# Patient Record
Sex: MALE | Race: WHITE | NOT HISPANIC OR LATINO | Employment: OTHER | URBAN - METROPOLITAN AREA
[De-identification: names, ages, dates, MRNs, and addresses within clinical notes are randomized per-mention and may not be internally consistent; named-entity substitution may affect disease eponyms.]

---

## 2018-03-22 ENCOUNTER — APPOINTMENT (OUTPATIENT)
Dept: PHYSICAL THERAPY | Facility: REHABILITATION | Age: 73
End: 2018-03-22
Payer: MEDICARE

## 2018-10-20 NOTE — H&P
DATE OF SURGERY:  10/23/2018    PREOPERATIVE DIAGNOSIS:  Benign prostatic hyperplasia with urinary retention.    PLANNED PROCEDURE:  Transurethral resection of the prostate.    HISTORY OF PRESENT ILLNESS:  Patient is a 73-year-old gentleman.  He presented   some time ago with urinary retention.  He was placed on medical therapy with   Flomax and finasteride.  He has continued to have some difficulties with   voiding and prefers surgical management.  He did have a cystoscopy showing   trilobar occlusion.  He also was found to have a small bladder stone.  His   urologic history is, otherwise, unremarkable.    PAST MEDICAL HISTORY:  Hypertension, adult onset diabetes mellitus, and   coronary artery disease.    PAST SURGICAL HISTORY:  Multiple spine surgeries, left lower leg amputation.    MEDICATIONS:  Hydrocodone, Flexeril, tamsulosin, and finasteride.    PHYSICAL EXAMINATION:  GENERAL:  He appears his age.  LUNGS:  Clear.  CARDIAC:  Regular rate and rhythm.  ABDOMEN:  Soft, nontender, no masses.  EXTREMITIES:  Right leg is normal.  GENITOURINARY:  Prostate exam, palpably benign 50 gram prostate.    ASSESSMENT:  Chronic urinary retention.    PLAN:  Plan will be for transurethral resection of prostate, anticipated   benefit is improved voiding.  Potential risks include, but limited to   continued difficulty voiding, bleeding requiring reoperation or transfusion,   prolonged catheterization, urgency, frequency, incontinence, recurrence of   obstruction and ongoing need for medical management.  With these issues in   mind, he does wish to proceed as planned.       ____________________________________     MD MANNY URIAS / LISA    DD:  10/19/2018 15:33:01  DT:  10/19/2018 16:48:28    D#:  [id number]  Job#:  [id number]    cc: Angela Paulino NP

## 2018-10-22 RX ORDER — LOVASTATIN 20 MG/1
20 TABLET ORAL NIGHTLY
Status: ON HOLD | COMMUNITY
End: 2018-10-23

## 2018-10-22 RX ORDER — VENLAFAXINE 100 MG/1
100 TABLET ORAL DAILY
Status: ON HOLD | COMMUNITY
End: 2018-10-23

## 2018-10-22 RX ORDER — FINASTERIDE 5 MG/1
5 TABLET, FILM COATED ORAL DAILY
Status: ON HOLD | COMMUNITY
End: 2020-07-02

## 2018-10-22 RX ORDER — TRAMADOL HYDROCHLORIDE 50 MG/1
50-100 TABLET ORAL 3 TIMES DAILY PRN
Status: ON HOLD | COMMUNITY
End: 2020-07-02

## 2018-10-22 RX ORDER — ARIPIPRAZOLE 5 MG/1
5 TABLET ORAL DAILY
Status: ON HOLD | COMMUNITY
End: 2018-10-23

## 2018-10-22 NOTE — OR NURSING
Spoke to Bebe/Dr Sahu' office regarding pt's report he did not have ordered labs drawn for scheduled procedure 10.23.18; per Bebe Sahu is aware.

## 2018-10-23 ENCOUNTER — HOSPITAL ENCOUNTER (OUTPATIENT)
Facility: MEDICAL CENTER | Age: 73
End: 2018-10-24
Attending: UROLOGY | Admitting: UROLOGY
Payer: MEDICARE

## 2018-10-23 DIAGNOSIS — N40.1 BENIGN PROSTATIC HYPERPLASIA WITH URINARY FREQUENCY: ICD-10-CM

## 2018-10-23 DIAGNOSIS — R35.0 BENIGN PROSTATIC HYPERPLASIA WITH URINARY FREQUENCY: ICD-10-CM

## 2018-10-23 LAB
ANION GAP SERPL CALC-SCNC: 5 MMOL/L (ref 0–11.9)
BASOPHILS # BLD AUTO: 0.7 % (ref 0–1.8)
BASOPHILS # BLD: 0.05 K/UL (ref 0–0.12)
BUN SERPL-MCNC: 18 MG/DL (ref 8–22)
CALCIUM SERPL-MCNC: 9.9 MG/DL (ref 8.5–10.5)
CHLORIDE SERPL-SCNC: 108 MMOL/L (ref 96–112)
CO2 SERPL-SCNC: 31 MMOL/L (ref 20–33)
CREAT SERPL-MCNC: 0.72 MG/DL (ref 0.5–1.4)
EKG IMPRESSION: NORMAL
EOSINOPHIL # BLD AUTO: 0.1 K/UL (ref 0–0.51)
EOSINOPHIL NFR BLD: 1.4 % (ref 0–6.9)
ERYTHROCYTE [DISTWIDTH] IN BLOOD BY AUTOMATED COUNT: 46.8 FL (ref 35.9–50)
GLUCOSE SERPL-MCNC: 79 MG/DL (ref 65–99)
HCT VFR BLD AUTO: 39.5 % (ref 42–52)
HGB BLD-MCNC: 13 G/DL (ref 14–18)
IMM GRANULOCYTES # BLD AUTO: 0.01 K/UL (ref 0–0.11)
IMM GRANULOCYTES NFR BLD AUTO: 0.1 % (ref 0–0.9)
INR PPP: 0.98 (ref 0.87–1.13)
LYMPHOCYTES # BLD AUTO: 2.04 K/UL (ref 1–4.8)
LYMPHOCYTES NFR BLD: 28.4 % (ref 22–41)
MCH RBC QN AUTO: 30.5 PG (ref 27–33)
MCHC RBC AUTO-ENTMCNC: 32.9 G/DL (ref 33.7–35.3)
MCV RBC AUTO: 92.7 FL (ref 81.4–97.8)
MONOCYTES # BLD AUTO: 0.53 K/UL (ref 0–0.85)
MONOCYTES NFR BLD AUTO: 7.4 % (ref 0–13.4)
NEUTROPHILS # BLD AUTO: 4.46 K/UL (ref 1.82–7.42)
NEUTROPHILS NFR BLD: 62 % (ref 44–72)
NRBC # BLD AUTO: 0 K/UL
NRBC BLD-RTO: 0 /100 WBC
PLATELET # BLD AUTO: 237 K/UL (ref 164–446)
PMV BLD AUTO: 9.5 FL (ref 9–12.9)
POTASSIUM SERPL-SCNC: 4.1 MMOL/L (ref 3.6–5.5)
PROTHROMBIN TIME: 13.1 SEC (ref 12–14.6)
RBC # BLD AUTO: 4.26 M/UL (ref 4.7–6.1)
SODIUM SERPL-SCNC: 144 MMOL/L (ref 135–145)
WBC # BLD AUTO: 7.2 K/UL (ref 4.8–10.8)

## 2018-10-23 PROCEDURE — A4357 BEDSIDE DRAINAGE BAG: HCPCS | Performed by: UROLOGY

## 2018-10-23 PROCEDURE — G0378 HOSPITAL OBSERVATION PER HR: HCPCS

## 2018-10-23 PROCEDURE — 700102 HCHG RX REV CODE 250 W/ 637 OVERRIDE(OP): Performed by: UROLOGY

## 2018-10-23 PROCEDURE — 88305 TISSUE EXAM BY PATHOLOGIST: CPT

## 2018-10-23 PROCEDURE — 96365 THER/PROPH/DIAG IV INF INIT: CPT

## 2018-10-23 PROCEDURE — 85025 COMPLETE CBC W/AUTO DIFF WBC: CPT

## 2018-10-23 PROCEDURE — 500879 HCHG PACK, CYSTO: Performed by: UROLOGY

## 2018-10-23 PROCEDURE — 160009 HCHG ANES TIME/MIN: Performed by: UROLOGY

## 2018-10-23 PROCEDURE — 160039 HCHG SURGERY MINUTES - EA ADDL 1 MIN LEVEL 3: Performed by: UROLOGY

## 2018-10-23 PROCEDURE — 501329 HCHG SET, CYSTO IRRIG Y TUR: Performed by: UROLOGY

## 2018-10-23 PROCEDURE — 160028 HCHG SURGERY MINUTES - 1ST 30 MINS LEVEL 3: Performed by: UROLOGY

## 2018-10-23 PROCEDURE — 160036 HCHG PACU - EA ADDL 30 MINS PHASE I: Performed by: UROLOGY

## 2018-10-23 PROCEDURE — 36415 COLL VENOUS BLD VENIPUNCTURE: CPT

## 2018-10-23 PROCEDURE — 160035 HCHG PACU - 1ST 60 MINS PHASE I: Performed by: UROLOGY

## 2018-10-23 PROCEDURE — 700111 HCHG RX REV CODE 636 W/ 250 OVERRIDE (IP)

## 2018-10-23 PROCEDURE — 700101 HCHG RX REV CODE 250

## 2018-10-23 PROCEDURE — 700105 HCHG RX REV CODE 258: Performed by: UROLOGY

## 2018-10-23 PROCEDURE — 93010 ELECTROCARDIOGRAM REPORT: CPT | Performed by: INTERNAL MEDICINE

## 2018-10-23 PROCEDURE — 85610 PROTHROMBIN TIME: CPT

## 2018-10-23 PROCEDURE — A9270 NON-COVERED ITEM OR SERVICE: HCPCS | Performed by: UROLOGY

## 2018-10-23 PROCEDURE — 80048 BASIC METABOLIC PNL TOTAL CA: CPT

## 2018-10-23 PROCEDURE — A4346 CATH INDW FOLEY 3 WAY: HCPCS | Performed by: UROLOGY

## 2018-10-23 PROCEDURE — 93005 ELECTROCARDIOGRAM TRACING: CPT | Performed by: UROLOGY

## 2018-10-23 PROCEDURE — 700111 HCHG RX REV CODE 636 W/ 250 OVERRIDE (IP): Performed by: UROLOGY

## 2018-10-23 PROCEDURE — 160048 HCHG OR STATISTICAL LEVEL 1-5: Performed by: UROLOGY

## 2018-10-23 PROCEDURE — 160002 HCHG RECOVERY MINUTES (STAT): Performed by: UROLOGY

## 2018-10-23 RX ORDER — HYDROMORPHONE HYDROCHLORIDE 2 MG/ML
0.5 INJECTION, SOLUTION INTRAMUSCULAR; INTRAVENOUS; SUBCUTANEOUS
Status: DISCONTINUED | OUTPATIENT
Start: 2018-10-23 | End: 2018-10-23 | Stop reason: HOSPADM

## 2018-10-23 RX ORDER — SODIUM CHLORIDE, SODIUM LACTATE, POTASSIUM CHLORIDE, CALCIUM CHLORIDE 600; 310; 30; 20 MG/100ML; MG/100ML; MG/100ML; MG/100ML
INJECTION, SOLUTION INTRAVENOUS CONTINUOUS
Status: DISCONTINUED | OUTPATIENT
Start: 2018-10-23 | End: 2018-10-23 | Stop reason: HOSPADM

## 2018-10-23 RX ORDER — HALOPERIDOL 5 MG/ML
1 INJECTION INTRAMUSCULAR
Status: DISCONTINUED | OUTPATIENT
Start: 2018-10-23 | End: 2018-10-23 | Stop reason: HOSPADM

## 2018-10-23 RX ORDER — MEPERIDINE HYDROCHLORIDE 25 MG/ML
25 INJECTION INTRAMUSCULAR; INTRAVENOUS; SUBCUTANEOUS
Status: DISCONTINUED | OUTPATIENT
Start: 2018-10-23 | End: 2018-10-23 | Stop reason: HOSPADM

## 2018-10-23 RX ORDER — DIPHENHYDRAMINE HYDROCHLORIDE 50 MG/ML
25 INJECTION INTRAMUSCULAR; INTRAVENOUS EVERY 6 HOURS PRN
Status: DISCONTINUED | OUTPATIENT
Start: 2018-10-23 | End: 2018-10-24 | Stop reason: HOSPADM

## 2018-10-23 RX ORDER — OXYCODONE HYDROCHLORIDE 10 MG/1
10 TABLET ORAL
Status: DISCONTINUED | OUTPATIENT
Start: 2018-10-23 | End: 2018-10-24 | Stop reason: HOSPADM

## 2018-10-23 RX ORDER — HYDROMORPHONE HYDROCHLORIDE 2 MG/ML
0.5 INJECTION, SOLUTION INTRAMUSCULAR; INTRAVENOUS; SUBCUTANEOUS
Status: DISCONTINUED | OUTPATIENT
Start: 2018-10-23 | End: 2018-10-24 | Stop reason: HOSPADM

## 2018-10-23 RX ORDER — FINASTERIDE 5 MG/1
5 TABLET, FILM COATED ORAL DAILY
Status: DISCONTINUED | OUTPATIENT
Start: 2018-10-23 | End: 2018-10-24 | Stop reason: HOSPADM

## 2018-10-23 RX ORDER — TAMSULOSIN HYDROCHLORIDE 0.4 MG/1
0.4 CAPSULE ORAL DAILY
Status: DISCONTINUED | OUTPATIENT
Start: 2018-10-24 | End: 2018-10-24 | Stop reason: HOSPADM

## 2018-10-23 RX ORDER — SCOLOPAMINE TRANSDERMAL SYSTEM 1 MG/1
1 PATCH, EXTENDED RELEASE TRANSDERMAL
Status: DISCONTINUED | OUTPATIENT
Start: 2018-10-23 | End: 2018-10-24 | Stop reason: HOSPADM

## 2018-10-23 RX ORDER — SCOLOPAMINE TRANSDERMAL SYSTEM 1 MG/1
1 PATCH, EXTENDED RELEASE TRANSDERMAL
Status: DISCONTINUED | OUTPATIENT
Start: 2018-10-23 | End: 2018-10-23

## 2018-10-23 RX ORDER — HALOPERIDOL 5 MG/ML
1 INJECTION INTRAMUSCULAR EVERY 6 HOURS PRN
Status: DISCONTINUED | OUTPATIENT
Start: 2018-10-23 | End: 2018-10-23

## 2018-10-23 RX ORDER — POLYETHYLENE GLYCOL 3350 17 G/17G
1 POWDER, FOR SOLUTION ORAL DAILY
Status: DISCONTINUED | OUTPATIENT
Start: 2018-10-24 | End: 2018-10-24 | Stop reason: HOSPADM

## 2018-10-23 RX ORDER — HYDRALAZINE HYDROCHLORIDE 20 MG/ML
10 INJECTION INTRAMUSCULAR; INTRAVENOUS
Status: DISCONTINUED | OUTPATIENT
Start: 2018-10-23 | End: 2018-10-23 | Stop reason: HOSPADM

## 2018-10-23 RX ORDER — LIDOCAINE HYDROCHLORIDE 10 MG/ML
INJECTION, SOLUTION INFILTRATION; PERINEURAL
Status: COMPLETED
Start: 2018-10-23 | End: 2018-10-23

## 2018-10-23 RX ORDER — SODIUM CHLORIDE, SODIUM LACTATE, POTASSIUM CHLORIDE, CALCIUM CHLORIDE 600; 310; 30; 20 MG/100ML; MG/100ML; MG/100ML; MG/100ML
INJECTION, SOLUTION INTRAVENOUS EVERY 6 HOURS
Status: DISCONTINUED | OUTPATIENT
Start: 2018-10-23 | End: 2018-10-23

## 2018-10-23 RX ORDER — LOVASTATIN 10 MG/1
10 TABLET ORAL NIGHTLY
Status: ON HOLD | COMMUNITY
End: 2020-07-02 | Stop reason: SDUPTHER

## 2018-10-23 RX ORDER — OXYCODONE HCL 5 MG/5 ML
5 SOLUTION, ORAL ORAL
Status: DISCONTINUED | OUTPATIENT
Start: 2018-10-23 | End: 2018-10-23 | Stop reason: HOSPADM

## 2018-10-23 RX ORDER — DIPHENHYDRAMINE HYDROCHLORIDE 50 MG/ML
12.5 INJECTION INTRAMUSCULAR; INTRAVENOUS
Status: DISCONTINUED | OUTPATIENT
Start: 2018-10-23 | End: 2018-10-23 | Stop reason: HOSPADM

## 2018-10-23 RX ORDER — HALOPERIDOL 5 MG/ML
1 INJECTION INTRAMUSCULAR EVERY 6 HOURS PRN
Status: DISCONTINUED | OUTPATIENT
Start: 2018-10-23 | End: 2018-10-24 | Stop reason: HOSPADM

## 2018-10-23 RX ORDER — MIDAZOLAM HYDROCHLORIDE 1 MG/ML
INJECTION INTRAMUSCULAR; INTRAVENOUS
Status: DISPENSED
Start: 2018-10-23 | End: 2018-10-24

## 2018-10-23 RX ORDER — ATROPA BELLADONNA AND OPIUM 16.2; 6 MG/1; MG/1
60 SUPPOSITORY RECTAL EVERY 12 HOURS PRN
Status: DISCONTINUED | OUTPATIENT
Start: 2018-10-23 | End: 2018-10-24 | Stop reason: HOSPADM

## 2018-10-23 RX ORDER — VENLAFAXINE HYDROCHLORIDE 75 MG/1
300 CAPSULE, EXTENDED RELEASE ORAL DAILY
Status: DISCONTINUED | OUTPATIENT
Start: 2018-10-24 | End: 2018-10-24 | Stop reason: HOSPADM

## 2018-10-23 RX ORDER — HYDROMORPHONE HYDROCHLORIDE 2 MG/ML
0.25 INJECTION, SOLUTION INTRAMUSCULAR; INTRAVENOUS; SUBCUTANEOUS
Status: DISCONTINUED | OUTPATIENT
Start: 2018-10-23 | End: 2018-10-23 | Stop reason: HOSPADM

## 2018-10-23 RX ORDER — HYDROMORPHONE HYDROCHLORIDE 2 MG/ML
0.5 INJECTION, SOLUTION INTRAMUSCULAR; INTRAVENOUS; SUBCUTANEOUS
Status: DISCONTINUED | OUTPATIENT
Start: 2018-10-23 | End: 2018-10-23

## 2018-10-23 RX ORDER — MELOXICAM 7.5 MG/1
7.5 TABLET ORAL DAILY
Status: ON HOLD | COMMUNITY
End: 2020-07-02

## 2018-10-23 RX ORDER — CIPROFLOXACIN 2 MG/ML
400 INJECTION, SOLUTION INTRAVENOUS EVERY 12 HOURS
Status: DISCONTINUED | OUTPATIENT
Start: 2018-10-23 | End: 2018-10-24 | Stop reason: HOSPADM

## 2018-10-23 RX ORDER — HYDROMORPHONE HYDROCHLORIDE 2 MG/ML
0.1 INJECTION, SOLUTION INTRAMUSCULAR; INTRAVENOUS; SUBCUTANEOUS
Status: DISCONTINUED | OUTPATIENT
Start: 2018-10-23 | End: 2018-10-23 | Stop reason: HOSPADM

## 2018-10-23 RX ORDER — ACETAMINOPHEN 500 MG
1000 TABLET ORAL EVERY 6 HOURS
Status: DISCONTINUED | OUTPATIENT
Start: 2018-10-23 | End: 2018-10-23

## 2018-10-23 RX ORDER — ONDANSETRON 2 MG/ML
4 INJECTION INTRAMUSCULAR; INTRAVENOUS EVERY 4 HOURS PRN
Status: DISCONTINUED | OUTPATIENT
Start: 2018-10-23 | End: 2018-10-24 | Stop reason: HOSPADM

## 2018-10-23 RX ORDER — SODIUM CHLORIDE, SODIUM LACTATE, POTASSIUM CHLORIDE, CALCIUM CHLORIDE 600; 310; 30; 20 MG/100ML; MG/100ML; MG/100ML; MG/100ML
INJECTION, SOLUTION INTRAVENOUS EVERY 6 HOURS
Status: COMPLETED | OUTPATIENT
Start: 2018-10-23 | End: 2018-10-24

## 2018-10-23 RX ORDER — CIPROFLOXACIN 2 MG/ML
400 INJECTION, SOLUTION INTRAVENOUS EVERY 12 HOURS
Status: DISCONTINUED | OUTPATIENT
Start: 2018-10-23 | End: 2018-10-23

## 2018-10-23 RX ORDER — POLYETHYLENE GLYCOL 3350 17 G/17G
1 POWDER, FOR SOLUTION ORAL DAILY
Status: DISCONTINUED | OUTPATIENT
Start: 2018-10-24 | End: 2018-10-23

## 2018-10-23 RX ORDER — ARIPIPRAZOLE 15 MG/1
15 TABLET ORAL DAILY
Status: ON HOLD | COMMUNITY
End: 2020-07-02

## 2018-10-23 RX ORDER — VENLAFAXINE HYDROCHLORIDE 150 MG/1
300 CAPSULE, EXTENDED RELEASE ORAL DAILY
Status: ON HOLD | COMMUNITY
End: 2020-07-02

## 2018-10-23 RX ORDER — LABETALOL HYDROCHLORIDE 5 MG/ML
5 INJECTION, SOLUTION INTRAVENOUS
Status: DISCONTINUED | OUTPATIENT
Start: 2018-10-23 | End: 2018-10-23 | Stop reason: HOSPADM

## 2018-10-23 RX ORDER — ONDANSETRON 2 MG/ML
4 INJECTION INTRAMUSCULAR; INTRAVENOUS EVERY 4 HOURS PRN
Status: DISCONTINUED | OUTPATIENT
Start: 2018-10-23 | End: 2018-10-23

## 2018-10-23 RX ORDER — OXYCODONE HYDROCHLORIDE 5 MG/1
5 TABLET ORAL
Status: DISCONTINUED | OUTPATIENT
Start: 2018-10-23 | End: 2018-10-23

## 2018-10-23 RX ORDER — DEXAMETHASONE SODIUM PHOSPHATE 4 MG/ML
4 INJECTION, SOLUTION INTRA-ARTICULAR; INTRALESIONAL; INTRAMUSCULAR; INTRAVENOUS; SOFT TISSUE
Status: DISCONTINUED | OUTPATIENT
Start: 2018-10-23 | End: 2018-10-23

## 2018-10-23 RX ORDER — ACETAMINOPHEN 500 MG
1000 TABLET ORAL EVERY 6 HOURS
Status: DISCONTINUED | OUTPATIENT
Start: 2018-10-23 | End: 2018-10-24 | Stop reason: HOSPADM

## 2018-10-23 RX ORDER — TRAMADOL HYDROCHLORIDE 50 MG/1
50-100 TABLET ORAL 3 TIMES DAILY PRN
Status: DISCONTINUED | OUTPATIENT
Start: 2018-10-23 | End: 2018-10-24 | Stop reason: HOSPADM

## 2018-10-23 RX ORDER — SODIUM CHLORIDE 9 MG/ML
INJECTION, SOLUTION INTRAVENOUS ONCE
Status: COMPLETED | OUTPATIENT
Start: 2018-10-23 | End: 2018-10-24

## 2018-10-23 RX ORDER — OXYCODONE HYDROCHLORIDE 10 MG/1
10 TABLET ORAL
Status: DISCONTINUED | OUTPATIENT
Start: 2018-10-23 | End: 2018-10-23 | Stop reason: HOSPADM

## 2018-10-23 RX ORDER — GABAPENTIN 400 MG/1
800 CAPSULE ORAL 2 TIMES DAILY
Status: ON HOLD | COMMUNITY
End: 2020-07-02

## 2018-10-23 RX ORDER — DEXAMETHASONE SODIUM PHOSPHATE 4 MG/ML
4 INJECTION, SOLUTION INTRA-ARTICULAR; INTRALESIONAL; INTRAMUSCULAR; INTRAVENOUS; SOFT TISSUE
Status: DISCONTINUED | OUTPATIENT
Start: 2018-10-23 | End: 2018-10-24 | Stop reason: HOSPADM

## 2018-10-23 RX ORDER — ONDANSETRON 2 MG/ML
4 INJECTION INTRAMUSCULAR; INTRAVENOUS
Status: DISCONTINUED | OUTPATIENT
Start: 2018-10-23 | End: 2018-10-23 | Stop reason: HOSPADM

## 2018-10-23 RX ORDER — AMLODIPINE BESYLATE 5 MG/1
5 TABLET ORAL DAILY
Status: DISCONTINUED | OUTPATIENT
Start: 2018-10-24 | End: 2018-10-24 | Stop reason: HOSPADM

## 2018-10-23 RX ORDER — MIRTAZAPINE 15 MG/1
15 TABLET, FILM COATED ORAL NIGHTLY
Status: ON HOLD | COMMUNITY
End: 2020-07-02

## 2018-10-23 RX ORDER — ARIPIPRAZOLE 15 MG/1
15 TABLET ORAL DAILY
Status: DISCONTINUED | OUTPATIENT
Start: 2018-10-24 | End: 2018-10-24 | Stop reason: HOSPADM

## 2018-10-23 RX ORDER — MIRTAZAPINE 15 MG/1
15 TABLET, FILM COATED ORAL NIGHTLY
Status: DISCONTINUED | OUTPATIENT
Start: 2018-10-23 | End: 2018-10-24 | Stop reason: HOSPADM

## 2018-10-23 RX ORDER — OXYCODONE HYDROCHLORIDE 10 MG/1
10 TABLET ORAL
Status: DISCONTINUED | OUTPATIENT
Start: 2018-10-23 | End: 2018-10-23

## 2018-10-23 RX ORDER — OXYCODONE HYDROCHLORIDE 5 MG/1
5 TABLET ORAL
Status: DISCONTINUED | OUTPATIENT
Start: 2018-10-23 | End: 2018-10-23 | Stop reason: HOSPADM

## 2018-10-23 RX ORDER — LISINOPRIL 20 MG/1
40 TABLET ORAL DAILY
Status: DISCONTINUED | OUTPATIENT
Start: 2018-10-24 | End: 2018-10-24 | Stop reason: HOSPADM

## 2018-10-23 RX ORDER — OXYCODONE HCL 5 MG/5 ML
10 SOLUTION, ORAL ORAL
Status: DISCONTINUED | OUTPATIENT
Start: 2018-10-23 | End: 2018-10-23 | Stop reason: HOSPADM

## 2018-10-23 RX ORDER — OXYCODONE HYDROCHLORIDE 5 MG/1
5 TABLET ORAL
Status: DISCONTINUED | OUTPATIENT
Start: 2018-10-23 | End: 2018-10-24 | Stop reason: HOSPADM

## 2018-10-23 RX ORDER — GABAPENTIN 400 MG/1
800 CAPSULE ORAL 2 TIMES DAILY
Status: DISCONTINUED | OUTPATIENT
Start: 2018-10-23 | End: 2018-10-24 | Stop reason: HOSPADM

## 2018-10-23 RX ADMIN — SODIUM CHLORIDE, POTASSIUM CHLORIDE, SODIUM LACTATE AND CALCIUM CHLORIDE: 600; 310; 30; 20 INJECTION, SOLUTION INTRAVENOUS at 21:32

## 2018-10-23 RX ADMIN — FINASTERIDE 5 MG: 5 TABLET, FILM COATED ORAL at 21:32

## 2018-10-23 RX ADMIN — CIPROFLOXACIN 400 MG: 2 INJECTION, SOLUTION INTRAVENOUS at 21:48

## 2018-10-23 RX ADMIN — LIDOCAINE HYDROCHLORIDE 0.5 ML: 10 INJECTION, SOLUTION INFILTRATION; PERINEURAL at 14:32

## 2018-10-23 RX ADMIN — METFORMIN HYDROCHLORIDE 500 MG: 500 TABLET ORAL at 21:33

## 2018-10-23 RX ADMIN — SODIUM CHLORIDE, SODIUM LACTATE, POTASSIUM CHLORIDE, CALCIUM CHLORIDE: 600; 310; 30; 20 INJECTION, SOLUTION INTRAVENOUS at 18:10

## 2018-10-23 RX ADMIN — Medication 0.5 ML: at 14:32

## 2018-10-23 RX ADMIN — ACETAMINOPHEN 1000 MG: 500 TABLET ORAL at 23:28

## 2018-10-23 RX ADMIN — SODIUM CHLORIDE: 9 INJECTION, SOLUTION INTRAVENOUS at 14:32

## 2018-10-23 RX ADMIN — GABAPENTIN 800 MG: 400 CAPSULE ORAL at 21:32

## 2018-10-23 ASSESSMENT — COGNITIVE AND FUNCTIONAL STATUS - GENERAL
WALKING IN HOSPITAL ROOM: A LITTLE
CLIMB 3 TO 5 STEPS WITH RAILING: A LITTLE
MOBILITY SCORE: 21
STANDING UP FROM CHAIR USING ARMS: A LITTLE
SUGGESTED CMS G CODE MODIFIER DAILY ACTIVITY: CI
DRESSING REGULAR LOWER BODY CLOTHING: A LITTLE
DAILY ACTIVITIY SCORE: 23
SUGGESTED CMS G CODE MODIFIER MOBILITY: CJ

## 2018-10-23 ASSESSMENT — LIFESTYLE VARIABLES: ALCOHOL_USE: NO

## 2018-10-23 ASSESSMENT — PAIN SCALES - GENERAL
PAINLEVEL_OUTOF10: 0
PAINLEVEL_OUTOF10: 3
PAINLEVEL_OUTOF10: 0

## 2018-10-23 ASSESSMENT — PATIENT HEALTH QUESTIONNAIRE - PHQ9
SUM OF ALL RESPONSES TO PHQ9 QUESTIONS 1 AND 2: 0
1. LITTLE INTEREST OR PLEASURE IN DOING THINGS: NOT AT ALL
2. FEELING DOWN, DEPRESSED, IRRITABLE, OR HOPELESS: NOT AT ALL

## 2018-10-23 NOTE — PROGRESS NOTES
Patient in pre-op, assessment completed, patient and family updated on plan of care, all questions answered, no further needs at this time, call light within reach.

## 2018-10-24 VITALS
DIASTOLIC BLOOD PRESSURE: 72 MMHG | BODY MASS INDEX: 26.63 KG/M2 | HEART RATE: 66 BPM | OXYGEN SATURATION: 93 % | TEMPERATURE: 97.5 F | SYSTOLIC BLOOD PRESSURE: 127 MMHG | RESPIRATION RATE: 18 BRPM | WEIGHT: 175.71 LBS | HEIGHT: 68 IN

## 2018-10-24 PROBLEM — N40.0 BPH (BENIGN PROSTATIC HYPERPLASIA): Status: ACTIVE | Noted: 2018-10-24

## 2018-10-24 LAB
ANION GAP SERPL CALC-SCNC: 7 MMOL/L (ref 0–11.9)
BUN SERPL-MCNC: 17 MG/DL (ref 8–22)
CALCIUM SERPL-MCNC: 9.2 MG/DL (ref 8.5–10.5)
CHLORIDE SERPL-SCNC: 106 MMOL/L (ref 96–112)
CO2 SERPL-SCNC: 27 MMOL/L (ref 20–33)
CREAT SERPL-MCNC: 0.75 MG/DL (ref 0.5–1.4)
ERYTHROCYTE [DISTWIDTH] IN BLOOD BY AUTOMATED COUNT: 46.6 FL (ref 35.9–50)
GLUCOSE SERPL-MCNC: 119 MG/DL (ref 65–99)
HCT VFR BLD AUTO: 37.7 % (ref 42–52)
HGB BLD-MCNC: 12.3 G/DL (ref 14–18)
MCH RBC QN AUTO: 30.4 PG (ref 27–33)
MCHC RBC AUTO-ENTMCNC: 32.6 G/DL (ref 33.7–35.3)
MCV RBC AUTO: 93.1 FL (ref 81.4–97.8)
PATHOLOGY CONSULT NOTE: NORMAL
PLATELET # BLD AUTO: 245 K/UL (ref 164–446)
PMV BLD AUTO: 9.6 FL (ref 9–12.9)
POTASSIUM SERPL-SCNC: 4.3 MMOL/L (ref 3.6–5.5)
RBC # BLD AUTO: 4.05 M/UL (ref 4.7–6.1)
SODIUM SERPL-SCNC: 140 MMOL/L (ref 135–145)
WBC # BLD AUTO: 9.5 K/UL (ref 4.8–10.8)

## 2018-10-24 PROCEDURE — 700102 HCHG RX REV CODE 250 W/ 637 OVERRIDE(OP): Performed by: UROLOGY

## 2018-10-24 PROCEDURE — G0378 HOSPITAL OBSERVATION PER HR: HCPCS

## 2018-10-24 PROCEDURE — 80048 BASIC METABOLIC PNL TOTAL CA: CPT

## 2018-10-24 PROCEDURE — 96366 THER/PROPH/DIAG IV INF ADDON: CPT

## 2018-10-24 PROCEDURE — 36415 COLL VENOUS BLD VENIPUNCTURE: CPT

## 2018-10-24 PROCEDURE — 700111 HCHG RX REV CODE 636 W/ 250 OVERRIDE (IP): Performed by: UROLOGY

## 2018-10-24 PROCEDURE — A9270 NON-COVERED ITEM OR SERVICE: HCPCS | Performed by: UROLOGY

## 2018-10-24 PROCEDURE — 85027 COMPLETE CBC AUTOMATED: CPT

## 2018-10-24 RX ADMIN — VENLAFAXINE HYDROCHLORIDE 300 MG: 75 CAPSULE, EXTENDED RELEASE ORAL at 06:13

## 2018-10-24 RX ADMIN — METFORMIN HYDROCHLORIDE 500 MG: 500 TABLET ORAL at 07:55

## 2018-10-24 RX ADMIN — ACETAMINOPHEN 1000 MG: 500 TABLET ORAL at 12:27

## 2018-10-24 RX ADMIN — ACETAMINOPHEN 1000 MG: 500 TABLET ORAL at 06:11

## 2018-10-24 RX ADMIN — GABAPENTIN 800 MG: 400 CAPSULE ORAL at 06:10

## 2018-10-24 RX ADMIN — ARIPIPRAZOLE 15 MG: 15 TABLET ORAL at 06:12

## 2018-10-24 RX ADMIN — CIPROFLOXACIN 400 MG: 2 INJECTION, SOLUTION INTRAVENOUS at 06:03

## 2018-10-24 RX ADMIN — AMLODIPINE BESYLATE 5 MG: 5 TABLET ORAL at 06:12

## 2018-10-24 RX ADMIN — TAMSULOSIN HYDROCHLORIDE 0.4 MG: 0.4 CAPSULE ORAL at 06:11

## 2018-10-24 RX ADMIN — TRAMADOL HYDROCHLORIDE 50 MG: 50 TABLET, FILM COATED ORAL at 01:25

## 2018-10-24 ASSESSMENT — PAIN SCALES - GENERAL
PAINLEVEL_OUTOF10: 0
PAINLEVEL_OUTOF10: 2

## 2018-10-24 NOTE — PROGRESS NOTES
Bladder trained patient with 300mL of irrigation solution and removed CBI. Patient was able to void 250mL of pink urine output immediately.

## 2018-10-24 NOTE — CARE PLAN
Problem: Urinary Elimination:  Goal: Ability to reestablish a normal urinary elimination pattern will improve  Outcome: PROGRESSING AS EXPECTED

## 2018-10-24 NOTE — PROGRESS NOTES
Report Received from PACU RN, assumed care @1950  A/O x 4  VSS  Pain- denies  O2- RA  IV-R FA, saline locked  Diet-Reg, denies nausea  Void- pt has CBI in place, pink tinged drainage 600 ml left in bag  BM- pta  Bed Locked in Lowest Position  Call light in reach    2 RN skin check  Pt has prosthetic Lower left leg, scattered abrasions on Lateral r leg, and bilat bruising on forearms.

## 2018-10-24 NOTE — PROGRESS NOTES
Discharge and follow up instructions discussed with patient. PIV and ID band removed. Wheelchair acquired for transport to vehicle.

## 2018-10-24 NOTE — DISCHARGE SUMMARY
Discharge Summary    CHIEF COMPLAINT ON ADMISSION  BPH with LUTS    Reason for Admission  BPH WITH LOWER URINARY TRACT SYMPT    Admission Date  10/23/2018    CODE STATUS  Full Code    HPI & HOSPITAL COURSE  This is a 73 y.o. male here with BPH with lower urinary tract symptoms, now S/P Trans urethral resection of the prostate 10/23/2018.  He has done well following the procedure.  He denies pain, has not required pain medication.  Porter in place and draining well.  CBI clamped, porter draining clear light pink, no clots.  Tolerates regular diet.  Denies N/V. Carlos fevers, chills.  Able to ambulate without difficulty.  At this point he is stable for discharge.  He will have voiding trial prior to discharge.  If successful he will discharge without the porter and plan for an outpatient follow up.     Therefore, he is discharged in good and stable condition to home with close outpatient follow-up.    The patient recovered much more quickly than anticipated on admission.    Discharge Date  10/24    FOLLOW UP ITEMS POST DISCHARGE  Post-op with Dr. Sahu    DISCHARGE DIAGNOSES  Active Problems:    BPH (benign prostatic hyperplasia) POA: Unknown  Resolved Problems:    * No resolved hospital problems. *      FOLLOW UP  Post- op with Dr. Sahu    MEDICATIONS ON DISCHARGE     Medication List      CONTINUE taking these medications      Instructions   amLODIPine 5 MG Tabs  Commonly known as:  NORVASC   Take 5 mg by mouth every day.  Dose:  5 mg     ARIPiprazole 15 MG Tabs  Commonly known as:  ABILIFY   Take 15 mg by mouth every day.  Dose:  15 mg     aspirin EC 81 MG Tbec  Commonly known as:  ECOTRIN   Take 81 mg by mouth every day.  Dose:  81 mg     b complex vitamins tablet   Take 1 Tab by mouth every day.  Dose:  1 Tab     baclofen 10 MG Tabs  Commonly known as:  LIORESAL   Take 10-20 mg by mouth 3 times a day as needed.  Dose:  10-20 mg     CENTRUM SILVER ADULT 50+ Tabs   Take 1 Tab by mouth every day.  Dose:  1 Tab      finasteride 5 MG Tabs  Commonly known as:  PROSCAR   Take 5 mg by mouth every day.  Dose:  5 mg     gabapentin 400 MG Caps  Commonly known as:  NEURONTIN   Take 800 mg by mouth 2 times a day.  Dose:  800 mg     lisinopril 40 MG tablet  Commonly known as:  PRINIVIL, ZESTRIL   Take 40 mg by mouth every day.  Dose:  40 mg     lovastatin 10 MG tablet  Commonly known as:  MEVACOR   Take 10 mg by mouth every evening.  Dose:  10 mg     meloxicam 7.5 MG Tabs  Commonly known as:  MOBIC   Take 7.5 mg by mouth every day.  Dose:  7.5 mg     metFORMIN 500 MG Tabs  Commonly known as:  GLUCOPHAGE   Take 500 mg by mouth 2 times a day. Prior to breakfast  Dose:  500 mg     mirtazapine 15 MG Tabs  Commonly known as:  REMERON   Take 15 mg by mouth every evening.  Dose:  15 mg     tamsulosin 0.4 MG capsule  Commonly known as:  FLOMAX   Take 0.4 mg by mouth every day.  Dose:  0.4 mg     tramadol 50 MG Tabs  Commonly known as:  ULTRAM   Take  mg by mouth 3 times a day as needed.  Dose:   mg     venlafaxine 150 MG extended-release capsule  Commonly known as:  EFFEXOR-XR   Take 300 mg by mouth every day.  Dose:  300 mg     Vitamin D-3 1000 units Caps   Take 1,000 Units by mouth every day.  Dose:  1000 Units            Allergies  Allergies   Allergen Reactions   • Wellbutrin [Bupropion] Nausea       DIET  Orders Placed This Encounter   Procedures   • Diet Order Clear Liquid     Standing Status:   Standing     Number of Occurrences:   1     Order Specific Question:   Diet:     Answer:   Clear Liquid [10]       ACTIVITY  Light duty.  Weight bearing as tolerated    CONSULTATIONS  None    PROCEDURES  Trans urethral resection of the prostate.    LABORATORY  Lab Results   Component Value Date    SODIUM 140 10/24/2018    POTASSIUM 4.3 10/24/2018    CHLORIDE 106 10/24/2018    CO2 27 10/24/2018    GLUCOSE 119 (H) 10/24/2018    BUN 17 10/24/2018    CREATININE 0.75 10/24/2018        Lab Results   Component Value Date    WBC 9.5  10/24/2018    HEMOGLOBIN 12.3 (L) 10/24/2018    HEMATOCRIT 37.7 (L) 10/24/2018    PLATELETCT 245 10/24/2018        Total time of the discharge process exceeds 30 minutes.

## 2018-10-24 NOTE — PROGRESS NOTES
Report received from NOc shift RN.   A/O X 4. Room air.   Patient has pacemaker/partial.   VSS. Labs reviewed.   BS normoactive X 4. Last BM was 10/23/2018 in am.   CBI in place with clear urine output with RBCs present. No clots observed. Drip rate slowed.   Patient denies pain.   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 Pt does not usually do that at home but states it has helped him get to the bathroom here.     Pt has prosthetics appt at 1300 today. Hoping to dc early to make appointment at [facility] on [facility].     Call light at bedside.

## 2018-10-24 NOTE — OR NURSING
Belongings taken from locker and placed on gurney with pt. 1-bag. FC with CBI draining clear.Attempt to locate wife in waiting area.

## 2018-10-24 NOTE — OR NURSING
FC has pink tinged drainage. The CBI rate was increased. No pain. Respirations regular and easy.Wife is not in waiting area. I have looked for her 3 times.

## 2018-10-24 NOTE — DISCHARGE INSTRUCTIONS
Discharge Instructions    Discharged to home by car with relative. Discharged via wheelchair, hospital escort: Yes.  Special equipment needed: Not Applicable    Be sure to schedule a follow-up appointment with your primary care doctor or any specialists as instructed.     Discharge Plan:     Discharge Instructions FOLLOWING    TRAnsurethral resection of prostate (TURP)       DIET:   You can resume your regular diet. We encourage you to eat well-balanced and nutritious meals.       ACTIVITY:   Please restrain from strenuous activity or heavy lifting (more than 10 pounds) for two weeks following your TURP procedure.  Please walk daily as much as tolerated, making exercise a part of your daily life. Do not drive while using narcotics for pain control if you are using them.  Please avoid excessive straining with defecation and use stool softeners as directed to prevent constipation!       WOUND CARE:   You have no dressing or wound to manage.     MEDICATIONS:   1. Please use plain tylenol for pain and stool softeners (Miralax and Colace) as directed.   2. Continue your home medications.   3. If you take aspirin,please do not take for 2 days following your surgery.     FOLLOW-UP:   We will call you to schedule your follow up appointment in 1-2 days.  If you have not heard from us in 1-2 business days, please call [phone] to schedule your follow-up appointment. You may also contact this number if you have questions or concerns.     WARNING SIGNS:   Fever greater than 101 degrees Fahrenheit, chills, nausea or vomiting, Large amount of clots in urine that make it difficult to urinate or for urine to drain from porter, increasing pain, or abdominal swelling. If you are experiencing these symptoms, call the Urology Clinic or go to your local PCP or emergency room.     It is normal to see blood in your urine for up to 2 weeks even from surgery. The urine may clear up entirely, and then turn bloody again a few days later  depending on your activity level; do not be alarmed. However, if you experience severe pain or tenderness, have a lot of increased bleeding, or find that you are unable to urinate because of large clots, please notify your doctor immediately       MEDICAL HELP DURING NORMAL BUSINESS HOURS   Between the hours of 7 AM and 7 PM, please call [phone] to speak with Dr. Lucio Roy’ staff.     MEDICAL HELP AFTER HOURS   If you have a serious emergency such as chest pain, shortness of breath, relentless pain you should call 911. For other urgent problems after hours you may contact the urology physician on call by phoning the [phone]. You may also visit the Emergency room at local hospital for help.   For non-emergent problems such as prescription refills or routine questions, please do your best to contact us during normal business hours. This after-hours number should be used for urgent or emergent questions only    Diet Plan: Discussed  Activity Level: Discussed  Confirmed Follow up Appointment: Patient to Call and Schedule Appointment  Confirmed Symptoms Management: Discussed  Medication Reconciliation Updated: Yes  Pneumococcal Vaccine Administered/Refused: Given (See MAR)  Influenza Vaccine Indication: Patient Refuses    I understand that a diet low in cholesterol, fat, and sodium is recommended for good health. Unless I have been given specific instructions below for another diet, I accept this instruction as my diet prescription.   Other diet: Regular    Special Instructions: None    · Is patient discharged on Warfarin / Coumadin?   No     Transurethral Resection of the Prostate, Care After  Refer to this sheet in the next few weeks. These instructions provide you with information on caring for yourself after your procedure. Your caregiver also may give you specific instructions. Your treatment has been planned according to current medical practices, but complications sometimes occur. Call your caregiver  if you have any problems or questions after your procedure.  HOME CARE INSTRUCTIONS   Recovery can take 4-6 weeks. Avoid alcohol, caffeinated drinks, and spicy foods for 2 weeks after your procedure. Drink enough fluids to keep your urine clear or pale yellow. Urinate as soon as you feel the urge to do so. Do not try to hold your urine for long periods of time.  During recovery you may experience pain caused by bladder spasms, which result in a very intense urge to urinate. Take all medicines as directed by your caregiver, including medicines for pain. Try to limit the amount of pain medicines you take because it can cause constipation. If you do become constipated, do not strain to move your bowels. Straining can increase bleeding. Constipation can be minimized by increasing the amount fluids and fiber in your diet. Your caregiver also may prescribe a stool softener.  Do not lift heavy objects (more than 5 lb [2.25 kg]) or perform exercises that cause you to strain for at least 1 month after your procedure. When sitting, you may want to sit in a soft chair or use a cushion. For the first 10 days after your procedure, avoid the following activities:  · Running.  · Strenuous work.  · Long walks.  · Riding in a car for extended periods.  · Sex.  SEEK MEDICAL CARE IF:  · You have difficulty urinating.  · You have blood in your urine that does not go away after you rest or increase your fluid intake.  · You have swelling in your penis or scrotum.  SEEK IMMEDIATE MEDICAL CARE IF:   · You are suddenly unable to urinate.  · You notice blood clots in your urine.  · You have chills.  · You have a fever.  · You have pain in your back or lower abdomen.  · You have pain or swelling in your legs.  MAKE SURE YOU:   · Understand these instructions.  · Will watch your condition.  · Will get help right away if you are not doing well or get worse.     This information is not intended to replace advice given to you by your health care  provider. Make sure you discuss any questions you have with your health care provider.     Document Released: 12/18/2006 Document Revised: 01/08/2016 Document Reviewed: 01/25/2013  [employer] Patient Education ©2016 [employer]      Depression / Suicide Risk    As you are discharged from this [facility] facility, it is important to learn how to keep safe from harming yourself.    Recognize the warning signs:  · Abrupt changes in personality, positive or negative- including increase in energy   · Giving away possessions  · Change in eating patterns- significant weight changes-  positive or negative  · Change in sleeping patterns- unable to sleep or sleeping all the time   · Unwillingness or inability to communicate  · Depression  · Unusual sadness, discouragement and loneliness  · Talk of wanting to die  · Neglect of personal appearance   · Rebelliousness- reckless behavior  · Withdrawal from people/activities they love  · Confusion- inability to concentrate     If you or a loved one observes any of these behaviors or has concerns about self-harm, here's what you can do:  · Talk about it- your feelings and reasons for harming yourself  · Remove any means that you might use to hurt yourself (examples: pills, rope, extension cords, firearm)  · Get professional help from the community (Mental Health, Substance Abuse, psychological counseling)  · Do not be alone:Call your Safe Contact- someone whom you trust who will be there for you.  · Call your local CRISIS HOTLINE 496-0858 or 475-263-6678  · Call your local Children's Mobile Crisis Response Team [place] (140) 639-4781 or www.[employer]  · Call the toll free National Suicide Prevention Hotlines   · National Suicide Prevention Lifeline 816-171-AJRE (4909)  · National Hope Line Network 800-SUICIDE (805-1196)

## 2018-10-26 NOTE — OP REPORT
DATE OF SERVICE:  10/23/2018    PREOPERATIVE DIAGNOSIS:  Benign prostatic hyperplasia.    POSTOPERATIVE DIAGNOSIS:  Benign prostatic hyperplasia.    PROCEDURE PERFORMED:  Transurethral resection of prostate.    OPERATIVE REPORT:  The patient was brought to the operating room, was given a   general anesthetic, placed in lithotomy position, prepped and draped in the   usual sterile fashion.  Resectoscope was placed.  Normal anterior and bulbar   urethra.  Prostatic urethra shows 2 cm of occlusion.  Bladder was found to be   normal.  I began the resection by first identifying the ureteral orifices, so   we did not resect into them, began by resecting the floor of the prostatic   urethra going from the bladder neck to the level of verumontanum staying   proximal to it.  With the floor well resected, we were able to improve our   continuous irrigation.  With good hemostasis, I began resecting the patient's   right lobe beginning at the groove at the 8 o'clock position where we could   identify the capsule, then worked anteriorly following the level of the   capsule until we got to the 11 o'clock.  With that resected and good   hemostasis, similar resection was done on the patient's left side.  There is   scant amount of tissue anteriorly that was resected at the conclusion.  With   good hemostasis and chips evacuated, a 22-Albanian 3-way catheter was placed to   continuous irrigation.  The patient was then sent to recovery in stable   condition.    FINAL DIAGNOSIS:  Status post transurethral resection of the prostate for   benign prostatic hyperplasia.       ____________________________________     MD MANNY URIAS / LISA    DD:  10/26/2018 07:43:40  DT:  10/26/2018 08:47:57    D#:  [id number]  Job#:  [id number]

## 2020-06-18 ENCOUNTER — APPOINTMENT (OUTPATIENT)
Dept: RADIOLOGY | Facility: REHABILITATION | Age: 75
DRG: 559 | End: 2020-06-18
Attending: PHYSICAL MEDICINE & REHABILITATION
Payer: MEDICARE

## 2020-06-18 ENCOUNTER — HOSPITAL ENCOUNTER (INPATIENT)
Facility: REHABILITATION | Age: 75
LOS: 20 days | DRG: 559 | End: 2020-07-08
Attending: PHYSICAL MEDICINE & REHABILITATION | Admitting: PHYSICAL MEDICINE & REHABILITATION
Payer: MEDICARE

## 2020-06-18 DIAGNOSIS — Z89.512 STATUS POST BELOW-KNEE AMPUTATION OF LEFT LOWER EXTREMITY (HCC): Chronic | ICD-10-CM

## 2020-06-18 DIAGNOSIS — M48.061 SPINAL STENOSIS OF LUMBAR REGION AT MULTIPLE LEVELS: ICD-10-CM

## 2020-06-18 DIAGNOSIS — G95.9 CERVICAL MYELOPATHY (HCC): ICD-10-CM

## 2020-06-18 DIAGNOSIS — G47.01 INSOMNIA DUE TO MEDICAL CONDITION: ICD-10-CM

## 2020-06-18 DIAGNOSIS — Z98.1 S/P LAMINECTOMY WITH SPINAL FUSION: ICD-10-CM

## 2020-06-18 PROBLEM — G89.18 POSTOPERATIVE PAIN: Status: ACTIVE | Noted: 2020-06-18

## 2020-06-18 LAB
GLUCOSE BLD-MCNC: 113 MG/DL (ref 65–99)
GLUCOSE BLD-MCNC: 134 MG/DL (ref 65–99)

## 2020-06-18 PROCEDURE — 700111 HCHG RX REV CODE 636 W/ 250 OVERRIDE (IP): Performed by: PHYSICAL MEDICINE & REHABILITATION

## 2020-06-18 PROCEDURE — 73502 X-RAY EXAM HIP UNI 2-3 VIEWS: CPT | Mod: RT

## 2020-06-18 PROCEDURE — A9270 NON-COVERED ITEM OR SERVICE: HCPCS | Performed by: PHYSICAL MEDICINE & REHABILITATION

## 2020-06-18 PROCEDURE — 700102 HCHG RX REV CODE 250 W/ 637 OVERRIDE(OP): Performed by: PHYSICAL MEDICINE & REHABILITATION

## 2020-06-18 PROCEDURE — 99223 1ST HOSP IP/OBS HIGH 75: CPT | Mod: AI | Performed by: PHYSICAL MEDICINE & REHABILITATION

## 2020-06-18 PROCEDURE — 770010 HCHG ROOM/CARE - REHAB SEMI PRIVAT*

## 2020-06-18 PROCEDURE — 700112 HCHG RX REV CODE 229: Performed by: PHYSICAL MEDICINE & REHABILITATION

## 2020-06-18 PROCEDURE — 74018 RADEX ABDOMEN 1 VIEW: CPT

## 2020-06-18 PROCEDURE — 94760 N-INVAS EAR/PLS OXIMETRY 1: CPT

## 2020-06-18 PROCEDURE — 82962 GLUCOSE BLOOD TEST: CPT

## 2020-06-18 RX ORDER — FINASTERIDE 5 MG/1
5 TABLET, FILM COATED ORAL DAILY
Status: DISCONTINUED | OUTPATIENT
Start: 2020-06-19 | End: 2020-07-08 | Stop reason: HOSPADM

## 2020-06-18 RX ORDER — LOVASTATIN 20 MG/1
10 TABLET ORAL NIGHTLY
Status: DISCONTINUED | OUTPATIENT
Start: 2020-06-18 | End: 2020-07-08 | Stop reason: HOSPADM

## 2020-06-18 RX ORDER — ONDANSETRON 2 MG/ML
4 INJECTION INTRAMUSCULAR; INTRAVENOUS 4 TIMES DAILY PRN
Status: DISCONTINUED | OUTPATIENT
Start: 2020-06-18 | End: 2020-07-08 | Stop reason: HOSPADM

## 2020-06-18 RX ORDER — VITAMIN C
1 TAB ORAL DAILY
Status: DISCONTINUED | OUTPATIENT
Start: 2020-06-19 | End: 2020-07-08 | Stop reason: HOSPADM

## 2020-06-18 RX ORDER — ACETAMINOPHEN 500 MG
1000 TABLET ORAL 3 TIMES DAILY
Status: DISPENSED | OUTPATIENT
Start: 2020-06-18 | End: 2020-06-23

## 2020-06-18 RX ORDER — VENLAFAXINE HYDROCHLORIDE 75 MG/1
300 CAPSULE, EXTENDED RELEASE ORAL DAILY
Status: DISCONTINUED | OUTPATIENT
Start: 2020-06-19 | End: 2020-06-18

## 2020-06-18 RX ORDER — ALUMINA, MAGNESIA, AND SIMETHICONE 2400; 2400; 240 MG/30ML; MG/30ML; MG/30ML
20 SUSPENSION ORAL
Status: DISCONTINUED | OUTPATIENT
Start: 2020-06-18 | End: 2020-07-08 | Stop reason: HOSPADM

## 2020-06-18 RX ORDER — ARIPIPRAZOLE 5 MG/1
10 TABLET ORAL DAILY
Status: DISCONTINUED | OUTPATIENT
Start: 2020-06-19 | End: 2020-07-07

## 2020-06-18 RX ORDER — DEXTROSE MONOHYDRATE 25 G/50ML
50 INJECTION, SOLUTION INTRAVENOUS
Status: DISCONTINUED | OUTPATIENT
Start: 2020-06-18 | End: 2020-06-22

## 2020-06-18 RX ORDER — LISINOPRIL 20 MG/1
40 TABLET ORAL DAILY
Status: DISCONTINUED | OUTPATIENT
Start: 2020-06-19 | End: 2020-06-19

## 2020-06-18 RX ORDER — VITAMIN B COMPLEX
2000 TABLET ORAL DAILY
Status: DISCONTINUED | OUTPATIENT
Start: 2020-06-19 | End: 2020-07-08 | Stop reason: HOSPADM

## 2020-06-18 RX ORDER — TAMSULOSIN HYDROCHLORIDE 0.4 MG/1
0.4 CAPSULE ORAL
Status: DISCONTINUED | OUTPATIENT
Start: 2020-06-18 | End: 2020-07-01

## 2020-06-18 RX ORDER — ARIPIPRAZOLE 5 MG/1
15 TABLET ORAL DAILY
Status: DISCONTINUED | OUTPATIENT
Start: 2020-06-19 | End: 2020-06-18

## 2020-06-18 RX ORDER — MIRTAZAPINE 15 MG/1
15 TABLET, ORALLY DISINTEGRATING ORAL
Status: DISCONTINUED | OUTPATIENT
Start: 2020-06-18 | End: 2020-06-24

## 2020-06-18 RX ORDER — AMLODIPINE BESYLATE 5 MG/1
5 TABLET ORAL DAILY
Status: DISCONTINUED | OUTPATIENT
Start: 2020-06-19 | End: 2020-06-19

## 2020-06-18 RX ORDER — BACLOFEN 10 MG/1
10 TABLET ORAL 3 TIMES DAILY
Status: DISCONTINUED | OUTPATIENT
Start: 2020-06-18 | End: 2020-06-23

## 2020-06-18 RX ORDER — POLYVINYL ALCOHOL 14 MG/ML
1 SOLUTION/ DROPS OPHTHALMIC PRN
Status: DISCONTINUED | OUTPATIENT
Start: 2020-06-18 | End: 2020-07-08 | Stop reason: HOSPADM

## 2020-06-18 RX ORDER — OXYCODONE HYDROCHLORIDE 5 MG/1
5 TABLET ORAL
Status: DISCONTINUED | OUTPATIENT
Start: 2020-06-18 | End: 2020-07-08 | Stop reason: HOSPADM

## 2020-06-18 RX ORDER — OXYCODONE HYDROCHLORIDE 10 MG/1
10 TABLET ORAL
Status: DISCONTINUED | OUTPATIENT
Start: 2020-06-18 | End: 2020-07-08 | Stop reason: HOSPADM

## 2020-06-18 RX ORDER — TRAZODONE HYDROCHLORIDE 50 MG/1
50 TABLET ORAL
Status: DISCONTINUED | OUTPATIENT
Start: 2020-06-18 | End: 2020-07-08 | Stop reason: HOSPADM

## 2020-06-18 RX ORDER — TRAMADOL HYDROCHLORIDE 50 MG/1
50 TABLET ORAL EVERY 4 HOURS PRN
Status: DISCONTINUED | OUTPATIENT
Start: 2020-06-18 | End: 2020-07-08 | Stop reason: HOSPADM

## 2020-06-18 RX ORDER — ECHINACEA PURPUREA EXTRACT 125 MG
2 TABLET ORAL PRN
Status: DISCONTINUED | OUTPATIENT
Start: 2020-06-18 | End: 2020-07-08 | Stop reason: HOSPADM

## 2020-06-18 RX ORDER — M-VIT,TX,IRON,MINS/CALC/FOLIC 27MG-0.4MG
1 TABLET ORAL DAILY
Status: DISCONTINUED | OUTPATIENT
Start: 2020-06-19 | End: 2020-07-08 | Stop reason: HOSPADM

## 2020-06-18 RX ORDER — ACETAMINOPHEN 325 MG/1
650 TABLET ORAL EVERY 4 HOURS PRN
Status: DISCONTINUED | OUTPATIENT
Start: 2020-06-18 | End: 2020-07-08 | Stop reason: HOSPADM

## 2020-06-18 RX ORDER — ONDANSETRON 4 MG/1
4 TABLET, ORALLY DISINTEGRATING ORAL 4 TIMES DAILY PRN
Status: DISCONTINUED | OUTPATIENT
Start: 2020-06-18 | End: 2020-07-08 | Stop reason: HOSPADM

## 2020-06-18 RX ORDER — DOCUSATE SODIUM 100 MG/1
200 CAPSULE, LIQUID FILLED ORAL 2 TIMES DAILY
Status: DISCONTINUED | OUTPATIENT
Start: 2020-06-18 | End: 2020-06-23

## 2020-06-18 RX ORDER — LIDOCAINE 50 MG/G
2 PATCH TOPICAL
Status: DISCONTINUED | OUTPATIENT
Start: 2020-06-19 | End: 2020-07-08 | Stop reason: HOSPADM

## 2020-06-18 RX ORDER — BISACODYL 10 MG/1
10 SUPPOSITORY RECTAL
Status: DISCONTINUED | OUTPATIENT
Start: 2020-06-18 | End: 2020-06-23

## 2020-06-18 RX ORDER — SENNOSIDES A AND B 8.6 MG/1
17.2 TABLET, FILM COATED ORAL
Status: DISCONTINUED | OUTPATIENT
Start: 2020-06-18 | End: 2020-06-23

## 2020-06-18 RX ORDER — GABAPENTIN 400 MG/1
800 CAPSULE ORAL 2 TIMES DAILY
Status: DISCONTINUED | OUTPATIENT
Start: 2020-06-18 | End: 2020-06-22

## 2020-06-18 RX ORDER — POLYETHYLENE GLYCOL 3350 17 G/17G
1 POWDER, FOR SOLUTION ORAL DAILY
Status: DISCONTINUED | OUTPATIENT
Start: 2020-06-18 | End: 2020-06-19

## 2020-06-18 RX ORDER — VENLAFAXINE HYDROCHLORIDE 75 MG/1
150 CAPSULE, EXTENDED RELEASE ORAL 2 TIMES DAILY
Status: DISCONTINUED | OUTPATIENT
Start: 2020-06-18 | End: 2020-07-08 | Stop reason: HOSPADM

## 2020-06-18 RX ORDER — MELOXICAM 7.5 MG/1
7.5 TABLET ORAL DAILY
Status: DISCONTINUED | OUTPATIENT
Start: 2020-06-18 | End: 2020-06-18

## 2020-06-18 RX ORDER — BACLOFEN 10 MG/1
10 TABLET ORAL 2 TIMES DAILY
Status: DISCONTINUED | OUTPATIENT
Start: 2020-06-18 | End: 2020-06-18

## 2020-06-18 RX ADMIN — STANDARDIZED SENNA CONCENTRATE 17.2 MG: 8.6 TABLET ORAL at 15:15

## 2020-06-18 RX ADMIN — POLYETHYLENE GLYCOL 3350 1 PACKET: 17 POWDER, FOR SOLUTION ORAL at 15:15

## 2020-06-18 RX ADMIN — TAMSULOSIN HYDROCHLORIDE 0.4 MG: 0.4 CAPSULE ORAL at 17:59

## 2020-06-18 RX ADMIN — VENLAFAXINE HYDROCHLORIDE 150 MG: 75 CAPSULE, EXTENDED RELEASE ORAL at 21:11

## 2020-06-18 RX ADMIN — ACETAMINOPHEN 1000 MG: 500 TABLET, FILM COATED ORAL at 21:10

## 2020-06-18 RX ADMIN — GABAPENTIN 800 MG: 400 CAPSULE ORAL at 21:10

## 2020-06-18 RX ADMIN — DOCUSATE SODIUM 200 MG: 100 CAPSULE, LIQUID FILLED ORAL at 21:11

## 2020-06-18 RX ADMIN — BISACODYL 10 MG: 10 SUPPOSITORY RECTAL at 21:00

## 2020-06-18 RX ADMIN — LOVASTATIN 10 MG: 20 TABLET ORAL at 21:11

## 2020-06-18 RX ADMIN — ACETAMINOPHEN 1000 MG: 500 TABLET, FILM COATED ORAL at 15:15

## 2020-06-18 RX ADMIN — OXYCODONE HYDROCHLORIDE 10 MG: 10 TABLET ORAL at 16:38

## 2020-06-18 RX ADMIN — BACLOFEN 10 MG: 10 TABLET ORAL at 21:11

## 2020-06-18 RX ADMIN — MIRTAZAPINE 15 MG: 15 TABLET, ORALLY DISINTEGRATING ORAL at 21:11

## 2020-06-18 RX ADMIN — ENOXAPARIN SODIUM 40 MG: 100 INJECTION SUBCUTANEOUS at 21:00

## 2020-06-18 RX ADMIN — METFORMIN HYDROCHLORIDE 500 MG: 500 TABLET ORAL at 17:59

## 2020-06-18 ASSESSMENT — LIFESTYLE VARIABLES
EVER_SMOKED: YES
EVER HAD A DRINK FIRST THING IN THE MORNING TO STEADY YOUR NERVES TO GET RID OF A HANGOVER: NO
TOTAL SCORE: 0
HOW MANY TIMES IN THE PAST YEAR HAVE YOU HAD 5 OR MORE DRINKS IN A DAY: 0
TOTAL SCORE: 0
HAVE YOU EVER FELT YOU SHOULD CUT DOWN ON YOUR DRINKING: NO
EVER FELT BAD OR GUILTY ABOUT YOUR DRINKING: NO
TOTAL SCORE: 0
HAVE PEOPLE ANNOYED YOU BY CRITICIZING YOUR DRINKING: NO
AVERAGE NUMBER OF DAYS PER WEEK YOU HAVE A DRINK CONTAINING ALCOHOL: 0
ALCOHOL_USE: NO
ON A TYPICAL DAY WHEN YOU DRINK ALCOHOL HOW MANY DRINKS DO YOU HAVE: 0
CONSUMPTION TOTAL: NEGATIVE

## 2020-06-18 ASSESSMENT — COPD QUESTIONNAIRES
COPD SCREENING SCORE: 4
DO YOU EVER COUGH UP ANY MUCUS OR PHLEGM?: NO/ONLY WITH OCCASIONAL COLDS OR INFECTIONS
HAVE YOU SMOKED AT LEAST 100 CIGARETTES IN YOUR ENTIRE LIFE: YES
DURING THE PAST 4 WEEKS HOW MUCH DID YOU FEEL SHORT OF BREATH: NONE/LITTLE OF THE TIME

## 2020-06-18 ASSESSMENT — PATIENT HEALTH QUESTIONNAIRE - PHQ9
SUM OF ALL RESPONSES TO PHQ9 QUESTIONS 1 AND 2: 0
1. LITTLE INTEREST OR PLEASURE IN DOING THINGS: NOT AT ALL
2. FEELING DOWN, DEPRESSED, IRRITABLE, OR HOPELESS: NOT AT ALL
2. FEELING DOWN, DEPRESSED, IRRITABLE, OR HOPELESS: NOT AT ALL
1. LITTLE INTEREST OR PLEASURE IN DOING THINGS: NOT AT ALL
SUM OF ALL RESPONSES TO PHQ9 QUESTIONS 1 AND 2: 0

## 2020-06-18 NOTE — PROGRESS NOTES
Patient admitted to facility at 12:45 via gurney; accompanied by [facility] staff. Patient assisted to room and positioned in bed for comfort and safety; call light within reach.  Patient assisted with stowing belongings and oriented to room and facility.  Admission assessment performed and documented in computer.  Admission paperwork completed; signed copies placed in chart.  2 RN skin check done with admitting RN and Nurse Tiffani. Face photo and skin photos documented in media. Appropriate LDAs opened.   Pt with Ovidio score of 18.

## 2020-06-18 NOTE — FLOWSHEET NOTE
06/18/20 1646   Incentive Spirometry Treatment   Incentive Spirometer Volume 3000 mL  (Good effort and technique)   Incentive Spirometer Next Change Date 07/16/20
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
## 2020-06-18 NOTE — FLOWSHEET NOTE
06/18/20 1626   Events/Summary/Plan   Events/Summary/Plan RT assessment   Vital Signs   Pulse 90   Respiration 16   Pulse Oximetry 95 %   $ Pulse Oximetry (Spot Check) Yes   Respiratory Assessment   Level of Consciousness Alert   Breath Sounds   RUL Breath Sounds Clear   RML Breath Sounds Clear   RLL Breath Sounds Clear   NISH Breath Sounds Clear   LLL Breath Sounds Clear   Secretions   Cough Strong;Dry;Non Productive   Oxygen   O2 Delivery Device None - Room Air   Smoking History   Have you ever smoked Yes   Have you smoked in the last 12 months No   Confirm Quit Date 06/18/92

## 2020-06-18 NOTE — CARE PLAN
Problem: Safety  Goal: Will remain free from injury  Outcome: PROGRESSING AS EXPECTED   Received shift report and assumed care of patient.  Patient awake, calm and stable, currently positioned in bed for comfort and safety; call light within reach.  Denies pain or discomfort at this time.  Will continue to monitor.   Problem: Infection  Goal: Will remain free from infection  Outcome: PROGRESSING AS EXPECTED   Patient remains free of infection as evidenced by normal vital signs and breath sounds. Will continue monitoring.   Problem: Venous Thromboembolism (VTW)/Deep Vein Thrombosis (DVT) Prevention:  Goal: Patient will participate in Venous Thrombosis (VTE)/Deep Vein Thrombosis (DVT)Prevention Measures  Outcome: PROGRESSING AS EXPECTED   Patient on Lovenox therapy for DVT prophylaxis.

## 2020-06-19 PROBLEM — E11.9 DIABETES (HCC): Status: ACTIVE | Noted: 2020-06-19

## 2020-06-19 PROBLEM — E55.9 VITAMIN D INSUFFICIENCY: Status: ACTIVE | Noted: 2020-06-19

## 2020-06-19 LAB
25(OH)D3 SERPL-MCNC: 28 NG/ML (ref 30–100)
ALBUMIN SERPL BCP-MCNC: 3.4 G/DL (ref 3.2–4.9)
ALBUMIN/GLOB SERPL: 1.3 G/DL
ALP SERPL-CCNC: 79 U/L (ref 30–99)
ALT SERPL-CCNC: 23 U/L (ref 2–50)
ANION GAP SERPL CALC-SCNC: 9 MMOL/L (ref 7–16)
AST SERPL-CCNC: 16 U/L (ref 12–45)
BASOPHILS # BLD AUTO: 0.7 % (ref 0–1.8)
BASOPHILS # BLD: 0.06 K/UL (ref 0–0.12)
BILIRUB SERPL-MCNC: 0.2 MG/DL (ref 0.1–1.5)
BUN SERPL-MCNC: 18 MG/DL (ref 8–22)
CALCIUM SERPL-MCNC: 8.8 MG/DL (ref 8.5–10.5)
CHLORIDE SERPL-SCNC: 105 MMOL/L (ref 96–112)
CHOLEST SERPL-MCNC: 182 MG/DL (ref 100–199)
CO2 SERPL-SCNC: 27 MMOL/L (ref 20–33)
CREAT SERPL-MCNC: 0.69 MG/DL (ref 0.5–1.4)
EOSINOPHIL # BLD AUTO: 0.16 K/UL (ref 0–0.51)
EOSINOPHIL NFR BLD: 1.9 % (ref 0–6.9)
ERYTHROCYTE [DISTWIDTH] IN BLOOD BY AUTOMATED COUNT: 48.4 FL (ref 35.9–50)
EST. AVERAGE GLUCOSE BLD GHB EST-MCNC: 114 MG/DL
GLOBULIN SER CALC-MCNC: 2.6 G/DL (ref 1.9–3.5)
GLUCOSE BLD-MCNC: 104 MG/DL (ref 65–99)
GLUCOSE BLD-MCNC: 111 MG/DL (ref 65–99)
GLUCOSE BLD-MCNC: 133 MG/DL (ref 65–99)
GLUCOSE BLD-MCNC: 94 MG/DL (ref 65–99)
GLUCOSE SERPL-MCNC: 129 MG/DL (ref 65–99)
HBA1C MFR BLD: 5.6 % (ref 0–5.6)
HCT VFR BLD AUTO: 28.3 % (ref 42–52)
HDLC SERPL-MCNC: 30 MG/DL
HGB BLD-MCNC: 8.7 G/DL (ref 14–18)
IMM GRANULOCYTES # BLD AUTO: 0.14 K/UL (ref 0–0.11)
IMM GRANULOCYTES NFR BLD AUTO: 1.7 % (ref 0–0.9)
INR PPP: 0.96 (ref 0.87–1.13)
LDLC SERPL CALC-MCNC: 93 MG/DL
LYMPHOCYTES # BLD AUTO: 1.64 K/UL (ref 1–4.8)
LYMPHOCYTES NFR BLD: 19.5 % (ref 22–41)
MAGNESIUM SERPL-MCNC: 2.1 MG/DL (ref 1.5–2.5)
MCH RBC QN AUTO: 29.6 PG (ref 27–33)
MCHC RBC AUTO-ENTMCNC: 30.7 G/DL (ref 33.7–35.3)
MCV RBC AUTO: 96.3 FL (ref 81.4–97.8)
MONOCYTES # BLD AUTO: 0.64 K/UL (ref 0–0.85)
MONOCYTES NFR BLD AUTO: 7.6 % (ref 0–13.4)
NEUTROPHILS # BLD AUTO: 5.77 K/UL (ref 1.82–7.42)
NEUTROPHILS NFR BLD: 68.6 % (ref 44–72)
NRBC # BLD AUTO: 0 K/UL
NRBC BLD-RTO: 0 /100 WBC
PHOSPHATE SERPL-MCNC: 3.1 MG/DL (ref 2.5–4.5)
PLATELET # BLD AUTO: 653 K/UL (ref 164–446)
PMV BLD AUTO: 8.9 FL (ref 9–12.9)
POTASSIUM SERPL-SCNC: 4 MMOL/L (ref 3.6–5.5)
PROT SERPL-MCNC: 6 G/DL (ref 6–8.2)
PROTHROMBIN TIME: 13 SEC (ref 12–14.6)
RBC # BLD AUTO: 2.94 M/UL (ref 4.7–6.1)
SODIUM SERPL-SCNC: 141 MMOL/L (ref 135–145)
TRIGL SERPL-MCNC: 296 MG/DL (ref 0–149)
TSH SERPL DL<=0.005 MIU/L-ACNC: 0.72 UIU/ML (ref 0.38–5.33)
WBC # BLD AUTO: 8.4 K/UL (ref 4.8–10.8)

## 2020-06-19 PROCEDURE — 99233 SBSQ HOSP IP/OBS HIGH 50: CPT | Performed by: PHYSICAL MEDICINE & REHABILITATION

## 2020-06-19 PROCEDURE — A9270 NON-COVERED ITEM OR SERVICE: HCPCS | Performed by: PHYSICAL MEDICINE & REHABILITATION

## 2020-06-19 PROCEDURE — 700111 HCHG RX REV CODE 636 W/ 250 OVERRIDE (IP): Performed by: PHYSICAL MEDICINE & REHABILITATION

## 2020-06-19 PROCEDURE — 94760 N-INVAS EAR/PLS OXIMETRY 1: CPT

## 2020-06-19 PROCEDURE — 97167 OT EVAL HIGH COMPLEX 60 MIN: CPT

## 2020-06-19 PROCEDURE — 85025 COMPLETE CBC W/AUTO DIFF WBC: CPT

## 2020-06-19 PROCEDURE — 82962 GLUCOSE BLOOD TEST: CPT | Mod: 91

## 2020-06-19 PROCEDURE — 80053 COMPREHEN METABOLIC PANEL: CPT

## 2020-06-19 PROCEDURE — 700102 HCHG RX REV CODE 250 W/ 637 OVERRIDE(OP): Performed by: PHYSICAL MEDICINE & REHABILITATION

## 2020-06-19 PROCEDURE — 97530 THERAPEUTIC ACTIVITIES: CPT

## 2020-06-19 PROCEDURE — 97535 SELF CARE MNGMENT TRAINING: CPT

## 2020-06-19 PROCEDURE — 770010 HCHG ROOM/CARE - REHAB SEMI PRIVAT*

## 2020-06-19 PROCEDURE — 99223 1ST HOSP IP/OBS HIGH 75: CPT | Performed by: HOSPITALIST

## 2020-06-19 PROCEDURE — 700101 HCHG RX REV CODE 250: Performed by: PHYSICAL MEDICINE & REHABILITATION

## 2020-06-19 PROCEDURE — 97162 PT EVAL MOD COMPLEX 30 MIN: CPT

## 2020-06-19 PROCEDURE — 83735 ASSAY OF MAGNESIUM: CPT

## 2020-06-19 PROCEDURE — 84100 ASSAY OF PHOSPHORUS: CPT

## 2020-06-19 PROCEDURE — 36415 COLL VENOUS BLD VENIPUNCTURE: CPT

## 2020-06-19 PROCEDURE — 700112 HCHG RX REV CODE 229: Performed by: PHYSICAL MEDICINE & REHABILITATION

## 2020-06-19 PROCEDURE — 84443 ASSAY THYROID STIM HORMONE: CPT

## 2020-06-19 PROCEDURE — 85610 PROTHROMBIN TIME: CPT

## 2020-06-19 PROCEDURE — 83036 HEMOGLOBIN GLYCOSYLATED A1C: CPT

## 2020-06-19 PROCEDURE — 82306 VITAMIN D 25 HYDROXY: CPT

## 2020-06-19 PROCEDURE — 80061 LIPID PANEL: CPT

## 2020-06-19 RX ORDER — MIDODRINE HYDROCHLORIDE 2.5 MG/1
5 TABLET ORAL EVERY 4 HOURS PRN
Status: DISCONTINUED | OUTPATIENT
Start: 2020-06-19 | End: 2020-07-08 | Stop reason: HOSPADM

## 2020-06-19 RX ORDER — LISINOPRIL 20 MG/1
20 TABLET ORAL DAILY
Status: DISCONTINUED | OUTPATIENT
Start: 2020-06-20 | End: 2020-06-19

## 2020-06-19 RX ADMIN — GABAPENTIN 800 MG: 400 CAPSULE ORAL at 08:38

## 2020-06-19 RX ADMIN — DOCUSATE SODIUM 200 MG: 100 CAPSULE, LIQUID FILLED ORAL at 20:13

## 2020-06-19 RX ADMIN — MELATONIN 2000 UNITS: at 08:34

## 2020-06-19 RX ADMIN — ASPIRIN 81 MG: 81 TABLET, COATED ORAL at 08:35

## 2020-06-19 RX ADMIN — FINASTERIDE 5 MG: 5 TABLET, FILM COATED ORAL at 08:34

## 2020-06-19 RX ADMIN — VENLAFAXINE HYDROCHLORIDE 150 MG: 75 CAPSULE, EXTENDED RELEASE ORAL at 20:12

## 2020-06-19 RX ADMIN — LISINOPRIL 40 MG: 20 TABLET ORAL at 08:34

## 2020-06-19 RX ADMIN — ENOXAPARIN SODIUM 40 MG: 100 INJECTION SUBCUTANEOUS at 20:14

## 2020-06-19 RX ADMIN — BACLOFEN 10 MG: 10 TABLET ORAL at 16:28

## 2020-06-19 RX ADMIN — ACETAMINOPHEN 1000 MG: 500 TABLET, FILM COATED ORAL at 20:12

## 2020-06-19 RX ADMIN — ARIPIPRAZOLE 10 MG: 5 TABLET ORAL at 08:35

## 2020-06-19 RX ADMIN — GABAPENTIN 800 MG: 400 CAPSULE ORAL at 20:12

## 2020-06-19 RX ADMIN — ACETAMINOPHEN 1000 MG: 500 TABLET, FILM COATED ORAL at 08:35

## 2020-06-19 RX ADMIN — DOCUSATE SODIUM 200 MG: 100 CAPSULE, LIQUID FILLED ORAL at 08:35

## 2020-06-19 RX ADMIN — LIDOCAINE 2 PATCH: 50 PATCH TOPICAL at 08:36

## 2020-06-19 RX ADMIN — METFORMIN HYDROCHLORIDE 500 MG: 500 TABLET ORAL at 08:35

## 2020-06-19 RX ADMIN — MULTIPLE VITAMINS W/ MINERALS TAB 1 TABLET: TAB at 08:36

## 2020-06-19 RX ADMIN — LOVASTATIN 10 MG: 20 TABLET ORAL at 20:13

## 2020-06-19 RX ADMIN — TAMSULOSIN HYDROCHLORIDE 0.4 MG: 0.4 CAPSULE ORAL at 17:47

## 2020-06-19 RX ADMIN — BISACODYL 10 MG: 10 SUPPOSITORY RECTAL at 20:12

## 2020-06-19 RX ADMIN — BACLOFEN 10 MG: 10 TABLET ORAL at 20:13

## 2020-06-19 RX ADMIN — VENLAFAXINE HYDROCHLORIDE 150 MG: 75 CAPSULE, EXTENDED RELEASE ORAL at 08:34

## 2020-06-19 RX ADMIN — VITAMIN C 1 TABLET: TAB at 08:34

## 2020-06-19 RX ADMIN — MIRTAZAPINE 15 MG: 15 TABLET, ORALLY DISINTEGRATING ORAL at 20:13

## 2020-06-19 RX ADMIN — ACETAMINOPHEN 650 MG: 325 TABLET, FILM COATED ORAL at 13:49

## 2020-06-19 RX ADMIN — METFORMIN HYDROCHLORIDE 500 MG: 500 TABLET ORAL at 17:47

## 2020-06-19 RX ADMIN — POLYETHYLENE GLYCOL 3350 1 PACKET: 17 POWDER, FOR SOLUTION ORAL at 08:36

## 2020-06-19 RX ADMIN — BACLOFEN 10 MG: 10 TABLET ORAL at 08:36

## 2020-06-19 ASSESSMENT — ACTIVITIES OF DAILY LIVING (ADL)
TOILETING_LEVEL_OF_ASSIST_DESCRIPTION: ASSIST FOR HYGIENE;ASSIST TO PULL PANTS UP;ASSIST TO PULL PANTS DOWN;INCREASED TIME;INITIAL PREPARATION FOR TASK;SET-UP OF EQUIPMENT;SUPERVISION FOR SAFETY;VERBAL CUEING
TOILETING: REQUIRES ASSIST

## 2020-06-19 ASSESSMENT — BRIEF INTERVIEW FOR MENTAL STATUS (BIMS)
ASKED TO RECALL SOCK: NO, COULD NOT RECALL
INITIAL REPETITION OF BED BLUE SOCK - FIRST ATTEMPT: 3
WHAT DAY OF THE WEEK IS IT: CORRECT
WHAT YEAR IS IT: CORRECT
WHAT MONTH IS IT: ACCURATE WITHIN 5 DAYS
BIMS SUMMARY SCORE: 13
ASKED TO RECALL BED: YES, NO CUE REQUIRED
ASKED TO RECALL BLUE: YES, NO CUE REQUIRED

## 2020-06-19 ASSESSMENT — PATIENT HEALTH QUESTIONNAIRE - PHQ9
2. FEELING DOWN, DEPRESSED, IRRITABLE, OR HOPELESS: NOT AT ALL
SUM OF ALL RESPONSES TO PHQ9 QUESTIONS 1 AND 2: 0
1. LITTLE INTEREST OR PLEASURE IN DOING THINGS: NOT AT ALL

## 2020-06-19 ASSESSMENT — GAIT ASSESSMENTS: GAIT LEVEL OF ASSIST: UNABLE TO PARTICIPATE

## 2020-06-19 NOTE — THERAPY
"Occupational Therapy  Daily Treatment     Patient Name: Gaurav Lopez  Age:  75 y.o., Sex:  male  Medical Record #: [id number]  Today's Date: 6/19/2020     Precautions  Precautions: Spinal / Back Precautions , Fall Risk, Other (See Comments)  Comments: LLE prosthesis, pacemaker          Subjective  \"I'm feeling better\" - therapist inquired of BP/dizziness - refer to BP above.     Objective       06/19/20 1231   Precautions   Precautions Spinal / Back Precautions ;Fall Risk;Other (See Comments)   Comments LLE prosthesis, pacemaker    Vitals   Pulse 78   Patient BP Position Supine   Blood Pressure  104/68   O2 Delivery Device None - Room Air   Sleep/Wake Cycle   Sleep & Rest Awake   Functional Level of Assist   Grooming Modified Independent;Seated   Grooming Description Seated in wheelchair at sink   Lower Body Dressing Maximal Assist   Lower Body Dressing Description Grab bar;Cues for spinal precautions;Increased time;Initial preparation for task;Set-up of equipment;Supervision for safety;Verbal cueing;Assist with threading into pant leg   Toileting Total Assist   Toileting Description Increased time;Grab bar;Assist to pull pants up;Assist to pull pants down;Assist for hygiene;Assist for standing balance;Initial preparation for task;Set-up of equipment;Supervision for safety;Verbal cueing  (See notes)   Toilet Transfers Moderate Assist   Toilet Transfer Description Grab bar;Set-up of equipment;Supervision for safety;Verbal cueing;Verbal cues for spinal precautions  (Wearing L prothesis)   Balance   Standing Balance (Static) Poor +   Standing Balance (Dynamic) Poor   Interdisciplinary Plan of Care Collaboration   IDT Collaboration with  Certified Nursing Assistant   Patient Position at End of Therapy Seated;Self Releasing Lap Belt Applied;Chair Alarm On;Call Light within Reach;Tray Table within Reach;Phone within Reach   OT Total Time Spent   OT Individual Total Time Spent (Mins) 30   OT Charge Group   OT Self Care / ADL " 2       Assessment    Pt was alert and cooperative w/ tx.  BP stabilized - refer to above.  Therapist applied FAMILIA hose to RLE and tube  stockingnette to R lower leg and thigh + L thigh.  Significant BM, Total assist for post BM toilet hygiene.  Total assist to thread feet through briefs/short,, Mod assist in stand via grab bar (wearing L prosthesis), Max assist to manage pants/briefs at waist.  Total assist to manage FAMILIA hose and  tube  stockingnette.  Mod I for groom/hygiene seated at sink side.  Strengths: Pleasant and cooperative, Willingly participates in therapeutic activities  Barriers: Decreased endurance, Generalized weakness, Orthostatic hypotension, Impaired activity tolerance, Impaired balance, Limited mobility    Plan    OT to address strength, endurance, balance + education for AE/DME to facilitate greater independence w/ ADL's/functional mobility/transfers.    Occupational Therapy Goals     Problem: Dressing     Dates: Start: 06/19/20       Goal: STG-Within one week, patient will dress LB     Dates: Start: 06/19/20       Description: 1) Individualized Goal:  Max assist for LB clothing management via AE/DME PRN  2) Interventions:  OT Self Care/ADL, OT Neuro Re-Ed/Balance, OT Therapeutic Activity, OT Evaluation, and OT Therapeutic Exercise                  Problem: Functional Transfers     Dates: Start: 06/19/20       Goal: STG-Within one week, patient will transfer to toilet     Dates: Start: 06/19/20       Description: 1) Individualized Goal:  Max assist for wc/commode transfer via DME PRN  2) Interventions:  OT Self Care/ADL, OT Neuro Re-Ed/Balance, OT Therapeutic Activity, OT Evaluation, and OT Therapeutic Exercise                  Problem: OT Long Term Goals     Dates: Start: 06/19/20       Goal: LTG-By discharge, patient will complete basic self care tasks     Dates: Start: 06/19/20       Description: 1) Individualized Goal:  Mod I for BADL's, Sup/SBA for shower via AE/DME PRN  2)  Interventions:  OT Self Care/ADL, OT Neuro Re-Ed/Balance, OT Therapeutic Activity, OT Evaluation, and OT Therapeutic Exercise            Goal: LTG-By discharge, patient will perform bathroom transfers     Dates: Start: 06/19/20                   Problem: Toileting     Dates: Start: 06/19/20       Goal: STG-Within one week, patient will complete toileting tasks     Dates: Start: 06/19/20       Description: 1) Individualized Goal:  Max assist for toileting task via AE/DME PRN  2) Interventions:  OT Self Care/ADL, OT Neuro Re-Ed/Balance, OT Therapeutic Activity, OT Evaluation, and OT Therapeutic Exercise
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
## 2020-06-19 NOTE — DISCHARGE PLANNING
CASE MANAGEMENT INITIAL ASSESSMENT    Admit Date:  6/18/2020     I met with patient to discuss role of case management / discharge planning / team conference. Patient is alert and able to provide information.   Patient is a  75 y.o. male with past medical history of BPH, multiple spinal surgeries, cervical myelopathy, pathic pain, left BKA, spasticity, hypertension, type 2 diabetes, dyslipidemia, who was admitted to [facility] on 6/18 after spinal surgeries  transferred from [facility] (6/5-6/10).    Primary Care Provider: Angela Paulino   Surgeon: Dr. Tam    Diagnosis: 04.130 Other Non-Traumatic Spinal Cord Dysfunction  Quadriplegia, C1-C4 incomplete (HCC)    Co-morbidities:   Patient Active Problem List    Diagnosis Date Noted   • Postoperative pain 06/18/2020   • BPH (benign prostatic hyperplasia) 10/24/2018   • Inability to walk 09/16/2015   • Sciatica of right side 09/16/2015   • Deep vein thrombosis (HCC) 12/19/2011   • Insomnia due to medical condition 12/15/2011   • Neurogenic bowel 12/15/2011   • Depression 11/30/2011   • Lower extremity weakness 11/30/2011   • Thoracic myelopathy 11/30/2011   • Neuropathic pain 10/14/2011   • Anemia 08/20/2011   • Spinal stenosis of lumbar region at multiple levels 08/19/2011   • S/P laminectomy with spinal fusion 08/19/2011   • Hyperlipidemia 08/02/2011   • Leukocytosis 08/02/2011   • S/P BKA (below knee amputation) ([facility]) 07/28/2011   • Cervical myelopathy ([facility]) 07/28/2011   • Lumbar myelopathy ([facility]) 07/28/2011   • Hypertension 07/28/2011   • DIABETES MELLITUS 07/28/2011     Prior Living Situation:  Housing / Facility: 1 Story House  Lives with - Patient's Self Care Capacity: Spouse    Prior Level of Function:  Medication Management: Requires Assist  Finances: Requires Assist  Home Management: Requires Assist  Shopping: Dependent  Prior Level Of Mobility: Independent With Device in Community, Independent With Device in  Home  Driving / Transportation: Relatives / Others Provide Transportation    Support Systems:  Primary : Tina Lopez (wife) [phone]    Previous Services Utilized:   Equipment Owned: Front-Wheel Walker, 4-Wheel Walker, Wheelchair(Has a light weight walker )  Prior Services: Home-Independent    Other Information:  Occupation (Pre-Hospital Vocational): Retired Due To Age  Primary Payor Source: Other (Comments)([employer] )  Secondary Payor Source: Other (Comments)([employer])  Primary Care Practitioner : Angela Paulino  Other MDs: Dr. Tam (Neurosurgeon) Shane    Patient / Family Goal:  Patient / Family Goal: patient lives in [facility] in a single level house with 3 mario. He lives with his spouse who is able to be caregiver. patient has a wheelchair and 2 walkers. Patient would like to be able to stand and walk out of the rehab facility.      Additional Case Management Questions:  Have you ever received case management services for yourself or a family member? Yes    Do you feel you have and an understanding of what services  provide? Yes    Do you have any additional questions regarding case management? No      CASE MANAGEMENT PLAN OF CARE   Individualized Goals:   1. To walk out of the rehabilitation center  2. To be able to stand  3. Set up PCP appointment    Barriers:   1. Functional deficit     Plan:  1. Continue to follow patient through hospitalization and provide discharge planning in collaboration with patient, family, physicians and ancillary services.     2. Utilize community resources to ensure a safe discharge.

## 2020-06-19 NOTE — ASSESSMENT & PLAN NOTE
BP ok and occ rises up  Off Norvasc and Lisinopril  On Florinef  On Midodrine  On Sudafed  Note: 2nd to on Baclofen and/or Proscar   Management per Physiatry

## 2020-06-19 NOTE — PROGRESS NOTES
"Rehab Progress Note     Encounter Date: 6/19/2020    CC: weakness    Interval Events (Subjective)  He reported episode of dizziness this morning, discussion with staff blood pressure dropped to 82/52 when standing.  Unable to perform shower this morning, blood pressure recovered when laying back in bed.    Asking about the large amounts of stool softeners that he is currently on.  He slept well last night  RT reporting patient did well with incentive spirometry, greater than 2 L    KUB done yesterday    Planes of generalized weakness, worse in his shoulders and hips    He reports that jumping of his legs has improved with the increased dose of baclofen    Bowel: Medium soft BM last night with BTP  Bladder: PVR/void-84/200(double voiding), 128/300      Intake/Output Summary (Last 24 hours) at 6/19/2020 1144  Last data filed at 6/19/2020 0458  Gross per 24 hour   Intake 540 ml   Output 500 ml   Net 40 ml       ROS:  Gen: No fatigue, confusion, significant weight loss  Eyes: no blurry vision, double vision or pain in eyes  ENT: no changes in hearing, runny nose, nose bleeds, sinus pain  CV: No CP, palpitations  Lungs: no shortness of breath, changes in secretions, changes in cough, pain with coughing  Abd: No abd pain, nausea, vomiting, pain with eating  : no blood in urine, pain during storage phase, bladder spasms, suprapubic pain  Ext: No swelling in legs, asymmetric atrophy  Neuro: no changes in strength or sensation  Skin: no new ulcers/skin breakdown appreciated by patient  Mood: No changes in mood today, increase in depression or anxiety  Heme: no bruising, or bleeding  Rest of 14 point review of systems is negative    Objective:  Vitals: /79   Pulse 90   Temp 36.6 °C (97.9 °F) (Temporal)   Resp 18   Ht 1.753 m (5' 9\")   Wt 76.5 kg (168 lb 10.4 oz)   SpO2 93%   Gen: NAD, Body mass index is 24.91 kg/m².  HEENT: NC/AT, PERRLA, moist mucous membranes, Aspen collar in place  Cardio: RRR, no " mumurs  Pulm: CTAB, with normal respiratory effort  Abd: Soft NTND, active bowel sounds  Ext: No peripheral edema. No calf tenderness. No clubbing/cyanosis. +dorsal pedalis pulses bilaterally.      Recent Results (from the past 72 hour(s))   ACCU-CHEK GLUCOSE    Collection Time: 06/18/20  5:26 PM   Result Value Ref Range    Glucose - Accu-Ck 134 (H) 65 - 99 mg/dL   ACCU-CHEK GLUCOSE    Collection Time: 06/18/20  8:59 PM   Result Value Ref Range    Glucose - Accu-Ck 113 (H) 65 - 99 mg/dL   CBC with Differential    Collection Time: 06/19/20  5:12 AM   Result Value Ref Range    WBC 8.4 4.8 - 10.8 K/uL    RBC 2.94 (L) 4.70 - 6.10 M/uL    Hemoglobin 8.7 (L) 14.0 - 18.0 g/dL    Hematocrit 28.3 (L) 42.0 - 52.0 %    MCV 96.3 81.4 - 97.8 fL    MCH 29.6 27.0 - 33.0 pg    MCHC 30.7 (L) 33.7 - 35.3 g/dL    RDW 48.4 35.9 - 50.0 fL    Platelet Count 653 (H) 164 - 446 K/uL    MPV 8.9 (L) 9.0 - 12.9 fL    Neutrophils-Polys 68.60 44.00 - 72.00 %    Lymphocytes 19.50 (L) 22.00 - 41.00 %    Monocytes 7.60 0.00 - 13.40 %    Eosinophils 1.90 0.00 - 6.90 %    Basophils 0.70 0.00 - 1.80 %    Immature Granulocytes 1.70 (H) 0.00 - 0.90 %    Nucleated RBC 0.00 /100 WBC    Neutrophils (Absolute) 5.77 1.82 - 7.42 K/uL    Lymphs (Absolute) 1.64 1.00 - 4.80 K/uL    Monos (Absolute) 0.64 0.00 - 0.85 K/uL    Eos (Absolute) 0.16 0.00 - 0.51 K/uL    Baso (Absolute) 0.06 0.00 - 0.12 K/uL    Immature Granulocytes (abs) 0.14 (H) 0.00 - 0.11 K/uL    NRBC (Absolute) 0.00 K/uL   Comp Metabolic Panel (CMP)    Collection Time: 06/19/20  5:12 AM   Result Value Ref Range    Sodium 141 135 - 145 mmol/L    Potassium 4.0 3.6 - 5.5 mmol/L    Chloride 105 96 - 112 mmol/L    Co2 27 20 - 33 mmol/L    Anion Gap 9.0 7.0 - 16.0    Glucose 129 (H) 65 - 99 mg/dL    Bun 18 8 - 22 mg/dL    Creatinine 0.69 0.50 - 1.40 mg/dL    Calcium 8.8 8.5 - 10.5 mg/dL    AST(SGOT) 16 12 - 45 U/L    ALT(SGPT) 23 2 - 50 U/L    Alkaline Phosphatase 79 30 - 99 U/L    Total Bilirubin 0.2 0.1  - 1.5 mg/dL    Albumin 3.4 3.2 - 4.9 g/dL    Total Protein 6.0 6.0 - 8.2 g/dL    Globulin 2.6 1.9 - 3.5 g/dL    A-G Ratio 1.3 g/dL   Lipid Profile (Lipid Panel)    Collection Time: 06/19/20  5:12 AM   Result Value Ref Range    Cholesterol,Tot 182 100 - 199 mg/dL    Triglycerides 296 (H) 0 - 149 mg/dL    HDL 30 (A) >=40 mg/dL    LDL 93 <100 mg/dL   Magnesium    Collection Time: 06/19/20  5:12 AM   Result Value Ref Range    Magnesium 2.1 1.5 - 2.5 mg/dL   Phosphorus    Collection Time: 06/19/20  5:12 AM   Result Value Ref Range    Phosphorus 3.1 2.5 - 4.5 mg/dL   Prothrombin time    Collection Time: 06/19/20  5:12 AM   Result Value Ref Range    PT 13.0 12.0 - 14.6 sec    INR 0.96 0.87 - 1.13   ESTIMATED GFR    Collection Time: 06/19/20  5:12 AM   Result Value Ref Range    GFR If African American >60 >60 mL/min/1.73 m 2    GFR If Non African American >60 >60 mL/min/1.73 m 2   ACCU-CHEK GLUCOSE    Collection Time: 06/19/20  7:38 AM   Result Value Ref Range    Glucose - Accu-Ck 111 (H) 65 - 99 mg/dL       Current Facility-Administered Medications   Medication Frequency   • amLODIPine (NORVASC) tablet 5 mg DAILY   • aspirin EC (ECOTRIN) tablet 81 mg DAILY   • vitamin D (cholecalciferol) tablet 2,000 Units DAILY   • finasteride (PROSCAR) tablet 5 mg DAILY   • gabapentin (NEURONTIN) capsule 800 mg BID   • lisinopril (PRINIVIL) tablet 40 mg DAILY   • lovastatin (MEVACOR) tablet 10 mg Nightly   • metFORMIN (GLUCOPHAGE) tablet 500 mg BID AC   • mirtazapine (REMERON) orally disintegrating tab 15 mg QHS   • tamsulosin (FLOMAX) capsule 0.4 mg AFTER DINNER   • vitamin B comp+C (ALLBEE WITH C) 1 Tab DAILY   • therapeutic multivitamin-minerals (THERAGRAN-M) tablet 1 Tab DAILY   • Pharmacy Consult Request ...Pain Management Review 1 Each PHARMACY TO DOSE   • Respiratory Therapy Consult Continuous RT   • tramadol (ULTRAM) 50 MG tablet 50 mg Q4HRS PRN   • acetaminophen (TYLENOL) tablet 1,000 mg TID   • oxyCODONE immediate-release  (ROXICODONE) tablet 5 mg Q3HRS PRN   • oxyCODONE immediate release (ROXICODONE) tablet 10 mg Q3HRS PRN   • acetaminophen (TYLENOL) tablet 650 mg Q4HRS PRN   • enoxaparin (LOVENOX) inj 40 mg QHS   • docusate sodium (COLACE) capsule 200 mg BID    And   • sennosides (SENOKOT) 8.6 MG tablet 17.2 mg DAILY AT NOON    And   • bisacodyl (THE MAGIC BULLET) suppository 10 mg QDAY    And   • magnesium hydroxide (MILK OF MAGNESIA) suspension 30 mL QDAY PRN   • artificial tears ophthalmic solution 1 Drop PRN   • benzocaine-menthol (CEPACOL) lozenge 1 Lozenge Q2HRS PRN   • mag hydrox-al hydrox-simeth (MAALOX PLUS ES or MYLANTA DS) suspension 20 mL Q2HRS PRN   • ondansetron (ZOFRAN ODT) dispertab 4 mg 4X/DAY PRN    Or   • ondansetron (ZOFRAN) syringe/vial injection 4 mg 4X/DAY PRN   • traZODone (DESYREL) tablet 50 mg QHS PRN   • sodium chloride (OCEAN) 0.65 % nasal spray 2 Spray PRN   • lidocaine (LIDODERM) 5 % 2 Patch Daily-0800   • aripiprazole (ABILIFY) tablet 10 mg DAILY   • baclofen (LIORESAL) tablet 10 mg TID   • polyethylene glycol/lytes (MIRALAX) PACKET 1 Packet DAILY   • venlafaxine XR (EFFEXOR XR) capsule 150 mg BID   • insulin regular (HUMULIN R) injection 1-6 Units 4X/DAY ACHS    And   • glucose 4 g chewable tablet 16 g Q15 MIN PRN    And   • dextrose 50% (D50W) injection 50 mL Q15 MIN PRN       Orders Placed This Encounter   Procedures   • Diet Order Diabetic     Standing Status:   Standing     Number of Occurrences:   1     Order Specific Question:   Diet:     Answer:   Diabetic [3]       Assessment:  Active Hospital Problems    Diagnosis   • *Cervical myelopathy (HCC)   • Postoperative pain   • Deep vein thrombosis (HCC)   • Insomnia due to medical condition   • Neurogenic bowel   • Depression   • Thoracic myelopathy   • Neuropathic pain   • Hyperlipidemia   • Hypertension   • S/P BKA (below knee amputation) (HCC)       Medical Decision Making and Plan:  C2 AIS D, 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  Status post multiple spine surgeries, C3-T7 posterior spinal fusion on 6/5, T12-pelvis posterior spinal fusion on 6/10 by Dr Marsh.   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 noon, Colace 200 mg twice daily, Dulcolax suppository with digital stimulation,   -DC MiraLAX 1 packet daily  -Discussed program with patient and importance of appropriate management of neurogenic bowel with repercussions of colonic dilation and possible rupture  -KUB showed no significant stool load in ascending or transverse colon      orthostatic hypotension: On 6/19- 82/52 with change in position  Because of significant autonomic dysfunction associated with spinal cord injury, the patient is at high risk for orthostatic hypotension.  - FAMILIA hose on prior to out of bed  - Midodrine 5 mg every 4 hours PRN systolic blood pressure less than 100  - P.o. fluid intake, goal 1.5-2 L/day  -Consult hospitalist, discussion with hospitalist about decreasing dose of lisinopril to 20 mg    Spasticity: MAS of 1-1+/4 jumping of bilateral lower extremities, greater in hip flexors  - Baclofen 10  mg 3 times daily     Decreased range of motion of right hip: In differential diagnosis includes heterotopic ossification of the right hip after multiple spinal surgeries  - x-ray right hip, showed no signs of heterotopic ossification  -CRP is less than 2 likely negative     Pain:  #Neuropathic pain: Neuropathy, complicated by diabetes and a cervical myelopathy  -increase Gabapentin 800 mg tid  -Tylenol 1000 mg 3 times daily     #Postoperative pain:  -Tramadol  mg every 4 hours as needed moderate to severe pain first-line  -Oxycodone 5-10 mg every 3 hours as needed moderate to severe pain, second line  -Tylenol 1000 mg 3 times daily, check CMP in a.m. to evaluate for LFTs given high-dose Tylenol  -Lidocaine patches to either side of incision on for 12 hours off for 12 hours     Hypertension:  -Lovastatin 10 mg nightly     Type 2 diabetes: With hyperglycemia  -Metformin 500 mg twice daily     Insomnia:  -Remeron 15 mg nightly  - Melatonin 3 mg nightly     Depression:  -Effexor  mg twice daily     Vitamin D deficiency  -Vitamin D pending     DVT prophylaxis: Patient has previous history of DVT in 2011 making him higher risk for clot formation  -Lovenox 40 mg daily    Total time:  36 minutes.  I spent greater than 50% of the time for patient care and coordination on this date, including unit/floor time, and face-to-face time with the patient as per assessment and plan above including orthostatic hypotension, called to the room, resolved quickly with change in position, decrease dose of lisinopril to 20 mg daily, Midodrine 5 mg every 4 hours PRN systolic blood pressure less than 100, fluid goal, consult hospitalist, neurogenic bowel, DC MiraLAX.    Rj Navarro D.O.

## 2020-06-19 NOTE — THERAPY
Physical Therapy   Initial Evaluation     Patient Name: Gaurav Lopez  Age:  75 y.o., Sex:  male  Medical Record #: [id number]  Today's Date: 6/19/2020     Subjective    Patient agreeable to PT.     Objective       06/19/20 1331   Prior Living Situation   Prior Services None   Housing / Facility 1 Story House   Steps Into Home 3   Steps In Home 0   Rail Right Rail (Steps into Home)   Elevator No   Equipment Owned Front-Wheel Walker  (Left BKA prosthesis)   Lives with - Patient's Self Care Capacity Spouse   Comments Patient's subjective portion of evaluation variable throughout session due to hypotension and orthostatic hypotension today; will continue to monitor.   Prior Level of Functional Mobility   Bed Mobility Independent   Transfer Status Independent   Ambulation Independent   Distance Ambulation (Feet)   (community distances)   Assistive Devices Used   (R BKA prosthesis)   Stairs Required Assist   Prior Functioning: Everyday Activities   Self Care Needed some help   Indoor Mobility (Ambulation) Independent   Stairs Needed some help   Functional Cognition Needed some help   Prior Device Use Orthotics/Prosthetics   Vitals   O2 (LPM) 0   O2 Delivery Device None - Room Air   Vitals Comments Patient had hypotension in sitting this session as per flowsheet.   Cognition    Level of Consciousness Alert  (but pt's confusion increases as BP decreases)   Ability To Follow Commands 1 Step   Safety Awareness Impaired   New Learning Impaired   Attention Impaired   Passive ROM Lower Body   Passive ROM Lower Body WDL   Comments prior Left BKA   Active ROM Lower Body    Active ROM Lower Body  WDL   Comments prior Left BKA   Strength Lower Body   Lower Body Strength  X   Gross Strength Generalized Weakness, Equal Bilaterally   Comments Grossly 4/5 BLE MMTs but decreases throughout session   Bed Mobility    Supine to Sit Total Assist X 2  (Yue initially but progressed to 2-person A @ end of session)   Sit to Supine Total Assist X  2  (Yue initially but progressed to 2-person A @ end of session)   Sit to Stand Total Assist X 2   Scooting Total Assist X 2   Rolling Total Assist X 2 to Lt.;Total Assist X 2 to Rt.   Roll Left and Right   Assistance Needed Physical assistance   Physical Assistance Level Total assistance   CARE Score 1   Roll Left and Right Discharge Goal   Discharge Goal Independent   Sit to Lying   Assistance Needed Physical assistance   Physical Assistance Level Total assistance   CARE Score 1   Sit to Lying Discharge Goal   Discharge Goal Independent   Lying to Sitting on Side of Bed   Assistance Needed Physical assistance   Physical Assistance Level Total assistance   CARE Score 1   Lying to Sitting on Side of Bed Discharge Goal   Discharge Goal Independent   Sit to Stand   Assistance Needed Physical assistance   Physical Assistance Level Total assistance   CARE Score 1   Sit to Stand Discharge Goal   Discharge Goal Independent   Chair/Bed-to-Chair Transfer   Assistance Needed Physical assistance   Physical Assistance Level Total assistance   CARE Score 1   Chair/Bed-to-Chair Transfer Discharge Goal   Discharge Goal Independent   Toilet Transfer   Assistance Needed Physical assistance   Physical Assistance Level Total assistance   CARE Score 1   Car Transfer   Reason if not Attempted Medical concerns   CARE Score 88   Car Transfer Discharge Goal   Discharge Goal Supervision or touching assistance   Walk 10 Feet   Reason if not Attempted Medical concerns   CARE Score 88   Walk 10 Feet Discharge Goal   Discharge Goal Supervision or touching assistance   Walk 50 Feet with Two Turns   Reason if not Attempted Medical concerns   CARE Score 88   Walk 50 Feet with Two Turns Discharge Goal   Discharge Goal Supervision or touching assistance   Walk 150 Feet   Reason if not Attempted Medical concerns   CARE Score 88   Walk 150 Feet Discharge Goal   Discharge Goal Supervision or touching assistance   Walking 10 Feet on Uneven Surfaces    Reason if not Attempted Medical concerns   CARE Score 88   Walking 10 Feet on Uneven Surfaces Discharge Goal   Discharge Goal Supervision or touching assistance   1 Step (Curb)   Reason if not Attempted Medical concerns   CARE Score 88   1 Step (Curb) Discharge Goal   Discharge Goal Partial/moderate assistance   4 Steps   Reason if not Attempted Medical concerns   CARE Score 88   4 Steps Discharge Goal   Discharge Goal Partial/moderate assistance   12 Steps   Reason if not Attempted Medical concerns   CARE Score 88   12 Steps Discharge Goal   Discharge Goal Partial/moderate assistance   Picking Up Object   Reason if not Attempted Medical concerns   CARE Score 88   Picking Up Object Discharge Goal   Discharge Goal Partial/moderate assistance   Wheel 50 Feet with Two Turns   Assistance Needed Supervision   Physical Assistance Level No physical assistance   CARE Score 4   Type of Wheelchair/Scooter Manual   Wheel 50 Feet with Two Turns Discharge Goal   Discharge Goal Independent   Wheel 150 Feet   Reason if not Attempted Safety concerns   CARE Score 88   Type of Wheelchair/Scooter Manual   Wheel 150 Feet Discharge Goal   Discharge Goal Independent   Gait Functional Level of Assist    Gait Level Of Assist Unable to Participate   Deviation   (Patient unable due to hypotension today)   Wheelchair Functional Level of Assist   Wheelchair Assist Stand by Assist   Distance Wheelchair (Feet or Distance) 146   Wheelchair Description Adaptive equipment;Extra time;Leg rest management;Limited by fatigue;Supervision for safety;Verbal cueing   Stairs Functional Level of Assist   Level of Assist with Stairs Unable to Participate   # of Stairs Climbed 0   Stairs Description Safety concerns   Transfer Functional Level of Assist   Bed, Chair, Wheelchair Transfer Total Assist X 2   Bed Chair Wheelchair Transfer Description Adaptive equipment;Assist with two limbs;Increased time;Set-up of equipment;Squat pivot transfer to  wheelchair;Supervision for safety;Verbal cueing   Toilet Transfers Total Assist X 2   Toilet Transfer Description Adaptive equipment;Grab bar;Increased time;Set-up of equipment;Supervision for safety;Verbal cueing;Verbal cues for spinal precautions   Problem List    Problems Impaired Bed Mobility;Impaired Ambulation;Impaired Transfers;Functional Strength Deficit;Impaired Balance;Decreased Activity Tolerance;Safety Awareness Deficits / Cognition;Limited Knowledge of Post-Op Precautions   Precautions   Precautions Cervical Collar  ;Spinal / Back Precautions ;Fall Risk   Comments LLE prosthesis, pacemaker, orthostatic hypotension   Current Discharge Plan   Current Discharge Plan Return to Prior Living Situation   Interdisciplinary Plan of Care Collaboration   IDT Collaboration with  Certified Nursing Assistant;Occupational Therapist;Nursing;Physician;Hospitalist RN   Patient Position at End of Therapy In Bed;Call Light within Reach;Tray Table within Reach;Phone within Reach;Other (Comments)  (elevated BLE; hospitalist present)   Collaboration Comments bowel incontinence epsiode during session monstly contained; hypotension; return to bed; medical hold; CLOF   Benefit   Therapy Benefit Patient Would Benefit from Inpatient Rehabilitation Physical Therapy to Maximize Functional [place] with ADLs, IADLs and Mobility.   Therapy Missed   Missed Therapy (Minutes) 30   Reason For Missed Therapy Medical - Patient has Bowel Issues;Medical - Patient on Hold from Therapy;Medical - Other (Please Comment)  (orthostatic hypotension requiring supine with elevated BLE)   PT Total Time Spent   PT Individual Total Time Spent (Mins) 60   PT Charge Group   PT Therapeutic Activities 1   PT Evaluation PT Evaluation Mod       Assessment  Patient is 75 y.o. male with a diagnosis of Spinal Cord Dysfunction, Non-Traumatic: Quadriplegia, Incomplete C1-4 due to the etiologic diagnosis of Cervical myelopathy.  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      Plan  Recommend Physical Therapy  minutes per day 5-7 days per week for 5 weeks for the following treatments:  PT Group Therapy, PT E Stim Attended, PT Prosthetic Training, PT Gait Training, PT Self Care/Home Eval, PT Therapeutic Exercises, PT TENS Application, PT Neuro Re-Ed/Balance, PT Aquatic Therapy, PT Therapeutic Activity and PT Manual Therapy.    Goals:  Long term and short term goals have been discussed with patient and they are in agreement.    Physical Therapy Problems     Problem: Balance     Dates: Start: 06/19/20       Goal: STG-Within one week, patient will maintain static standing     Dates: Start: 06/19/20       Description: 1) Individualized goal:  // bars, c/s collar, L BKA prosthesis, gait belt, at Min A level x3 minutes  2) Interventions:  PT Group Therapy, PT E Stim Attended, PT Prosthetic Training, PT Gait Training, PT Therapeutic Exercises, PT TENS Application, PT Neuro Re-Ed/Balance, PT Therapeutic Activity, and PT Manual Therapy          Goal: STG-Within one week, patient will maintain static sitting     Dates: Start: 06/19/20       Description: 1) Individualized goal:  EOM or unsupported, c/s collar, L BKA prosthesis, gait belt, at CGA level x3 minutes  2) Interventions:  PT Group Therapy, PT E Stim Attended, PT Prosthetic Training, PT Gait Training, PT Therapeutic Exercises, PT TENS Application, PT Neuro Re-Ed/Balance, PT Therapeutic Activity, and PT Manual Therapy                  Problem: Mobility     Dates: Start: 06/19/20       Goal: STG-Within one  week, patient will ambulate household distance     Dates: Start: 06/19/20       Description: 1) Individualized goal:  // bars x10 feet, c/s collar, L BKA prosthesis, gait belt, at Mod A level with w/c follow prn  2) Interventions:  PT Group Therapy, PT E Stim Attended, PT Prosthetic Training, PT Gait Training, PT Therapeutic Exercises, PT TENS Application, PT Neuro Re-Ed/Balance, PT Therapeutic Activity, and PT Manual Therapy            Goal: STG-Within one week, patient will propel wheelchair community     Dates: Start: 06/19/20       Description: 1) Individualized goal:  BLE propulsion x150 feet, c/s collar, L BKA prosthesis, gait belt, at SPV level  2) Interventions:  PT Group Therapy, PT E Stim Attended, PT Prosthetic Training, PT Gait Training, PT Therapeutic Exercises, PT TENS Application, PT Neuro Re-Ed/Balance, PT Therapeutic Activity, and PT Manual Therapy                  Problem: Mobility Transfers     Dates: Start: 06/19/20       Goal: STG-Within one week, patient will perform bed mobility     Dates: Start: 06/19/20       Description: 1) Individualized goal:  Mod A supine<>sit, c/s collar, L BKA prosthesis, bed rail  2) Interventions:  PT Group Therapy, PT E Stim Attended, PT Prosthetic Training, PT Gait Training, PT Therapeutic Exercises, PT TENS Application, PT Neuro Re-Ed/Balance, PT Therapeutic Activity, and PT Manual Therapy          Goal: STG-Within one week, patient will sit to stand     Dates: Start: 06/19/20       Description: 1) Individualized goal:  // bars, c/s collar, L BKA prosthesis, gait belt, at Min A level consistently  2) Interventions:  PT Group Therapy, PT E Stim Attended, PT Prosthetic Training, PT Gait Training, PT Therapeutic Exercises, PT TENS Application, PT Neuro Re-Ed/Balance, PT Therapeutic Activity, and PT Manual Therapy          Goal: STG-Within one week, patient will transfer bed to chair     Dates: Start: 06/19/20       Description: 1) Individualized goal:  Mod A  supine<>sit, c/s collar, L BKA prosthesis, bed rail, gait belt  2) Interventions:  PT Group Therapy, PT E Stim Attended, PT Prosthetic Training, PT Gait Training, PT Therapeutic Exercises, PT TENS Application, PT Neuro Re-Ed/Balance, PT Therapeutic Activity, and PT Manual Therapy                  Problem: PT-Long Term Goals     Dates: Start: 06/19/20       Goal: LTG-By discharge, patient will ambulate     Dates: Start: 06/19/20       Description: 1) Individualized goal:  150 feet with FWW, c/s collar, L BKA prosthesis, at SBA level  2) Interventions:  PT Group Therapy, PT E Stim Attended, PT Prosthetic Training, PT Gait Training, PT Therapeutic Exercises, PT TENS Application, PT Neuro Re-Ed/Balance, PT Therapeutic Activity, and PT Manual Therapy          Goal: LTG-By discharge, patient will transfer one surface to another     Dates: Start: 06/19/20       Description: 1) Individualized goal:  with FWW, c/s collar, L BKA prosthesis, at SBA level  2) Interventions:  PT Group Therapy, PT E Stim Attended, PT Prosthetic Training, PT Gait Training, PT Therapeutic Exercises, PT TENS Application, PT Neuro Re-Ed/Balance, PT Therapeutic Activity, and PT Manual Therapy            Goal: LTG-By discharge, patient will ambulate up/down 4-6 stairs     Dates: Start: 06/19/20       Description: 1) Individualized goal:  with 1 railing, c/s collar, L BKA prosthesis, gait belt, at Min A level  2) Interventions:  PT Group Therapy, PT E Stim Attended, PT Prosthetic Training, PT Gait Training, PT Therapeutic Exercises, PT TENS Application, PT Neuro Re-Ed/Balance, PT Therapeutic Activity, and PT Manual Therapy          Goal: LTG-By discharge, patient will transfer in/out of a car     Dates: Start: 06/19/20       Description: 1) Individualized goal:  with FWW, c/s collar, L BKA prosthesis, at SBA level  2) Interventions:  PT Group Therapy, PT E Stim Attended, PT Prosthetic Training, PT Gait Training, PT Therapeutic Exercises, PT TENS  Application, PT Neuro Re-Ed/Balance, PT Therapeutic Activity, and PT Manual Therapy

## 2020-06-19 NOTE — CARE PLAN
Problem: Safety  Goal: Will remain free from injury  Outcome: PROGRESSING AS EXPECTED  Note: Call light with in reach. Redirection to use call light for assistance. Non skid socks. Upper siderails up x2 while in bed. HS FS with in parameters. Aspen collar on.     Problem: Bowel/Gastric:  Goal: Normal bowel function is maintained or improved  Outcome: PROGRESSING AS EXPECTED  Note: HS bowel program with medium formed soft BM results.   Will continue to monitor.

## 2020-06-20 LAB
GLUCOSE BLD-MCNC: 112 MG/DL (ref 65–99)
GLUCOSE BLD-MCNC: 124 MG/DL (ref 65–99)
GLUCOSE BLD-MCNC: 91 MG/DL (ref 65–99)
GLUCOSE BLD-MCNC: 99 MG/DL (ref 65–99)

## 2020-06-20 PROCEDURE — 700101 HCHG RX REV CODE 250: Performed by: PHYSICAL MEDICINE & REHABILITATION

## 2020-06-20 PROCEDURE — 82962 GLUCOSE BLOOD TEST: CPT | Mod: 91

## 2020-06-20 PROCEDURE — 700102 HCHG RX REV CODE 250 W/ 637 OVERRIDE(OP): Performed by: HOSPITALIST

## 2020-06-20 PROCEDURE — A9270 NON-COVERED ITEM OR SERVICE: HCPCS | Performed by: PHYSICAL MEDICINE & REHABILITATION

## 2020-06-20 PROCEDURE — 700111 HCHG RX REV CODE 636 W/ 250 OVERRIDE (IP): Performed by: PHYSICAL MEDICINE & REHABILITATION

## 2020-06-20 PROCEDURE — 770010 HCHG ROOM/CARE - REHAB SEMI PRIVAT*

## 2020-06-20 PROCEDURE — 700112 HCHG RX REV CODE 229: Performed by: PHYSICAL MEDICINE & REHABILITATION

## 2020-06-20 PROCEDURE — 99232 SBSQ HOSP IP/OBS MODERATE 35: CPT | Performed by: HOSPITALIST

## 2020-06-20 PROCEDURE — A9270 NON-COVERED ITEM OR SERVICE: HCPCS | Performed by: HOSPITALIST

## 2020-06-20 PROCEDURE — 700102 HCHG RX REV CODE 250 W/ 637 OVERRIDE(OP): Performed by: PHYSICAL MEDICINE & REHABILITATION

## 2020-06-20 PROCEDURE — 99231 SBSQ HOSP IP/OBS SF/LOW 25: CPT | Performed by: PHYSICAL MEDICINE & REHABILITATION

## 2020-06-20 RX ADMIN — GABAPENTIN 800 MG: 400 CAPSULE ORAL at 08:45

## 2020-06-20 RX ADMIN — LIDOCAINE 2 PATCH: 50 PATCH TOPICAL at 08:53

## 2020-06-20 RX ADMIN — BISACODYL 10 MG: 10 SUPPOSITORY RECTAL at 20:06

## 2020-06-20 RX ADMIN — BACLOFEN 10 MG: 10 TABLET ORAL at 20:17

## 2020-06-20 RX ADMIN — ARIPIPRAZOLE 10 MG: 5 TABLET ORAL at 08:43

## 2020-06-20 RX ADMIN — TAMSULOSIN HYDROCHLORIDE 0.4 MG: 0.4 CAPSULE ORAL at 17:54

## 2020-06-20 RX ADMIN — MELATONIN 2000 UNITS: at 08:43

## 2020-06-20 RX ADMIN — ACETAMINOPHEN 1000 MG: 500 TABLET, FILM COATED ORAL at 20:16

## 2020-06-20 RX ADMIN — ACETAMINOPHEN 1000 MG: 500 TABLET, FILM COATED ORAL at 16:15

## 2020-06-20 RX ADMIN — GABAPENTIN 800 MG: 400 CAPSULE ORAL at 20:06

## 2020-06-20 RX ADMIN — TRAZODONE HYDROCHLORIDE 50 MG: 50 TABLET ORAL at 23:34

## 2020-06-20 RX ADMIN — MIRTAZAPINE 15 MG: 15 TABLET, ORALLY DISINTEGRATING ORAL at 20:17

## 2020-06-20 RX ADMIN — ACETAMINOPHEN 1000 MG: 500 TABLET, FILM COATED ORAL at 08:49

## 2020-06-20 RX ADMIN — LOVASTATIN 10 MG: 20 TABLET ORAL at 20:16

## 2020-06-20 RX ADMIN — ASPIRIN 81 MG: 81 TABLET, COATED ORAL at 08:45

## 2020-06-20 RX ADMIN — VENLAFAXINE HYDROCHLORIDE 150 MG: 75 CAPSULE, EXTENDED RELEASE ORAL at 08:43

## 2020-06-20 RX ADMIN — FINASTERIDE 5 MG: 5 TABLET, FILM COATED ORAL at 08:43

## 2020-06-20 RX ADMIN — METFORMIN HYDROCHLORIDE 250 MG: 500 TABLET ORAL at 08:44

## 2020-06-20 RX ADMIN — BACLOFEN 10 MG: 10 TABLET ORAL at 08:53

## 2020-06-20 RX ADMIN — DOCUSATE SODIUM 100 MG: 100 CAPSULE, LIQUID FILLED ORAL at 08:46

## 2020-06-20 RX ADMIN — BACLOFEN 10 MG: 10 TABLET ORAL at 16:15

## 2020-06-20 RX ADMIN — METFORMIN HYDROCHLORIDE 250 MG: 500 TABLET ORAL at 17:54

## 2020-06-20 RX ADMIN — DOCUSATE SODIUM 200 MG: 100 CAPSULE, LIQUID FILLED ORAL at 20:17

## 2020-06-20 RX ADMIN — MULTIPLE VITAMINS W/ MINERALS TAB 1 TABLET: TAB at 08:43

## 2020-06-20 RX ADMIN — ENOXAPARIN SODIUM 40 MG: 100 INJECTION SUBCUTANEOUS at 20:15

## 2020-06-20 RX ADMIN — VENLAFAXINE HYDROCHLORIDE 150 MG: 75 CAPSULE, EXTENDED RELEASE ORAL at 20:17

## 2020-06-20 RX ADMIN — VITAMIN C 1 TABLET: TAB at 08:43

## 2020-06-20 RX ADMIN — STANDARDIZED SENNA CONCENTRATE 17.2 MG: 8.6 TABLET ORAL at 13:34

## 2020-06-20 ASSESSMENT — ENCOUNTER SYMPTOMS
FEVER: 0
NAUSEA: 0
PALPITATIONS: 0
DIZZINESS: 0
VOMITING: 0
SHORTNESS OF BREATH: 0
BLURRED VISION: 0
HALLUCINATIONS: 0
HEADACHES: 0

## 2020-06-20 ASSESSMENT — PATIENT HEALTH QUESTIONNAIRE - PHQ9
1. LITTLE INTEREST OR PLEASURE IN DOING THINGS: NOT AT ALL
SUM OF ALL RESPONSES TO PHQ9 QUESTIONS 1 AND 2: 0
2. FEELING DOWN, DEPRESSED, IRRITABLE, OR HOPELESS: NOT AT ALL

## 2020-06-20 ASSESSMENT — FIBROSIS 4 INDEX: FIB4 SCORE: 0.37

## 2020-06-20 NOTE — PROGRESS NOTES
"Rehab Progress Note     CC: weakness    Interval Events (Subjective)  Patient is doing well. No complaints. Patient reports pain is controlled, sleeping well, and overall feeling ok. No new issues.     No new hypotension since Friday. Asymptomatic       Objective:  VITAL SIGNS: /76   Pulse 88   Temp 36.5 °C (97.7 °F) (Oral)   Resp 18   Ht 1.753 m (5' 9\")   Wt 76 kg (167 lb 8.8 oz)   SpO2 96%   BMI 24.74 kg/m²   Gen: NAD  Psych: Mood & Affect appropriate   CV: RRR, No cyanosis  Resp: CTAB  Abd: NTND  Skin: No visible rashes or lesions  Neuro: Answers questions appropriately, Following commands     Recent Results (from the past 72 hour(s))   ACCU-CHEK GLUCOSE    Collection Time: 06/18/20  5:26 PM   Result Value Ref Range    Glucose - Accu-Ck 134 (H) 65 - 99 mg/dL   ACCU-CHEK GLUCOSE    Collection Time: 06/18/20  8:59 PM   Result Value Ref Range    Glucose - Accu-Ck 113 (H) 65 - 99 mg/dL   CBC with Differential    Collection Time: 06/19/20  5:12 AM   Result Value Ref Range    WBC 8.4 4.8 - 10.8 K/uL    RBC 2.94 (L) 4.70 - 6.10 M/uL    Hemoglobin 8.7 (L) 14.0 - 18.0 g/dL    Hematocrit 28.3 (L) 42.0 - 52.0 %    MCV 96.3 81.4 - 97.8 fL    MCH 29.6 27.0 - 33.0 pg    MCHC 30.7 (L) 33.7 - 35.3 g/dL    RDW 48.4 35.9 - 50.0 fL    Platelet Count 653 (H) 164 - 446 K/uL    MPV 8.9 (L) 9.0 - 12.9 fL    Neutrophils-Polys 68.60 44.00 - 72.00 %    Lymphocytes 19.50 (L) 22.00 - 41.00 %    Monocytes 7.60 0.00 - 13.40 %    Eosinophils 1.90 0.00 - 6.90 %    Basophils 0.70 0.00 - 1.80 %    Immature Granulocytes 1.70 (H) 0.00 - 0.90 %    Nucleated RBC 0.00 /100 WBC    Neutrophils (Absolute) 5.77 1.82 - 7.42 K/uL    Lymphs (Absolute) 1.64 1.00 - 4.80 K/uL    Monos (Absolute) 0.64 0.00 - 0.85 K/uL    Eos (Absolute) 0.16 0.00 - 0.51 K/uL    Baso (Absolute) 0.06 0.00 - 0.12 K/uL    Immature Granulocytes (abs) 0.14 (H) 0.00 - 0.11 K/uL    NRBC (Absolute) 0.00 K/uL   Comp Metabolic Panel (CMP)    Collection Time: 06/19/20  5:12 AM "   Result Value Ref Range    Sodium 141 135 - 145 mmol/L    Potassium 4.0 3.6 - 5.5 mmol/L    Chloride 105 96 - 112 mmol/L    Co2 27 20 - 33 mmol/L    Anion Gap 9.0 7.0 - 16.0    Glucose 129 (H) 65 - 99 mg/dL    Bun 18 8 - 22 mg/dL    Creatinine 0.69 0.50 - 1.40 mg/dL    Calcium 8.8 8.5 - 10.5 mg/dL    AST(SGOT) 16 12 - 45 U/L    ALT(SGPT) 23 2 - 50 U/L    Alkaline Phosphatase 79 30 - 99 U/L    Total Bilirubin 0.2 0.1 - 1.5 mg/dL    Albumin 3.4 3.2 - 4.9 g/dL    Total Protein 6.0 6.0 - 8.2 g/dL    Globulin 2.6 1.9 - 3.5 g/dL    A-G Ratio 1.3 g/dL   HEMOGLOBIN A1C    Collection Time: 06/19/20  5:12 AM   Result Value Ref Range    Glycohemoglobin 5.6 0.0 - 5.6 %    Est Avg Glucose 114 mg/dL   Lipid Profile (Lipid Panel)    Collection Time: 06/19/20  5:12 AM   Result Value Ref Range    Cholesterol,Tot 182 100 - 199 mg/dL    Triglycerides 296 (H) 0 - 149 mg/dL    HDL 30 (A) >=40 mg/dL    LDL 93 <100 mg/dL   Magnesium    Collection Time: 06/19/20  5:12 AM   Result Value Ref Range    Magnesium 2.1 1.5 - 2.5 mg/dL   Phosphorus    Collection Time: 06/19/20  5:12 AM   Result Value Ref Range    Phosphorus 3.1 2.5 - 4.5 mg/dL   Prothrombin time    Collection Time: 06/19/20  5:12 AM   Result Value Ref Range    PT 13.0 12.0 - 14.6 sec    INR 0.96 0.87 - 1.13   TSH with Reflex to FT4    Collection Time: 06/19/20  5:12 AM   Result Value Ref Range    TSH 0.720 0.380 - 5.330 uIU/mL   Vitamin D, 25-hydroxy (blood)    Collection Time: 06/19/20  5:12 AM   Result Value Ref Range    25-Hydroxy   Vitamin D 25 28 (L) 30 - 100 ng/mL   ESTIMATED GFR    Collection Time: 06/19/20  5:12 AM   Result Value Ref Range    GFR If African American >60 >60 mL/min/1.73 m 2    GFR If Non African American >60 >60 mL/min/1.73 m 2   ACCU-CHEK GLUCOSE    Collection Time: 06/19/20  7:38 AM   Result Value Ref Range    Glucose - Accu-Ck 111 (H) 65 - 99 mg/dL   ACCU-CHEK GLUCOSE    Collection Time: 06/19/20 11:32 AM   Result Value Ref Range    Glucose - Accu-Ck  104 (H) 65 - 99 mg/dL   ACCU-CHEK GLUCOSE    Collection Time: 06/19/20  5:34 PM   Result Value Ref Range    Glucose - Accu-Ck 94 65 - 99 mg/dL   ACCU-CHEK GLUCOSE    Collection Time: 06/19/20  8:19 PM   Result Value Ref Range    Glucose - Accu-Ck 133 (H) 65 - 99 mg/dL   ACCU-CHEK GLUCOSE    Collection Time: 06/20/20  7:54 AM   Result Value Ref Range    Glucose - Accu-Ck 99 65 - 99 mg/dL       Current Facility-Administered Medications   Medication Frequency   • metFORMIN (GLUCOPHAGE) tablet 250 mg BID AC   • midodrine (PROAMATINE) tablet 5 mg Q4HRS PRN   • aspirin EC (ECOTRIN) tablet 81 mg DAILY   • vitamin D (cholecalciferol) tablet 2,000 Units DAILY   • finasteride (PROSCAR) tablet 5 mg DAILY   • gabapentin (NEURONTIN) capsule 800 mg BID   • lovastatin (MEVACOR) tablet 10 mg Nightly   • mirtazapine (REMERON) orally disintegrating tab 15 mg QHS   • tamsulosin (FLOMAX) capsule 0.4 mg AFTER DINNER   • vitamin B comp+C (ALLBEE WITH C) 1 Tab DAILY   • therapeutic multivitamin-minerals (THERAGRAN-M) tablet 1 Tab DAILY   • Pharmacy Consult Request ...Pain Management Review 1 Each PHARMACY TO DOSE   • Respiratory Therapy Consult Continuous RT   • tramadol (ULTRAM) 50 MG tablet 50 mg Q4HRS PRN   • acetaminophen (TYLENOL) tablet 1,000 mg TID   • oxyCODONE immediate-release (ROXICODONE) tablet 5 mg Q3HRS PRN   • oxyCODONE immediate release (ROXICODONE) tablet 10 mg Q3HRS PRN   • acetaminophen (TYLENOL) tablet 650 mg Q4HRS PRN   • enoxaparin (LOVENOX) inj 40 mg QHS   • docusate sodium (COLACE) capsule 200 mg BID    And   • sennosides (SENOKOT) 8.6 MG tablet 17.2 mg DAILY AT NOON    And   • bisacodyl (THE MAGIC BULLET) suppository 10 mg QDAY    And   • magnesium hydroxide (MILK OF MAGNESIA) suspension 30 mL QDAY PRN   • artificial tears ophthalmic solution 1 Drop PRN   • benzocaine-menthol (CEPACOL) lozenge 1 Lozenge Q2HRS PRN   • mag hydrox-al hydrox-simeth (MAALOX PLUS ES or MYLANTA DS) suspension 20 mL Q2HRS PRN   •  ondansetron (ZOFRAN ODT) dispertab 4 mg 4X/DAY PRN    Or   • ondansetron (ZOFRAN) syringe/vial injection 4 mg 4X/DAY PRN   • traZODone (DESYREL) tablet 50 mg QHS PRN   • sodium chloride (OCEAN) 0.65 % nasal spray 2 Spray PRN   • lidocaine (LIDODERM) 5 % 2 Patch Daily-0800   • aripiprazole (ABILIFY) tablet 10 mg DAILY   • baclofen (LIORESAL) tablet 10 mg TID   • venlafaxine XR (EFFEXOR XR) capsule 150 mg BID   • insulin regular (HUMULIN R) injection 1-6 Units 4X/DAY ACHS    And   • glucose 4 g chewable tablet 16 g Q15 MIN PRN    And   • dextrose 50% (D50W) injection 50 mL Q15 MIN PRN       Orders Placed This Encounter   Procedures   • Diet Order Diabetic     Standing Status:   Standing     Number of Occurrences:   1     Order Specific Question:   Diet:     Answer:   Diabetic [3]       Assessment:  Active Hospital Problems    Diagnosis   • *Cervical myelopathy (HCC)   • Vitamin D insufficiency   • Diabetes ([facility])   • Postoperative pain   • BPH (benign prostatic hyperplasia)   • Insomnia due to medical condition   • Neurogenic bowel   • Depression   • Thoracic myelopathy   • Neuropathic pain   • Anemia   • Hyperlipidemia   • Hypertension   • S/P BKA (below knee amputation) ([facility])       Medical Decision Making and Plan:  Patient is admitted to [facility] for ongoing PT, OT and/or SLP.  Continue medical management per primary team.      No changes     Total time:  15 minutes.  I spent greater than 50% of the time for patient care and coordination on this date, including unit/floor time, and face-to-face time with the patient as per assessment and plan above.      Josue Barraza, DO   Physical Medicine and Rehabilitation

## 2020-06-20 NOTE — FLOWSHEET NOTE
06/19/20 1924   Events/Summary/Plan   Events/Summary/Plan SpO2 check, IS   Vital Signs   Pulse 84   Respiration 16   Pulse Oximetry 96 %   $ Pulse Oximetry (Spot Check) Yes   Respiratory Assessment   Level of Consciousness Alert   Oxygen   O2 Delivery Device None - Room Air

## 2020-06-20 NOTE — PROGRESS NOTES
Change of shift report obtained, vitals wnl, denies pain. Patient completed HS bowel program, incontinent of bladder. Pt slept comfortably all shift - no other issues or concerns.
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
## 2020-06-20 NOTE — FLOWSHEET NOTE
06/19/20 1925   Incentive Spirometry Treatment   Incentive Spirometer Volume 3000 mL  (3000cc typical, can do 3500 intermittantly)

## 2020-06-20 NOTE — CARE PLAN
Problem: Communication  Goal: The ability to communicate needs accurately and effectively will improve  Outcome: PROGRESSING AS EXPECTED     Problem: Venous Thromboembolism (VTW)/Deep Vein Thrombosis (DVT) Prevention:  Goal: Patient will participate in Venous Thrombosis (VTE)/Deep Vein Thrombosis (DVT)Prevention Measures  Outcome: PROGRESSING AS EXPECTED

## 2020-06-20 NOTE — CARE PLAN
Problem: Bowel/Gastric:  Goal: Normal bowel function is maintained or improved  Outcome: PROGRESSING AS EXPECTED  Had a bowel incontinence of very loose BM during therapy time.

## 2020-06-20 NOTE — CARE PLAN
Problem: Safety  Goal: Will remain free from injury  Outcome: PROGRESSING AS EXPECTED     Problem: Infection  Goal: Will remain free from infection  Outcome: PROGRESSING AS EXPECTED     Problem: Venous Thromboembolism (VTW)/Deep Vein Thrombosis (DVT) Prevention:  Goal: Patient will participate in Venous Thrombosis (VTE)/Deep Vein Thrombosis (DVT)Prevention Measures  Outcome: PROGRESSING AS EXPECTED     Problem: Bowel/Gastric:  Goal: Normal bowel function is maintained or improved  Outcome: PROGRESSING AS EXPECTED     Problem: Knowledge Deficit  Goal: Knowledge of disease process/condition, treatment plan, diagnostic tests, and medications will improve  Outcome: PROGRESSING AS EXPECTED

## 2020-06-21 LAB
GLUCOSE BLD-MCNC: 106 MG/DL (ref 65–99)
GLUCOSE BLD-MCNC: 145 MG/DL (ref 65–99)
GLUCOSE BLD-MCNC: 96 MG/DL (ref 65–99)
GLUCOSE BLD-MCNC: 97 MG/DL (ref 65–99)

## 2020-06-21 PROCEDURE — 97535 SELF CARE MNGMENT TRAINING: CPT

## 2020-06-21 PROCEDURE — A9270 NON-COVERED ITEM OR SERVICE: HCPCS | Performed by: HOSPITALIST

## 2020-06-21 PROCEDURE — 770010 HCHG ROOM/CARE - REHAB SEMI PRIVAT*

## 2020-06-21 PROCEDURE — 99232 SBSQ HOSP IP/OBS MODERATE 35: CPT | Performed by: HOSPITALIST

## 2020-06-21 PROCEDURE — 700111 HCHG RX REV CODE 636 W/ 250 OVERRIDE (IP): Performed by: PHYSICAL MEDICINE & REHABILITATION

## 2020-06-21 PROCEDURE — 700102 HCHG RX REV CODE 250 W/ 637 OVERRIDE(OP): Performed by: PHYSICAL MEDICINE & REHABILITATION

## 2020-06-21 PROCEDURE — 700112 HCHG RX REV CODE 229: Performed by: PHYSICAL MEDICINE & REHABILITATION

## 2020-06-21 PROCEDURE — 97530 THERAPEUTIC ACTIVITIES: CPT

## 2020-06-21 PROCEDURE — 700102 HCHG RX REV CODE 250 W/ 637 OVERRIDE(OP): Performed by: HOSPITALIST

## 2020-06-21 PROCEDURE — A9270 NON-COVERED ITEM OR SERVICE: HCPCS | Performed by: PHYSICAL MEDICINE & REHABILITATION

## 2020-06-21 PROCEDURE — 700101 HCHG RX REV CODE 250: Performed by: PHYSICAL MEDICINE & REHABILITATION

## 2020-06-21 PROCEDURE — 82962 GLUCOSE BLOOD TEST: CPT | Mod: 91

## 2020-06-21 PROCEDURE — 97110 THERAPEUTIC EXERCISES: CPT

## 2020-06-21 RX ADMIN — FINASTERIDE 5 MG: 5 TABLET, FILM COATED ORAL at 08:29

## 2020-06-21 RX ADMIN — DOCUSATE SODIUM 200 MG: 100 CAPSULE, LIQUID FILLED ORAL at 08:29

## 2020-06-21 RX ADMIN — MULTIPLE VITAMINS W/ MINERALS TAB 1 TABLET: TAB at 08:29

## 2020-06-21 RX ADMIN — METFORMIN HYDROCHLORIDE 250 MG: 500 TABLET ORAL at 17:32

## 2020-06-21 RX ADMIN — MIRTAZAPINE 15 MG: 15 TABLET, ORALLY DISINTEGRATING ORAL at 20:03

## 2020-06-21 RX ADMIN — ENOXAPARIN SODIUM 40 MG: 100 INJECTION SUBCUTANEOUS at 20:03

## 2020-06-21 RX ADMIN — DOCUSATE SODIUM 200 MG: 100 CAPSULE, LIQUID FILLED ORAL at 20:02

## 2020-06-21 RX ADMIN — VENLAFAXINE HYDROCHLORIDE 150 MG: 75 CAPSULE, EXTENDED RELEASE ORAL at 20:02

## 2020-06-21 RX ADMIN — GABAPENTIN 800 MG: 400 CAPSULE ORAL at 08:29

## 2020-06-21 RX ADMIN — LOVASTATIN 10 MG: 20 TABLET ORAL at 20:02

## 2020-06-21 RX ADMIN — MELATONIN 2000 UNITS: at 08:29

## 2020-06-21 RX ADMIN — ASPIRIN 81 MG: 81 TABLET, COATED ORAL at 08:28

## 2020-06-21 RX ADMIN — ACETAMINOPHEN 1000 MG: 500 TABLET, FILM COATED ORAL at 20:02

## 2020-06-21 RX ADMIN — ARIPIPRAZOLE 10 MG: 5 TABLET ORAL at 09:14

## 2020-06-21 RX ADMIN — GABAPENTIN 800 MG: 400 CAPSULE ORAL at 20:02

## 2020-06-21 RX ADMIN — MIDODRINE HYDROCHLORIDE 5 MG: 2.5 TABLET ORAL at 09:16

## 2020-06-21 RX ADMIN — STANDARDIZED SENNA CONCENTRATE 17.2 MG: 8.6 TABLET ORAL at 12:04

## 2020-06-21 RX ADMIN — METFORMIN HYDROCHLORIDE 250 MG: 500 TABLET ORAL at 08:29

## 2020-06-21 RX ADMIN — LIDOCAINE 2 PATCH: 50 PATCH TOPICAL at 08:28

## 2020-06-21 RX ADMIN — TAMSULOSIN HYDROCHLORIDE 0.4 MG: 0.4 CAPSULE ORAL at 17:32

## 2020-06-21 RX ADMIN — VENLAFAXINE HYDROCHLORIDE 150 MG: 75 CAPSULE, EXTENDED RELEASE ORAL at 08:29

## 2020-06-21 RX ADMIN — VITAMIN C 1 TABLET: TAB at 08:29

## 2020-06-21 RX ADMIN — ACETAMINOPHEN 1000 MG: 500 TABLET, FILM COATED ORAL at 14:22

## 2020-06-21 RX ADMIN — BACLOFEN 10 MG: 10 TABLET ORAL at 14:22

## 2020-06-21 RX ADMIN — ACETAMINOPHEN 1000 MG: 500 TABLET, FILM COATED ORAL at 08:29

## 2020-06-21 RX ADMIN — BACLOFEN 10 MG: 10 TABLET ORAL at 20:02

## 2020-06-21 RX ADMIN — BISACODYL 10 MG: 10 SUPPOSITORY RECTAL at 20:00

## 2020-06-21 RX ADMIN — BACLOFEN 10 MG: 10 TABLET ORAL at 08:29

## 2020-06-21 ASSESSMENT — ACTIVITIES OF DAILY LIVING (ADL)
TOILET_TRANSFER_DESCRIPTION: GRAB BAR;INCREASED TIME;REQUIRES LIFT;VERBAL CUEING
BED_CHAIR_WHEELCHAIR_TRANSFER_DESCRIPTION: INCREASED TIME;INITIAL PREPARATION FOR TASK;SET-UP OF EQUIPMENT;SUPERVISION FOR SAFETY;VERBAL CUEING
BED_CHAIR_WHEELCHAIR_TRANSFER_DESCRIPTION: INCREASED TIME;REQUIRES LIFT;VERBAL CUEING

## 2020-06-21 ASSESSMENT — FIBROSIS 4 INDEX: FIB4 SCORE: 0.37

## 2020-06-21 ASSESSMENT — PATIENT HEALTH QUESTIONNAIRE - PHQ9
1. LITTLE INTEREST OR PLEASURE IN DOING THINGS: NOT AT ALL
2. FEELING DOWN, DEPRESSED, IRRITABLE, OR HOPELESS: NOT AT ALL
SUM OF ALL RESPONSES TO PHQ9 QUESTIONS 1 AND 2: 0
2. FEELING DOWN, DEPRESSED, IRRITABLE, OR HOPELESS: NOT AT ALL
1. LITTLE INTEREST OR PLEASURE IN DOING THINGS: NOT AT ALL
SUM OF ALL RESPONSES TO PHQ9 QUESTIONS 1 AND 2: 0

## 2020-06-21 ASSESSMENT — ENCOUNTER SYMPTOMS
FEVER: 0
COUGH: 0
DIARRHEA: 0
NERVOUS/ANXIOUS: 0
DIZZINESS: 0
BLURRED VISION: 0
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
## 2020-06-21 NOTE — THERAPY
Physical Therapy   Daily Treatment     Patient Name: Gaurav Lopez  Age:  75 y.o., Sex:  male  Medical Record #: [id number]  Today's Date: 6/21/2020     Precautions  Precautions: (hypotension)  Comments: LLE prosthesis, pacemaker, orthostatic hypotension    Subjective    Pt reports he still feels a little dizzy     Objective       06/21/20 1201   Vitals   Pulse 89   Patient BP Position Sitting   Blood Pressure  (!) 95/69   Pulse Oximetry 97 %   Sitting Lower Body Exercises   Sitting Lower Body Exercises Yes   Ankle Pumps Right ;2 sets of 10   Hip Abduction Bilateral;2 sets of 10   Hip Adduction Bilateral;2 sets of 10   Long Arc Quad Bilateral;2 sets of 10   Marching 2 sets of 10;Reciprocal   Other Exercises Green TB, VCs and demo for form and tehcnique, HEP issued for performing in between session in room to hopefully increase BP somewhat   Interdisciplinary Plan of Care Collaboration   Patient Position at End of Therapy Seated;Call Light within Reach;Tray Table within Reach;Self Releasing Lap Belt Applied;Chair Alarm On   PT Total Time Spent   PT Individual Total Time Spent (Mins) 30   PT Charge Group   PT Therapeutic Exercise 1   PT Therapeutic Activities 1       Assessment    Pt reports he will continue to complete exercises this afternoon as HEP while seated. Pt cont to demo low BP in sitting with orthostatic symptoms - unsafe to stand this session       Plan    Continue to monitor vitals, STS and gait in // bars vs FWW when tolerable, continue exercise, outcome measures, activity tolerance,     Physical Therapy Problems     Problem: Balance     Dates: Start: 06/19/20       Goal: STG-Within one week, patient will maintain static standing     Dates: Start: 06/19/20       Description: 1) Individualized goal:  // bars, c/s collar, L BKA prosthesis, gait belt, at Min A level x3 minutes  2) Interventions:  PT Group Therapy, PT E Stim Attended, PT Prosthetic Training, PT Gait Training, PT Therapeutic Exercises, PT TENS  Application, PT Neuro Re-Ed/Balance, PT Therapeutic Activity, and PT Manual Therapy          Goal: STG-Within one week, patient will maintain static sitting     Dates: Start: 06/19/20       Description: 1) Individualized goal:  EOM or unsupported, c/s collar, L BKA prosthesis, gait belt, at CGA level x3 minutes  2) Interventions:  PT Group Therapy, PT E Stim Attended, PT Prosthetic Training, PT Gait Training, PT Therapeutic Exercises, PT TENS Application, PT Neuro Re-Ed/Balance, PT Therapeutic Activity, and PT Manual Therapy                  Problem: Mobility     Dates: Start: 06/19/20       Goal: STG-Within one week, patient will ambulate household distance     Dates: Start: 06/19/20       Description: 1) Individualized goal:  // bars x10 feet, c/s collar, L BKA prosthesis, gait belt, at Mod A level with w/c follow prn  2) Interventions:  PT Group Therapy, PT E Stim Attended, PT Prosthetic Training, PT Gait Training, PT Therapeutic Exercises, PT TENS Application, PT Neuro Re-Ed/Balance, PT Therapeutic Activity, and PT Manual Therapy            Goal: STG-Within one week, patient will propel wheelchair community     Dates: Start: 06/19/20       Description: 1) Individualized goal:  BLE propulsion x150 feet, c/s collar, L BKA prosthesis, gait belt, at SPV level  2) Interventions:  PT Group Therapy, PT E Stim Attended, PT Prosthetic Training, PT Gait Training, PT Therapeutic Exercises, PT TENS Application, PT Neuro Re-Ed/Balance, PT Therapeutic Activity, and PT Manual Therapy                  Problem: Mobility Transfers     Dates: Start: 06/19/20       Goal: STG-Within one week, patient will perform bed mobility     Dates: Start: 06/19/20       Description: 1) Individualized goal:  Mod A supine<>sit, c/s collar, L BKA prosthesis, bed rail  2) Interventions:  PT Group Therapy, PT E Stim Attended, PT Prosthetic Training, PT Gait Training, PT Therapeutic Exercises, PT TENS Application, PT Neuro Re-Ed/Balance, PT  Therapeutic Activity, and PT Manual Therapy          Goal: STG-Within one week, patient will sit to stand     Dates: Start: 06/19/20       Description: 1) Individualized goal:  // bars, c/s collar, L BKA prosthesis, gait belt, at Min A level consistently  2) Interventions:  PT Group Therapy, PT E Stim Attended, PT Prosthetic Training, PT Gait Training, PT Therapeutic Exercises, PT TENS Application, PT Neuro Re-Ed/Balance, PT Therapeutic Activity, and PT Manual Therapy          Goal: STG-Within one week, patient will transfer bed to chair     Dates: Start: 06/19/20       Description: 1) Individualized goal:  Mod A supine<>sit, c/s collar, L BKA prosthesis, bed rail, gait belt  2) Interventions:  PT Group Therapy, PT E Stim Attended, PT Prosthetic Training, PT Gait Training, PT Therapeutic Exercises, PT TENS Application, PT Neuro Re-Ed/Balance, PT Therapeutic Activity, and PT Manual Therapy                  Problem: PT-Long Term Goals     Dates: Start: 06/19/20       Goal: LTG-By discharge, patient will ambulate     Dates: Start: 06/19/20       Description: 1) Individualized goal:  150 feet with FWW, c/s collar, L BKA prosthesis, at SBA level  2) Interventions:  PT Group Therapy, PT E Stim Attended, PT Prosthetic Training, PT Gait Training, PT Therapeutic Exercises, PT TENS Application, PT Neuro Re-Ed/Balance, PT Therapeutic Activity, and PT Manual Therapy          Goal: LTG-By discharge, patient will transfer one surface to another     Dates: Start: 06/19/20       Description: 1) Individualized goal:  with FWW, c/s collar, L BKA prosthesis, at SBA level  2) Interventions:  PT Group Therapy, PT E Stim Attended, PT Prosthetic Training, PT Gait Training, PT Therapeutic Exercises, PT TENS Application, PT Neuro Re-Ed/Balance, PT Therapeutic Activity, and PT Manual Therapy            Goal: LTG-By discharge, patient will ambulate up/down 4-6 stairs     Dates: Start: 06/19/20       Description: 1) Individualized goal:  with  1 railing, c/s collar, L BKA prosthesis, gait belt, at Min A level  2) Interventions:  PT Group Therapy, PT E Stim Attended, PT Prosthetic Training, PT Gait Training, PT Therapeutic Exercises, PT TENS Application, PT Neuro Re-Ed/Balance, PT Therapeutic Activity, and PT Manual Therapy          Goal: LTG-By discharge, patient will transfer in/out of a car     Dates: Start: 06/19/20       Description: 1) Individualized goal:  with FWW, c/s collar, L BKA prosthesis, at SBA level  2) Interventions:  PT Group Therapy, PT E Stim Attended, PT Prosthetic Training, PT Gait Training, PT Therapeutic Exercises, PT TENS Application, PT Neuro Re-Ed/Balance, PT Therapeutic Activity, and PT Manual Therapy

## 2020-06-21 NOTE — THERAPY
Occupational Therapy  Daily Treatment     Patient Name: Gaurav Lopez  Age:  75 y.o., Sex:  male  Medical Record #: [id number]  Today's Date: 6/21/2020     Precautions  Precautions: Spinal / Back Precautions , Cervical Collar  (hypotension)  Comments: LLE prosthesis, pacemaker, orthostatic hypotension    Safety   ADL Safety : Requires Physical Assist for Safety, Requires Supervision for Safety, Requires Cueing for Safety  Bathroom Safety: Requires Supervision for Safety, Requires Physical Assist for Safety, Requires Cuing for Safety    Subjective    Agreeable to try out of bed activity. Able to verbalise lightheadedness with transitions, request back to supine when needed     Objective       06/21/20 0831   Precautions   Precautions Spinal / Back Precautions ;Cervical Collar    (hypotension)   Safety    ADL Safety  Requires Physical Assist for Safety;Requires Supervision for Safety;Requires Cueing for Safety   Bathroom Safety Requires Supervision for Safety;Requires Physical Assist for Safety;Requires Cuing for Safety   Vitals   Blood Pressure  (!) 89/53  (sitting, supine was 101/68, 100/59 once returned to supine)   O2 Delivery Device None - Room Air   Pain 0 - 10 Group   Therapist Pain Assessment Post Activity Pain Same as Prior to Activity;Nurse Notified;0   Cognition    Level of Consciousness Alert   Ability To Follow Commands 2 Step   New Learning Impaired  (cues and reinforcement required)   Sleep/Wake Cycle   Sleep & Rest Awake   Functional Level of Assist   Grooming Stand by Assist   Grooming Description Increased time;Set-up of equipment;Verbal cueing  (supine in bed, dizzy with sitting)   Bathing Moderate Assist   Bathing Description Assit with back;Assit wtih lower extremities;Assit with perineal;Increased time;Set-up of equipment;Verbal cueing  (supine in bed, unable to tolerate sitting)   Upper Body Dressing Moderate Assist   Upper Body Dressing Description Application of orthotic or brace;Assit with  threading arms through sleeves;Assist with pulling shirt over head;Increased time;Initial preparation for task;Verbal cueing   Lower Body Dressing Moderate Assist   Lower Body Dressing Description Assist with threading into pant leg;Cues for spinal precautions;Increased time;Verbal cueing  (supine)   Balance   Sitting Balance (Static) Fair -  (poor upon inital sit, able to stabilise and improve)   Sitting Balance (Dynamic) Poor +   Weight Shift Sitting Fair   Skilled Intervention Verbal Cuing;Tactile Cuing;Compensatory Strategies   Bed Mobility    Supine to Sit Moderate Assist   Sit to Supine Minimal Assist   Scooting Moderate Assist   Rolling Moderate Assist to Rt.;Moderate Assist to Lt.   Skilled Intervention Compensatory Strategies;Verbal Cuing;Tactile Cuing   Interdisciplinary Plan of Care Collaboration   IDT Collaboration with  Certified Nursing Assistant;Nursing   Patient Position at End of Therapy In Bed;Call Light within Reach;Tray Table within Reach;Phone within Reach   Collaboration Comments aware of BP, tolerance, intervention   Strengths & Barriers   Strengths Able to follow instructions;Adequate strength;Alert and oriented;Effective communication skills;Pleasant and cooperative;Supportive family;Willingly participates in therapeutic activities   Barriers Hypotension;Orthostatic hypotension;Impaired activity tolerance;Impaired balance   OT Total Time Spent   OT Individual Total Time Spent (Mins) 60   OT Charge Group   OT Self Care / ADL 4       Assessment    Low blood pressure limits activity, not safe to shower, so completed full bath at supine level. Able to complete significant amount with set up. Cues for spinal precautions, mostly when rolling .  Strengths: Able to follow instructions, Adequate strength, Alert and oriented, Effective communication skills, Pleasant and cooperative, Supportive family, Willingly participates in therapeutic activities  Barriers: Hypotension, Orthostatic hypotension,  Impaired activity tolerance, Impaired balance    Plan    Follow current OT POC    Occupational Therapy Goals     Problem: Dressing     Dates: Start: 06/19/20       Goal: STG-Within one week, patient will dress LB     Dates: Start: 06/19/20       Description: 1) Individualized Goal:  Max assist for LB clothing management via AE/DME PRN  2) Interventions:  OT Self Care/ADL, OT Neuro Re-Ed/Balance, OT Therapeutic Activity, OT Evaluation, and OT Therapeutic Exercise                  Problem: Functional Transfers     Dates: Start: 06/19/20       Goal: STG-Within one week, patient will transfer to toilet     Dates: Start: 06/19/20       Description: 1) Individualized Goal:  Max assist for wc/commode transfer via DME PRN  2) Interventions:  OT Self Care/ADL, OT Neuro Re-Ed/Balance, OT Therapeutic Activity, OT Evaluation, and OT Therapeutic Exercise                  Problem: OT Long Term Goals     Dates: Start: 06/19/20       Goal: LTG-By discharge, patient will complete basic self care tasks     Dates: Start: 06/19/20       Description: 1) Individualized Goal:  Mod I for BADL's, Sup/SBA for shower via AE/DME PRN  2) Interventions:  OT Self Care/ADL, OT Neuro Re-Ed/Balance, OT Therapeutic Activity, OT Evaluation, and OT Therapeutic Exercise            Goal: LTG-By discharge, patient will perform bathroom transfers     Dates: Start: 06/19/20                   Problem: Toileting     Dates: Start: 06/19/20       Goal: STG-Within one week, patient will complete toileting tasks     Dates: Start: 06/19/20       Description: 1) Individualized Goal:  Max assist for toileting task via AE/DME PRN  2) Interventions:  OT Self Care/ADL, OT Neuro Re-Ed/Balance, OT Therapeutic Activity, OT Evaluation, and OT Therapeutic Exercise

## 2020-06-21 NOTE — THERAPY
Occupational Therapy  Daily Treatment     Patient Name: Gaurav Lopez  Age:  75 y.o., Sex:  male  Medical Record #: [id number]  Today's Date: 6/21/2020     Precautions  Precautions: (hypotension)  Comments: LLE prosthesis, pacemaker, orthostatic hypotension    Safety   ADL Safety : Requires Supervision for Safety, Requires Physical Assist for Safety, Requires Cueing for Safety  Bathroom Safety: Requires Supervision for Safety, Requires Physical Assist for Safety, Requires Cuing for Safety    Subjective    Feeling rested, ready to try to get up to wheelchair     Objective       06/21/20 1030   Precautions   Precautions   (hypotension)   Safety    ADL Safety  Requires Supervision for Safety;Requires Physical Assist for Safety;Requires Cueing for Safety   Bathroom Safety Requires Supervision for Safety;Requires Physical Assist for Safety;Requires Cuing for Safety   Vitals   Pulse 86  (hernandez to 90 with initial sit, 103 after exercises in sitting)   Blood Pressure  (!) 99/58  (113/72 supine, recovered to 107/69 after 10 mins sitting)   Pain 0 - 10 Group   Therapist Pain Assessment Post Activity Pain Same as Prior to Activity;Nurse Notified;0   Cognition    Orientation Level Oriented x 4   Level of Consciousness Alert   Ability To Follow Commands 2 Step   Safety Awareness Impaired   Sleep/Wake Cycle   Sleep & Rest Awake   Functional Level of Assist   Lower Body Dressing Moderate Assist   Lower Body Dressing Description Application of orthotic or brace;Cues for spinal precautions;Increased time;Initial preparation for task;Supervision for safety;Verbal cueing   Bed, Chair, Wheelchair Transfer Moderate Assist   Bed Chair Wheelchair Transfer Description Increased time;Initial preparation for task;Set-up of equipment;Supervision for safety;Verbal cueing   Sitting Upper Body Exercises   Comments wheelchair propulsion to bathroom, uses feet to propel   Balance   Sitting Balance (Static) Fair -   Sitting Balance (Dynamic) Fair -    Standing Balance (Static) Poor +   Standing Balance (Dynamic) Poor   Weight Shift Sitting Fair   Bed Mobility    Supine to Sit Moderate Assist   Sit to Supine   (left seated in wheelchair, BP stabilised, no dizzyness)   Interdisciplinary Plan of Care Collaboration   IDT Collaboration with  Certified Nursing Assistant;Nursing;Physical Therapist   Patient Position at End of Therapy Seated;Chair Alarm On;Self Releasing Lap Belt Applied;Call Light within Reach;Tray Table within Reach;Phone within Reach   Collaboration Comments aware of tolerance, BP, progress, functional level   Strengths & Barriers   Strengths Motivated for self care and independence;Pleasant and cooperative;Supportive family   Barriers Orthostatic hypotension;Hypotension   OT Total Time Spent   OT Individual Total Time Spent (Mins) 30   OT Charge Group   OT Self Care / ADL 2       Assessment    BP more stable, initial light headness with supine to sit, and transition to wheelchair, but otherwise stable.assisted to don prosthesis in sitting, cues to maintain spinal precautions. Seated x 30 minute session, without BP drop, left seated for lunch  Strengths: Motivated for self care and independence, Pleasant and cooperative, Supportive family  Barriers: Orthostatic hypotension, Hypotension    Plan    Follow current OT POC. Progress function as BP allows    Occupational Therapy Goals     Problem: Dressing     Dates: Start: 06/19/20       Goal: STG-Within one week, patient will dress LB     Dates: Start: 06/19/20       Description: 1) Individualized Goal:  Max assist for LB clothing management via AE/DME PRN  2) Interventions:  OT Self Care/ADL, OT Neuro Re-Ed/Balance, OT Therapeutic Activity, OT Evaluation, and OT Therapeutic Exercise                  Problem: Functional Transfers     Dates: Start: 06/19/20       Goal: STG-Within one week, patient will transfer to toilet     Dates: Start: 06/19/20       Description: 1) Individualized Goal:  Max assist  for wc/commode transfer via DME PRN  2) Interventions:  OT Self Care/ADL, OT Neuro Re-Ed/Balance, OT Therapeutic Activity, OT Evaluation, and OT Therapeutic Exercise                  Problem: OT Long Term Goals     Dates: Start: 06/19/20       Goal: LTG-By discharge, patient will complete basic self care tasks     Dates: Start: 06/19/20       Description: 1) Individualized Goal:  Mod I for BADL's, Sup/SBA for shower via AE/DME PRN  2) Interventions:  OT Self Care/ADL, OT Neuro Re-Ed/Balance, OT Therapeutic Activity, OT Evaluation, and OT Therapeutic Exercise            Goal: LTG-By discharge, patient will perform bathroom transfers     Dates: Start: 06/19/20                   Problem: Toileting     Dates: Start: 06/19/20       Goal: STG-Within one week, patient will complete toileting tasks     Dates: Start: 06/19/20       Description: 1) Individualized Goal:  Max assist for toileting task via AE/DME PRN  2) Interventions:  OT Self Care/ADL, OT Neuro Re-Ed/Balance, OT Therapeutic Activity, OT Evaluation, and OT Therapeutic Exercise

## 2020-06-21 NOTE — THERAPY
Physical Therapy   Daily Treatment     Patient Name: Gaurav Lopez  Age:  75 y.o., Sex:  male  Medical Record #: [id number]  Today's Date: 6/21/2020     Precautions  Precautions: (hypotension)  Comments: LLE prosthesis, pacemaker, orthostatic hypotension    Subjective    Pt needing to use toilet     Objective       06/21/20 1331   Vitals   Patient BP Position Supine   Blood Pressure  108/70  (down to 83/58 initial sitting, 99/65 after 3 min sitting)   Vitals Comments Back up 122/79 in supine end of session   Transfer Functional Level of Assist   Bed, Chair, Wheelchair Transfer Moderate Assist   Bed Chair Wheelchair Transfer Description Increased time;Requires lift;Verbal cueing   Toilet Transfers Minimal Assist   Toilet Transfer Description Grab bar;Increased time;Requires lift;Verbal cueing   Supine Lower Body Exercise   Supine Lower Body Exercises Yes   Bridges Two Legged;2 sets of 10   Hip Abduction Bilateral;2 sets of 10   Hip Adduction  Bilateral;2 sets of 10   Straight Leg Raises Bilateral;2 sets of 10   Short Arc Quad Bilateral;2 sets of 10   Heel Slide Bilateral;2 sets of 10   Ankle Pumps Right;2 sets of 10   Other Exercises VCs adn demo for form and technqiue, issued HEP to perform in bed if feeling sick or low BP to keep legs moving   Bed Mobility    Supine to Sit Moderate Assist   Sit to Supine Minimal Assist   Sit to Stand Minimal Assist  (GB)   Scooting Minimal Assist   Rolling Minimal Assist to Rt.;Minimum Assist to Lt.   Interdisciplinary Plan of Care Collaboration   IDT Collaboration with  Nursing   Patient Position at End of Therapy In Bed;Call Light within Reach;Tray Table within Reach;Bed Alarm On   Collaboration Comments Reported low BP today and upset stomach with loose bowel acciedent   PT Total Time Spent   PT Individual Total Time Spent (Mins) 60       Assessment    Pt cont to be limited this session by low BP in sitting - also reporting upset stomach after loose BM. Pt not wanting to trial  standing - pt BP does improve in supine and reports less symptoms as well. Pt does demo improved transfer ability this session - decreased level of assist compared to previous sessions.        Plan    Continue to monitor vitals, STS and gait in // bars vs FWW when BP stabilizes, continue exercise, outcome measures, activity tolerance,     Physical Therapy Problems     Problem: Balance     Dates: Start: 06/19/20       Goal: STG-Within one week, patient will maintain static standing     Dates: Start: 06/19/20       Description: 1) Individualized goal:  // bars, c/s collar, L BKA prosthesis, gait belt, at Min A level x3 minutes  2) Interventions:  PT Group Therapy, PT E Stim Attended, PT Prosthetic Training, PT Gait Training, PT Therapeutic Exercises, PT TENS Application, PT Neuro Re-Ed/Balance, PT Therapeutic Activity, and PT Manual Therapy          Goal: STG-Within one week, patient will maintain static sitting     Dates: Start: 06/19/20       Description: 1) Individualized goal:  EOM or unsupported, c/s collar, L BKA prosthesis, gait belt, at CGA level x3 minutes  2) Interventions:  PT Group Therapy, PT E Stim Attended, PT Prosthetic Training, PT Gait Training, PT Therapeutic Exercises, PT TENS Application, PT Neuro Re-Ed/Balance, PT Therapeutic Activity, and PT Manual Therapy                  Problem: Mobility     Dates: Start: 06/19/20       Goal: STG-Within one week, patient will ambulate household distance     Dates: Start: 06/19/20       Description: 1) Individualized goal:  // bars x10 feet, c/s collar, L BKA prosthesis, gait belt, at Mod A level with w/c follow prn  2) Interventions:  PT Group Therapy, PT E Stim Attended, PT Prosthetic Training, PT Gait Training, PT Therapeutic Exercises, PT TENS Application, PT Neuro Re-Ed/Balance, PT Therapeutic Activity, and PT Manual Therapy            Goal: STG-Within one week, patient will propel wheelchair community     Dates: Start: 06/19/20       Description: 1)  Individualized goal:  BLE propulsion x150 feet, c/s collar, L BKA prosthesis, gait belt, at SPV level  2) Interventions:  PT Group Therapy, PT E Stim Attended, PT Prosthetic Training, PT Gait Training, PT Therapeutic Exercises, PT TENS Application, PT Neuro Re-Ed/Balance, PT Therapeutic Activity, and PT Manual Therapy                  Problem: Mobility Transfers     Dates: Start: 06/19/20       Goal: STG-Within one week, patient will perform bed mobility     Dates: Start: 06/19/20       Description: 1) Individualized goal:  Mod A supine<>sit, c/s collar, L BKA prosthesis, bed rail  2) Interventions:  PT Group Therapy, PT E Stim Attended, PT Prosthetic Training, PT Gait Training, PT Therapeutic Exercises, PT TENS Application, PT Neuro Re-Ed/Balance, PT Therapeutic Activity, and PT Manual Therapy          Goal: STG-Within one week, patient will sit to stand     Dates: Start: 06/19/20       Description: 1) Individualized goal:  // bars, c/s collar, L BKA prosthesis, gait belt, at Min A level consistently  2) Interventions:  PT Group Therapy, PT E Stim Attended, PT Prosthetic Training, PT Gait Training, PT Therapeutic Exercises, PT TENS Application, PT Neuro Re-Ed/Balance, PT Therapeutic Activity, and PT Manual Therapy          Goal: STG-Within one week, patient will transfer bed to chair     Dates: Start: 06/19/20       Description: 1) Individualized goal:  Mod A supine<>sit, c/s collar, L BKA prosthesis, bed rail, gait belt  2) Interventions:  PT Group Therapy, PT E Stim Attended, PT Prosthetic Training, PT Gait Training, PT Therapeutic Exercises, PT TENS Application, PT Neuro Re-Ed/Balance, PT Therapeutic Activity, and PT Manual Therapy                  Problem: PT-Long Term Goals     Dates: Start: 06/19/20       Goal: LTG-By discharge, patient will ambulate     Dates: Start: 06/19/20       Description: 1) Individualized goal:  150 feet with FWW, c/s collar, L BKA prosthesis, at SBA level  2) Interventions:  PT Group  Therapy, PT E Stim Attended, PT Prosthetic Training, PT Gait Training, PT Therapeutic Exercises, PT TENS Application, PT Neuro Re-Ed/Balance, PT Therapeutic Activity, and PT Manual Therapy          Goal: LTG-By discharge, patient will transfer one surface to another     Dates: Start: 06/19/20       Description: 1) Individualized goal:  with FWW, c/s collar, L BKA prosthesis, at SBA level  2) Interventions:  PT Group Therapy, PT E Stim Attended, PT Prosthetic Training, PT Gait Training, PT Therapeutic Exercises, PT TENS Application, PT Neuro Re-Ed/Balance, PT Therapeutic Activity, and PT Manual Therapy            Goal: LTG-By discharge, patient will ambulate up/down 4-6 stairs     Dates: Start: 06/19/20       Description: 1) Individualized goal:  with 1 railing, c/s collar, L BKA prosthesis, gait belt, at Min A level  2) Interventions:  PT Group Therapy, PT E Stim Attended, PT Prosthetic Training, PT Gait Training, PT Therapeutic Exercises, PT TENS Application, PT Neuro Re-Ed/Balance, PT Therapeutic Activity, and PT Manual Therapy          Goal: LTG-By discharge, patient will transfer in/out of a car     Dates: Start: 06/19/20       Description: 1) Individualized goal:  with FWW, c/s collar, L BKA prosthesis, at SBA level  2) Interventions:  PT Group Therapy, PT E Stim Attended, PT Prosthetic Training, PT Gait Training, PT Therapeutic Exercises, PT TENS Application, PT Neuro Re-Ed/Balance, PT Therapeutic Activity, and PT Manual Therapy

## 2020-06-22 LAB
GLUCOSE BLD-MCNC: 117 MG/DL (ref 65–99)
GLUCOSE BLD-MCNC: 99 MG/DL (ref 65–99)

## 2020-06-22 PROCEDURE — 97112 NEUROMUSCULAR REEDUCATION: CPT

## 2020-06-22 PROCEDURE — 97110 THERAPEUTIC EXERCISES: CPT

## 2020-06-22 PROCEDURE — 700102 HCHG RX REV CODE 250 W/ 637 OVERRIDE(OP): Performed by: HOSPITALIST

## 2020-06-22 PROCEDURE — 700102 HCHG RX REV CODE 250 W/ 637 OVERRIDE(OP): Performed by: PHYSICAL MEDICINE & REHABILITATION

## 2020-06-22 PROCEDURE — 94760 N-INVAS EAR/PLS OXIMETRY 1: CPT

## 2020-06-22 PROCEDURE — 82962 GLUCOSE BLOOD TEST: CPT

## 2020-06-22 PROCEDURE — A9270 NON-COVERED ITEM OR SERVICE: HCPCS | Performed by: PHYSICAL MEDICINE & REHABILITATION

## 2020-06-22 PROCEDURE — 700111 HCHG RX REV CODE 636 W/ 250 OVERRIDE (IP): Performed by: PHYSICAL MEDICINE & REHABILITATION

## 2020-06-22 PROCEDURE — 97535 SELF CARE MNGMENT TRAINING: CPT

## 2020-06-22 PROCEDURE — 99232 SBSQ HOSP IP/OBS MODERATE 35: CPT | Performed by: HOSPITALIST

## 2020-06-22 PROCEDURE — A9270 NON-COVERED ITEM OR SERVICE: HCPCS | Performed by: HOSPITALIST

## 2020-06-22 PROCEDURE — 97530 THERAPEUTIC ACTIVITIES: CPT

## 2020-06-22 PROCEDURE — 700112 HCHG RX REV CODE 229: Performed by: PHYSICAL MEDICINE & REHABILITATION

## 2020-06-22 PROCEDURE — 99233 SBSQ HOSP IP/OBS HIGH 50: CPT | Performed by: PHYSICAL MEDICINE & REHABILITATION

## 2020-06-22 PROCEDURE — 770010 HCHG ROOM/CARE - REHAB SEMI PRIVAT*

## 2020-06-22 PROCEDURE — 700101 HCHG RX REV CODE 250: Performed by: PHYSICAL MEDICINE & REHABILITATION

## 2020-06-22 RX ORDER — GABAPENTIN 400 MG/1
800 CAPSULE ORAL 3 TIMES DAILY
Status: DISCONTINUED | OUTPATIENT
Start: 2020-06-22 | End: 2020-07-08 | Stop reason: HOSPADM

## 2020-06-22 RX ORDER — MIDODRINE HYDROCHLORIDE 10 MG/1
10 TABLET ORAL 3 TIMES DAILY
Status: DISCONTINUED | OUTPATIENT
Start: 2020-06-22 | End: 2020-06-24

## 2020-06-22 RX ORDER — DEXTROSE MONOHYDRATE 25 G/50ML
50 INJECTION, SOLUTION INTRAVENOUS
Status: DISCONTINUED | OUTPATIENT
Start: 2020-06-22 | End: 2020-06-23

## 2020-06-22 RX ADMIN — ASPIRIN 81 MG: 81 TABLET, COATED ORAL at 08:31

## 2020-06-22 RX ADMIN — VITAMIN C 1 TABLET: TAB at 08:30

## 2020-06-22 RX ADMIN — DOCUSATE SODIUM 200 MG: 100 CAPSULE, LIQUID FILLED ORAL at 08:36

## 2020-06-22 RX ADMIN — METFORMIN HYDROCHLORIDE 250 MG: 500 TABLET ORAL at 08:30

## 2020-06-22 RX ADMIN — GABAPENTIN 800 MG: 400 CAPSULE ORAL at 15:07

## 2020-06-22 RX ADMIN — ENOXAPARIN SODIUM 40 MG: 100 INJECTION SUBCUTANEOUS at 19:58

## 2020-06-22 RX ADMIN — ARIPIPRAZOLE 10 MG: 5 TABLET ORAL at 08:31

## 2020-06-22 RX ADMIN — VENLAFAXINE HYDROCHLORIDE 150 MG: 75 CAPSULE, EXTENDED RELEASE ORAL at 19:55

## 2020-06-22 RX ADMIN — BACLOFEN 10 MG: 10 TABLET ORAL at 15:07

## 2020-06-22 RX ADMIN — FINASTERIDE 5 MG: 5 TABLET, FILM COATED ORAL at 08:31

## 2020-06-22 RX ADMIN — VENLAFAXINE HYDROCHLORIDE 150 MG: 75 CAPSULE, EXTENDED RELEASE ORAL at 08:30

## 2020-06-22 RX ADMIN — BACLOFEN 10 MG: 10 TABLET ORAL at 19:56

## 2020-06-22 RX ADMIN — BACLOFEN 10 MG: 10 TABLET ORAL at 08:30

## 2020-06-22 RX ADMIN — LIDOCAINE 2 PATCH: 50 PATCH TOPICAL at 08:29

## 2020-06-22 RX ADMIN — ACETAMINOPHEN 1000 MG: 500 TABLET, FILM COATED ORAL at 19:55

## 2020-06-22 RX ADMIN — DOCUSATE SODIUM 200 MG: 100 CAPSULE, LIQUID FILLED ORAL at 19:55

## 2020-06-22 RX ADMIN — MELATONIN 2000 UNITS: at 08:30

## 2020-06-22 RX ADMIN — LOVASTATIN 10 MG: 20 TABLET ORAL at 19:56

## 2020-06-22 RX ADMIN — STANDARDIZED SENNA CONCENTRATE 17.2 MG: 8.6 TABLET ORAL at 11:47

## 2020-06-22 RX ADMIN — GABAPENTIN 800 MG: 400 CAPSULE ORAL at 08:30

## 2020-06-22 RX ADMIN — GABAPENTIN 800 MG: 400 CAPSULE ORAL at 19:55

## 2020-06-22 RX ADMIN — MULTIPLE VITAMINS W/ MINERALS TAB 1 TABLET: TAB at 08:30

## 2020-06-22 RX ADMIN — ACETAMINOPHEN 1000 MG: 500 TABLET, FILM COATED ORAL at 15:07

## 2020-06-22 RX ADMIN — MIRTAZAPINE 15 MG: 15 TABLET, ORALLY DISINTEGRATING ORAL at 19:56

## 2020-06-22 RX ADMIN — MIDODRINE HYDROCHLORIDE 10 MG: 10 TABLET ORAL at 15:20

## 2020-06-22 RX ADMIN — MIDODRINE HYDROCHLORIDE 10 MG: 10 TABLET ORAL at 11:59

## 2020-06-22 RX ADMIN — ACETAMINOPHEN 1000 MG: 500 TABLET, FILM COATED ORAL at 08:30

## 2020-06-22 RX ADMIN — METFORMIN HYDROCHLORIDE 250 MG: 500 TABLET ORAL at 17:13

## 2020-06-22 RX ADMIN — TAMSULOSIN HYDROCHLORIDE 0.4 MG: 0.4 CAPSULE ORAL at 17:13

## 2020-06-22 ASSESSMENT — ENCOUNTER SYMPTOMS
SHORTNESS OF BREATH: 0
NERVOUS/ANXIOUS: 0
DIARRHEA: 0
NAUSEA: 0
ABDOMINAL PAIN: 0
CHILLS: 0
FEVER: 0
VOMITING: 0

## 2020-06-22 ASSESSMENT — PATIENT HEALTH QUESTIONNAIRE - PHQ9
SUM OF ALL RESPONSES TO PHQ9 QUESTIONS 1 AND 2: 0
1. LITTLE INTEREST OR PLEASURE IN DOING THINGS: NOT AT ALL
1. LITTLE INTEREST OR PLEASURE IN DOING THINGS: NOT AT ALL
2. FEELING DOWN, DEPRESSED, IRRITABLE, OR HOPELESS: NOT AT ALL
2. FEELING DOWN, DEPRESSED, IRRITABLE, OR HOPELESS: NOT AT ALL
SUM OF ALL RESPONSES TO PHQ9 QUESTIONS 1 AND 2: 0

## 2020-06-22 ASSESSMENT — GAIT ASSESSMENTS
DISTANCE (FEET): 0
GAIT LEVEL OF ASSIST: UNABLE TO PARTICIPATE
GAIT LEVEL OF ASSIST: UNABLE TO PARTICIPATE

## 2020-06-22 ASSESSMENT — FIBROSIS 4 INDEX: FIB4 SCORE: 0.37

## 2020-06-22 ASSESSMENT — ACTIVITIES OF DAILY LIVING (ADL): TOILET_TRANSFER_DESCRIPTION: GRAB BAR;SET-UP OF EQUIPMENT;SUPERVISION FOR SAFETY;VERBAL CUEING

## 2020-06-22 NOTE — THERAPY
06/22/20 1120   IDT Conference   IDT Conference I have reviewed and discussed the POC and barriers to discharge for this patient.  I am knowledgeable of the patient's needs and have attended the IDT Conference.   Interdisciplinary Plan of Care Collaboration   IDT Collaboration with    (IDT team)   Collaboration Comments IDT conference: d/c TBD, limited by hypotension and barriers per note, discussed home setup and living situation, still awaiting assessment of standing activity and safety

## 2020-06-22 NOTE — THERAPY
"Occupational Therapy  Daily Treatment     Patient Name: Gaurav Lopez  Age:  75 y.o., Sex:  male  Medical Record #: [id number]  Today's Date: 6/22/2020     Precautions  Precautions: (P) Fall Risk, Cervical Collar  , Spinal / Back Precautions , Other (See Comments)  Comments: (P) LLE prosthesis, pacemaker, orthostatic hypotension         Subjective    \"I was here in 2011 after a back surgery\"     Objective       06/22/20 1431   Precautions   Precautions Fall Risk;Cervical Collar  ;Spinal / Back Precautions ;Other (See Comments)   Comments LLE prosthesis, pacemaker, orthostatic hypotension    Cognition    Level of Consciousness Alert   Sitting Upper Body Exercises   Tricep Press 3 sets of 15;Bilateral;Weight (See Comments for lbs)  (Rickshaw facing fwd 3x15 with 25#, facing bwd 3x15 with 15#)   Upper Extremity Bike Level 4 Resistance  (BUE [employer] for UB strength/endurance x10 min, 0 RB)   Interdisciplinary Plan of Care Collaboration   Patient Position at End of Therapy Seated;Chair Alarm On;Self Releasing Lap Belt Applied;Call Light within Reach;Tray Table within Reach;Phone within Reach   OT Total Time Spent   OT Individual Total Time Spent (Mins) 30   OT Charge Group   OT Therapeutic Exercise  2       Assessment    Pt tolerated session well. He completed UB exercises with no complaints of pain and maintained spinal prec throughout. Pt did not report any dizziness or demonstrate sx of orthostatic hypotension during therapy session.     Strengths: Motivated for self care and independence, Pleasant and cooperative, Supportive family  Barriers: Orthostatic hypotension, Hypotension    Plan    OT to address strength, endurance, balance + education for AE/DME to facilitate greater independence w/ ADL's/functional mobility/transfers.    Occupational Therapy Goals     Problem: Dressing     Dates: Start: 06/19/20       Goal: STG-Within one week, patient will dress LB     Dates: Start: 06/19/20       Description: 1) " Individualized Goal:  Max assist for LB clothing management via AE/DME PRN  2) Interventions:  OT Self Care/ADL, OT Neuro Re-Ed/Balance, OT Therapeutic Activity, OT Evaluation, and OT Therapeutic Exercise      Note:     Goal Note filed on 06/22/20 1109 by Mesfin Mtz MS,OTR/L    Pt at mod assist via AE/DME                        Problem: OT Long Term Goals     Dates: Start: 06/19/20       Goal: LTG-By discharge, patient will complete basic self care tasks     Dates: Start: 06/19/20       Description: 1) Individualized Goal:  Mod I for BADL's, Sup/SBA for shower via AE/DME PRN  2) Interventions:  OT Self Care/ADL, OT Neuro Re-Ed/Balance, OT Therapeutic Activity, OT Evaluation, and OT Therapeutic Exercise            Goal: LTG-By discharge, patient will perform bathroom transfers     Dates: Start: 06/19/20                   Problem: Toileting     Dates: Start: 06/19/20       Goal: STG-Within one week, patient will complete toileting tasks     Dates: Start: 06/19/20       Description: 1) Individualized Goal:  Max assist for toileting task via AE/DME PRN  2) Interventions:  OT Self Care/ADL, OT Neuro Re-Ed/Balance, OT Therapeutic Activity, OT Evaluation, and OT Therapeutic Exercise      Note:     Goal Note filed on 06/22/20 1109 by Mesfin Mtz MS,OTR/L    Pt at Total assist for toileting task via DME

## 2020-06-22 NOTE — FLOWSHEET NOTE
"   06/22/20 0847   Incentive Spirometry Treatment   Height 1.753 m (5' 9.02\")   Predicted Inspiratory Capacity 2850   60% of predicted IS capacity 1710 mL   40% of predicted IS capacity 1140 mL   Incentive Spirometer Volume 2500 mL  (x 3)     "

## 2020-06-22 NOTE — CARE PLAN
Problem: Balance  Goal: STG-Within one week, patient will maintain static standing  Description: 1) Individualized goal:  // bars, c/s collar, L BKA prosthesis, gait belt, at Min A level x3 minutes  2) Interventions:  PT Group Therapy, PT E Stim Attended, PT Prosthetic Training, PT Gait Training, PT Therapeutic Exercises, PT TENS Application, PT Neuro Re-Ed/Balance, PT Therapeutic Activity, and PT Manual Therapy  Outcome: NOT MET  Note: Three days since PT eval; will monitor pt's progress towards goals this week.  Goal: STG-Within one week, patient will maintain static sitting  Description: 1) Individualized goal:  EOM or unsupported, c/s collar, L BKA prosthesis, gait belt, at CGA level x3 minutes  2) Interventions:  PT Group Therapy, PT E Stim Attended, PT Prosthetic Training, PT Gait Training, PT Therapeutic Exercises, PT TENS Application, PT Neuro Re-Ed/Balance, PT Therapeutic Activity, and PT Manual Therapy    Outcome: NOT MET  Note: Three days since PT eval; will monitor pt's progress towards goals this week.     Problem: Mobility  Goal: STG-Within one week, patient will ambulate household distance  Description: 1) Individualized goal:  // bars x10 feet, c/s collar, L BKA prosthesis, gait belt, at Mod A level with w/c follow prn  2) Interventions:  PT Group Therapy, PT E Stim Attended, PT Prosthetic Training, PT Gait Training, PT Therapeutic Exercises, PT TENS Application, PT Neuro Re-Ed/Balance, PT Therapeutic Activity, and PT Manual Therapy    Outcome: NOT MET  Note: Three days since PT eval; will monitor pt's progress towards goals this week.  Goal: STG-Within one week, patient will propel wheelchair community  Description: 1) Individualized goal:  BLE propulsion x150 feet, c/s collar, L BKA prosthesis, gait belt, at SPV level  2) Interventions:  PT Group Therapy, PT E Stim Attended, PT Prosthetic Training, PT Gait Training, PT Therapeutic Exercises, PT TENS Application, PT Neuro Re-Ed/Balance, PT  Therapeutic Activity, and PT Manual Therapy    Outcome: NOT MET  Note: Three days since PT eval; will monitor pt's progress towards goals this week.     Problem: Mobility Transfers  Goal: STG-Within one week, patient will perform bed mobility  Description: 1) Individualized goal:  Mod A supine<>sit, c/s collar, L BKA prosthesis, bed rail  2) Interventions:  PT Group Therapy, PT E Stim Attended, PT Prosthetic Training, PT Gait Training, PT Therapeutic Exercises, PT TENS Application, PT Neuro Re-Ed/Balance, PT Therapeutic Activity, and PT Manual Therapy  Outcome: NOT MET  Note: Three days since PT eval; will monitor pt's progress towards goals this week.  Goal: STG-Within one week, patient will sit to stand  Description: 1) Individualized goal:  // bars, c/s collar, L BKA prosthesis, gait belt, at Min A level consistently  2) Interventions:  PT Group Therapy, PT E Stim Attended, PT Prosthetic Training, PT Gait Training, PT Therapeutic Exercises, PT TENS Application, PT Neuro Re-Ed/Balance, PT Therapeutic Activity, and PT Manual Therapy  Outcome: NOT MET  Note: Three days since PT eval; will monitor pt's progress towards goals this week.  Goal: STG-Within one week, patient will transfer bed to chair  Description: 1) Individualized goal:  Mod A supine<>sit, c/s collar, L BKA prosthesis, bed rail, gait belt  2) Interventions:  PT Group Therapy, PT E Stim Attended, PT Prosthetic Training, PT Gait Training, PT Therapeutic Exercises, PT TENS Application, PT Neuro Re-Ed/Balance, PT Therapeutic Activity, and PT Manual Therapy    Outcome: NOT MET  Note: Three days since PT eval; will monitor pt's progress towards goals this week.

## 2020-06-22 NOTE — THERAPY
Physical Therapy   Daily Treatment     Patient Name: Gaurav Lopez  Age:  75 y.o., Sex:  male  Medical Record #: [id number]  Today's Date: 6/22/2020     Precautions  Precautions: Fall Risk, Cervical Collar  , Spinal / Back Precautions , Other (See Comments)  Comments: LLE prosthesis, pacemaker, orthostatic hypotension     Subjective    Patient agreeable to PT.     Objective       06/22/20 1301   Vitals   Pulse 82   Patient BP Position Supine   Blood Pressure  116/73   O2 (LPM) 0   O2 Delivery Device None - Room Air   Gait Functional Level of Assist    Gait Level Of Assist Unable to Participate   Distance (Feet) 0   # of Times Distance was Traveled 0   Bed Mobility    Supine to Sit Moderate Assist   Sit to Stand   (Max Ax1 and Min Ax1, gait belt, cues for upright posture, x3)   Scooting Moderate Assist   Neuro-Muscular Treatments   Comments Sitting balance EOM with 2 sets of posterior lean to large exercise ball and then sitting upright & unsupported again, variable A at gait belt from CGA-Mod A, 2nd person posteriorly for safety.  Sitting static EOM for 3 sets with cueing for patient to stop onset/ongoing posterior lean and resume upright seated unsupporting posture.  2 sets of lateral sitting<>elbows using 3-4 kickboards on each side, varaible A at gait belt up to Mod A.  x40 min   Interdisciplinary Plan of Care Collaboration   Patient Position at End of Therapy Seated;Self Releasing Lap Belt Applied;Call Light within Reach;Tray Table within Reach;Phone within Reach   PT Total Time Spent   PT Individual Total Time Spent (Mins) 60   PT Charge Group   PT Neuromuscular Re-Education / Balance 3   PT Therapeutic Activities 1     Reviewed POC with patient.    Assessment    Patient has good motivation; continues to have mild symptoms with sitting positions and orthostatic hypotension; impaired strength in core and BLE; impaired coordination; receptive to education but repetition is required.    Strengths: Able to follow  instructions, Effective communication skills, Independent prior level of function, Pleasant and cooperative, Willingly participates in therapeutic activities  Barriers: Bladder incontinence, Bowel incontinence, Confused, Decreased endurance, Generalized weakness, Home accessibility, Hypotension, Orthostatic hypotension, Impaired activity tolerance, Impaired balance, Impaired carryover of learning, Limited mobility    Plan    Continue to monitor BP, STS and gait in // bars vs FWW when BP stabilizes, continue exercise, outcome measures, activity tolerance, sitting balance, education.      Physical Therapy Problems     Problem: Balance     Dates: Start: 06/19/20       Goal: STG-Within one week, patient will maintain static standing     Dates: Start: 06/19/20       Description: 1) Individualized goal:  // bars, c/s collar, L BKA prosthesis, gait belt, at Min A level x3 minutes  2) Interventions:  PT Group Therapy, PT E Stim Attended, PT Prosthetic Training, PT Gait Training, PT Therapeutic Exercises, PT TENS Application, PT Neuro Re-Ed/Balance, PT Therapeutic Activity, and PT Manual Therapy    Note:     Goal Note filed on 06/22/20 0813 by Teofilo Vázquez, PT    Three days since PT eval; will monitor pt's progress towards goals this week.                  Goal: STG-Within one week, patient will maintain static sitting     Dates: Start: 06/19/20       Description: 1) Individualized goal:  EOM or unsupported, c/s collar, L BKA prosthesis, gait belt, at CGA level x3 minutes  2) Interventions:  PT Group Therapy, PT E Stim Attended, PT Prosthetic Training, PT Gait Training, PT Therapeutic Exercises, PT TENS Application, PT Neuro Re-Ed/Balance, PT Therapeutic Activity, and PT Manual Therapy      Note:     Goal Note filed on 06/22/20 0813 by Teofilo Vázquez, PT    Three days since PT eval; will monitor pt's progress towards goals this week.                        Problem: Mobility     Dates: Start: 06/19/20        Goal: STG-Within one week, patient will ambulate household distance     Dates: Start: 06/19/20       Description: 1) Individualized goal:  // bars x10 feet, c/s collar, L BKA prosthesis, gait belt, at Mod A level with w/c follow prn  2) Interventions:  PT Group Therapy, PT E Stim Attended, PT Prosthetic Training, PT Gait Training, PT Therapeutic Exercises, PT TENS Application, PT Neuro Re-Ed/Balance, PT Therapeutic Activity, and PT Manual Therapy      Note:     Goal Note filed on 06/22/20 0813 by Teofilo Vázquez, PT    Three days since PT eval; will monitor pt's progress towards goals this week.                  Goal: STG-Within one week, patient will propel wheelchair community     Dates: Start: 06/19/20       Description: 1) Individualized goal:  BLE propulsion x150 feet, c/s collar, L BKA prosthesis, gait belt, at SPV level  2) Interventions:  PT Group Therapy, PT E Stim Attended, PT Prosthetic Training, PT Gait Training, PT Therapeutic Exercises, PT TENS Application, PT Neuro Re-Ed/Balance, PT Therapeutic Activity, and PT Manual Therapy      Note:     Goal Note filed on 06/22/20 0813 by Teofilo Vázquez, PT    Three days since PT eval; will monitor pt's progress towards goals this week.                        Problem: Mobility Transfers     Dates: Start: 06/19/20       Goal: STG-Within one week, patient will perform bed mobility     Dates: Start: 06/19/20       Description: 1) Individualized goal:  Mod A supine<>sit, c/s collar, L BKA prosthesis, bed rail  2) Interventions:  PT Group Therapy, PT E Stim Attended, PT Prosthetic Training, PT Gait Training, PT Therapeutic Exercises, PT TENS Application, PT Neuro Re-Ed/Balance, PT Therapeutic Activity, and PT Manual Therapy    Note:     Goal Note filed on 06/22/20 0813 by Teofilo Vázquez, PT    Three days since PT eval; will monitor pt's progress towards goals this week.                    Goal: STG-Within one week, patient will sit to stand      Dates: Start: 06/19/20       Description: 1) Individualized goal:  // bars, c/s collar, L BKA prosthesis, gait belt, at Min A level consistently  2) Interventions:  PT Group Therapy, PT E Stim Attended, PT Prosthetic Training, PT Gait Training, PT Therapeutic Exercises, PT TENS Application, PT Neuro Re-Ed/Balance, PT Therapeutic Activity, and PT Manual Therapy    Note:     Goal Note filed on 06/22/20 0813 by Teofilo Vázquez, PT    Three days since PT eval; will monitor pt's progress towards goals this week.                  Goal: STG-Within one week, patient will transfer bed to chair     Dates: Start: 06/19/20       Description: 1) Individualized goal:  Mod A supine<>sit, c/s collar, L BKA prosthesis, bed rail, gait belt  2) Interventions:  PT Group Therapy, PT E Stim Attended, PT Prosthetic Training, PT Gait Training, PT Therapeutic Exercises, PT TENS Application, PT Neuro Re-Ed/Balance, PT Therapeutic Activity, and PT Manual Therapy      Note:     Goal Note filed on 06/22/20 0813 by Teofilo Vázquez, PT    Three days since PT eval; will monitor pt's progress towards goals this week.                        Problem: PT-Long Term Goals     Dates: Start: 06/19/20       Goal: LTG-By discharge, patient will ambulate     Dates: Start: 06/19/20       Description: 1) Individualized goal:  150 feet with FWW, c/s collar, L BKA prosthesis, at SBA level  2) Interventions:  PT Group Therapy, PT E Stim Attended, PT Prosthetic Training, PT Gait Training, PT Therapeutic Exercises, PT TENS Application, PT Neuro Re-Ed/Balance, PT Therapeutic Activity, and PT Manual Therapy          Goal: LTG-By discharge, patient will transfer one surface to another     Dates: Start: 06/19/20       Description: 1) Individualized goal:  with FWW, c/s collar, L BKA prosthesis, at SBA level  2) Interventions:  PT Group Therapy, PT E Stim Attended, PT Prosthetic Training, PT Gait Training, PT Therapeutic Exercises, PT TENS Application, PT  Neuro Re-Ed/Balance, PT Therapeutic Activity, and PT Manual Therapy            Goal: LTG-By discharge, patient will ambulate up/down 4-6 stairs     Dates: Start: 06/19/20       Description: 1) Individualized goal:  with 1 railing, c/s collar, L BKA prosthesis, gait belt, at Min A level  2) Interventions:  PT Group Therapy, PT E Stim Attended, PT Prosthetic Training, PT Gait Training, PT Therapeutic Exercises, PT TENS Application, PT Neuro Re-Ed/Balance, PT Therapeutic Activity, and PT Manual Therapy          Goal: LTG-By discharge, patient will transfer in/out of a car     Dates: Start: 06/19/20       Description: 1) Individualized goal:  with FWW, c/s collar, L BKA prosthesis, at SBA level  2) Interventions:  PT Group Therapy, PT E Stim Attended, PT Prosthetic Training, PT Gait Training, PT Therapeutic Exercises, PT TENS Application, PT Neuro Re-Ed/Balance, PT Therapeutic Activity, and PT Manual Therapy

## 2020-06-22 NOTE — THERAPY
Physical Therapy   Daily Treatment     Patient Name: Gaurav Lopez  Age:  75 y.o., Sex:  male  Medical Record #: [id number]  Today's Date: 6/22/2020     Precautions  Precautions: Cervical Collar  , Spinal / Back Precautions , Fall Risk, Other (See Comments)  Comments: LLE prosthesis, pacemaker, orthostatic hypotension     Subjective    Patient agreeable to PT; received sitting in w/c at bedside.     Objective       06/22/20 1031   Vitals   Pulse 92   Patient BP Position Sitting   Blood Pressure  (!) 98/62  (symptomatic)   Pulse Oximetry 95 %   O2 (LPM) 0   O2 Delivery Device None - Room Air   Vitals Comments ABD binder donned throughout session   Pain   Non Verbal Scale  Calm   Pain 0 - 10 Group   Comfort Goal 0   Gait Functional Level of Assist    Gait Level Of Assist Unable to Participate   Assistive Device   (unable due to hypotension)   Distance (Feet)   (unable due to hypotension)   # of Times Distance was Traveled   (unable due to hypotension)   Deviation   (unable due to hypotension)   Wheelchair Functional Level of Assist   Wheelchair Assist Stand by Assist   Distance Wheelchair (Feet or Distance) 150   Wheelchair Description Adaptive equipment;Extra time;Supervision for safety;Verbal cueing  (BLE propulsion)   Stairs Functional Level of Assist   Level of Assist with Stairs Unable to Participate   # of Stairs Climbed 0   Stairs Description Safety concerns  (unable due to hypotension)   Sitting Lower Body Exercises   Sitting Lower Body Exercises   (2 lbs BLE)   Ankle Pumps Right ;2 sets of 10   Long Arc Quad 2 sets of 10   Marching 2 sets of 10   Standing Lower Body Exercises   Standing Lower Body Exercises   (unable to attempt due to hypotension)   Bed Mobility    Sit to Stand   (Unable to attempt today due to hypotension)   Interdisciplinary Plan of Care Collaboration   Patient Position at End of Therapy Seated;Self Releasing Lap Belt Applied;Call Light within Reach;Tray Table within Reach;Phone within Reach    PT Total Time Spent   PT Individual Total Time Spent (Mins) 30   PT Charge Group   PT Therapeutic Exercise 2       Assessment    Patient unable to attempt standing due to hypotension again today; good motivation and participation; improving endurance and activity tolerance, including sitting tolerance; better adherence to cervical spinal precautions today.    Strengths: Able to follow instructions, Effective communication skills, Independent prior level of function, Pleasant and cooperative, Willingly participates in therapeutic activities  Barriers: Bladder incontinence, Bowel incontinence, Confused, Decreased endurance, Generalized weakness, Home accessibility, Hypotension, Orthostatic hypotension, Impaired activity tolerance, Impaired balance, Impaired carryover of learning, Limited mobility    Plan    Continue to monitor BP, STS and gait in // bars vs FWW when BP stabilizes, continue exercise, outcome measures, activity tolerance, sitting balance, education.    Physical Therapy Problems     Problem: Balance     Dates: Start: 06/19/20       Goal: STG-Within one week, patient will maintain static standing     Dates: Start: 06/19/20       Description: 1) Individualized goal:  // bars, c/s collar, L BKA prosthesis, gait belt, at Min A level x3 minutes  2) Interventions:  PT Group Therapy, PT E Stim Attended, PT Prosthetic Training, PT Gait Training, PT Therapeutic Exercises, PT TENS Application, PT Neuro Re-Ed/Balance, PT Therapeutic Activity, and PT Manual Therapy    Note:     Goal Note filed on 06/22/20 0813 by Teofilo Vázquez, PT    Three days since PT eval; will monitor pt's progress towards goals this week.                  Goal: STG-Within one week, patient will maintain static sitting     Dates: Start: 06/19/20       Description: 1) Individualized goal:  EOM or unsupported, c/s collar, L BKA prosthesis, gait belt, at CGA level x3 minutes  2) Interventions:  PT Group Therapy, PT E Stim Attended, PT  Prosthetic Training, PT Gait Training, PT Therapeutic Exercises, PT TENS Application, PT Neuro Re-Ed/Balance, PT Therapeutic Activity, and PT Manual Therapy      Note:     Goal Note filed on 06/22/20 0813 by Teofilo Vázquez, PT    Three days since PT eval; will monitor pt's progress towards goals this week.                        Problem: Mobility     Dates: Start: 06/19/20       Goal: STG-Within one week, patient will ambulate household distance     Dates: Start: 06/19/20       Description: 1) Individualized goal:  // bars x10 feet, c/s collar, L BKA prosthesis, gait belt, at Mod A level with w/c follow prn  2) Interventions:  PT Group Therapy, PT E Stim Attended, PT Prosthetic Training, PT Gait Training, PT Therapeutic Exercises, PT TENS Application, PT Neuro Re-Ed/Balance, PT Therapeutic Activity, and PT Manual Therapy      Note:     Goal Note filed on 06/22/20 0813 by Teofilo Vázquez, PT    Three days since PT eval; will monitor pt's progress towards goals this week.                  Goal: STG-Within one week, patient will propel wheelchair community     Dates: Start: 06/19/20       Description: 1) Individualized goal:  BLE propulsion x150 feet, c/s collar, L BKA prosthesis, gait belt, at SPV level  2) Interventions:  PT Group Therapy, PT E Stim Attended, PT Prosthetic Training, PT Gait Training, PT Therapeutic Exercises, PT TENS Application, PT Neuro Re-Ed/Balance, PT Therapeutic Activity, and PT Manual Therapy      Note:     Goal Note filed on 06/22/20 0813 by Teofilo Vázquez, PT    Three days since PT eval; will monitor pt's progress towards goals this week.                        Problem: Mobility Transfers     Dates: Start: 06/19/20       Goal: STG-Within one week, patient will perform bed mobility     Dates: Start: 06/19/20       Description: 1) Individualized goal:  Mod A supine<>sit, c/s collar, L BKA prosthesis, bed rail  2) Interventions:  PT Group Therapy, PT E Stim Attended, PT  Prosthetic Training, PT Gait Training, PT Therapeutic Exercises, PT TENS Application, PT Neuro Re-Ed/Balance, PT Therapeutic Activity, and PT Manual Therapy    Note:     Goal Note filed on 06/22/20 0813 by Teofilo Vázquez, PT    Three days since PT eval; will monitor pt's progress towards goals this week.                    Goal: STG-Within one week, patient will sit to stand     Dates: Start: 06/19/20       Description: 1) Individualized goal:  // bars, c/s collar, L BKA prosthesis, gait belt, at Min A level consistently  2) Interventions:  PT Group Therapy, PT E Stim Attended, PT Prosthetic Training, PT Gait Training, PT Therapeutic Exercises, PT TENS Application, PT Neuro Re-Ed/Balance, PT Therapeutic Activity, and PT Manual Therapy    Note:     Goal Note filed on 06/22/20 0813 by Teofilo Vázquez, PT    Three days since PT eval; will monitor pt's progress towards goals this week.                  Goal: STG-Within one week, patient will transfer bed to chair     Dates: Start: 06/19/20       Description: 1) Individualized goal:  Mod A supine<>sit, c/s collar, L BKA prosthesis, bed rail, gait belt  2) Interventions:  PT Group Therapy, PT E Stim Attended, PT Prosthetic Training, PT Gait Training, PT Therapeutic Exercises, PT TENS Application, PT Neuro Re-Ed/Balance, PT Therapeutic Activity, and PT Manual Therapy      Note:     Goal Note filed on 06/22/20 0813 by Teofilo Vázquez, PT    Three days since PT eval; will monitor pt's progress towards goals this week.                        Problem: PT-Long Term Goals     Dates: Start: 06/19/20       Goal: LTG-By discharge, patient will ambulate     Dates: Start: 06/19/20       Description: 1) Individualized goal:  150 feet with FWW, c/s collar, L BKA prosthesis, at SBA level  2) Interventions:  PT Group Therapy, PT E Stim Attended, PT Prosthetic Training, PT Gait Training, PT Therapeutic Exercises, PT TENS Application, PT Neuro Re-Ed/Balance, PT  Therapeutic Activity, and PT Manual Therapy          Goal: LTG-By discharge, patient will transfer one surface to another     Dates: Start: 06/19/20       Description: 1) Individualized goal:  with FWW, c/s collar, L BKA prosthesis, at SBA level  2) Interventions:  PT Group Therapy, PT E Stim Attended, PT Prosthetic Training, PT Gait Training, PT Therapeutic Exercises, PT TENS Application, PT Neuro Re-Ed/Balance, PT Therapeutic Activity, and PT Manual Therapy            Goal: LTG-By discharge, patient will ambulate up/down 4-6 stairs     Dates: Start: 06/19/20       Description: 1) Individualized goal:  with 1 railing, c/s collar, L BKA prosthesis, gait belt, at Min A level  2) Interventions:  PT Group Therapy, PT E Stim Attended, PT Prosthetic Training, PT Gait Training, PT Therapeutic Exercises, PT TENS Application, PT Neuro Re-Ed/Balance, PT Therapeutic Activity, and PT Manual Therapy          Goal: LTG-By discharge, patient will transfer in/out of a car     Dates: Start: 06/19/20       Description: 1) Individualized goal:  with FWW, c/s collar, L BKA prosthesis, at SBA level  2) Interventions:  PT Group Therapy, PT E Stim Attended, PT Prosthetic Training, PT Gait Training, PT Therapeutic Exercises, PT TENS Application, PT Neuro Re-Ed/Balance, PT Therapeutic Activity, and PT Manual Therapy

## 2020-06-22 NOTE — PROGRESS NOTES
"Rehab Progress Note     Encounter Date: 6/22/2020    CC: weakness    Interval Events (Subjective)  He had episode of low blood pressure yesterday.  This is associated with change in position.     He reports pain is present, not impairing him from participating with therapy.  He slept much better over the weekend.    He is participating well with therapy    Jumping of his legs is significantly improved, not causing him pain, impairment of sleep or function.  Increased dose of baclofen is not affecting his cognitive status.  Bowel: Last BM: 06/21/20  Bladder: Intermittent voiding,  to 367 cc    ROS:  Gen: No fatigue, confusion, significant weight loss  Eyes: no blurry vision, double vision or pain in eyes  ENT: no changes in hearing, runny nose, nose bleeds, sinus pain  CV: No CP, palpitations  Lungs: no shortness of breath, changes in secretions, changes in cough, pain with coughing  Abd: No abd pain, nausea, vomiting, pain with eating  : no blood in urine, pain during storage phase, bladder spasms, suprapubic pain  Ext: No swelling in legs, asymmetric atrophy  Neuro: no changes in strength or sensation  Skin: no new ulcers/skin breakdown appreciated by patient  Mood: No changes in mood today, increase in depression or anxiety  Heme: no bruising, or bleeding  Rest of 14 point review of systems is negative    Objective:  Vitals: /82   Pulse 88   Temp 36.3 °C (97.4 °F) (Oral)   Resp 18   Ht 1.753 m (5' 9.02\")   Wt 78.8 kg (173 lb 11.6 oz)   SpO2 97%   Gen: NAD, Body mass index is 25.64 kg/m².  Sitting comfortably in wheelchair, working with occupational therapy  HEENT:NC/AT, PERRLA, moist mucous membranes, Aspen collar in place  Cardio: RRR, no mumurs  Pulm: CTAB, with normal respiratory effort  Abd: Soft NTND, active bowel sounds  Ext: No peripheral edema. No calf tenderness. No clubbing/cyanosis. +dorsal pedalis pulses on the right, BKA on the left    Skin: Incision is clean dry and intact, " sutures in place, no signs of dehiscence      Recent Results (from the past 72 hour(s))   ACCU-CHEK GLUCOSE    Collection Time: 06/19/20 11:32 AM   Result Value Ref Range    Glucose - Accu-Ck 104 (H) 65 - 99 mg/dL   ACCU-CHEK GLUCOSE    Collection Time: 06/19/20  5:34 PM   Result Value Ref Range    Glucose - Accu-Ck 94 65 - 99 mg/dL   ACCU-CHEK GLUCOSE    Collection Time: 06/19/20  8:19 PM   Result Value Ref Range    Glucose - Accu-Ck 133 (H) 65 - 99 mg/dL   ACCU-CHEK GLUCOSE    Collection Time: 06/20/20  7:54 AM   Result Value Ref Range    Glucose - Accu-Ck 99 65 - 99 mg/dL   ACCU-CHEK GLUCOSE    Collection Time: 06/20/20 11:44 AM   Result Value Ref Range    Glucose - Accu-Ck 112 (H) 65 - 99 mg/dL   ACCU-CHEK GLUCOSE    Collection Time: 06/20/20  5:29 PM   Result Value Ref Range    Glucose - Accu-Ck 91 65 - 99 mg/dL   ACCU-CHEK GLUCOSE    Collection Time: 06/20/20  8:08 PM   Result Value Ref Range    Glucose - Accu-Ck 124 (H) 65 - 99 mg/dL   ACCU-CHEK GLUCOSE    Collection Time: 06/21/20  7:34 AM   Result Value Ref Range    Glucose - Accu-Ck 106 (H) 65 - 99 mg/dL   ACCU-CHEK GLUCOSE    Collection Time: 06/21/20 11:25 AM   Result Value Ref Range    Glucose - Accu-Ck 97 65 - 99 mg/dL   ACCU-CHEK GLUCOSE    Collection Time: 06/21/20  4:57 PM   Result Value Ref Range    Glucose - Accu-Ck 96 65 - 99 mg/dL   ACCU-CHEK GLUCOSE    Collection Time: 06/21/20  8:04 PM   Result Value Ref Range    Glucose - Accu-Ck 145 (H) 65 - 99 mg/dL   ACCU-CHEK GLUCOSE    Collection Time: 06/22/20  7:16 AM   Result Value Ref Range    Glucose - Accu-Ck 117 (H) 65 - 99 mg/dL       Current Facility-Administered Medications   Medication Frequency   • metFORMIN (GLUCOPHAGE) tablet 250 mg BID AC   • midodrine (PROAMATINE) tablet 5 mg Q4HRS PRN   • aspirin EC (ECOTRIN) tablet 81 mg DAILY   • vitamin D (cholecalciferol) tablet 2,000 Units DAILY   • finasteride (PROSCAR) tablet 5 mg DAILY   • gabapentin (NEURONTIN) capsule 800 mg BID   • lovastatin  (MEVACOR) tablet 10 mg Nightly   • mirtazapine (REMERON) orally disintegrating tab 15 mg QHS   • tamsulosin (FLOMAX) capsule 0.4 mg AFTER DINNER   • vitamin B comp+C (ALLBEE WITH C) 1 Tab DAILY   • therapeutic multivitamin-minerals (THERAGRAN-M) tablet 1 Tab DAILY   • Pharmacy Consult Request ...Pain Management Review 1 Each PHARMACY TO DOSE   • Respiratory Therapy Consult Continuous RT   • tramadol (ULTRAM) 50 MG tablet 50 mg Q4HRS PRN   • acetaminophen (TYLENOL) tablet 1,000 mg TID   • oxyCODONE immediate-release (ROXICODONE) tablet 5 mg Q3HRS PRN   • oxyCODONE immediate release (ROXICODONE) tablet 10 mg Q3HRS PRN   • acetaminophen (TYLENOL) tablet 650 mg Q4HRS PRN   • enoxaparin (LOVENOX) inj 40 mg QHS   • docusate sodium (COLACE) capsule 200 mg BID    And   • sennosides (SENOKOT) 8.6 MG tablet 17.2 mg DAILY AT NOON    And   • bisacodyl (THE MAGIC BULLET) suppository 10 mg QDAY    And   • magnesium hydroxide (MILK OF MAGNESIA) suspension 30 mL QDAY PRN   • artificial tears ophthalmic solution 1 Drop PRN   • benzocaine-menthol (CEPACOL) lozenge 1 Lozenge Q2HRS PRN   • mag hydrox-al hydrox-simeth (MAALOX PLUS ES or MYLANTA DS) suspension 20 mL Q2HRS PRN   • ondansetron (ZOFRAN ODT) dispertab 4 mg 4X/DAY PRN    Or   • ondansetron (ZOFRAN) syringe/vial injection 4 mg 4X/DAY PRN   • traZODone (DESYREL) tablet 50 mg QHS PRN   • sodium chloride (OCEAN) 0.65 % nasal spray 2 Spray PRN   • lidocaine (LIDODERM) 5 % 2 Patch Daily-0800   • aripiprazole (ABILIFY) tablet 10 mg DAILY   • baclofen (LIORESAL) tablet 10 mg TID   • venlafaxine XR (EFFEXOR XR) capsule 150 mg BID   • insulin regular (HUMULIN R) injection 1-6 Units 4X/DAY ACHS    And   • glucose 4 g chewable tablet 16 g Q15 MIN PRN    And   • dextrose 50% (D50W) injection 50 mL Q15 MIN PRN       Orders Placed This Encounter   Procedures   • Diet Order Diabetic     Standing Status:   Standing     Number of Occurrences:   1     Order Specific Question:   Diet:      Answer:   Diabetic [3]       Assessment:  Active Hospital Problems    Diagnosis   • *Cervical myelopathy (HCC)   • Postoperative pain   • Deep vein thrombosis (HCC)   • Insomnia due to medical condition   • Neurogenic bowel   • Depression   • Thoracic myelopathy   • Neuropathic pain   • Hyperlipidemia   • Hypertension   • S/P BKA (below knee amputation) (HCC)       Medical Decision Making and Plan:  C2 AIS D, 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  Status post multiple spine surgeries, C3-T7 posterior spinal fusion on 6/5, T12-pelvis posterior spinal fusion on 6/10 by Dr Marsh.   - C-collar on at all times, Ford collar when in shower to be cleared by neurosurgery as outpatient, 8-12 weeks  -  Sutures to be removed 3 to 4 weeks postop, prolonged time secondary to repeat surgeries  -Continue comprehensive acute inpatient rehabilitation    Left BKA: Using [facility]  for prosthetists, not following up with physicians  - Prosthesis at bedside  -Follow-up with Dr. 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 noon, Colace 200 mg twice daily, Dulcolax suppository with digital stimulation,   -DC MiraLAX 1 packet daily  -Discussed program with patient and importance of appropriate management of neurogenic bowel with repercussions of colonic dilation and possible rupture  -KUB showed  no significant stool load in ascending or transverse colon      orthostatic hypotension: On 6/19- 82/52 with change in position  Because of significant autonomic dysfunction associated with spinal cord injury, the patient is at high risk for orthostatic hypotension.  - FAMILIA hose on prior to out of bed  -Midodrine 10 mg 3 times daily  - Midodrine 5 mg every 4 hours PRN systolic blood pressure less than 100  - P.o. fluid intake, goal 1.5-2 L/day  -Consult hospitalist, discussion with hospitalist about discontinuing lisinopril  - Amlodipine was DC'd    Spasticity: MAS of 1-1+/4 jumping of bilateral lower extremities, greater in hip flexors  - Baclofen 10 mg 3 times daily     Decreased range of motion of right hip: In differential diagnosis includes heterotopic ossification of the right hip after multiple spinal surgeries  - x-ray right hip, showed no signs of heterotopic ossification  -CRP is less than 2 likely negative     Pain:  #Neuropathic pain: Neuropathy, complicated by diabetes and a cervical myelopathy  -increase Gabapentin 800 mg tid  -Tylenol 1000 mg 3 times daily     #Postoperative pain:  -Tramadol  mg every 4 hours as needed moderate to severe pain first-line  -Oxycodone 5-10 mg every 3 hours as needed moderate to severe pain, second line  -Tylenol 1000 mg 3 times daily, check CMP in a.m. to evaluate for LFTs given high-dose Tylenol  -Lidocaine patches to either side of incision on for 12 hours off for 12 hours     Hypertension:  - All oral antihypertensives have been DC'd  -Appreciate hospitalist input     Type 2 diabetes: With hyperglycemia  -Metformin 500 mg twice daily  -Appreciate hospitalist input    Insomnia:  -Remeron 15 mg nightly  - Melatonin 3 mg nightly     Depression:  -Effexor  mg twice daily     Vitamin D deficiency  -Vitamin D pending     DVT prophylaxis: Patient has previous history of DVT in 2011 making him higher risk for clot formation  -Lovenox 40 mg daily    IDT Team Meeting  6/22/2020    I, Rj Navarro D.O., was present and led the interdisciplinary team conference on 6/22/2020.  I led the IDT conference and agree with the IDT conference documentation and plan of care as noted below.     RN:  Diet    % Meal     Pain    Sleep    Bowel Using BTP   Bladder Incont, montior PVR's   In's & Out's      PT:  Bed Mobility    Transfers    Mobility SBA   Stairs    Limited by BP, sitting was 98/62, + symptoms  Unable to stand at this time  Barrier: orthostatic hypotension    OT:  Eating    Grooming    Bathing    UB Dressing    LB Dressing    Toileting    Shower & Transfer    STG 1/3  Mod - total A for standing ADL's, limited by low BP  Transfers are improved with prosthetic     Resp:  RA, 2500-3L with IS      CM:  Continues to work on disposition and DME needs.      DC/Disposition:  TBD    Patient was seen for 36 minutes on unit/floor of which > 50% of time was spent on counseling and coordination of care regarding the above, including prognosis, risk reduction, benefits of treatment, and options for next stage of care including neuropathic pain, increase gabapentin to 800 mg 3 times a day, orthostatic hypotension, DC of all oral antihypertensive medication, initiate midodrine 3 times a day, coordination of care as per IDT above.      Rj Navarro D.O.

## 2020-06-22 NOTE — FLOWSHEET NOTE
06/22/20 9624   Patient History   Pulmonary Diagnosis incomplete quadriplegia   Procedures Relevant to Respiratory Status C3-T7 spinal fusion

## 2020-06-22 NOTE — FLOWSHEET NOTE
06/22/20 0846   Events/Summary/Plan   Events/Summary/Plan 02 spot check, IS   Vital Signs   Pulse 88   Respiration 18   Pulse Oximetry 97 %   $ Pulse Oximetry (Spot Check) Yes   Respiratory Assessment   Level of Consciousness Alert   Secretions   Cough Dry   Oxygen   O2 Delivery Device None - Room Air

## 2020-06-22 NOTE — THERAPY
Occupational Therapy  Daily Treatment     Patient Name: Gaurav Lopez  Age:  75 y.o., Sex:  male  Medical Record #: [id number]  Today's Date: 6/22/2020     Precautions  Precautions: Cervical Collar  , Spinal / Back Precautions , Fall Risk, Other (See Comments)  Comments: LLE prosthesis, pacemaker, orthostatic hypotension     Safety   ADL Safety : Impaired, Requires Supervision for Safety, Requires Physical Assist for Safety, Impaired Insight into Safety, Requires Cueing for Safety  Bathroom Safety: Impaired, Requires Supervision for Safety, Requires Physical Assist for Safety, Impaired Insight into Safety, Requires Cuing for Safety    Subjective       Objective       06/22/20 0931   Precautions   Precautions Cervical Collar  ;Spinal / Back Precautions ;Fall Risk;Other (See Comments)   Comments LLE prosthesis, pacemaker, orthostatic hypotension    Safety    ADL Safety  Impaired;Requires Supervision for Safety;Requires Physical Assist for Safety;Impaired Insight into Safety;Requires Cueing for Safety   Bathroom Safety Impaired;Requires Supervision for Safety;Requires Physical Assist for Safety;Impaired Insight into Safety;Requires Cuing for Safety   Vitals   Pulse 89   Patient BP Position Sitting   Blood Pressure  105/74   Respiration 18   Pulse Oximetry 96 %   O2 Delivery Device None - Room Air   Pain   Intervention Declines   Pain 0 - 10 Group   Therapist Pain Assessment 0   Non Verbal Descriptors   Non Verbal Scale  Calm   Cognition    Level of Consciousness Alert   Sleep/Wake Cycle   Sleep & Rest Awake   Functional Level of Assist   Eating Modified Independent   Grooming Modified Independent   Grooming Description Increased time;Seated in wheelchair at sink   Bathing Maximal Assist   Bathing Description Grab bar;Hand held shower;Long handled bath tool;Tub bench;Assit with back;Assit wtih lower extremities;Assit with perineal;Increased time;Initial preparation for task;Set-up of equipment;Set up for wound  protection;Supervision for safety;Verbal cueing   Upper Body Dressing Moderate Assist   Upper Body Dressing Description Application of orthotic or brace;Increased time;Assist with pulling shirt over head;Initial preparation for task;Supervision for safety;Verbal cueing   Lower Body Dressing Moderate Assist   Lower Body Dressing Description Increased time;Initial preparation for task;Set-up of equipment;Supervision for safety;Verbal cueing;Reacher;Grab bar   Toilet Transfers Minimal Assist   Toilet Transfer Description Grab bar;Set-up of equipment;Supervision for safety;Verbal cueing   Tub / Shower Transfers Moderate Assist   Tub Shower Transfer Description Increased time;Grab bar;Shower bench;Set-up of equipment;Supervision for safety;Verbal cueing   Interdisciplinary Plan of Care Collaboration   IDT Collaboration with  Certified Nursing Assistant;Nursing   Patient Position at End of Therapy Seated;Self Releasing Lap Belt Applied;Call Light within Reach;Tray Table within Reach;Phone within Reach   Collaboration Comments CLOF, dressing change   OT Total Time Spent   OT Individual Total Time Spent (Mins) 60   OT Charge Group   OT Self Care / ADL 4       Assessment    Pt was alert and cooperative w/ tx.  Tx emphasis on shower/dressing/grooming task - see notes above.  Barriers include generalized weakness, impaired endurance, orthostatic, and impaired balance.  Strengths: Motivated for self care and independence, Pleasant and cooperative, Supportive family  Barriers: Orthostatic hypotension, Hypotension    Plan    OT to address strength, endurance, balance + education for AE/DME to facilitate greater independence w/ ADL's/functional mobility/transfers.    Occupational Therapy Goals     Problem: Dressing     Dates: Start: 06/19/20       Goal: STG-Within one week, patient will dress LB     Dates: Start: 06/19/20       Description: 1) Individualized Goal:  Max assist for LB clothing management via AE/DME PRN  2)  Interventions:  OT Self Care/ADL, OT Neuro Re-Ed/Balance, OT Therapeutic Activity, OT Evaluation, and OT Therapeutic Exercise      Note:     Goal Note filed on 06/22/20 1109 by Mesfin Mtz MS,OTR/L    Pt at mod assist via AE/DME                        Problem: OT Long Term Goals     Dates: Start: 06/19/20       Goal: LTG-By discharge, patient will complete basic self care tasks     Dates: Start: 06/19/20       Description: 1) Individualized Goal:  Mod I for BADL's, Sup/SBA for shower via AE/DME PRN  2) Interventions:  OT Self Care/ADL, OT Neuro Re-Ed/Balance, OT Therapeutic Activity, OT Evaluation, and OT Therapeutic Exercise            Goal: LTG-By discharge, patient will perform bathroom transfers     Dates: Start: 06/19/20                   Problem: Toileting     Dates: Start: 06/19/20       Goal: STG-Within one week, patient will complete toileting tasks     Dates: Start: 06/19/20       Description: 1) Individualized Goal:  Max assist for toileting task via AE/DME PRN  2) Interventions:  OT Self Care/ADL, OT Neuro Re-Ed/Balance, OT Therapeutic Activity, OT Evaluation, and OT Therapeutic Exercise      Note:     Goal Note filed on 06/22/20 1109 by Mesfin Mtz MS,OTR/L    Pt at Total assist for toileting task via DME
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
## 2020-06-23 LAB — GLUCOSE BLD-MCNC: 111 MG/DL (ref 65–99)

## 2020-06-23 PROCEDURE — 99232 SBSQ HOSP IP/OBS MODERATE 35: CPT | Performed by: PHYSICAL MEDICINE & REHABILITATION

## 2020-06-23 PROCEDURE — A9270 NON-COVERED ITEM OR SERVICE: HCPCS | Performed by: HOSPITALIST

## 2020-06-23 PROCEDURE — 700101 HCHG RX REV CODE 250: Performed by: PHYSICAL MEDICINE & REHABILITATION

## 2020-06-23 PROCEDURE — 97112 NEUROMUSCULAR REEDUCATION: CPT | Mod: CQ

## 2020-06-23 PROCEDURE — 700112 HCHG RX REV CODE 229: Performed by: PHYSICAL MEDICINE & REHABILITATION

## 2020-06-23 PROCEDURE — 700102 HCHG RX REV CODE 250 W/ 637 OVERRIDE(OP): Performed by: PHYSICAL MEDICINE & REHABILITATION

## 2020-06-23 PROCEDURE — 700111 HCHG RX REV CODE 636 W/ 250 OVERRIDE (IP): Performed by: PHYSICAL MEDICINE & REHABILITATION

## 2020-06-23 PROCEDURE — 99232 SBSQ HOSP IP/OBS MODERATE 35: CPT | Performed by: HOSPITALIST

## 2020-06-23 PROCEDURE — 82962 GLUCOSE BLOOD TEST: CPT

## 2020-06-23 PROCEDURE — 770010 HCHG ROOM/CARE - REHAB SEMI PRIVAT*

## 2020-06-23 PROCEDURE — A9270 NON-COVERED ITEM OR SERVICE: HCPCS | Performed by: PHYSICAL MEDICINE & REHABILITATION

## 2020-06-23 PROCEDURE — 700102 HCHG RX REV CODE 250 W/ 637 OVERRIDE(OP): Performed by: HOSPITALIST

## 2020-06-23 PROCEDURE — 97110 THERAPEUTIC EXERCISES: CPT

## 2020-06-23 PROCEDURE — 97116 GAIT TRAINING THERAPY: CPT

## 2020-06-23 PROCEDURE — 97535 SELF CARE MNGMENT TRAINING: CPT

## 2020-06-23 PROCEDURE — 97530 THERAPEUTIC ACTIVITIES: CPT

## 2020-06-23 RX ORDER — BISACODYL 10 MG/1
10 SUPPOSITORY RECTAL
Status: DISCONTINUED | OUTPATIENT
Start: 2020-06-23 | End: 2020-06-26

## 2020-06-23 RX ORDER — DOCUSATE SODIUM 100 MG/1
100 CAPSULE, LIQUID FILLED ORAL 2 TIMES DAILY
Status: DISCONTINUED | OUTPATIENT
Start: 2020-06-23 | End: 2020-06-26

## 2020-06-23 RX ORDER — BACLOFEN 10 MG/1
15 TABLET ORAL 3 TIMES DAILY
Status: DISCONTINUED | OUTPATIENT
Start: 2020-06-23 | End: 2020-06-26

## 2020-06-23 RX ORDER — SENNOSIDES A AND B 8.6 MG/1
17.2 TABLET, FILM COATED ORAL
Status: DISCONTINUED | OUTPATIENT
Start: 2020-06-24 | End: 2020-06-26

## 2020-06-23 RX ADMIN — MELATONIN 2000 UNITS: at 08:20

## 2020-06-23 RX ADMIN — METFORMIN HYDROCHLORIDE 250 MG: 500 TABLET ORAL at 17:13

## 2020-06-23 RX ADMIN — BACLOFEN 15 MG: 10 TABLET ORAL at 15:05

## 2020-06-23 RX ADMIN — ENOXAPARIN SODIUM 40 MG: 100 INJECTION SUBCUTANEOUS at 19:46

## 2020-06-23 RX ADMIN — BISACODYL 10 MG: 10 SUPPOSITORY RECTAL at 19:41

## 2020-06-23 RX ADMIN — LIDOCAINE 2 PATCH: 50 PATCH TOPICAL at 08:21

## 2020-06-23 RX ADMIN — ASPIRIN 81 MG: 81 TABLET, COATED ORAL at 08:20

## 2020-06-23 RX ADMIN — BACLOFEN 10 MG: 10 TABLET ORAL at 08:20

## 2020-06-23 RX ADMIN — MIDODRINE HYDROCHLORIDE 10 MG: 10 TABLET ORAL at 05:28

## 2020-06-23 RX ADMIN — ARIPIPRAZOLE 10 MG: 5 TABLET ORAL at 08:20

## 2020-06-23 RX ADMIN — DOCUSATE SODIUM 200 MG: 100 CAPSULE, LIQUID FILLED ORAL at 08:19

## 2020-06-23 RX ADMIN — TAMSULOSIN HYDROCHLORIDE 0.4 MG: 0.4 CAPSULE ORAL at 17:13

## 2020-06-23 RX ADMIN — VENLAFAXINE HYDROCHLORIDE 150 MG: 75 CAPSULE, EXTENDED RELEASE ORAL at 08:19

## 2020-06-23 RX ADMIN — MULTIPLE VITAMINS W/ MINERALS TAB 1 TABLET: TAB at 08:20

## 2020-06-23 RX ADMIN — STANDARDIZED SENNA CONCENTRATE 17.2 MG: 8.6 TABLET ORAL at 12:00

## 2020-06-23 RX ADMIN — GABAPENTIN 800 MG: 400 CAPSULE ORAL at 19:41

## 2020-06-23 RX ADMIN — FINASTERIDE 5 MG: 5 TABLET, FILM COATED ORAL at 08:20

## 2020-06-23 RX ADMIN — LOVASTATIN 10 MG: 20 TABLET ORAL at 19:42

## 2020-06-23 RX ADMIN — GABAPENTIN 800 MG: 400 CAPSULE ORAL at 08:19

## 2020-06-23 RX ADMIN — MIDODRINE HYDROCHLORIDE 10 MG: 10 TABLET ORAL at 17:13

## 2020-06-23 RX ADMIN — DOCUSATE SODIUM 100 MG: 100 CAPSULE, LIQUID FILLED ORAL at 19:42

## 2020-06-23 RX ADMIN — GABAPENTIN 800 MG: 400 CAPSULE ORAL at 15:05

## 2020-06-23 RX ADMIN — METFORMIN HYDROCHLORIDE 250 MG: 500 TABLET ORAL at 08:20

## 2020-06-23 RX ADMIN — MIDODRINE HYDROCHLORIDE 10 MG: 10 TABLET ORAL at 12:00

## 2020-06-23 RX ADMIN — VENLAFAXINE HYDROCHLORIDE 150 MG: 75 CAPSULE, EXTENDED RELEASE ORAL at 19:42

## 2020-06-23 RX ADMIN — VITAMIN C 1 TABLET: TAB at 08:20

## 2020-06-23 RX ADMIN — BACLOFEN 15 MG: 10 TABLET ORAL at 19:41

## 2020-06-23 RX ADMIN — MIRTAZAPINE 15 MG: 15 TABLET, ORALLY DISINTEGRATING ORAL at 19:41

## 2020-06-23 RX ADMIN — ACETAMINOPHEN 1000 MG: 500 TABLET, FILM COATED ORAL at 08:19

## 2020-06-23 ASSESSMENT — ACTIVITIES OF DAILY LIVING (ADL): BED_CHAIR_WHEELCHAIR_TRANSFER_DESCRIPTION: ASSIST WITH TWO LIMBS;REQUIRES LIFT

## 2020-06-23 ASSESSMENT — ENCOUNTER SYMPTOMS
FOCAL WEAKNESS: 1
NAUSEA: 0
SENSORY CHANGE: 1
NECK PAIN: 1
FEVER: 0
POLYDIPSIA: 0
ABDOMINAL PAIN: 0
BRUISES/BLEEDS EASILY: 0
CHILLS: 0
SHORTNESS OF BREATH: 0
EYES NEGATIVE: 1
COUGH: 0
PALPITATIONS: 0
VOMITING: 0

## 2020-06-23 ASSESSMENT — PATIENT HEALTH QUESTIONNAIRE - PHQ9
2. FEELING DOWN, DEPRESSED, IRRITABLE, OR HOPELESS: NOT AT ALL
1. LITTLE INTEREST OR PLEASURE IN DOING THINGS: NOT AT ALL
SUM OF ALL RESPONSES TO PHQ9 QUESTIONS 1 AND 2: 0

## 2020-06-23 ASSESSMENT — GAIT ASSESSMENTS
DISTANCE (FEET): 10
DEVIATION: DECREASED BASE OF SUPPORT;DECREASED TOE OFF
DEVIATION: DECREASED BASE OF SUPPORT;DECREASED TOE OFF;DECREASED HEEL STRIKE
ASSISTIVE DEVICE: PARALLEL BARS
ASSISTIVE DEVICE: FRONT WHEEL WALKER
GAIT LEVEL OF ASSIST: CONTACT GUARD ASSIST
DISTANCE (FEET): 20
GAIT LEVEL OF ASSIST: MINIMAL ASSIST

## 2020-06-23 ASSESSMENT — FIBROSIS 4 INDEX: FIB4 SCORE: 0.37

## 2020-06-23 NOTE — THERAPY
"Physical Therapy   Daily Treatment     Patient Name: Gaurav Lopez  Age:  75 y.o., Sex:  male  Medical Record #: [id number]  Today's Date: 6/23/2020     Precautions  Precautions: (P) Fall Risk, Cervical Collar    Comments: (P) LLE prosthesis, pacemaker, orthostatic hypotension , abdominal binder    Subjective    Patient excited to try FWW trial- reports he had FWW at home previously     Objective       06/23/20 1001   Precautions   Precautions Fall Risk;Cervical Collar     Comments LLE prosthesis, pacemaker, orthostatic hypotension , abdominal binder   Vitals   Blood Pressure  115/72  (after ambulation activity)   Gait Functional Level of Assist    Gait Level Of Assist Contact Guard Assist   Assistive Device Front Wheel Walker   Distance (Feet) 20   # of Times Distance was Traveled 2   Deviation Decreased Base Of Support;Decreased Toe Off   Transfer Functional Level of Assist   Bed, Chair, Wheelchair Transfer Moderate Assist   Bed Chair Wheelchair Transfer Description Assist with two limbs;Requires lift  (min assist STS, CGA pivot steps)   Interdisciplinary Plan of Care Collaboration   IDT Collaboration with  Physical Therapist Assistant (PTA)   Collaboration Comments treatment planning   PT Total Time Spent   PT Individual Total Time Spent (Mins) 30   PT Charge Group   PT Gait Training 1   PT Therapeutic Activities 1       Assessment    Patient ambulation distance limited by c/o \"dizziness\" (LX=330/72)    Strengths: Able to follow instructions, Effective communication skills, Independent prior level of function, Pleasant and cooperative, Willingly participates in therapeutic activities  Barriers: Bladder incontinence, Bowel incontinence, Confused, Decreased endurance, Generalized weakness, Home accessibility, Hypotension, Orthostatic hypotension, Impaired activity tolerance, Impaired balance, Impaired carryover of learning, Limited mobility    Plan    Monitor BP, STS work, standing tolerance; progress ambulation " distance with FWW; consistent transfer training; LE strengthening    Physical Therapy Problems     Problem: Balance     Dates: Start: 06/19/20       Goal: STG-Within one week, patient will maintain static standing     Dates: Start: 06/19/20       Description: 1) Individualized goal:  // bars, c/s collar, L BKA prosthesis, gait belt, at Min A level x3 minutes  2) Interventions:  PT Group Therapy, PT E Stim Attended, PT Prosthetic Training, PT Gait Training, PT Therapeutic Exercises, PT TENS Application, PT Neuro Re-Ed/Balance, PT Therapeutic Activity, and PT Manual Therapy    Note:     Goal Note filed on 06/22/20 0813 by Teofilo Vázquez, PT    Three days since PT eval; will monitor pt's progress towards goals this week.                  Goal: STG-Within one week, patient will maintain static sitting     Dates: Start: 06/19/20       Description: 1) Individualized goal:  EOM or unsupported, c/s collar, L BKA prosthesis, gait belt, at CGA level x3 minutes  2) Interventions:  PT Group Therapy, PT E Stim Attended, PT Prosthetic Training, PT Gait Training, PT Therapeutic Exercises, PT TENS Application, PT Neuro Re-Ed/Balance, PT Therapeutic Activity, and PT Manual Therapy      Note:     Goal Note filed on 06/22/20 0813 by Teofilo Vázquez, PT    Three days since PT eval; will monitor pt's progress towards goals this week.                        Problem: Mobility     Dates: Start: 06/19/20       Goal: STG-Within one week, patient will ambulate household distance     Dates: Start: 06/19/20       Description: 1) Individualized goal:  // bars x10 feet, c/s collar, L BKA prosthesis, gait belt, at Mod A level with w/c follow prn  2) Interventions:  PT Group Therapy, PT E Stim Attended, PT Prosthetic Training, PT Gait Training, PT Therapeutic Exercises, PT TENS Application, PT Neuro Re-Ed/Balance, PT Therapeutic Activity, and PT Manual Therapy      Note:     Goal Note filed on 06/22/20 0813 by Teofilo Vázquez, PT     Three days since PT eval; will monitor pt's progress towards goals this week.                  Goal: STG-Within one week, patient will propel wheelchair community     Dates: Start: 06/19/20       Description: 1) Individualized goal:  BLE propulsion x150 feet, c/s collar, L BKA prosthesis, gait belt, at SPV level  2) Interventions:  PT Group Therapy, PT E Stim Attended, PT Prosthetic Training, PT Gait Training, PT Therapeutic Exercises, PT TENS Application, PT Neuro Re-Ed/Balance, PT Therapeutic Activity, and PT Manual Therapy      Note:     Goal Note filed on 06/22/20 0813 by Teofilo Vázquez, PT    Three days since PT eval; will monitor pt's progress towards goals this week.                        Problem: Mobility Transfers     Dates: Start: 06/19/20       Goal: STG-Within one week, patient will perform bed mobility     Dates: Start: 06/19/20       Description: 1) Individualized goal:  Mod A supine<>sit, c/s collar, L BKA prosthesis, bed rail  2) Interventions:  PT Group Therapy, PT E Stim Attended, PT Prosthetic Training, PT Gait Training, PT Therapeutic Exercises, PT TENS Application, PT Neuro Re-Ed/Balance, PT Therapeutic Activity, and PT Manual Therapy    Note:     Goal Note filed on 06/22/20 0813 by Teofilo Vázquez, PT    Three days since PT eval; will monitor pt's progress towards goals this week.                    Goal: STG-Within one week, patient will sit to stand     Dates: Start: 06/19/20       Description: 1) Individualized goal:  // bars, c/s collar, L BKA prosthesis, gait belt, at Min A level consistently  2) Interventions:  PT Group Therapy, PT E Stim Attended, PT Prosthetic Training, PT Gait Training, PT Therapeutic Exercises, PT TENS Application, PT Neuro Re-Ed/Balance, PT Therapeutic Activity, and PT Manual Therapy    Note:     Goal Note filed on 06/22/20 0813 by Teofilo Vázquez, PT    Three days since PT eval; will monitor pt's progress towards goals this week.                   Goal: STG-Within one week, patient will transfer bed to chair     Dates: Start: 06/19/20       Description: 1) Individualized goal:  Mod A supine<>sit, c/s collar, L BKA prosthesis, bed rail, gait belt  2) Interventions:  PT Group Therapy, PT E Stim Attended, PT Prosthetic Training, PT Gait Training, PT Therapeutic Exercises, PT TENS Application, PT Neuro Re-Ed/Balance, PT Therapeutic Activity, and PT Manual Therapy      Note:     Goal Note filed on 06/22/20 0813 by Teofilo Vázquez, PT    Three days since PT eval; will monitor pt's progress towards goals this week.                        Problem: PT-Long Term Goals     Dates: Start: 06/19/20       Goal: LTG-By discharge, patient will ambulate     Dates: Start: 06/19/20       Description: 1) Individualized goal:  150 feet with FWW, c/s collar, L BKA prosthesis, at SBA level  2) Interventions:  PT Group Therapy, PT E Stim Attended, PT Prosthetic Training, PT Gait Training, PT Therapeutic Exercises, PT TENS Application, PT Neuro Re-Ed/Balance, PT Therapeutic Activity, and PT Manual Therapy          Goal: LTG-By discharge, patient will transfer one surface to another     Dates: Start: 06/19/20       Description: 1) Individualized goal:  with FWW, c/s collar, L BKA prosthesis, at SBA level  2) Interventions:  PT Group Therapy, PT E Stim Attended, PT Prosthetic Training, PT Gait Training, PT Therapeutic Exercises, PT TENS Application, PT Neuro Re-Ed/Balance, PT Therapeutic Activity, and PT Manual Therapy            Goal: LTG-By discharge, patient will ambulate up/down 4-6 stairs     Dates: Start: 06/19/20       Description: 1) Individualized goal:  with 1 railing, c/s collar, L BKA prosthesis, gait belt, at Min A level  2) Interventions:  PT Group Therapy, PT E Stim Attended, PT Prosthetic Training, PT Gait Training, PT Therapeutic Exercises, PT TENS Application, PT Neuro Re-Ed/Balance, PT Therapeutic Activity, and PT Manual Therapy          Goal: LTG-By  discharge, patient will transfer in/out of a car     Dates: Start: 06/19/20       Description: 1) Individualized goal:  with FWW, c/s collar, L BKA prosthesis, at SBA level  2) Interventions:  PT Group Therapy, PT E Stim Attended, PT Prosthetic Training, PT Gait Training, PT Therapeutic Exercises, PT TENS Application, PT Neuro Re-Ed/Balance, PT Therapeutic Activity, and PT Manual Therapy

## 2020-06-23 NOTE — CARE PLAN
Problem: Safety  Goal: Will remain free from falls  Intervention: Implement fall precautions  Note: Use of call light encouraged to make needs known.Fall precautions and frequent rounding in place for safety.Call light within reach.Will continue to monitor and assess needs safety.     Problem: Bowel/Gastric:  Goal: Will not experience complications related to bowel motility  Intervention: Implement Bowel Protocol, if applicable  Note: Pt refused scheduled suppository at hs.Education given on the importance of procedure to no avail.[facility] 06/22.Will continue to monitor.     Problem: Pain Management  Goal: Pain level will decrease to patient's comfort goal  Intervention: Follow pain managment plan developed in collaboration with patient and Interdisciplinary Team  Note: Pain is manageable with scheduled medication.Repositioned with pillows for comfort.Will continue to monitor and assess pain level and medicate as needed.

## 2020-06-23 NOTE — DISCHARGE PLANNING
CM LM for patients wife-updating her on IDT yesterday and no target DC date yet.  Patient making slow progress due to blood pressure being low.  CM will continue to monitor for DC needs.

## 2020-06-23 NOTE — PROGRESS NOTES
"Rehab Progress Note     Encounter Date: 6/23/2020    CC: weakness    Interval Events (Subjective)  Reports continued jumping in his hip flexors of his lower extremities right greater than left, worse after exercise, impairing his sleep, not causing him pain or impairing function.  He does notice improvement with the increased dose of baclofen.    Complaining of having multiple bowel accidents over the past 2 days  Bowel: Small incontinence yesterday  Bladder: PVR/void-106/400, 99/350, 157/200    ROS:  Gen: No fatigue, confusion, significant weight loss  Eyes: no blurry vision, double vision or pain in eyes  ENT: no changes in hearing, runny nose, nose bleeds, sinus pain  CV: No CP, palpitations,   Lungs: no shortness of breath, changes in secretions, changes in cough, pain with coughing  Abd: No abd pain, nausea, vomiting, pain with eating  : no blood in urine, pain during storage phase, bladder spasms, suprapubic pain  Ext: No swelling in legs, asymmetric atrophy  Neuro: no changes in strength or sensation  Skin: no new ulcers/skin breakdown appreciated by patient  Mood: No changes in mood today, increase in depression or anxiety  Heme: no bruising, or bleeding  Rest of 14 point review of systems is negative    Objective:  Vitals: /80   Pulse 80   Temp 36.6 °C (97.8 °F) (Temporal)   Resp 18   Ht 1.753 m (5' 9.02\")   Wt 77.7 kg (171 lb 4.8 oz)   SpO2 92%   Gen: NAD, Body mass index is 25.28 kg/m².  HEENT: NC/AT, PERRLA, moist mucous membranes  Cardio: RRR, no mumurs  Pulm: CTAB, with normal respiratory effort  Abd: Soft NTND, active bowel sounds  Ext: No peripheral edema. No calf tenderness. No clubbing/cyanosis. +dorsal pedalis pulses on the right, left BKA    Skin: Incision is clean dry and intact, sutures in place, no signs of dehiscence    Tone: Hip flexion spasticity, 1+-2/4    Recent Results (from the past 72 hour(s))   ACCU-CHEK GLUCOSE    Collection Time: 06/20/20 11:44 AM   Result Value Ref " Range    Glucose - Accu-Ck 112 (H) 65 - 99 mg/dL   ACCU-CHEK GLUCOSE    Collection Time: 06/20/20  5:29 PM   Result Value Ref Range    Glucose - Accu-Ck 91 65 - 99 mg/dL   ACCU-CHEK GLUCOSE    Collection Time: 06/20/20  8:08 PM   Result Value Ref Range    Glucose - Accu-Ck 124 (H) 65 - 99 mg/dL   ACCU-CHEK GLUCOSE    Collection Time: 06/21/20  7:34 AM   Result Value Ref Range    Glucose - Accu-Ck 106 (H) 65 - 99 mg/dL   ACCU-CHEK GLUCOSE    Collection Time: 06/21/20 11:25 AM   Result Value Ref Range    Glucose - Accu-Ck 97 65 - 99 mg/dL   ACCU-CHEK GLUCOSE    Collection Time: 06/21/20  4:57 PM   Result Value Ref Range    Glucose - Accu-Ck 96 65 - 99 mg/dL   ACCU-CHEK GLUCOSE    Collection Time: 06/21/20  8:04 PM   Result Value Ref Range    Glucose - Accu-Ck 145 (H) 65 - 99 mg/dL   ACCU-CHEK GLUCOSE    Collection Time: 06/22/20  7:16 AM   Result Value Ref Range    Glucose - Accu-Ck 117 (H) 65 - 99 mg/dL   ACCU-CHEK GLUCOSE    Collection Time: 06/22/20  5:11 PM   Result Value Ref Range    Glucose - Accu-Ck 99 65 - 99 mg/dL   ACCU-CHEK GLUCOSE    Collection Time: 06/23/20  7:39 AM   Result Value Ref Range    Glucose - Accu-Ck 111 (H) 65 - 99 mg/dL       Current Facility-Administered Medications   Medication Frequency   • insulin regular (HUMULIN R) injection 1-6 Units BID INSULIN    And   • glucose 4 g chewable tablet 16 g Q15 MIN PRN    And   • dextrose 50% (D50W) injection 50 mL Q15 MIN PRN   • gabapentin (NEURONTIN) capsule 800 mg TID   • midodrine (PROAMATINE) tablet 10 mg TID   • metFORMIN (GLUCOPHAGE) tablet 250 mg BID AC   • midodrine (PROAMATINE) tablet 5 mg Q4HRS PRN   • aspirin EC (ECOTRIN) tablet 81 mg DAILY   • vitamin D (cholecalciferol) tablet 2,000 Units DAILY   • finasteride (PROSCAR) tablet 5 mg DAILY   • lovastatin (MEVACOR) tablet 10 mg Nightly   • mirtazapine (REMERON) orally disintegrating tab 15 mg QHS   • tamsulosin (FLOMAX) capsule 0.4 mg AFTER DINNER   • vitamin B comp+C (ALLBEE WITH C) 1 Tab  DAILY   • therapeutic multivitamin-minerals (THERAGRAN-M) tablet 1 Tab DAILY   • Pharmacy Consult Request ...Pain Management Review 1 Each PHARMACY TO DOSE   • Respiratory Therapy Consult Continuous RT   • tramadol (ULTRAM) 50 MG tablet 50 mg Q4HRS PRN   • acetaminophen (TYLENOL) tablet 1,000 mg TID   • oxyCODONE immediate-release (ROXICODONE) tablet 5 mg Q3HRS PRN   • oxyCODONE immediate release (ROXICODONE) tablet 10 mg Q3HRS PRN   • acetaminophen (TYLENOL) tablet 650 mg Q4HRS PRN   • enoxaparin (LOVENOX) inj 40 mg QHS   • docusate sodium (COLACE) capsule 200 mg BID    And   • sennosides (SENOKOT) 8.6 MG tablet 17.2 mg DAILY AT NOON    And   • bisacodyl (THE MAGIC BULLET) suppository 10 mg QDAY    And   • magnesium hydroxide (MILK OF MAGNESIA) suspension 30 mL QDAY PRN   • artificial tears ophthalmic solution 1 Drop PRN   • benzocaine-menthol (CEPACOL) lozenge 1 Lozenge Q2HRS PRN   • mag hydrox-al hydrox-simeth (MAALOX PLUS ES or MYLANTA DS) suspension 20 mL Q2HRS PRN   • ondansetron (ZOFRAN ODT) dispertab 4 mg 4X/DAY PRN    Or   • ondansetron (ZOFRAN) syringe/vial injection 4 mg 4X/DAY PRN   • traZODone (DESYREL) tablet 50 mg QHS PRN   • sodium chloride (OCEAN) 0.65 % nasal spray 2 Spray PRN   • lidocaine (LIDODERM) 5 % 2 Patch Daily-0800   • aripiprazole (ABILIFY) tablet 10 mg DAILY   • baclofen (LIORESAL) tablet 10 mg TID   • venlafaxine XR (EFFEXOR XR) capsule 150 mg BID       Orders Placed This Encounter   Procedures   • Diet Order Diabetic     Standing Status:   Standing     Number of Occurrences:   1     Order Specific Question:   Diet:     Answer:   Diabetic [3]       Assessment:  Active Hospital Problems    Diagnosis   • *Cervical myelopathy (HCC)   • Postoperative pain   • Deep vein thrombosis (HCC)   • Insomnia due to medical condition   • Neurogenic bowel   • Depression   • Thoracic myelopathy   • Neuropathic pain   • Hyperlipidemia   • Hypertension   • S/P BKA (below knee amputation) (HCC)        Medical Decision Making and Plan:  C2 AIS D, 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  Status post multiple spine surgeries, C3-T7 posterior spinal fusion on 6/5, T12-pelvis posterior spinal fusion on 6/10 by Dr Marsh.   - C-collar on at all times, [place] collar when in shower to be cleared by neurosurgery as outpatient, 8-12 weeks  -  Sutures to be removed 21 days postop, prolonged time secondary to repeat surgeries  -Continue comprehensive acute inpatient rehabilitation    Left BKA: Using [employer] for prosthetists, not following up with physicians  - Prosthesis at bedside  -Follow-up with Dr. 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 noon, Colace 200 mg twice daily, Dulcolax suppository with digital stimulation,   -DC MiraLAX 1 packet daily  -Discussed program with patient and importance of appropriate management of neurogenic bowel with repercussions of colonic dilation and possible rupture  -KUB showed no significant stool load in ascending or transverse colon      orthostatic hypotension: On 6/19- 82/52 with change in position  Because of significant autonomic dysfunction associated with spinal cord injury, the patient is at high risk for orthostatic hypotension.  - FAMILIA hose on prior to out of bed  -Midodrine 10 mg 3 times daily  - Midodrine 5 mg every 4 hours  PRN systolic blood pressure less than 100  - P.o. fluid intake, goal 1.5-2 L/day  -Consult hospitalist, discussion with hospitalist about discontinuing lisinopril  - Amlodipine was DC'd    Spasticity: MAS of 1-1+/4 jumping of bilateral lower extremities, greater in hip flexors  - increased Baclofen 15 mg 3 times daily     Decreased range of motion of right hip: In differential diagnosis includes heterotopic ossification of the right hip after multiple spinal surgeries  - x-ray right hip, showed no signs of heterotopic ossification  -CRP is less than 2 likely negative     Pain:  #Neuropathic pain: Neuropathy, complicated by diabetes and a cervical myelopathy  -increase Gabapentin 800 mg tid  - DCTylenol 1000 mg 3 times daily     #Postoperative pain:  -Tramadol  mg every 4 hours as needed moderate to severe pain first-line  -Oxycodone 5-10 mg every 3 hours as needed moderate to severe pain, second line  - DC Tylenol 1000 mg 3 times daily, check CMP in a.m. to evaluate for LFTs given high-dose Tylenol  -Lidocaine patches to either side of incision on for 12 hours off for 12 hours     Hypertension:  - All oral antihypertensives have been DC'd  -Appreciate hospitalist input     Type 2 diabetes: With hyperglycemia  -Metformin 500 mg twice daily  -Appreciate hospitalist input    Insomnia:  -Remeron 15 mg nightly  - Melatonin 3 mg nightly     Depression:  -Effexor  mg twice daily     Vitamin D deficiency  -Vitamin D pending     DVT prophylaxis: Patient has previous history of DVT in 2011 making him higher risk for clot formation  -Lovenox 40 mg daily    Patient was seen for 28 minutes on unit/floor of which > 50% of time was spent on counseling and coordination of care regarding the above, including prognosis, risk reduction, benefits of treatment, and options for next stage of care including orthostatic hypotension, improved with current mechanical and chemical support, spasticity, increase baclofen to 15 mg 3  times daily, neurogenic bowel, decrease Colace to 100 mg twice daily.      Rj Navarro D.O.

## 2020-06-23 NOTE — THERAPY
Recreational Therapy   Initial Evaluation     Patient Name: Gaurav Lopez  Age:  75 y.o., Sex:  male  Medical Record #: [id number]  Today's Date: 6/23/2020     Subjective    Pt speaking on how he was weaker with his  strength in his R hand than his L hand.     Objective       06/22/20 1531   Functional Ability Status - Cognitive   Attention Span Remains on Task   Comprehension Follows Three Step Commands   Judgment Able to Make Independent Decisions   Functional Ability Status - Emotional    Affect Appropriate   Mood Appropriate   Behavior Appropriate   Leisure Competence Measure   Leisure Awareness Cueing Assist   Leisure Attitude Cueing Assist   Leisure Skills Moderate Assist   Cultural / Social Behaviors Cueing Assist   Interpersonal Skills Independent   Community Integration Skills Moderate Assist   Social Contact Cueing Assist   Community Participation Moderate Assist   Skilled Intervention    Skilled Intervention Cognitive Leisure   Interdisciplinary Plan of Care Collaboration   Patient Position at End of Therapy Seated;Call Light within Reach;Tray Table within Reach   Strengths & Barriers   Strengths Able to follow instructions;Alert and oriented;Effective communication skills;Pleasant and cooperative;Willingly participates in therapeutic activities   Barriers Bowel incontinence;Emotional lability;Impaired activity tolerance;Limited mobility  (Negative self talk)   Treatment Time   Total Time Spent (mins) 30   Procedural Tracking   Procedural Tracking Community Re-Integration;Community Skills Development;Leisure Skills Awareness;Leisure Skills Development;Cognitive Skills Training;Gross Motor Functional Leisure Skills;Fine Motor Functional Leisure Skills       Assessment  Patient is 75 y.o. male with a diagnosis of Spinal Cord Dysfunction, Non-Traumatic: Quadriplegia, Incomplete C1-4 due to the etiologic diagnosis of Cervical myelopathy (HCC)..  Additional factors influencing patient status / progress (ie:  cognitive factors, co-morbidities, social support, etc): BPH, multiple spinal surgeries, cervical myelopathy, pathic pain, left BKA, spasticity, hypertension, type 2 diabetes, dyslipidemia.        Plan  Recommend Recreational Therapy 30-60 minutes per day 3-4 days per week for 2 weeks for the following treatments:  Community Re-Integration, Community Skills Development, Leisure Skills Awareness, Leisure Skills Development, Gross Motor Functional Leisure Skills and Fine Motor Functional Leisure Skills    Goals:  Long term and short term goals have been discussed with patient and they are in agreement.    Recreation Therapy Problems     Problem: Recreation Therapy     Dates: Start: 06/23/20       Goal: STG-Within one week, patient will demonstrate leisure problem solving     Dates: Start: 06/23/20       Description: By stating 2 leisure activities he would like to participate in during his free time or during session and any barriers to his participation.           Goal: STG-Within one week, patient will     Dates: Start: 06/23/20       Description: Participate in a fine motor leisure activity with pieces one inch or larger without dropping them.           Goal: LTG-By discharge, patient will demonstrate leisure problem solving     Dates: Start: 06/23/20       Description: By stating 2 leisure activities he would like to participate in following discharge and any barriers to his participation.           Goal: LTG-By discharge, patient will     Dates: Start: 06/23/20       Description: Participate in a fine motor leisure activity with pieces smaller than an inch without dropping them.

## 2020-06-23 NOTE — THERAPY
Physical Therapy   Daily Treatment     Patient Name: Gaurav Lopez  Age:  75 y.o., Sex:  male  Medical Record #: [id number]  Today's Date: 6/23/2020     Precautions  Precautions: Fall Risk, Cervical Collar  , Spinal / Back Precautions , Other (See Comments)  Comments: LLE prosthesis, pacemaker, orthostatic hypotension , abdominal binder    Subjective    Pt agreeable to PT     Objective       06/23/20 0831   Precautions   Precautions Fall Risk;Cervical Collar  ;Spinal / Back Precautions ;Other (See Comments)   Comments LLE prosthesis, pacemaker, orthostatic hypotension , abdominal binder   Vitals   Pulse 84   Patient BP Position Standing 3 minutes   Blood Pressure  108/52   Room Air Oximetry 97   O2 Delivery Device None - Room Air   Vitals Comments abdominal binder donned in supine, supine /78, seated /71, standing /52   Gait Functional Level of Assist    Gait Level Of Assist Minimal Assist   Assistive Device Parallel Bars   Distance (Feet) 10   # of Times Distance was Traveled 3   Deviation Decreased Base Of Support;Decreased Toe Off;Decreased Heel Strike  (L toe catching with prosthesis donned)   Wheelchair Functional Level of Assist   Wheelchair Assist Supervised   Distance Wheelchair (Feet or Distance) 100   Wheelchair Description Extra time;Leg rest management;Supervision for safety;Limited by fatigue;Verbal cueing  (vc for use of UE's)   Transfer Functional Level of Assist   Bed, Chair, Wheelchair Transfer Moderate Assist   Bed Chair Wheelchair Transfer Description Verbal cueing;Squat pivot transfer to wheelchair;Set-up of equipment;Requires lift;Increased time  (reach pivot transfer)   Bed Mobility    Supine to Sit Maximal Assist  (vc for log roll technique)   Sit to Stand Minimal Assist  (CGA/[place] pulling up on //)   Rolling Moderate Assist to Lt.;Moderate Assist to Rt.  (fpor donning of abdominal binder)   Neuro-Muscular Treatments   Neuro-Muscular Treatments Weight Shift Left;Weight Shift  Right;Verbal Cuing;Postural Changes;Postural Facilitation;Tactile Cuing   Comments pre gait in // anterior toe taps 1 x 10 R 1 x 10 L and 1 x 10 alternating L/R, B UE support in // CGA. forward steps with facilitated anterolateral weight shift    Interdisciplinary Plan of Care Collaboration   IDT Collaboration with  Therapy Tech;Physical Therapist   Patient Position at End of Therapy Seated;Self Releasing Lap Belt Applied;Call Light within Reach;Tray Table within Reach   Collaboration Comments wc follow, PT: CLOF/bp   PT Total Time Spent   PT Individual Total Time Spent (Mins) 60   PT Charge Group   PT Gait Training 2   PT Neuromuscular Re-Education / Balance 1   PT Therapeutic Activities 1   Supervising Physical Therapist eTofilo Vázquez       Rolling side to side to henrry abdominal binder    Donned L LE prosthesis seated EOB    Assessment    Pt with only mild symptoms of low bp in standing (lightheadedness) and was able to tolerate standing multiple times for up to 3 minutes at a time in // during pregait activities. Gait training as indicated above in flow sheet, CGA/[place] wc follow for safety. Improved static sitting balance at EOB noted ranged from CGA to SBA    Strengths: Able to follow instructions, Effective communication skills, Independent prior level of function, Pleasant and cooperative, Willingly participates in therapeutic activities  Barriers: Bladder incontinence, Bowel incontinence, Confused, Decreased endurance, Generalized weakness, Home accessibility, Hypotension, Orthostatic hypotension, Impaired activity tolerance, Impaired balance, Impaired carryover of learning, Limited mobility    Plan       Continue to monitor BP, STS and gait in // bars vs FWW when BP stabilizes, continue exercise, outcome measures, activity tolerance, sitting balance, education.       Physical Therapy Problems     Problem: Balance     Dates: Start: 06/19/20       Goal: STG-Within one week, patient will maintain static  standing     Dates: Start: 06/19/20       Description: 1) Individualized goal:  // bars, c/s collar, L BKA prosthesis, gait belt, at Min A level x3 minutes  2) Interventions:  PT Group Therapy, PT E Stim Attended, PT Prosthetic Training, PT Gait Training, PT Therapeutic Exercises, PT TENS Application, PT Neuro Re-Ed/Balance, PT Therapeutic Activity, and PT Manual Therapy    Note:     Goal Note filed on 06/22/20 0813 by Teofilo Vázquez, PT    Three days since PT eval; will monitor pt's progress towards goals this week.                  Goal: STG-Within one week, patient will maintain static sitting     Dates: Start: 06/19/20       Description: 1) Individualized goal:  EOM or unsupported, c/s collar, L BKA prosthesis, gait belt, at CGA level x3 minutes  2) Interventions:  PT Group Therapy, PT E Stim Attended, PT Prosthetic Training, PT Gait Training, PT Therapeutic Exercises, PT TENS Application, PT Neuro Re-Ed/Balance, PT Therapeutic Activity, and PT Manual Therapy      Note:     Goal Note filed on 06/22/20 0813 by Teofilo Vázquez, PT    Three days since PT eval; will monitor pt's progress towards goals this week.                        Problem: Mobility     Dates: Start: 06/19/20       Goal: STG-Within one week, patient will ambulate household distance     Dates: Start: 06/19/20       Description: 1) Individualized goal:  // bars x10 feet, c/s collar, L BKA prosthesis, gait belt, at Mod A level with w/c follow prn  2) Interventions:  PT Group Therapy, PT E Stim Attended, PT Prosthetic Training, PT Gait Training, PT Therapeutic Exercises, PT TENS Application, PT Neuro Re-Ed/Balance, PT Therapeutic Activity, and PT Manual Therapy      Note:     Goal Note filed on 06/22/20 0813 by Teofilo Vázquez, PT    Three days since PT eval; will monitor pt's progress towards goals this week.                  Goal: STG-Within one week, patient will propel wheelchair community     Dates: Start: 06/19/20        Description: 1) Individualized goal:  BLE propulsion x150 feet, c/s collar, L BKA prosthesis, gait belt, at SPV level  2) Interventions:  PT Group Therapy, PT E Stim Attended, PT Prosthetic Training, PT Gait Training, PT Therapeutic Exercises, PT TENS Application, PT Neuro Re-Ed/Balance, PT Therapeutic Activity, and PT Manual Therapy      Note:     Goal Note filed on 06/22/20 0813 by Teoflio Vázquez, PT    Three days since PT eval; will monitor pt's progress towards goals this week.                        Problem: Mobility Transfers     Dates: Start: 06/19/20       Goal: STG-Within one week, patient will perform bed mobility     Dates: Start: 06/19/20       Description: 1) Individualized goal:  Mod A supine<>sit, c/s collar, L BKA prosthesis, bed rail  2) Interventions:  PT Group Therapy, PT E Stim Attended, PT Prosthetic Training, PT Gait Training, PT Therapeutic Exercises, PT TENS Application, PT Neuro Re-Ed/Balance, PT Therapeutic Activity, and PT Manual Therapy    Note:     Goal Note filed on 06/22/20 0813 by Teofilo Vázquez, PT    Three days since PT eval; will monitor pt's progress towards goals this week.                    Goal: STG-Within one week, patient will sit to stand     Dates: Start: 06/19/20       Description: 1) Individualized goal:  // bars, c/s collar, L BKA prosthesis, gait belt, at Min A level consistently  2) Interventions:  PT Group Therapy, PT E Stim Attended, PT Prosthetic Training, PT Gait Training, PT Therapeutic Exercises, PT TENS Application, PT Neuro Re-Ed/Balance, PT Therapeutic Activity, and PT Manual Therapy    Note:     Goal Note filed on 06/22/20 0813 by Teofilo Vázquez, PT    Three days since PT eval; will monitor pt's progress towards goals this week.                  Goal: STG-Within one week, patient will transfer bed to chair     Dates: Start: 06/19/20       Description: 1) Individualized goal:  Mod A supine<>sit, c/s collar, L BKA prosthesis, bed rail,  gait belt  2) Interventions:  PT Group Therapy, PT E Stim Attended, PT Prosthetic Training, PT Gait Training, PT Therapeutic Exercises, PT TENS Application, PT Neuro Re-Ed/Balance, PT Therapeutic Activity, and PT Manual Therapy      Note:     Goal Note filed on 06/22/20 0813 by Teofilo Vázquez, PT    Three days since PT eval; will monitor pt's progress towards goals this week.                        Problem: PT-Long Term Goals     Dates: Start: 06/19/20       Goal: LTG-By discharge, patient will ambulate     Dates: Start: 06/19/20       Description: 1) Individualized goal:  150 feet with FWW, c/s collar, L BKA prosthesis, at SBA level  2) Interventions:  PT Group Therapy, PT E Stim Attended, PT Prosthetic Training, PT Gait Training, PT Therapeutic Exercises, PT TENS Application, PT Neuro Re-Ed/Balance, PT Therapeutic Activity, and PT Manual Therapy          Goal: LTG-By discharge, patient will transfer one surface to another     Dates: Start: 06/19/20       Description: 1) Individualized goal:  with FWW, c/s collar, L BKA prosthesis, at SBA level  2) Interventions:  PT Group Therapy, PT E Stim Attended, PT Prosthetic Training, PT Gait Training, PT Therapeutic Exercises, PT TENS Application, PT Neuro Re-Ed/Balance, PT Therapeutic Activity, and PT Manual Therapy            Goal: LTG-By discharge, patient will ambulate up/down 4-6 stairs     Dates: Start: 06/19/20       Description: 1) Individualized goal:  with 1 railing, c/s collar, L BKA prosthesis, gait belt, at Min A level  2) Interventions:  PT Group Therapy, PT E Stim Attended, PT Prosthetic Training, PT Gait Training, PT Therapeutic Exercises, PT TENS Application, PT Neuro Re-Ed/Balance, PT Therapeutic Activity, and PT Manual Therapy          Goal: LTG-By discharge, patient will transfer in/out of a car     Dates: Start: 06/19/20       Description: 1) Individualized goal:  with FWW, c/s collar, L BKA prosthesis, at SBA level  2) Interventions:  PT Group  Therapy, PT E Stim Attended, PT Prosthetic Training, PT Gait Training, PT Therapeutic Exercises, PT TENS Application, PT Neuro Re-Ed/Balance, PT Therapeutic Activity, and PT Manual Therapy

## 2020-06-23 NOTE — THERAPY
Occupational Therapy  Daily Treatment     Patient Name: Gaurav Lopez  Age:  75 y.o., Sex:  male  Medical Record #: [id number]  Today's Date: 6/23/2020     Precautions  Precautions: Fall Risk, Cervical Collar  , Spinal / Back Precautions   Comments: LLE prosthesis, pacemaker, orthostatic hypotension , abdominal binder     Safety   ADL Safety : Impaired, Requires Supervision for Safety, Requires Physical Assist for Safety, Impaired Insight into Safety, Requires Cueing for Safety  Bathroom Safety: Impaired, Requires Supervision for Safety, Requires Physical Assist for Safety, Impaired Insight into Safety, Requires Cuing for Safety    Subjective       Objective       06/23/20 0931   Precautions   Precautions Fall Risk;Cervical Collar  ;Spinal / Back Precautions    Comments LLE prosthesis, pacemaker, orthostatic hypotension , abdominal binder    Vitals   O2 Delivery Device None - Room Air   Functional Level of Assist   Toileting Maximal Assist   Toileting Description Assist to pull pants up;Assist to pull pants down;Grab bar;Increased time;Verbal cueing;Supervision for safety   Toilet Transfers Minimal Assist   Toilet Transfer Description Grab bar;Supervision for safety;Verbal cueing;Verbal cues for spinal precautions   Sitting Upper Body Exercises   Upper Extremity Bike Level 3 Resistance  (FluidoBike, level 3, 15 mins, rest break x 3, .890km)   Interdisciplinary Plan of Care Collaboration   IDT Collaboration with  Certified Nursing Assistant   Patient Position at End of Therapy Seated;Call Light within Reach;Tray Table within Reach;Phone within Reach   OT Total Time Spent   OT Individual Total Time Spent (Mins) 30   OT Charge Group   OT Self Care / ADL 1   OT Therapeutic Exercise  1       Assessment    Pt was alert and cooperative w/ tx.  Tx emphasis on bathroom ADL's and There/Ex - see notes.  Strengths: Motivated for self care and independence, Pleasant and cooperative, Supportive family  Barriers: Orthostatic  hypotension, Hypotension    Plan    OT to address strength, endurance, balance + education for AE/DME to facilitate greater independence w/ ADL's/functional mobility/transfers.    Occupational Therapy Goals     Problem: Dressing     Dates: Start: 06/19/20       Goal: STG-Within one week, patient will dress LB     Dates: Start: 06/19/20       Description: 1) Individualized Goal:  Max assist for LB clothing management via AE/DME PRN  2) Interventions:  OT Self Care/ADL, OT Neuro Re-Ed/Balance, OT Therapeutic Activity, OT Evaluation, and OT Therapeutic Exercise      Note:     Goal Note filed on 06/22/20 1109 by Mesfin Mtz MS,OTR/L    Pt at mod assist via AE/DME                        Problem: OT Long Term Goals     Dates: Start: 06/19/20       Goal: LTG-By discharge, patient will complete basic self care tasks     Dates: Start: 06/19/20       Description: 1) Individualized Goal:  Mod I for BADL's, Sup/SBA for shower via AE/DME PRN  2) Interventions:  OT Self Care/ADL, OT Neuro Re-Ed/Balance, OT Therapeutic Activity, OT Evaluation, and OT Therapeutic Exercise            Goal: LTG-By discharge, patient will perform bathroom transfers     Dates: Start: 06/19/20                   Problem: Toileting     Dates: Start: 06/19/20       Goal: STG-Within one week, patient will complete toileting tasks     Dates: Start: 06/19/20       Description: 1) Individualized Goal:  Max assist for toileting task via AE/DME PRN  2) Interventions:  OT Self Care/ADL, OT Neuro Re-Ed/Balance, OT Therapeutic Activity, OT Evaluation, and OT Therapeutic Exercise      Note:     Goal Note filed on 06/22/20 1109 by Mesfin Mtz MS,OTR/L    Pt at Total assist for toileting task via DME
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
## 2020-06-24 LAB
ANION GAP SERPL CALC-SCNC: 10 MMOL/L (ref 7–16)
BUN SERPL-MCNC: 15 MG/DL (ref 8–22)
CALCIUM SERPL-MCNC: 9 MG/DL (ref 8.5–10.5)
CHLORIDE SERPL-SCNC: 105 MMOL/L (ref 96–112)
CO2 SERPL-SCNC: 27 MMOL/L (ref 20–33)
CREAT SERPL-MCNC: 0.69 MG/DL (ref 0.5–1.4)
ERYTHROCYTE [DISTWIDTH] IN BLOOD BY AUTOMATED COUNT: 50.7 FL (ref 35.9–50)
GLUCOSE SERPL-MCNC: 97 MG/DL (ref 65–99)
HCT VFR BLD AUTO: 32.1 % (ref 42–52)
HGB BLD-MCNC: 9.6 G/DL (ref 14–18)
IRON SATN MFR SERPL: 13 % (ref 15–55)
IRON SERPL-MCNC: 32 UG/DL (ref 50–180)
MCH RBC QN AUTO: 29 PG (ref 27–33)
MCHC RBC AUTO-ENTMCNC: 29.9 G/DL (ref 33.7–35.3)
MCV RBC AUTO: 97 FL (ref 81.4–97.8)
PLATELET # BLD AUTO: 617 K/UL (ref 164–446)
PMV BLD AUTO: 8.8 FL (ref 9–12.9)
POTASSIUM SERPL-SCNC: 4 MMOL/L (ref 3.6–5.5)
RBC # BLD AUTO: 3.31 M/UL (ref 4.7–6.1)
SODIUM SERPL-SCNC: 142 MMOL/L (ref 135–145)
TIBC SERPL-MCNC: 254 UG/DL (ref 250–450)
UIBC SERPL-MCNC: 222 UG/DL (ref 110–370)
WBC # BLD AUTO: 7.5 K/UL (ref 4.8–10.8)

## 2020-06-24 PROCEDURE — 700112 HCHG RX REV CODE 229: Performed by: PHYSICAL MEDICINE & REHABILITATION

## 2020-06-24 PROCEDURE — 99232 SBSQ HOSP IP/OBS MODERATE 35: CPT | Performed by: HOSPITALIST

## 2020-06-24 PROCEDURE — 97110 THERAPEUTIC EXERCISES: CPT

## 2020-06-24 PROCEDURE — 85027 COMPLETE CBC AUTOMATED: CPT

## 2020-06-24 PROCEDURE — A9270 NON-COVERED ITEM OR SERVICE: HCPCS | Performed by: PHYSICAL MEDICINE & REHABILITATION

## 2020-06-24 PROCEDURE — A9270 NON-COVERED ITEM OR SERVICE: HCPCS | Performed by: HOSPITALIST

## 2020-06-24 PROCEDURE — 83550 IRON BINDING TEST: CPT

## 2020-06-24 PROCEDURE — 97530 THERAPEUTIC ACTIVITIES: CPT

## 2020-06-24 PROCEDURE — 36415 COLL VENOUS BLD VENIPUNCTURE: CPT

## 2020-06-24 PROCEDURE — 700102 HCHG RX REV CODE 250 W/ 637 OVERRIDE(OP): Performed by: HOSPITALIST

## 2020-06-24 PROCEDURE — 97116 GAIT TRAINING THERAPY: CPT

## 2020-06-24 PROCEDURE — 99232 SBSQ HOSP IP/OBS MODERATE 35: CPT | Performed by: PHYSICAL MEDICINE & REHABILITATION

## 2020-06-24 PROCEDURE — 80048 BASIC METABOLIC PNL TOTAL CA: CPT

## 2020-06-24 PROCEDURE — 770010 HCHG ROOM/CARE - REHAB SEMI PRIVAT*

## 2020-06-24 PROCEDURE — 700102 HCHG RX REV CODE 250 W/ 637 OVERRIDE(OP): Performed by: PHYSICAL MEDICINE & REHABILITATION

## 2020-06-24 PROCEDURE — 83540 ASSAY OF IRON: CPT

## 2020-06-24 PROCEDURE — 97535 SELF CARE MNGMENT TRAINING: CPT

## 2020-06-24 PROCEDURE — 700101 HCHG RX REV CODE 250: Performed by: PHYSICAL MEDICINE & REHABILITATION

## 2020-06-24 PROCEDURE — 700111 HCHG RX REV CODE 636 W/ 250 OVERRIDE (IP): Performed by: PHYSICAL MEDICINE & REHABILITATION

## 2020-06-24 RX ORDER — MIRTAZAPINE 15 MG/1
30 TABLET, ORALLY DISINTEGRATING ORAL
Status: DISCONTINUED | OUTPATIENT
Start: 2020-06-24 | End: 2020-06-25

## 2020-06-24 RX ORDER — FERROUS SULFATE 325(65) MG
325 TABLET ORAL
Status: DISCONTINUED | OUTPATIENT
Start: 2020-06-25 | End: 2020-07-08 | Stop reason: HOSPADM

## 2020-06-24 RX ORDER — LANOLIN ALCOHOL/MO/W.PET/CERES
3 CREAM (GRAM) TOPICAL
Status: DISCONTINUED | OUTPATIENT
Start: 2020-06-24 | End: 2020-07-08 | Stop reason: HOSPADM

## 2020-06-24 RX ORDER — MIDODRINE HYDROCHLORIDE 10 MG/1
15 TABLET ORAL 3 TIMES DAILY
Status: DISCONTINUED | OUTPATIENT
Start: 2020-06-24 | End: 2020-07-08 | Stop reason: HOSPADM

## 2020-06-24 RX ADMIN — LOVASTATIN 10 MG: 20 TABLET ORAL at 19:46

## 2020-06-24 RX ADMIN — ENOXAPARIN SODIUM 40 MG: 100 INJECTION SUBCUTANEOUS at 19:47

## 2020-06-24 RX ADMIN — METFORMIN HYDROCHLORIDE 250 MG: 500 TABLET ORAL at 17:15

## 2020-06-24 RX ADMIN — STANDARDIZED SENNA CONCENTRATE 17.2 MG: 8.6 TABLET ORAL at 11:50

## 2020-06-24 RX ADMIN — METFORMIN HYDROCHLORIDE 250 MG: 500 TABLET ORAL at 08:29

## 2020-06-24 RX ADMIN — MIDODRINE HYDROCHLORIDE 10 MG: 10 TABLET ORAL at 05:20

## 2020-06-24 RX ADMIN — FINASTERIDE 5 MG: 5 TABLET, FILM COATED ORAL at 08:29

## 2020-06-24 RX ADMIN — VENLAFAXINE HYDROCHLORIDE 150 MG: 75 CAPSULE, EXTENDED RELEASE ORAL at 08:28

## 2020-06-24 RX ADMIN — ARIPIPRAZOLE 10 MG: 5 TABLET ORAL at 08:29

## 2020-06-24 RX ADMIN — BACLOFEN 15 MG: 10 TABLET ORAL at 19:45

## 2020-06-24 RX ADMIN — MELATONIN 2000 UNITS: at 08:29

## 2020-06-24 RX ADMIN — GABAPENTIN 800 MG: 400 CAPSULE ORAL at 19:45

## 2020-06-24 RX ADMIN — GABAPENTIN 800 MG: 400 CAPSULE ORAL at 14:32

## 2020-06-24 RX ADMIN — MULTIPLE VITAMINS W/ MINERALS TAB 1 TABLET: TAB at 08:28

## 2020-06-24 RX ADMIN — MIRTAZAPINE 30 MG: 15 TABLET, ORALLY DISINTEGRATING ORAL at 19:46

## 2020-06-24 RX ADMIN — VITAMIN C 1 TABLET: TAB at 08:29

## 2020-06-24 RX ADMIN — TRAMADOL HYDROCHLORIDE 50 MG: 50 TABLET, COATED ORAL at 09:15

## 2020-06-24 RX ADMIN — DOCUSATE SODIUM 100 MG: 100 CAPSULE, LIQUID FILLED ORAL at 08:29

## 2020-06-24 RX ADMIN — BACLOFEN 15 MG: 10 TABLET ORAL at 14:31

## 2020-06-24 RX ADMIN — ASPIRIN 81 MG: 81 TABLET, COATED ORAL at 08:29

## 2020-06-24 RX ADMIN — MIDODRINE HYDROCHLORIDE 15 MG: 10 TABLET ORAL at 17:15

## 2020-06-24 RX ADMIN — BACLOFEN 15 MG: 10 TABLET ORAL at 08:29

## 2020-06-24 RX ADMIN — MELATONIN 3 MG: at 19:47

## 2020-06-24 RX ADMIN — LIDOCAINE 2 PATCH: 50 PATCH TOPICAL at 08:33

## 2020-06-24 RX ADMIN — MIDODRINE HYDROCHLORIDE 15 MG: 10 TABLET ORAL at 11:51

## 2020-06-24 RX ADMIN — GABAPENTIN 800 MG: 400 CAPSULE ORAL at 08:28

## 2020-06-24 RX ADMIN — VENLAFAXINE HYDROCHLORIDE 150 MG: 75 CAPSULE, EXTENDED RELEASE ORAL at 19:45

## 2020-06-24 RX ADMIN — DOCUSATE SODIUM 100 MG: 100 CAPSULE, LIQUID FILLED ORAL at 19:45

## 2020-06-24 RX ADMIN — TAMSULOSIN HYDROCHLORIDE 0.4 MG: 0.4 CAPSULE ORAL at 17:15

## 2020-06-24 ASSESSMENT — ENCOUNTER SYMPTOMS
FEVER: 0
EYES NEGATIVE: 1
NECK PAIN: 1
ABDOMINAL PAIN: 0
PALPITATIONS: 0
FOCAL WEAKNESS: 1
CHILLS: 0
VOMITING: 0
NAUSEA: 0
COUGH: 0
SENSORY CHANGE: 1
SHORTNESS OF BREATH: 0
POLYDIPSIA: 0
BRUISES/BLEEDS EASILY: 0

## 2020-06-24 ASSESSMENT — GAIT ASSESSMENTS
DEVIATION: BRADYKINETIC;OTHER (COMMENT)
GAIT LEVEL OF ASSIST: MINIMAL ASSIST
ASSISTIVE DEVICE: FRONT WHEEL WALKER
DISTANCE (FEET): 0
GAIT LEVEL OF ASSIST: UNABLE TO PARTICIPATE

## 2020-06-24 ASSESSMENT — FIBROSIS 4 INDEX: FIB4 SCORE: 0.37

## 2020-06-24 NOTE — THERAPY
Physical Therapy   Daily Treatment     Patient Name: Gaurav Lopez  Age:  75 y.o., Sex:  male  Medical Record #: [id number]  Today's Date: 6/24/2020     Precautions  Precautions: Fall Risk  Comments: LLE prosthesis, pacemaker, orthostatic hypotension , abdominal binder     Subjective    Patient reports occasional lightheadedness during therapy.      Objective       06/24/20 1001   Vitals   Pulse 100   Patient BP Position Standing 1 minute   Blood Pressure  127/49   O2 (LPM) 0   O2 Delivery Device None - Room Air   Vitals Comments Abdominal binder and compression stockings present.    Gait Functional Level of Assist    Gait Level Of Assist Minimal Assist  (CGA-Min Assist. )   Assistive Device Front Wheel Walker   Distance (Feet)   (55 ft, 77 ft, and 78 ft. Seated rest between reps. )   # of Times Distance was Traveled 3   Deviation Bradykinetic;Other (Comment)  (Narrow JENNIE, Occasional left toe catching with swing through.)   Standing Lower Body Exercises   Standing Lower Body Exercises Yes   Marching 1 set of 10  (// bars, CGA, gait belt applied, weakness on L. )   Other Exercises   (Weight shift <>,front and back. // bars CGA-Min A. 10 each. )   Bed Mobility    Sit to Stand Moderate Assist  (Gait belt applied, min-mod assist. )   Neuro-Muscular Treatments   Comments Sitting edge of mat unsupported, gait belt applied, CGA-Supervision. approximatly 2-3 minutes bwetween walking.    Interdisciplinary Plan of Care Collaboration   Patient Position at End of Therapy In Bed;Call Light within Reach;Tray Table within Reach;Phone within Reach   PT Total Time Spent   PT Individual Total Time Spent (Mins) 60   PT Charge Group   PT Gait Training 1   PT Therapeutic Exercise 1   PT Therapeutic Activities 2       Assessment    Patient demonstrated improvements in trunk control today when sitting edge of mat. Blood pressure was better during the second session allowing us to do more standing and walking activities. Patient demonstrates  weakness in hip abduction demonstrated with gait deviation. Patient also demonstrated weakness in the left extremity by his left leg buckling with weight shifting to that side.   Strengths: Able to follow instructions, Effective communication skills, Independent prior level of function, Pleasant and cooperative, Willingly participates in therapeutic activities  Barriers: Bladder incontinence, Bowel incontinence, Confused, Decreased endurance, Generalized weakness, Home accessibility, Hypotension, Orthostatic hypotension, Impaired activity tolerance, Impaired balance, Impaired carryover of learning, Limited mobility    Plan    Monitor BP, STS work, standing tolerance; progress ambulation distance with FWW; consistent transfer training; LE strengthening, and core strengthening.    Physical Therapy Problems     Problem: Balance     Dates: Start: 06/19/20       Goal: STG-Within one week, patient will maintain static standing     Dates: Start: 06/19/20       Description: 1) Individualized goal:  // bars, c/s collar, L BKA prosthesis, gait belt, at Min A level x3 minutes  2) Interventions:  PT Group Therapy, PT E Stim Attended, PT Prosthetic Training, PT Gait Training, PT Therapeutic Exercises, PT TENS Application, PT Neuro Re-Ed/Balance, PT Therapeutic Activity, and PT Manual Therapy    Note:     Goal Note filed on 06/22/20 0813 by Teofilo Vázquez, PT    Three days since PT eval; will monitor pt's progress towards goals this week.                  Goal: STG-Within one week, patient will maintain static sitting     Dates: Start: 06/19/20       Description: 1) Individualized goal:  EOM or unsupported, c/s collar, L BKA prosthesis, gait belt, at CGA level x3 minutes  2) Interventions:  PT Group Therapy, PT E Stim Attended, PT Prosthetic Training, PT Gait Training, PT Therapeutic Exercises, PT TENS Application, PT Neuro Re-Ed/Balance, PT Therapeutic Activity, and PT Manual Therapy      Note:     Goal Note filed on  06/22/20 0813 by Teofilo Vázquez, PT    Three days since PT eval; will monitor pt's progress towards goals this week.                        Problem: Mobility     Dates: Start: 06/19/20       Goal: STG-Within one week, patient will ambulate household distance     Dates: Start: 06/19/20       Description: 1) Individualized goal:  // bars x10 feet, c/s collar, L BKA prosthesis, gait belt, at Mod A level with w/c follow prn  2) Interventions:  PT Group Therapy, PT E Stim Attended, PT Prosthetic Training, PT Gait Training, PT Therapeutic Exercises, PT TENS Application, PT Neuro Re-Ed/Balance, PT Therapeutic Activity, and PT Manual Therapy      Note:     Goal Note filed on 06/22/20 0813 by Toefilo Vázquez, PT    Three days since PT eval; will monitor pt's progress towards goals this week.                  Goal: STG-Within one week, patient will propel wheelchair community     Dates: Start: 06/19/20       Description: 1) Individualized goal:  BLE propulsion x150 feet, c/s collar, L BKA prosthesis, gait belt, at SPV level  2) Interventions:  PT Group Therapy, PT E Stim Attended, PT Prosthetic Training, PT Gait Training, PT Therapeutic Exercises, PT TENS Application, PT Neuro Re-Ed/Balance, PT Therapeutic Activity, and PT Manual Therapy      Note:     Goal Note filed on 06/22/20 0813 by Teofilo Vázquez, PT    Three days since PT eval; will monitor pt's progress towards goals this week.                        Problem: Mobility Transfers     Dates: Start: 06/19/20       Goal: STG-Within one week, patient will perform bed mobility     Dates: Start: 06/19/20       Description: 1) Individualized goal:  Mod A supine<>sit, c/s collar, L BKA prosthesis, bed rail  2) Interventions:  PT Group Therapy, PT E Stim Attended, PT Prosthetic Training, PT Gait Training, PT Therapeutic Exercises, PT TENS Application, PT Neuro Re-Ed/Balance, PT Therapeutic Activity, and PT Manual Therapy    Note:     Goal Note filed on  06/22/20 0813 by Teofilo Vázquez, PT    Three days since PT eval; will monitor pt's progress towards goals this week.                    Goal: STG-Within one week, patient will sit to stand     Dates: Start: 06/19/20       Description: 1) Individualized goal:  // bars, c/s collar, L BKA prosthesis, gait belt, at Min A level consistently  2) Interventions:  PT Group Therapy, PT E Stim Attended, PT Prosthetic Training, PT Gait Training, PT Therapeutic Exercises, PT TENS Application, PT Neuro Re-Ed/Balance, PT Therapeutic Activity, and PT Manual Therapy    Note:     Goal Note filed on 06/22/20 0813 by Teofilo Vázquez, PT    Three days since PT eval; will monitor pt's progress towards goals this week.                  Goal: STG-Within one week, patient will transfer bed to chair     Dates: Start: 06/19/20       Description: 1) Individualized goal:  Mod A supine<>sit, c/s collar, L BKA prosthesis, bed rail, gait belt  2) Interventions:  PT Group Therapy, PT E Stim Attended, PT Prosthetic Training, PT Gait Training, PT Therapeutic Exercises, PT TENS Application, PT Neuro Re-Ed/Balance, PT Therapeutic Activity, and PT Manual Therapy      Note:     Goal Note filed on 06/22/20 0813 by Teofilo Vázquez, PT    Three days since PT eval; will monitor pt's progress towards goals this week.                        Problem: PT-Long Term Goals     Dates: Start: 06/19/20       Goal: LTG-By discharge, patient will ambulate     Dates: Start: 06/19/20       Description: 1) Individualized goal:  150 feet with FWW, c/s collar, L BKA prosthesis, at SBA level  2) Interventions:  PT Group Therapy, PT E Stim Attended, PT Prosthetic Training, PT Gait Training, PT Therapeutic Exercises, PT TENS Application, PT Neuro Re-Ed/Balance, PT Therapeutic Activity, and PT Manual Therapy          Goal: LTG-By discharge, patient will transfer one surface to another     Dates: Start: 06/19/20       Description: 1) Individualized goal:  with  FWW, c/s collar, L BKA prosthesis, at SBA level  2) Interventions:  PT Group Therapy, PT E Stim Attended, PT Prosthetic Training, PT Gait Training, PT Therapeutic Exercises, PT TENS Application, PT Neuro Re-Ed/Balance, PT Therapeutic Activity, and PT Manual Therapy            Goal: LTG-By discharge, patient will ambulate up/down 4-6 stairs     Dates: Start: 06/19/20       Description: 1) Individualized goal:  with 1 railing, c/s collar, L BKA prosthesis, gait belt, at Min A level  2) Interventions:  PT Group Therapy, PT E Stim Attended, PT Prosthetic Training, PT Gait Training, PT Therapeutic Exercises, PT TENS Application, PT Neuro Re-Ed/Balance, PT Therapeutic Activity, and PT Manual Therapy          Goal: LTG-By discharge, patient will transfer in/out of a car     Dates: Start: 06/19/20       Description: 1) Individualized goal:  with FWW, c/s collar, L BKA prosthesis, at SBA level  2) Interventions:  PT Group Therapy, PT E Stim Attended, PT Prosthetic Training, PT Gait Training, PT Therapeutic Exercises, PT TENS Application, PT Neuro Re-Ed/Balance, PT Therapeutic Activity, and PT Manual Therapy

## 2020-06-24 NOTE — CARE PLAN
Problem: Safety  Goal: Will remain free from injury  Note: Patient uses call light consistently and appropriately this shift.  Waits for assistance when needed and does not attempt self transfer.  Able to verbalize needs.  Will continue to monitor.      Problem: Infection  Goal: Will remain free from infection  Note: Patient remains free from s/s infection; afebrile.  Will continue to monitor.      Problem: Pain Management  Goal: Pain level will decrease to patient's comfort goal  Note: Patient able to verbalize pain level and verbalize an acceptable level of pain.

## 2020-06-24 NOTE — CARE PLAN
Problem: Safety  Goal: Will remain free from injury  Note: Patient uses call light consistently and appropriately this shift.  Waits for assistance when needed and does not attempt self transfer.  Able to verbalize needs.  Will continue to monitor.      Problem: Infection  Goal: Will remain free from infection  Note: Patient remains free from s/s infection; afebrile.  Will continue to monitor.

## 2020-06-24 NOTE — THERAPY
Recreational Therapy  Daily Treatment     Patient Name: Gaurav Lopez  AGE:  75 y.o., SEX:  male  Medical Record #: [id number]  Today's Date: 6/24/2020       Subjective    Pt was ready and willing for session.      Objective       06/24/20 0931   Functional Ability Status - Cognitive   Attention Span Remains on Task   Comprehension Follows Three Step Commands   Judgment Able to Make Independent Decisions   Functional Ability Status - Emotional    Affect Appropriate   Mood Appropriate   Behavior Appropriate   Skilled Intervention    Skilled Intervention Fine Motor Leisure   Skilled Intervention Comments [place] activity   Interdisciplinary Plan of Care Collaboration   Patient Position at End of Therapy Seated;Call Light within Reach;Tray Table within Reach   Strengths & Barriers   Strengths Pleasant and cooperative;Able to follow instructions;Alert and oriented;Effective communication skills;Good carryover of learning;Good insight into deficits/needs;Making steady progress towards goals;Motivated for self care and independence;Willingly participates in therapeutic activities   Barriers Decreased endurance;Generalized weakness   Treatment Time   Total Time Spent (mins) 30   Procedural Tracking   Procedural Tracking Fine Motor Functional Leisure Skills       Assessment    Pt was taught a new game to him called josé. This required manipulation of half inch glass circular beads. He was asked to pick them up individually and place them in new locations. Pt required some verbal cues for reminders of the rules. Pt dropped the beads X3 with once completely off of the table. Pt required encouragement for positive thinking for his actions.      Strengths: (P) Pleasant and cooperative, Able to follow instructions, Alert and oriented, Effective communication skills, Good carryover of learning, Good insight into deficits/needs, Making steady progress towards goals, Motivated for self care and independence, Willingly participates  in therapeutic activities  Barriers: (P) Decreased endurance, Generalized weakness    Plan    Smaller fine motor tasks.

## 2020-06-24 NOTE — THERAPY
"Occupational Therapy  Daily Treatment     Patient Name: Gaurav Lopez  Age:  75 y.o., Sex:  male  Medical Record #: [id number]  Today's Date: 6/24/2020     Precautions  Precautions: Fall Risk  Comments: LLE prosthesis, pacemaker, orthostatic hypotension , abdominal binder     Safety   ADL Safety : Impaired, Requires Supervision for Safety  Bathroom Safety: Impaired, Requires Supervision for Safety    Subjective  \"I'm feeling good\"      Objective       06/24/20 0701   Precautions   Precautions Fall Risk   Comments LLE prosthesis, pacemaker, orthostatic hypotension , abdominal binder    Safety    ADL Safety  Impaired;Requires Supervision for Safety   Bathroom Safety Impaired;Requires Supervision for Safety   Vitals   Pulse 98   Patient BP Position Sitting   Blood Pressure  103/60   Respiration 19   O2 Delivery Device None - Room Air   Vitals Comments Vitals pre TherEx (sitting) 103/59.  Above sarita;s taken post TherEx.   Pain   Intervention Declines   Pain 0 - 10 Group   Therapist Pain Assessment 0   Non Verbal Descriptors   Non Verbal Scale  Calm   Cognition    Level of Consciousness Alert   Sleep/Wake Cycle   Sleep & Rest Awake   Functional Level of Assist   Grooming Modified Independent;Seated   Grooming Description Increased time;Seated in wheelchair at sink   Lower Body Dressing Moderate Assist   Lower Body Dressing Description Grab bar;Application of orthotic or brace;Increased time;Initial preparation for task;Set-up of equipment;Supervision for safety;Verbal cueing  (See notes)   Sitting Upper Body Exercises   Front Arm Raise 3 sets of 10  (5# wand)   Bicep Curls 3 sets of 10  (10# wand)   Upper Extremity Bike Level 4 Resistance  (FluidoBike, Level 4, rest break x 3, 16 mins, .692km)   Bed Mobility    Supine to Sit Maximal Assist   Scooting Moderate Assist   Interdisciplinary Plan of Care Collaboration   IDT Collaboration with  Certified Nursing Assistant;Nursing   Patient Position at End of Therapy Seated;Self " Releasing Lap Belt Applied;Call Light within Reach;Tray Table within Reach;Phone within Reach   OT Total Time Spent   OT Individual Total Time Spent (Mins) 60   OT Charge Group   OT Self Care / ADL 2   OT Therapeutic Exercise  2       Assessment    Pt was alert and cooperative w/ tx.  Tx emphasis on ADL's and UB TherEx - see notes for details.  BP stabilized as reflected above pre/post tx - FAMILIA hose, tube  LE's and abdominal bider + hydration  Strengths: Motivated for self care and independence, Pleasant and cooperative, Supportive family  Barriers: Orthostatic hypotension, Hypotension    Plan    OT to address strength, endurance, balance + education for AE/DME to facilitate greater independence w/ ADL's/functional mobility/transfers.    Occupational Therapy Goals     Problem: Dressing     Dates: Start: 06/19/20       Goal: STG-Within one week, patient will dress LB     Dates: Start: 06/19/20       Description: 1) Individualized Goal:  Max assist for LB clothing management via AE/DME PRN  2) Interventions:  OT Self Care/ADL, OT Neuro Re-Ed/Balance, OT Therapeutic Activity, OT Evaluation, and OT Therapeutic Exercise      Note:     Goal Note filed on 06/22/20 1109 by Mesfin Mtz MS,OTR/L    Pt at mod assist via AE/DME                        Problem: OT Long Term Goals     Dates: Start: 06/19/20       Goal: LTG-By discharge, patient will complete basic self care tasks     Dates: Start: 06/19/20       Description: 1) Individualized Goal:  Mod I for BADL's, Sup/SBA for shower via AE/DME PRN  2) Interventions:  OT Self Care/ADL, OT Neuro Re-Ed/Balance, OT Therapeutic Activity, OT Evaluation, and OT Therapeutic Exercise            Goal: LTG-By discharge, patient will perform bathroom transfers     Dates: Start: 06/19/20                   Problem: Toileting     Dates: Start: 06/19/20       Goal: STG-Within one week, patient will complete toileting tasks     Dates: Start: 06/19/20       Description: 1) Individualized  Goal:  Max assist for toileting task via AE/DME PRN  2) Interventions:  OT Self Care/ADL, OT Neuro Re-Ed/Balance, OT Therapeutic Activity, OT Evaluation, and OT Therapeutic Exercise      Note:     Goal Note filed on 06/22/20 1109 by Mesfin Mtz MS,OTR/L    Pt at Total assist for toileting task via DME

## 2020-06-24 NOTE — PROGRESS NOTES
"Rehab Progress Note     Encounter Date: 6/24/2020    CC: weakness    Interval Events (Subjective)    He reported poor sleep last night, has been taking in sleep meds in the past, unclear which 1 seems to work better.    Staff reporting improved blood pressure yesterday, was able to stand with short bouts of ambulation.  He reports no dizziness when changing positions today.  Still a blood pressure of 100 this morning when sitting.    Jumping of lower extremities is improved today.    Bowel: Small formed BM 6/23 with bowel training program  Bladder: PVR/void- 168/350, 181/400, 96/350    ROS:  Gen: No fatigue, confusion, significant weight loss  Eyes: no blurry vision, double vision or pain in eyes  ENT: no changes in hearing, runny nose, nose bleeds, sinus pain  CV: No CP, palpitations, symptoms of AUTONOMIC DYSREFLEXIA  Lungs: no shortness of breath, changes in secretions, changes in cough, pain with coughing  Abd: No abd pain, nausea, vomiting, pain with eating  : no blood in urine, pain during storage phase, bladder spasms, suprapubic pain  Ext: No swelling in legs, asymmetric atrophy  Neuro: no changes in strength or sensation  Skin: no new ulcers/skin breakdown appreciated by patient  Mood: No changes in mood today, increase in depression or anxiety  Heme: no bruising, or bleeding  Rest of 14 point review of systems is negative    Objective:  Vitals: /60   Pulse 98   Temp 36.7 °C (98 °F) (Temporal)   Resp 19   Ht 1.753 m (5' 9.02\")   Wt 78.1 kg (172 lb 2.9 oz)   SpO2 91%   Gen: NAD, Body mass index is 25.41 kg/m².  HEENT: NC/AT, PERRLA, moist mucous membranes  Cardio: RRR, no mumurs  Pulm: CTAB, with normal respiratory effort  Abd: Soft NTND, active bowel sounds,  Ext: No peripheral edema. No calf tenderness. No clubbing/cyanosis. +dorsal pedalis pulses on the right with left BKA.      Skin: Incision is clean dry and intact, sutures in place, no signs of dehiscence    Tone: Hip flexion spasticity, " 1+-2/4    Recent Results (from the past 72 hour(s))   ACCU-CHEK GLUCOSE    Collection Time: 06/21/20 11:25 AM   Result Value Ref Range    Glucose - Accu-Ck 97 65 - 99 mg/dL   ACCU-CHEK GLUCOSE    Collection Time: 06/21/20  4:57 PM   Result Value Ref Range    Glucose - Accu-Ck 96 65 - 99 mg/dL   ACCU-CHEK GLUCOSE    Collection Time: 06/21/20  8:04 PM   Result Value Ref Range    Glucose - Accu-Ck 145 (H) 65 - 99 mg/dL   ACCU-CHEK GLUCOSE    Collection Time: 06/22/20  7:16 AM   Result Value Ref Range    Glucose - Accu-Ck 117 (H) 65 - 99 mg/dL   ACCU-CHEK GLUCOSE    Collection Time: 06/22/20  5:11 PM   Result Value Ref Range    Glucose - Accu-Ck 99 65 - 99 mg/dL   ACCU-CHEK GLUCOSE    Collection Time: 06/23/20  7:39 AM   Result Value Ref Range    Glucose - Accu-Ck 111 (H) 65 - 99 mg/dL   CBC WITHOUT DIFFERENTIAL    Collection Time: 06/24/20  5:16 AM   Result Value Ref Range    WBC 7.5 4.8 - 10.8 K/uL    RBC 3.31 (L) 4.70 - 6.10 M/uL    Hemoglobin 9.6 (L) 14.0 - 18.0 g/dL    Hematocrit 32.1 (L) 42.0 - 52.0 %    MCV 97.0 81.4 - 97.8 fL    MCH 29.0 27.0 - 33.0 pg    MCHC 29.9 (L) 33.7 - 35.3 g/dL    RDW 50.7 (H) 35.9 - 50.0 fL    Platelet Count 617 (H) 164 - 446 K/uL    MPV 8.8 (L) 9.0 - 12.9 fL   Basic Metabolic Panel    Collection Time: 06/24/20  5:16 AM   Result Value Ref Range    Sodium 142 135 - 145 mmol/L    Potassium 4.0 3.6 - 5.5 mmol/L    Chloride 105 96 - 112 mmol/L    Co2 27 20 - 33 mmol/L    Glucose 97 65 - 99 mg/dL    Bun 15 8 - 22 mg/dL    Creatinine 0.69 0.50 - 1.40 mg/dL    Calcium 9.0 8.5 - 10.5 mg/dL    Anion Gap 10.0 7.0 - 16.0   IRON/TOTAL IRON BIND    Collection Time: 06/24/20  5:16 AM   Result Value Ref Range    Iron 32 (L) 50 - 180 ug/dL    Total Iron Binding 254 250 - 450 ug/dL    Unsat Iron Binding 222 110 - 370 ug/dL    % Saturation 13 (L) 15 - 55 %   ESTIMATED GFR    Collection Time: 06/24/20  5:16 AM   Result Value Ref Range    GFR If African American >60 >60 mL/min/1.73 m 2    GFR If Non  African American >60 >60 mL/min/1.73 m 2       Current Facility-Administered Medications   Medication Frequency   • baclofen (LIORESAL) tablet 15 mg TID   • docusate sodium (COLACE) capsule 100 mg BID    And   • sennosides (SENOKOT) 8.6 MG tablet 17.2 mg DAILY AT NOON    And   • bisacodyl (THE MAGIC BULLET) suppository 10 mg QDAY    And   • magnesium hydroxide (MILK OF MAGNESIA) suspension 30 mL QDAY PRN   • gabapentin (NEURONTIN) capsule 800 mg TID   • midodrine (PROAMATINE) tablet 10 mg TID   • metFORMIN (GLUCOPHAGE) tablet 250 mg BID AC   • midodrine (PROAMATINE) tablet 5 mg Q4HRS PRN   • aspirin EC (ECOTRIN) tablet 81 mg DAILY   • vitamin D (cholecalciferol) tablet 2,000 Units DAILY   • finasteride (PROSCAR) tablet 5 mg DAILY   • lovastatin (MEVACOR) tablet 10 mg Nightly   • mirtazapine (REMERON) orally disintegrating tab 15 mg QHS   • tamsulosin (FLOMAX) capsule 0.4 mg AFTER DINNER   • vitamin B comp+C (ALLBEE WITH C) 1 Tab DAILY   • therapeutic multivitamin-minerals (THERAGRAN-M) tablet 1 Tab DAILY   • Pharmacy Consult Request ...Pain Management Review 1 Each PHARMACY TO DOSE   • Respiratory Therapy Consult Continuous RT   • tramadol (ULTRAM) 50 MG tablet 50 mg Q4HRS PRN   • oxyCODONE immediate-release (ROXICODONE) tablet 5 mg Q3HRS PRN   • oxyCODONE immediate release (ROXICODONE) tablet 10 mg Q3HRS PRN   • acetaminophen (TYLENOL) tablet 650 mg Q4HRS PRN   • enoxaparin (LOVENOX) inj 40 mg QHS   • artificial tears ophthalmic solution 1 Drop PRN   • benzocaine-menthol (CEPACOL) lozenge 1 Lozenge Q2HRS PRN   • mag hydrox-al hydrox-simeth (MAALOX PLUS ES or MYLANTA DS) suspension 20 mL Q2HRS PRN   • ondansetron (ZOFRAN ODT) dispertab 4 mg 4X/DAY PRN    Or   • ondansetron (ZOFRAN) syringe/vial injection 4 mg 4X/DAY PRN   • traZODone (DESYREL) tablet 50 mg QHS PRN   • sodium chloride (OCEAN) 0.65 % nasal spray 2 Spray PRN   • lidocaine (LIDODERM) 5 % 2 Patch Daily-0800   • aripiprazole (ABILIFY) tablet 10 mg DAILY    • venlafaxine XR (EFFEXOR XR) capsule 150 mg BID       Orders Placed This Encounter   Procedures   • Diet Order Diabetic     Standing Status:   Standing     Number of Occurrences:   1     Order Specific Question:   Diet:     Answer:   Diabetic [3]       Assessment:  Active Hospital Problems    Diagnosis   • *Cervical myelopathy (HCC)   • Postoperative pain   • Deep vein thrombosis (HCC)   • Insomnia due to medical condition   • Neurogenic bowel   • Depression   • Thoracic myelopathy   • Neuropathic pain   • Hyperlipidemia   • Hypertension   • S/P BKA (below knee amputation) (HCC)       Medical Decision Making and Plan:  C2 AIS D, 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  Status post multiple spine surgeries, C3-T7 posterior spinal fusion on 6/5, T12-pelvis posterior spinal fusion on 6/10 by Dr Marsh.   - C-collar on at all times, [place] collar when in shower to be cleared by neurosurgery as outpatient, 8-12 weeks  -  Sutures to be removed 21 days postop, prolonged time secondary to repeat surgeries  -Continue comprehensive acute inpatient rehabilitation    Left BKA: Using [employer] for prosthetists, not following up with physicians  - Prosthesis at bedside  -Follow-up with Dr. 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 noon, Colace 200 mg twice daily, Dulcolax suppository  with digital stimulation,   -DC MiraLAX 1 packet daily  -Discussed program with patient and importance of appropriate management of neurogenic bowel with repercussions of colonic dilation and possible rupture  -KUB showed no significant stool load in ascending or transverse colon      orthostatic hypotension: On 6/19- 82/52 with change in position  Because of significant autonomic dysfunction associated with spinal cord injury, the patient is at high risk for orthostatic hypotension.  - FAMILIA hose on prior to out of bed  -increase midodrine 15 mg 3 times daily  - P.o. fluid intake, goal 1.5-2 L/day  -Consult hospitalist, discussion with hospitalist about discontinuing lisinopril  - Amlodipine was DC'd    Spasticity: MAS of 1-1+/4 jumping of bilateral lower extremities, greater in hip flexors  - Baclofen 15 mg 3 times daily     Decreased range of motion of right hip: In differential diagnosis includes heterotopic ossification of the right hip after multiple spinal surgeries.  Improved with stretching during acute rehabilitation  - x-ray right hip, showed no signs of heterotopic ossification  -CRP is less than 2 likely negative     Pain:  #Neuropathic pain: Neuropathy, complicated by diabetes and a cervical myelopathy  - Gabapentin 800 mg tid  - DCTylenol 1000 mg 3 times daily     #Postoperative pain:  -Tramadol  mg every 4 hours as needed moderate to severe pain first-line  -Oxycodone 5-10 mg every 3 hours as needed moderate to severe pain, second line  - DC Tylenol 1000 mg 3 times daily, check CMP in a.m. to evaluate for LFTs given high-dose Tylenol  -Lidocaine patches to either side of incision on for 12 hours off for 12 hours     Hypertension:  - All oral antihypertensives have been DC'd  -Appreciate hospitalist input     Type 2 diabetes: With hyperglycemia  -Metformin 500 mg twice daily  -Appreciate hospitalist input    Insomnia:  -Increase Remeron 30 mg nightly  - Melatonin 3 mg  nightly     Depression:  -Effexor  mg twice daily     Vitamin D deficiency  -Vitamin D pending     DVT prophylaxis: Patient has previous history of DVT in 2011 making him higher risk for clot formation  -Lovenox 40 mg daily    Patient was seen for 28 minutes on unit/floor of which > 50% of time was spent on counseling and coordination of care regarding the above, including prognosis, risk reduction, benefits of treatment, and options for next stage of care including orthostatic hypotension, increase Midodrine to 15 mg 3 times daily, insomnia, increase Remeron to 30 mg nightly.      Rj Navarro D.O.

## 2020-06-24 NOTE — THERAPY
Occupational Therapy  Daily Treatment     Patient Name: Gaurav Lopez  Age:  75 y.o., Sex:  male  Medical Record #: [id number]  Today's Date: 6/24/2020     Precautions  Precautions: Fall Risk  Comments: LLE prosthesis, pacemaker, orthostatic hypotension , abdominal binder     Safety   ADL Safety : Impaired, Requires Supervision for Safety  Bathroom Safety: Impaired, Requires Supervision for Safety    Subjective       Objective       06/24/20 1402   Precautions   Precautions Fall Risk   Comments LLE prosthesis, pacemaker, orthostatic hypotension , abdominal binder    Vitals   O2 Delivery Device None - Room Air   Non Verbal Descriptors   Non Verbal Scale  Calm   Cognition    Level of Consciousness Alert   Sleep/Wake Cycle   Sleep & Rest Awake   Standing Upper Body Exercises   Comments Standing at Parallel Bars (PB) for Ladder Ball (LB) activity - standing at one end of PB's w/ ladder for LB 5' behind pt, pt instructed to navigate 12' to opposite end of PB's to retrieve one LB at a time and return to starting position to toss LB at ladder.  1 LB = 12'x2 = 24'.  Pt completed 12 = 12x24' = 288'.  Seated rest break x 3, additional time required, CGA via gait belt w/ no LOB.    Interdisciplinary Plan of Care Collaboration   IDT Collaboration with  Certified Nursing Assistant   Patient Position at End of Therapy In Bed;Call Light within Reach;Tray Table within Reach;Phone within Reach   OT Total Time Spent   OT Individual Total Time Spent (Mins) 30   OT Charge Group   OT Therapeutic Exercise  2       Assessment    Pt was alert and cooperative w/ tx.  Tx emphasis on Standing balance/endurance, functional mobility and endurance - see notes above.  Strengths: Motivated for self care and independence, Pleasant and cooperative, Supportive family  Barriers: Orthostatic hypotension, Hypotension    Plan    OT to address strength, endurance, balance + education for AE/DME to facilitate greater independence w/ ADL's/functional  mobility/transfers.    Occupational Therapy Goals     Problem: Dressing     Dates: Start: 06/19/20       Goal: STG-Within one week, patient will dress LB     Dates: Start: 06/19/20       Description: 1) Individualized Goal:  Max assist for LB clothing management via AE/DME PRN  2) Interventions:  OT Self Care/ADL, OT Neuro Re-Ed/Balance, OT Therapeutic Activity, OT Evaluation, and OT Therapeutic Exercise      Note:     Goal Note filed on 06/22/20 1109 by Mesfin Mtz MS,OTR/L    Pt at mod assist via AE/DME                        Problem: OT Long Term Goals     Dates: Start: 06/19/20       Goal: LTG-By discharge, patient will complete basic self care tasks     Dates: Start: 06/19/20       Description: 1) Individualized Goal:  Mod I for BADL's, Sup/SBA for shower via AE/DME PRN  2) Interventions:  OT Self Care/ADL, OT Neuro Re-Ed/Balance, OT Therapeutic Activity, OT Evaluation, and OT Therapeutic Exercise            Goal: LTG-By discharge, patient will perform bathroom transfers     Dates: Start: 06/19/20                   Problem: Toileting     Dates: Start: 06/19/20       Goal: STG-Within one week, patient will complete toileting tasks     Dates: Start: 06/19/20       Description: 1) Individualized Goal:  Max assist for toileting task via AE/DME PRN  2) Interventions:  OT Self Care/ADL, OT Neuro Re-Ed/Balance, OT Therapeutic Activity, OT Evaluation, and OT Therapeutic Exercise      Note:     Goal Note filed on 06/22/20 1109 by Mesfin Mtz MS,OTR/L    Pt at Total assist for toileting task via DME
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
## 2020-06-24 NOTE — THERAPY
Physical Therapy   Daily Treatment     Patient Name: Gaurav Lopez  Age:  75 y.o., Sex:  male  Medical Record #: [id number]  Today's Date: 6/24/2020     Precautions  Precautions: Fall Risk  Comments: LLE prosthesis, pacemaker, orthostatic hypotension , abdominal binder     Subjective    Patient reports that he did not sleep well last night. Patient also reports feeling fatigued.      Objective       06/24/20 0831   Vitals   Pulse 97   Patient BP Position Sitting   Blood Pressure  (!) 98/60  (MD Notified)   O2 (LPM) 0   O2 Delivery Device None - Room Air   Vitals Comments   (Abdominal binder and compression stockings in place. )   Gait Functional Level of Assist    Gait Level Of Assist Unable to Participate  (Due to hypotension.)   Assistive Device   (Unsafe due to hypotension. )   Distance (Feet) 0   # of Times Distance was Traveled 0   Deviation   (Unable due to hypotension. )   Sitting Lower Body Exercises   Ankle Pumps 1 set of 10;Right    Hip Abduction 1 set of 10;Bilateral  (Pink light Theraband.)   Hip Adduction 1 set of 10;Bilateral  (Yellow ball resistance. )   Long Arc Quad 1 set of 10;Bilateral;Weight (See Comments for lbs)  (3# ankle weight. )   Marching 1 set of 10;Reciprocal  (3# ankle weight)   Interdisciplinary Plan of Care Collaboration   IDT Collaboration with  Nursing;Physician   Patient Position at End of Therapy Seated;Self Releasing Lap Belt Applied;Call Light within Reach;Tray Table within Reach;Phone within Reach   Collaboration Comments Notfied MD about low BP. Nursing present for lidocaine patches at begining of session, notified for medication afterwards.    PT Total Time Spent   PT Individual Total Time Spent (Mins) 30   PT Charge Group   PT Therapeutic Exercise 2       Assessment    Unable to perform standing activities due to low BP in sitting. Patient demonstrates abdominal weakness with unsupported sitting during exercise.   Strengths: Able to follow instructions, Effective communication  skills, Independent prior level of function, Pleasant and cooperative, Willingly participates in therapeutic activities  Barriers: Bladder incontinence, Bowel incontinence, Confused, Decreased endurance, Generalized weakness, Home accessibility, Hypotension, Orthostatic hypotension, Impaired activity tolerance, Impaired balance, Impaired carryover of learning, Limited mobility    Plan    Monitor BP, STS work, standing tolerance; progress ambulation distance with FWW; consistent transfer training; LE strengthening, and core strengthening.     Physical Therapy Problems     Problem: Balance     Dates: Start: 06/19/20       Goal: STG-Within one week, patient will maintain static standing     Dates: Start: 06/19/20       Description: 1) Individualized goal:  // bars, c/s collar, L BKA prosthesis, gait belt, at Min A level x3 minutes  2) Interventions:  PT Group Therapy, PT E Stim Attended, PT Prosthetic Training, PT Gait Training, PT Therapeutic Exercises, PT TENS Application, PT Neuro Re-Ed/Balance, PT Therapeutic Activity, and PT Manual Therapy    Note:     Goal Note filed on 06/22/20 0813 by Teofilo Vázquez, PT    Three days since PT eval; will monitor pt's progress towards goals this week.                  Goal: STG-Within one week, patient will maintain static sitting     Dates: Start: 06/19/20       Description: 1) Individualized goal:  EOM or unsupported, c/s collar, L BKA prosthesis, gait belt, at CGA level x3 minutes  2) Interventions:  PT Group Therapy, PT E Stim Attended, PT Prosthetic Training, PT Gait Training, PT Therapeutic Exercises, PT TENS Application, PT Neuro Re-Ed/Balance, PT Therapeutic Activity, and PT Manual Therapy      Note:     Goal Note filed on 06/22/20 0813 by Teofilo Vázquez, PT    Three days since PT eval; will monitor pt's progress towards goals this week.                        Problem: Mobility     Dates: Start: 06/19/20       Goal: STG-Within one week, patient will ambulate  household distance     Dates: Start: 06/19/20       Description: 1) Individualized goal:  // bars x10 feet, c/s collar, L BKA prosthesis, gait belt, at Mod A level with w/c follow prn  2) Interventions:  PT Group Therapy, PT E Stim Attended, PT Prosthetic Training, PT Gait Training, PT Therapeutic Exercises, PT TENS Application, PT Neuro Re-Ed/Balance, PT Therapeutic Activity, and PT Manual Therapy      Note:     Goal Note filed on 06/22/20 0813 by Teofilo Vázquez, PT    Three days since PT eval; will monitor pt's progress towards goals this week.                  Goal: STG-Within one week, patient will propel wheelchair community     Dates: Start: 06/19/20       Description: 1) Individualized goal:  BLE propulsion x150 feet, c/s collar, L BKA prosthesis, gait belt, at SPV level  2) Interventions:  PT Group Therapy, PT E Stim Attended, PT Prosthetic Training, PT Gait Training, PT Therapeutic Exercises, PT TENS Application, PT Neuro Re-Ed/Balance, PT Therapeutic Activity, and PT Manual Therapy      Note:     Goal Note filed on 06/22/20 0813 by Teofilo Vázquez, PT    Three days since PT eval; will monitor pt's progress towards goals this week.                        Problem: Mobility Transfers     Dates: Start: 06/19/20       Goal: STG-Within one week, patient will perform bed mobility     Dates: Start: 06/19/20       Description: 1) Individualized goal:  Mod A supine<>sit, c/s collar, L BKA prosthesis, bed rail  2) Interventions:  PT Group Therapy, PT E Stim Attended, PT Prosthetic Training, PT Gait Training, PT Therapeutic Exercises, PT TENS Application, PT Neuro Re-Ed/Balance, PT Therapeutic Activity, and PT Manual Therapy    Note:     Goal Note filed on 06/22/20 0813 by Teofilo Vázquez, PT    Three days since PT eval; will monitor pt's progress towards goals this week.                    Goal: STG-Within one week, patient will sit to stand     Dates: Start: 06/19/20       Description: 1)  Individualized goal:  // bars, c/s collar, L BKA prosthesis, gait belt, at Min A level consistently  2) Interventions:  PT Group Therapy, PT E Stim Attended, PT Prosthetic Training, PT Gait Training, PT Therapeutic Exercises, PT TENS Application, PT Neuro Re-Ed/Balance, PT Therapeutic Activity, and PT Manual Therapy    Note:     Goal Note filed on 06/22/20 0813 by Teofilo Vázquez, PT    Three days since PT eval; will monitor pt's progress towards goals this week.                  Goal: STG-Within one week, patient will transfer bed to chair     Dates: Start: 06/19/20       Description: 1) Individualized goal:  Mod A supine<>sit, c/s collar, L BKA prosthesis, bed rail, gait belt  2) Interventions:  PT Group Therapy, PT E Stim Attended, PT Prosthetic Training, PT Gait Training, PT Therapeutic Exercises, PT TENS Application, PT Neuro Re-Ed/Balance, PT Therapeutic Activity, and PT Manual Therapy      Note:     Goal Note filed on 06/22/20 0813 by Teofilo Vázquez, PT    Three days since PT eval; will monitor pt's progress towards goals this week.                        Problem: PT-Long Term Goals     Dates: Start: 06/19/20       Goal: LTG-By discharge, patient will ambulate     Dates: Start: 06/19/20       Description: 1) Individualized goal:  150 feet with FWW, c/s collar, L BKA prosthesis, at SBA level  2) Interventions:  PT Group Therapy, PT E Stim Attended, PT Prosthetic Training, PT Gait Training, PT Therapeutic Exercises, PT TENS Application, PT Neuro Re-Ed/Balance, PT Therapeutic Activity, and PT Manual Therapy          Goal: LTG-By discharge, patient will transfer one surface to another     Dates: Start: 06/19/20       Description: 1) Individualized goal:  with FWW, c/s collar, L BKA prosthesis, at SBA level  2) Interventions:  PT Group Therapy, PT E Stim Attended, PT Prosthetic Training, PT Gait Training, PT Therapeutic Exercises, PT TENS Application, PT Neuro Re-Ed/Balance, PT Therapeutic Activity,  and PT Manual Therapy            Goal: LTG-By discharge, patient will ambulate up/down 4-6 stairs     Dates: Start: 06/19/20       Description: 1) Individualized goal:  with 1 railing, c/s collar, L BKA prosthesis, gait belt, at Min A level  2) Interventions:  PT Group Therapy, PT E Stim Attended, PT Prosthetic Training, PT Gait Training, PT Therapeutic Exercises, PT TENS Application, PT Neuro Re-Ed/Balance, PT Therapeutic Activity, and PT Manual Therapy          Goal: LTG-By discharge, patient will transfer in/out of a car     Dates: Start: 06/19/20       Description: 1) Individualized goal:  with FWW, c/s collar, L BKA prosthesis, at SBA level  2) Interventions:  PT Group Therapy, PT E Stim Attended, PT Prosthetic Training, PT Gait Training, PT Therapeutic Exercises, PT TENS Application, PT Neuro Re-Ed/Balance, PT Therapeutic Activity, and PT Manual Therapy

## 2020-06-24 NOTE — CARE PLAN
Problem: Bowel/Gastric:  Goal: Will not experience complications related to bowel motility  Intervention: Implement Bowel Protocol, if applicable  Note: Scheduled suppository given and digital stimulation performed with small bm.Will continue to monitor.     Problem: Pain Management  Goal: Pain level will decrease to patient's comfort goal  Intervention: Follow pain managment plan developed in collaboration with patient and Interdisciplinary Team  Note: Pain is manageable with scheduled medication.Repositioned with pillows for comfort.Will continue to monitor and assess pain level and medicate as needed.

## 2020-06-25 PROCEDURE — 99232 SBSQ HOSP IP/OBS MODERATE 35: CPT | Performed by: PHYSICAL MEDICINE & REHABILITATION

## 2020-06-25 PROCEDURE — A9270 NON-COVERED ITEM OR SERVICE: HCPCS | Performed by: HOSPITALIST

## 2020-06-25 PROCEDURE — 700102 HCHG RX REV CODE 250 W/ 637 OVERRIDE(OP): Performed by: HOSPITALIST

## 2020-06-25 PROCEDURE — 97535 SELF CARE MNGMENT TRAINING: CPT

## 2020-06-25 PROCEDURE — 700111 HCHG RX REV CODE 636 W/ 250 OVERRIDE (IP): Performed by: PHYSICAL MEDICINE & REHABILITATION

## 2020-06-25 PROCEDURE — A9270 NON-COVERED ITEM OR SERVICE: HCPCS | Performed by: PHYSICAL MEDICINE & REHABILITATION

## 2020-06-25 PROCEDURE — 700101 HCHG RX REV CODE 250: Performed by: PHYSICAL MEDICINE & REHABILITATION

## 2020-06-25 PROCEDURE — 97116 GAIT TRAINING THERAPY: CPT

## 2020-06-25 PROCEDURE — 97110 THERAPEUTIC EXERCISES: CPT | Mod: CO

## 2020-06-25 PROCEDURE — 97112 NEUROMUSCULAR REEDUCATION: CPT

## 2020-06-25 PROCEDURE — 700102 HCHG RX REV CODE 250 W/ 637 OVERRIDE(OP): Performed by: PHYSICAL MEDICINE & REHABILITATION

## 2020-06-25 PROCEDURE — 700112 HCHG RX REV CODE 229: Performed by: PHYSICAL MEDICINE & REHABILITATION

## 2020-06-25 PROCEDURE — 97530 THERAPEUTIC ACTIVITIES: CPT

## 2020-06-25 PROCEDURE — 770010 HCHG ROOM/CARE - REHAB SEMI PRIVAT*

## 2020-06-25 PROCEDURE — 99232 SBSQ HOSP IP/OBS MODERATE 35: CPT | Performed by: HOSPITALIST

## 2020-06-25 RX ORDER — TRAZODONE HYDROCHLORIDE 50 MG/1
50 TABLET ORAL
Status: DISCONTINUED | OUTPATIENT
Start: 2020-06-25 | End: 2020-06-30

## 2020-06-25 RX ORDER — MIRTAZAPINE 15 MG/1
15 TABLET, ORALLY DISINTEGRATING ORAL
Status: DISCONTINUED | OUTPATIENT
Start: 2020-06-25 | End: 2020-06-26

## 2020-06-25 RX ADMIN — MIDODRINE HYDROCHLORIDE 15 MG: 10 TABLET ORAL at 17:20

## 2020-06-25 RX ADMIN — BACLOFEN 15 MG: 10 TABLET ORAL at 14:59

## 2020-06-25 RX ADMIN — MIRTAZAPINE 15 MG: 15 TABLET, ORALLY DISINTEGRATING ORAL at 20:07

## 2020-06-25 RX ADMIN — VENLAFAXINE HYDROCHLORIDE 150 MG: 75 CAPSULE, EXTENDED RELEASE ORAL at 08:22

## 2020-06-25 RX ADMIN — FERROUS SULFATE TAB 325 MG (65 MG ELEMENTAL FE) 325 MG: 325 (65 FE) TAB at 08:23

## 2020-06-25 RX ADMIN — LOVASTATIN 10 MG: 20 TABLET ORAL at 20:07

## 2020-06-25 RX ADMIN — GABAPENTIN 800 MG: 400 CAPSULE ORAL at 08:22

## 2020-06-25 RX ADMIN — FINASTERIDE 5 MG: 5 TABLET, FILM COATED ORAL at 08:22

## 2020-06-25 RX ADMIN — TRAZODONE HYDROCHLORIDE 50 MG: 50 TABLET ORAL at 20:08

## 2020-06-25 RX ADMIN — ASPIRIN 81 MG: 81 TABLET, COATED ORAL at 08:23

## 2020-06-25 RX ADMIN — MELATONIN 2000 UNITS: at 08:22

## 2020-06-25 RX ADMIN — MELATONIN 3 MG: at 20:07

## 2020-06-25 RX ADMIN — DOCUSATE SODIUM 100 MG: 100 CAPSULE, LIQUID FILLED ORAL at 08:22

## 2020-06-25 RX ADMIN — GABAPENTIN 800 MG: 400 CAPSULE ORAL at 14:59

## 2020-06-25 RX ADMIN — MIDODRINE HYDROCHLORIDE 15 MG: 10 TABLET ORAL at 05:10

## 2020-06-25 RX ADMIN — BACLOFEN 15 MG: 10 TABLET ORAL at 08:22

## 2020-06-25 RX ADMIN — STANDARDIZED SENNA CONCENTRATE 17.2 MG: 8.6 TABLET ORAL at 12:12

## 2020-06-25 RX ADMIN — TAMSULOSIN HYDROCHLORIDE 0.4 MG: 0.4 CAPSULE ORAL at 17:20

## 2020-06-25 RX ADMIN — ENOXAPARIN SODIUM 40 MG: 100 INJECTION SUBCUTANEOUS at 20:08

## 2020-06-25 RX ADMIN — BISACODYL 10 MG: 10 SUPPOSITORY RECTAL at 20:06

## 2020-06-25 RX ADMIN — BACLOFEN 15 MG: 10 TABLET ORAL at 20:07

## 2020-06-25 RX ADMIN — VENLAFAXINE HYDROCHLORIDE 150 MG: 75 CAPSULE, EXTENDED RELEASE ORAL at 20:06

## 2020-06-25 RX ADMIN — MIDODRINE HYDROCHLORIDE 15 MG: 10 TABLET ORAL at 12:12

## 2020-06-25 RX ADMIN — METFORMIN HYDROCHLORIDE 250 MG: 500 TABLET ORAL at 08:22

## 2020-06-25 RX ADMIN — GABAPENTIN 800 MG: 400 CAPSULE ORAL at 20:07

## 2020-06-25 RX ADMIN — METFORMIN HYDROCHLORIDE 250 MG: 500 TABLET ORAL at 17:20

## 2020-06-25 RX ADMIN — VITAMIN C 1 TABLET: TAB at 08:23

## 2020-06-25 RX ADMIN — ARIPIPRAZOLE 10 MG: 5 TABLET ORAL at 08:22

## 2020-06-25 RX ADMIN — MULTIPLE VITAMINS W/ MINERALS TAB 1 TABLET: TAB at 08:23

## 2020-06-25 RX ADMIN — LIDOCAINE 2 PATCH: 50 PATCH TOPICAL at 08:29

## 2020-06-25 RX ADMIN — DOCUSATE SODIUM 100 MG: 100 CAPSULE, LIQUID FILLED ORAL at 20:07

## 2020-06-25 ASSESSMENT — GAIT ASSESSMENTS
DEVIATION: DECREASED BASE OF SUPPORT;BRADYKINETIC
GAIT LEVEL OF ASSIST: CONTACT GUARD ASSIST
ASSISTIVE DEVICE: FRONT WHEEL WALKER;OTHER (COMMENTS)

## 2020-06-25 ASSESSMENT — ENCOUNTER SYMPTOMS
NAUSEA: 0
POLYDIPSIA: 0
PALPITATIONS: 0
FOCAL WEAKNESS: 1
CHILLS: 0
EYES NEGATIVE: 1
ABDOMINAL PAIN: 0
NECK PAIN: 1
SHORTNESS OF BREATH: 0
BRUISES/BLEEDS EASILY: 0
SENSORY CHANGE: 1
VOMITING: 0
COUGH: 0
FEVER: 0

## 2020-06-25 ASSESSMENT — FIBROSIS 4 INDEX: FIB4 SCORE: 0.39

## 2020-06-25 ASSESSMENT — ACTIVITIES OF DAILY LIVING (ADL): BED_CHAIR_WHEELCHAIR_TRANSFER_DESCRIPTION: ADAPTIVE EQUIPMENT;INCREASED TIME;SET-UP OF EQUIPMENT

## 2020-06-25 NOTE — PROGRESS NOTES
"Rehab Progress Note     Encounter Date: 6/25/2020    CC: weakness    Interval Events (Subjective)    Difficult time with sleep last night, vivid dreams, woke up at night screaming asking for help.  Is unclear if he was on Remeron as an outpatient.  He reported blood pressure was significantly improved, no symptoms of dizziness when changing to seated or standing position, was able to ambulate yesterday with physical therapy.    He reports pain is well under control current regiment  Bowel: Large soft BM at noon on 6/24, no results with bowel training program  Bladder: PVRs 175-201    ROS:  Gen: No fatigue, confusion, significant weight loss  Eyes: no blurry vision, double vision or pain in eyes  ENT: no changes in hearing, runny nose, nose bleeds, sinus pain  CV: No CP, palpitations, symptoms of AUTONOMIC DYSREFLEXIA  Lungs: no shortness of breath, changes in secretions, changes in cough, pain with coughing  Abd: No abd pain, nausea, vomiting, pain with eating  : no blood in urine,  suprapubic pain  Ext: No swelling in legs, asymmetric atrophy  Neuro: no changes in strength or sensation  Skin: no new ulcers/skin breakdown appreciated by patient  Mood: No changes in mood today, increase in depression or anxiety  Heme: no bruising, or bleeding  Rest of 14 point review of systems is negative      Objective:  Vitals: /81   Pulse 66   Temp 36.1 °C (97 °F) (Temporal)   Resp 18   Ht 1.753 m (5' 9.02\")   Wt 77.9 kg (171 lb 11.8 oz)   SpO2 98%   Gen: NAD, Body mass index is 25.35 kg/m².  HEENT:NC/AT, PERRLA, moist mucous membranes  Cardio: RRR, no mumurs  Pulm: CTAB, with normal respiratory effort  Abd: Soft NTND, active bowel sounds  Ext: No peripheral edema. No calf tenderness. No clubbing/cyanosis. +dorsal pedalis pulses on the right, left BKA      Skin: Incision is clean dry and intact, sutures in place, no signs of dehiscence    Tone: Hip flexion spasticity, 1+-2/4    Recent Results (from the past 72 " hour(s))   ACCU-CHEK GLUCOSE    Collection Time: 06/22/20  5:11 PM   Result Value Ref Range    Glucose - Accu-Ck 99 65 - 99 mg/dL   ACCU-CHEK GLUCOSE    Collection Time: 06/23/20  7:39 AM   Result Value Ref Range    Glucose - Accu-Ck 111 (H) 65 - 99 mg/dL   CBC WITHOUT DIFFERENTIAL    Collection Time: 06/24/20  5:16 AM   Result Value Ref Range    WBC 7.5 4.8 - 10.8 K/uL    RBC 3.31 (L) 4.70 - 6.10 M/uL    Hemoglobin 9.6 (L) 14.0 - 18.0 g/dL    Hematocrit 32.1 (L) 42.0 - 52.0 %    MCV 97.0 81.4 - 97.8 fL    MCH 29.0 27.0 - 33.0 pg    MCHC 29.9 (L) 33.7 - 35.3 g/dL    RDW 50.7 (H) 35.9 - 50.0 fL    Platelet Count 617 (H) 164 - 446 K/uL    MPV 8.8 (L) 9.0 - 12.9 fL   Basic Metabolic Panel    Collection Time: 06/24/20  5:16 AM   Result Value Ref Range    Sodium 142 135 - 145 mmol/L    Potassium 4.0 3.6 - 5.5 mmol/L    Chloride 105 96 - 112 mmol/L    Co2 27 20 - 33 mmol/L    Glucose 97 65 - 99 mg/dL    Bun 15 8 - 22 mg/dL    Creatinine 0.69 0.50 - 1.40 mg/dL    Calcium 9.0 8.5 - 10.5 mg/dL    Anion Gap 10.0 7.0 - 16.0   IRON/TOTAL IRON BIND    Collection Time: 06/24/20  5:16 AM   Result Value Ref Range    Iron 32 (L) 50 - 180 ug/dL    Total Iron Binding 254 250 - 450 ug/dL    Unsat Iron Binding 222 110 - 370 ug/dL    % Saturation 13 (L) 15 - 55 %   ESTIMATED GFR    Collection Time: 06/24/20  5:16 AM   Result Value Ref Range    GFR If African American >60 >60 mL/min/1.73 m 2    GFR If Non African American >60 >60 mL/min/1.73 m 2       Current Facility-Administered Medications   Medication Frequency   • melatonin tablet 3 mg QHS   • midodrine (PROAMATINE) tablet 15 mg TID   • mirtazapine (REMERON) orally disintegrating tab 30 mg QHS   • ferrous sulfate tablet 325 mg QDAY with Breakfast   • baclofen (LIORESAL) tablet 15 mg TID   • docusate sodium (COLACE) capsule 100 mg BID    And   • sennosides (SENOKOT) 8.6 MG tablet 17.2 mg DAILY AT NOON    And   • bisacodyl (THE MAGIC BULLET) suppository 10 mg QDAY    And   • magnesium  hydroxide (MILK OF MAGNESIA) suspension 30 mL QDAY PRN   • gabapentin (NEURONTIN) capsule 800 mg TID   • metFORMIN (GLUCOPHAGE) tablet 250 mg BID AC   • midodrine (PROAMATINE) tablet 5 mg Q4HRS PRN   • aspirin EC (ECOTRIN) tablet 81 mg DAILY   • vitamin D (cholecalciferol) tablet 2,000 Units DAILY   • finasteride (PROSCAR) tablet 5 mg DAILY   • lovastatin (MEVACOR) tablet 10 mg Nightly   • tamsulosin (FLOMAX) capsule 0.4 mg AFTER DINNER   • vitamin B comp+C (ALLBEE WITH C) 1 Tab DAILY   • therapeutic multivitamin-minerals (THERAGRAN-M) tablet 1 Tab DAILY   • Pharmacy Consult Request ...Pain Management Review 1 Each PHARMACY TO DOSE   • Respiratory Therapy Consult Continuous RT   • tramadol (ULTRAM) 50 MG tablet 50 mg Q4HRS PRN   • oxyCODONE immediate-release (ROXICODONE) tablet 5 mg Q3HRS PRN   • oxyCODONE immediate release (ROXICODONE) tablet 10 mg Q3HRS PRN   • acetaminophen (TYLENOL) tablet 650 mg Q4HRS PRN   • enoxaparin (LOVENOX) inj 40 mg QHS   • artificial tears ophthalmic solution 1 Drop PRN   • benzocaine-menthol (CEPACOL) lozenge 1 Lozenge Q2HRS PRN   • mag hydrox-al hydrox-simeth (MAALOX PLUS ES or MYLANTA DS) suspension 20 mL Q2HRS PRN   • ondansetron (ZOFRAN ODT) dispertab 4 mg 4X/DAY PRN    Or   • ondansetron (ZOFRAN) syringe/vial injection 4 mg 4X/DAY PRN   • traZODone (DESYREL) tablet 50 mg QHS PRN   • sodium chloride (OCEAN) 0.65 % nasal spray 2 Spray PRN   • lidocaine (LIDODERM) 5 % 2 Patch Daily-0800   • aripiprazole (ABILIFY) tablet 10 mg DAILY   • venlafaxine XR (EFFEXOR XR) capsule 150 mg BID       Orders Placed This Encounter   Procedures   • Diet Order Diabetic     Standing Status:   Standing     Number of Occurrences:   1     Order Specific Question:   Diet:     Answer:   Diabetic [3]       Assessment:  Active Hospital Problems    Diagnosis   • *Cervical myelopathy (HCC)   • Postoperative pain   • Deep vein thrombosis (HCC)   • Insomnia due to medical condition   • Neurogenic bowel   •  Depression   • Thoracic myelopathy   • Neuropathic pain   • Hyperlipidemia   • Hypertension   • S/P BKA (below knee amputation) ([facility])       Medical Decision Making and Plan:  C2 AIS D, 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  Status post multiple spine surgeries, C3-T7 posterior spinal fusion on 6/5, T12-pelvis posterior spinal fusion on 6/10 by Dr Marsh.   - C-collar on at all times, [place] collar when in shower to be cleared by neurosurgery as outpatient, 8-12 weeks  -  Sutures to be removed 21 days postop, prolonged time secondary to repeat surgeries  -Continue comprehensive acute inpatient rehabilitation    Left BKA: Using [employer]  for prosthetists, not following up with physicians  - Prosthesis at bedside  -Follow-up with Dr. 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 noon, decrease Colace 100 mg twice daily, Dulcolax suppository with digital stimulation,   -DC MiraLAX 1 packet daily  -Discussed program with patient and importance of appropriate management of neurogenic bowel with repercussions of colonic dilation and possible rupture  -KUB showed no significant stool load in ascending or transverse colon      orthostatic hypotension: On 6/19- 82/52 with change in position  Because of significant autonomic dysfunction associated with spinal cord  injury, the patient is at high risk for orthostatic hypotension.  - FAMILIA hose on prior to out of bed  -midodrine 15 mg 3 times daily  - P.o. fluid intake, goal 1.5-2 L/day  -Consult hospitalist, discussion with hospitalist about discontinuing lisinopril  - Amlodipine was DC'd    Spasticity: MAS of 1-1+/4 jumping of bilateral lower extremities, greater in hip flexors  - Baclofen 15 mg 3 times daily     Decreased range of motion of right hip: In differential diagnosis includes heterotopic ossification of the right hip after multiple spinal surgeries.  Improved with stretching during acute rehabilitation  - x-ray right hip, showed no signs of heterotopic ossification  -CRP is less than 2 likely negative     Pain:  #Neuropathic pain: Neuropathy, complicated by diabetes and a cervical myelopathy  - Gabapentin 800 mg tid  - DCTylenol 1000 mg 3 times daily     #Postoperative pain:  -Tramadol  mg every 4 hours as needed moderate to severe pain first-line  -Oxycodone 5-10 mg every 3 hours as needed moderate to severe pain, second line  - DC Tylenol 1000 mg 3 times daily, check CMP in a.m. to evaluate for LFTs given high-dose Tylenol  -Lidocaine patches to either side of incision on for 12 hours off for 12 hours     Hypertension:  - All oral antihypertensives have been DC'd  -Appreciate hospitalist input     Type 2 diabetes: With hyperglycemia  -Metformin 500 mg twice daily  -Appreciate hospitalist input    Insomnia:  -decrease Remeron 15 mg nightly, given vivid dreams  -Initiate trazodone 50 mg nightly  - Melatonin 3 mg nightly     Depression:  -Effexor  mg twice daily     Vitamin D deficiency  -Vitamin D pending     DVT prophylaxis: Patient has previous history of DVT in 2011 making him higher risk for clot formation  -Lovenox 40 mg daily    Patient was seen for 26 minutes on unit/floor of which > 50% of time was spent on counseling and coordination of care regarding the above, including prognosis, risk  reduction, benefits of treatment, and options for next stage of care including insomnia, decrease Remeron to 15 mg nightly given and nightmares, initiate trazodone 50 mg scheduled nightly, neurogenic bowel, discussion of bowel program with patient, decrease in Colace for stool consistency.      Rj Navarro D.O.

## 2020-06-25 NOTE — THERAPY
Recreational Therapy  Daily Treatment     Patient Name: Gaurav Lopez  AGE:  75 y.o., SEX:  male  Medical Record #: [id number]  Today's Date: 6/25/2020       Subjective    Pt suggested that we play [employer] again.      Objective       06/25/20 0901   Functional Ability Status - Cognitive   Attention Span Remains on Task   Comprehension Follows Three Step Commands   Judgment Able to Make Independent Decisions   Functional Ability Status - Emotional    Affect Appropriate   Mood Appropriate   Behavior Appropriate   Skilled Intervention    Skilled Intervention Fine Motor Leisure   Skilled Intervention Comments [place]   Interdisciplinary Plan of Care Collaboration   Patient Position at End of Therapy Seated;Tray Table within Reach;Call Light within Reach   Strengths & Barriers   Strengths Able to follow instructions;Effective communication skills;Good carryover of learning;Motivated for self care and independence;Pleasant and cooperative;Willingly participates in therapeutic activities   Barriers Decreased endurance;Generalized weakness  (Impared fine motor)   Treatment Time   Total Time Spent (mins) 30   Procedural Tracking   Procedural Tracking Fine Motor Functional Leisure Skills       Assessment    Pt showing an improvement in fine motor as he was able to  up to 6 of the glass half inch beads used in the game at a time. Pt dropped these pieces x2 during the session. Less cueing for following the correct rules x2.     Strengths: Able to follow instructions, Effective communication skills, Good carryover of learning, Motivated for self care and independence, Pleasant and cooperative, Willingly participates in therapeutic activities  Barriers: Decreased endurance, Generalized weakness(Impared fine motor)    Plan    Smaller fine motor pieces.

## 2020-06-25 NOTE — CARE PLAN
Problem: Bowel/Gastric:  Goal: Will not experience complications related to bowel motility  Intervention: Implement Bowel Protocol, if applicable  Note: Pt refused scheduled suppository at hs.Continent of large bm per report.LBM 06/24.Will continue to monitor.     Problem: Pain Management  Goal: Pain level will decrease to patient's comfort goal  Intervention: Follow pain managment plan developed in collaboration with patient and Interdisciplinary Team  Note: Pain is manageable with scheduled medication.Repositioned with pillows for comfort.Will continue to monitor and assess pain level and medicate as needed.

## 2020-06-25 NOTE — THERAPY
Physical Therapy   Daily Treatment     Patient Name: Gaurav Lopez  Age:  75 y.o., Sex:  male  Medical Record #: [id number]  Today's Date: 6/25/2020     Precautions  Precautions: Fall Risk  Comments: LLE prosthesis, pacemaker, orthostatic hypotension , abdominal binder     Subjective    Patient in wheelchair upon arrival watching TV. Patient reports that he did not sleep well last night. Patient reported some hip pain today that was bothering him during ambulation. He did say that the lidocaine patches from the nurse helped.      Objective       06/25/20 1501   Cosignature   Documentation Review Approved    Vitals   Pulse 74   Patient BP Position Sitting   Blood Pressure  125/76   O2 (LPM) 0   O2 Delivery Device None - Room Air   Vitals Comments   (abdominal binder present. )   Pain 0 - 10 Group   Location Groin;Hip   Location Orientation Left   Description Other (Comments)  (hurt initially with walking. )   Comfort Goal 0   Therapist Pain Assessment Prior to Activity;During Activity;Post Activity   Gait Functional Level of Assist    Gait Level Of Assist Contact Guard Assist   Assistive Device Front Wheel Walker;Other (Comments)  (Gait belt )   Distance (Feet)   (155 ft, 85 ft)   # of Times Distance was Traveled 2   Deviation Decreased Base Of Support;Bradykinetic  (Ocassional left toe catching, forward lean)   Supine Lower Body Exercise   Supine Lower Body Exercises Yes   Bridges 1 set of 10  (2 legs, Feet on bolster. )   Bed Mobility    Supine to Sit Moderate Assist  (Log Roll. )   Sit to Supine Minimal Assist   Sit to Stand Moderate Assist  (Min assist - Mod assist. Gait belt. Set up cueing. )   Scooting Stand by Assist   Rolling Moderate Assist to Rt.;Moderate Assist to Lt.   Neuro-Muscular Treatments   Comments Sitting ege of mat unsupported with perterbations, gait belt applied.    Interdisciplinary Plan of Care Collaboration   Patient Position at End of Therapy In Bed;Call Light within Reach;Tray Table within  Reach;Phone within Reach;Bed Alarm On   PT Total Time Spent   PT Individual Total Time Spent (Mins) 60   PT Charge Group   PT Gait Training 1   PT Neuromuscular Re-Education / Balance 1   PT Therapeutic Activities 2       Assessed the patient's hip pain:   - Checked hip and knee ROM B.    - FADIR B.   - Standing hip flexor stretch.   - Active SLR.   - Standing hip extension.   We were unable to reproduce the hip pain with our evaluation.     Assessment    The patient's BP was doing better today so we were able to do more. We tried to assess the patient's hip pain that he has been getting but were unable to reproduce the pain. He had more energy today and demonstrated improvements in walking endurance today. He continues to demonstrate core weakness with bed mobility but is making improvements.   Strengths: Able to follow instructions, Effective communication skills, Independent prior level of function, Pleasant and cooperative, Willingly participates in therapeutic activities  Barriers: Bladder incontinence, Bowel incontinence, Confused, Decreased endurance, Generalized weakness, Home accessibility, Hypotension, Orthostatic hypotension, Impaired activity tolerance, Impaired balance, Impaired carryover of learning, Limited mobility    Plan    Monitor BP, STS work, standing tolerance; progress ambulation distance with FWW; consistent transfer training; LE strengthening, and core strengthening.    Physical Therapy Problems     Problem: Balance     Dates: Start: 06/19/20       Goal: STG-Within one week, patient will maintain static standing     Dates: Start: 06/19/20       Description: 1) Individualized goal:  // bars, c/s collar, L BKA prosthesis, gait belt, at Min A level x3 minutes  2) Interventions:  PT Group Therapy, PT E Stim Attended, PT Prosthetic Training, PT Gait Training, PT Therapeutic Exercises, PT TENS Application, PT Neuro Re-Ed/Balance, PT Therapeutic Activity, and PT Manual Therapy    Note:     Goal  Note filed on 06/22/20 0813 by Teofilo Vázquez, PT    Three days since PT eval; will monitor pt's progress towards goals this week.                  Goal: STG-Within one week, patient will maintain static sitting     Dates: Start: 06/19/20       Description: 1) Individualized goal:  EOM or unsupported, c/s collar, L BKA prosthesis, gait belt, at CGA level x3 minutes  2) Interventions:  PT Group Therapy, PT E Stim Attended, PT Prosthetic Training, PT Gait Training, PT Therapeutic Exercises, PT TENS Application, PT Neuro Re-Ed/Balance, PT Therapeutic Activity, and PT Manual Therapy      Note:     Goal Note filed on 06/22/20 0813 by Teofilo Vázquez, PT    Three days since PT eval; will monitor pt's progress towards goals this week.                        Problem: Mobility     Dates: Start: 06/19/20       Goal: STG-Within one week, patient will ambulate household distance     Dates: Start: 06/19/20       Description: 1) Individualized goal:  // bars x10 feet, c/s collar, L BKA prosthesis, gait belt, at Mod A level with w/c follow prn  2) Interventions:  PT Group Therapy, PT E Stim Attended, PT Prosthetic Training, PT Gait Training, PT Therapeutic Exercises, PT TENS Application, PT Neuro Re-Ed/Balance, PT Therapeutic Activity, and PT Manual Therapy      Note:     Goal Note filed on 06/22/20 0813 by Teofilo Vázquez, PT    Three days since PT eval; will monitor pt's progress towards goals this week.                  Goal: STG-Within one week, patient will propel wheelchair community     Dates: Start: 06/19/20       Description: 1) Individualized goal:  BLE propulsion x150 feet, c/s collar, L BKA prosthesis, gait belt, at SPV level  2) Interventions:  PT Group Therapy, PT E Stim Attended, PT Prosthetic Training, PT Gait Training, PT Therapeutic Exercises, PT TENS Application, PT Neuro Re-Ed/Balance, PT Therapeutic Activity, and PT Manual Therapy      Note:     Goal Note filed on 06/22/20 0813 by  Teofilo Vázquez, PT    Three days since PT eval; will monitor pt's progress towards goals this week.                        Problem: Mobility Transfers     Dates: Start: 06/19/20       Goal: STG-Within one week, patient will perform bed mobility     Dates: Start: 06/19/20       Description: 1) Individualized goal:  Mod A supine<>sit, c/s collar, L BKA prosthesis, bed rail  2) Interventions:  PT Group Therapy, PT E Stim Attended, PT Prosthetic Training, PT Gait Training, PT Therapeutic Exercises, PT TENS Application, PT Neuro Re-Ed/Balance, PT Therapeutic Activity, and PT Manual Therapy    Note:     Goal Note filed on 06/22/20 0813 by Teofilo Vázquez, PT    Three days since PT eval; will monitor pt's progress towards goals this week.                    Goal: STG-Within one week, patient will sit to stand     Dates: Start: 06/19/20       Description: 1) Individualized goal:  // bars, c/s collar, L BKA prosthesis, gait belt, at Min A level consistently  2) Interventions:  PT Group Therapy, PT E Stim Attended, PT Prosthetic Training, PT Gait Training, PT Therapeutic Exercises, PT TENS Application, PT Neuro Re-Ed/Balance, PT Therapeutic Activity, and PT Manual Therapy    Note:     Goal Note filed on 06/22/20 0813 by Teofilo Vázquez, PT    Three days since PT eval; will monitor pt's progress towards goals this week.                  Goal: STG-Within one week, patient will transfer bed to chair     Dates: Start: 06/19/20       Description: 1) Individualized goal:  Mod A supine<>sit, c/s collar, L BKA prosthesis, bed rail, gait belt  2) Interventions:  PT Group Therapy, PT E Stim Attended, PT Prosthetic Training, PT Gait Training, PT Therapeutic Exercises, PT TENS Application, PT Neuro Re-Ed/Balance, PT Therapeutic Activity, and PT Manual Therapy      Note:     Goal Note filed on 06/22/20 0813 by Teofilo Vázquez, PT    Three days since PT eval; will monitor pt's progress towards goals this week.                         Problem: PT-Long Term Goals     Dates: Start: 06/19/20       Goal: LTG-By discharge, patient will ambulate     Dates: Start: 06/19/20       Description: 1) Individualized goal:  150 feet with FWW, c/s collar, L BKA prosthesis, at SBA level  2) Interventions:  PT Group Therapy, PT E Stim Attended, PT Prosthetic Training, PT Gait Training, PT Therapeutic Exercises, PT TENS Application, PT Neuro Re-Ed/Balance, PT Therapeutic Activity, and PT Manual Therapy          Goal: LTG-By discharge, patient will transfer one surface to another     Dates: Start: 06/19/20       Description: 1) Individualized goal:  with FWW, c/s collar, L BKA prosthesis, at SBA level  2) Interventions:  PT Group Therapy, PT E Stim Attended, PT Prosthetic Training, PT Gait Training, PT Therapeutic Exercises, PT TENS Application, PT Neuro Re-Ed/Balance, PT Therapeutic Activity, and PT Manual Therapy            Goal: LTG-By discharge, patient will ambulate up/down 4-6 stairs     Dates: Start: 06/19/20       Description: 1) Individualized goal:  with 1 railing, c/s collar, L BKA prosthesis, gait belt, at Min A level  2) Interventions:  PT Group Therapy, PT E Stim Attended, PT Prosthetic Training, PT Gait Training, PT Therapeutic Exercises, PT TENS Application, PT Neuro Re-Ed/Balance, PT Therapeutic Activity, and PT Manual Therapy          Goal: LTG-By discharge, patient will transfer in/out of a car     Dates: Start: 06/19/20       Description: 1) Individualized goal:  with FWW, c/s collar, L BKA prosthesis, at SBA level  2) Interventions:  PT Group Therapy, PT E Stim Attended, PT Prosthetic Training, PT Gait Training, PT Therapeutic Exercises, PT TENS Application, PT Neuro Re-Ed/Balance, PT Therapeutic Activity, and PT Manual Therapy

## 2020-06-25 NOTE — THERAPY
Physical Therapy   Daily Treatment     Patient Name: Gaurav Lopez  Age:  75 y.o., Sex:  male  Medical Record #: [id number]  Today's Date: 6/25/2020     Precautions  Precautions: Fall Risk  Comments: LLE prosthesis, pacemaker, orthostatic hypotension , abdominal binder     Subjective  Pt finishing breakfast, he required assistance for LB dressing in order to participate in PT session     Objective     06/25/20 0831   Wheelchair Functional Level of Assist   Wheelchair Assist Supervised   Distance Wheelchair (Feet or Distance) 100   Wheelchair Description Extra time;Supervision for safety   Transfer Functional Level of Assist   Bed, Chair, Wheelchair Transfer Contact Guard Assist   Bed Chair Wheelchair Transfer Description Adaptive equipment;Increased time;Set-up of equipment   Bed Mobility    Supine to Sit Maximal Assist  (HOB elevated, vc for log roll)   Sit to Stand Minimal Assist  (from elevated bed to FWW during SPT)   Rolling Moderate Assist to Rt.;Moderate Assist to Lt.  (for donning of abdominal binder and for LB dressing)   Interdisciplinary Plan of Care Collaboration   Patient Position at End of Therapy Seated;Self Releasing Lap Belt Applied  (in gym awaiting rec therapist)   PT Total Time Spent   PT Individual Total Time Spent (Mins) 30   PT Charge Group   PT Therapeutic Activities 2   Supervising Physical Therapist Teofilo hall in seated with small basket ball 1 x 5 reps with Glenna    edu pt on being proactive in asking for assistance to get dressed and OOB in the am, in order to be ready for PT session, as pt expressed that he felt his therapy time wasn't being maximized. Pt will request assistance in the morning going forward.    Assessment    Pt cont to demonstrate impaired trunk control as evidenced by his unsupported sitting (F/F-)balance at EOB. Pt is cooperative and motivated toward therapy goals.      Strengths: Able to follow instructions, Effective communication skills, Independent  prior level of function, Pleasant and cooperative, Willingly participates in therapeutic activities  Barriers: Bladder incontinence, Bowel incontinence, Confused, Decreased endurance, Generalized weakness, Home accessibility, Hypotension, Orthostatic hypotension, Impaired activity tolerance, Impaired balance, Impaired carryover of learning, Limited mobility    Plan    Monitor BP, STS work, standing tolerance; progress ambulation distance with FWW; consistent transfer training; LE strengthening, and core strengthening    Physical Therapy Problems     Problem: Balance     Dates: Start: 06/19/20       Goal: STG-Within one week, patient will maintain static standing     Dates: Start: 06/19/20       Description: 1) Individualized goal:  // bars, c/s collar, L BKA prosthesis, gait belt, at Min A level x3 minutes  2) Interventions:  PT Group Therapy, PT E Stim Attended, PT Prosthetic Training, PT Gait Training, PT Therapeutic Exercises, PT TENS Application, PT Neuro Re-Ed/Balance, PT Therapeutic Activity, and PT Manual Therapy    Note:     Goal Note filed on 06/22/20 0813 by Teofilo Vázquez, PT    Three days since PT eval; will monitor pt's progress towards goals this week.                  Goal: STG-Within one week, patient will maintain static sitting     Dates: Start: 06/19/20       Description: 1) Individualized goal:  EOM or unsupported, c/s collar, L BKA prosthesis, gait belt, at CGA level x3 minutes  2) Interventions:  PT Group Therapy, PT E Stim Attended, PT Prosthetic Training, PT Gait Training, PT Therapeutic Exercises, PT TENS Application, PT Neuro Re-Ed/Balance, PT Therapeutic Activity, and PT Manual Therapy      Note:     Goal Note filed on 06/22/20 0813 by Teofilo Vázquez, PT    Three days since PT eval; will monitor pt's progress towards goals this week.                        Problem: Mobility     Dates: Start: 06/19/20       Goal: STG-Within one week, patient will ambulate household distance      Dates: Start: 06/19/20       Description: 1) Individualized goal:  // bars x10 feet, c/s collar, L BKA prosthesis, gait belt, at Mod A level with w/c follow prn  2) Interventions:  PT Group Therapy, PT E Stim Attended, PT Prosthetic Training, PT Gait Training, PT Therapeutic Exercises, PT TENS Application, PT Neuro Re-Ed/Balance, PT Therapeutic Activity, and PT Manual Therapy      Note:     Goal Note filed on 06/22/20 0813 by Teofilo Vázquez, PT    Three days since PT eval; will monitor pt's progress towards goals this week.                  Goal: STG-Within one week, patient will propel wheelchair community     Dates: Start: 06/19/20       Description: 1) Individualized goal:  BLE propulsion x150 feet, c/s collar, L BKA prosthesis, gait belt, at SPV level  2) Interventions:  PT Group Therapy, PT E Stim Attended, PT Prosthetic Training, PT Gait Training, PT Therapeutic Exercises, PT TENS Application, PT Neuro Re-Ed/Balance, PT Therapeutic Activity, and PT Manual Therapy      Note:     Goal Note filed on 06/22/20 0813 by Teofilo Vázquez, PT    Three days since PT eval; will monitor pt's progress towards goals this week.                        Problem: Mobility Transfers     Dates: Start: 06/19/20       Goal: STG-Within one week, patient will perform bed mobility     Dates: Start: 06/19/20       Description: 1) Individualized goal:  Mod A supine<>sit, c/s collar, L BKA prosthesis, bed rail  2) Interventions:  PT Group Therapy, PT E Stim Attended, PT Prosthetic Training, PT Gait Training, PT Therapeutic Exercises, PT TENS Application, PT Neuro Re-Ed/Balance, PT Therapeutic Activity, and PT Manual Therapy    Note:     Goal Note filed on 06/22/20 0813 by Teofilo Vázquez, PT    Three days since PT eval; will monitor pt's progress towards goals this week.                    Goal: STG-Within one week, patient will sit to stand     Dates: Start: 06/19/20       Description: 1) Individualized goal:  //  bars, c/s collar, L BKA prosthesis, gait belt, at Min A level consistently  2) Interventions:  PT Group Therapy, PT E Stim Attended, PT Prosthetic Training, PT Gait Training, PT Therapeutic Exercises, PT TENS Application, PT Neuro Re-Ed/Balance, PT Therapeutic Activity, and PT Manual Therapy    Note:     Goal Note filed on 06/22/20 0813 by Teofilo Vázquez, PT    Three days since PT eval; will monitor pt's progress towards goals this week.                  Goal: STG-Within one week, patient will transfer bed to chair     Dates: Start: 06/19/20       Description: 1) Individualized goal:  Mod A supine<>sit, c/s collar, L BKA prosthesis, bed rail, gait belt  2) Interventions:  PT Group Therapy, PT E Stim Attended, PT Prosthetic Training, PT Gait Training, PT Therapeutic Exercises, PT TENS Application, PT Neuro Re-Ed/Balance, PT Therapeutic Activity, and PT Manual Therapy      Note:     Goal Note filed on 06/22/20 0813 by Teofilo Vázquez, PT    Three days since PT eval; will monitor pt's progress towards goals this week.                        Problem: PT-Long Term Goals     Dates: Start: 06/19/20       Goal: LTG-By discharge, patient will ambulate     Dates: Start: 06/19/20       Description: 1) Individualized goal:  150 feet with FWW, c/s collar, L BKA prosthesis, at SBA level  2) Interventions:  PT Group Therapy, PT E Stim Attended, PT Prosthetic Training, PT Gait Training, PT Therapeutic Exercises, PT TENS Application, PT Neuro Re-Ed/Balance, PT Therapeutic Activity, and PT Manual Therapy          Goal: LTG-By discharge, patient will transfer one surface to another     Dates: Start: 06/19/20       Description: 1) Individualized goal:  with FWW, c/s collar, L BKA prosthesis, at SBA level  2) Interventions:  PT Group Therapy, PT E Stim Attended, PT Prosthetic Training, PT Gait Training, PT Therapeutic Exercises, PT TENS Application, PT Neuro Re-Ed/Balance, PT Therapeutic Activity, and PT Manual Therapy             Goal: LTG-By discharge, patient will ambulate up/down 4-6 stairs     Dates: Start: 06/19/20       Description: 1) Individualized goal:  with 1 railing, c/s collar, L BKA prosthesis, gait belt, at Min A level  2) Interventions:  PT Group Therapy, PT E Stim Attended, PT Prosthetic Training, PT Gait Training, PT Therapeutic Exercises, PT TENS Application, PT Neuro Re-Ed/Balance, PT Therapeutic Activity, and PT Manual Therapy          Goal: LTG-By discharge, patient will transfer in/out of a car     Dates: Start: 06/19/20       Description: 1) Individualized goal:  with FWW, c/s collar, L BKA prosthesis, at SBA level  2) Interventions:  PT Group Therapy, PT E Stim Attended, PT Prosthetic Training, PT Gait Training, PT Therapeutic Exercises, PT TENS Application, PT Neuro Re-Ed/Balance, PT Therapeutic Activity, and PT Manual Therapy

## 2020-06-25 NOTE — THERAPY
"Occupational Therapy  Daily Treatment     Patient Name: Gaurav Lopez  Age:  75 y.o., Sex:  male  Medical Record #: [id number]  Today's Date: 6/25/2020     Precautions  Precautions: (P) Fall Risk  Comments: (P) LLE prosthesis, pacemaker, orthostatic hypotension , abdominal binder     Safety   ADL Safety : Impaired, Requires Supervision for Safety  Bathroom Safety: Impaired, Requires Supervision for Safety    Subjective    \" OK\" agreeable to tx      Objective       06/25/20 1401   Precautions   Precautions Fall Risk   Comments LLE prosthesis, pacemaker, orthostatic hypotension , abdominal binder    Sitting Upper Body Exercises   Front Arm Raise 1 set of 10;Right ;Left  (acive assisted  to 90 degrees )   Bicep Curls 2 sets of 10;Right ;Left  (1 lb dumbbell  active assist for left UE. )   Pronation / Supination 2 sets of 10;Right ;Left  (1lb dumb bell )   Other Exercise bialteral isometric exs.    shoulder  ab and adduction   and triceps extension   Bed Mobility    Supine to Sit Maximal Assist   Scooting Minimal Assist   Comments  edge of bed to w/c transfer performed with  Min assist using FWW    Interdisciplinary Plan of Care Collaboration   Patient Position at End of Therapy Seated;Self Releasing Lap Belt Applied;Call Light within Reach;Tray Table within Reach   OT Total Time Spent   OT Individual Total Time Spent (Mins) 30   OT Charge Group   OT Therapeutic Exercise  2      Adjusted Aspen collar for improved fit  Due to chin slipping down when in bed.     Assessment     limited by decreased UE strength/endurance     Strengths: Motivated for self care and independence, Pleasant and cooperative, Supportive family  Barriers: Orthostatic hypotension, Hypotension    Plan  OT to address strength, endurance, balance + education for AE/DME to facilitate greater independence w/ ADL's/functional mobility/transfers.         Occupational Therapy Goals     Problem: Dressing     Dates: Start: 06/19/20       Goal: STG-Within one " week, patient will dress LB     Dates: Start: 06/19/20       Description: 1) Individualized Goal:  Max assist for LB clothing management via AE/DME PRN  2) Interventions:  OT Self Care/ADL, OT Neuro Re-Ed/Balance, OT Therapeutic Activity, OT Evaluation, and OT Therapeutic Exercise      Note:     Goal Note filed on 06/22/20 1109 by Mesfin Mtz MS,OTR/L    Pt at mod assist via AE/DME                        Problem: OT Long Term Goals     Dates: Start: 06/19/20       Goal: LTG-By discharge, patient will complete basic self care tasks     Dates: Start: 06/19/20       Description: 1) Individualized Goal:  Mod I for BADL's, Sup/SBA for shower via AE/DME PRN  2) Interventions:  OT Self Care/ADL, OT Neuro Re-Ed/Balance, OT Therapeutic Activity, OT Evaluation, and OT Therapeutic Exercise            Goal: LTG-By discharge, patient will perform bathroom transfers     Dates: Start: 06/19/20                   Problem: Toileting     Dates: Start: 06/19/20       Goal: STG-Within one week, patient will complete toileting tasks     Dates: Start: 06/19/20       Description: 1) Individualized Goal:  Max assist for toileting task via AE/DME PRN  2) Interventions:  OT Self Care/ADL, OT Neuro Re-Ed/Balance, OT Therapeutic Activity, OT Evaluation, and OT Therapeutic Exercise      Note:     Goal Note filed on 06/22/20 1109 by Mesfin Mtz MS,OTR/L    Pt at Total assist for toileting task via DME
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
## 2020-06-25 NOTE — THERAPY
Occupational Therapy  Daily Treatment     Patient Name: Gaurav Lopez  Age:  75 y.o., Sex:  male  Medical Record #: [id number]  Today's Date: 6/25/2020     Precautions  Precautions: Fall Risk  Comments: LLE prosthesis, pacemaker, orthostatic hypotension , abdominal binder     Safety   ADL Safety : Impaired, Requires Supervision for Safety  Bathroom Safety: Impaired, Requires Supervision for Safety    Subjective       Objective       06/25/20 1031   Precautions   Precautions Fall Risk   Comments LLE prosthesis, pacemaker, orthostatic hypotension , abdominal binder    Vitals   O2 Delivery Device None - Room Air   Pain   Intervention Declines   Pain 0 - 10 Group   Therapist Pain Assessment 0   Non Verbal Descriptors   Non Verbal Scale  Calm   Cognition    Level of Consciousness Alert   Sleep/Wake Cycle   Sleep & Rest Awake   Functional Level of Assist   Eating Modified Independent   Eating Description Increased time;Insert dentures   Grooming Modified Independent;Seated   Grooming Description Increased time;Seated in wheelchair at sink   Sitting Upper Body Exercises   Upper Extremity Bike Level 5 Resistance  (5 mins FluidoBike, .268km - transition to standing - see bel)   Standing Upper Body Exercises   Other Exercises Standing at FluidoBike, Level 3, CGA via gait belt w/ no LOB, no rest break, .453km   Comments Standing at Parallel Bars (PB) for Ladder Ball (LB) activity - standing at one end of PB's w/ ladder for LB 5' behind pt, pt instructed to navigate 12' to opposite end of PB's to retrieve one LB at a time and return to starting position to toss LB at ladder.  1 LB = 12'x2 = 24'.  Pt completed 2x7 w/ seated rest break between sets.  14x24' = 336; + stepping over bolster 2x14 = 28x's.    OT Total Time Spent   OT Individual Total Time Spent (Mins) 60   OT Charge Group   OT Self Care / ADL 1   OT Therapeutic Exercise  3       Assessment    Pt was alert and cooperative w/ tx.  Tx emphasis on ADL's, standing  balance/endurance, functional mobility, strength and endurance - see notes above.  Strengths: Motivated for self care and independence, Pleasant and cooperative, Supportive family  Barriers: Orthostatic hypotension, Hypotension    Plan    OT to address strength, endurance, balance + education for AE/DME to facilitate greater independence w/ ADL's/functional mobility/transfers.    Occupational Therapy Goals     Problem: Dressing     Dates: Start: 06/19/20       Goal: STG-Within one week, patient will dress LB     Dates: Start: 06/19/20       Description: 1) Individualized Goal:  Max assist for LB clothing management via AE/DME PRN  2) Interventions:  OT Self Care/ADL, OT Neuro Re-Ed/Balance, OT Therapeutic Activity, OT Evaluation, and OT Therapeutic Exercise      Note:     Goal Note filed on 06/22/20 1109 by Mesfin Mtz MS,OTR/L    Pt at mod assist via AE/DME                        Problem: OT Long Term Goals     Dates: Start: 06/19/20       Goal: LTG-By discharge, patient will complete basic self care tasks     Dates: Start: 06/19/20       Description: 1) Individualized Goal:  Mod I for BADL's, Sup/SBA for shower via AE/DME PRN  2) Interventions:  OT Self Care/ADL, OT Neuro Re-Ed/Balance, OT Therapeutic Activity, OT Evaluation, and OT Therapeutic Exercise            Goal: LTG-By discharge, patient will perform bathroom transfers     Dates: Start: 06/19/20                   Problem: Toileting     Dates: Start: 06/19/20       Goal: STG-Within one week, patient will complete toileting tasks     Dates: Start: 06/19/20       Description: 1) Individualized Goal:  Max assist for toileting task via AE/DME PRN  2) Interventions:  OT Self Care/ADL, OT Neuro Re-Ed/Balance, OT Therapeutic Activity, OT Evaluation, and OT Therapeutic Exercise      Note:     Goal Note filed on 06/22/20 1109 by Mesfin Mtz MS,OTR/L    Pt at Total assist for toileting task via DME

## 2020-06-26 PROCEDURE — 770010 HCHG ROOM/CARE - REHAB SEMI PRIVAT*

## 2020-06-26 PROCEDURE — 700102 HCHG RX REV CODE 250 W/ 637 OVERRIDE(OP): Performed by: HOSPITALIST

## 2020-06-26 PROCEDURE — 97110 THERAPEUTIC EXERCISES: CPT

## 2020-06-26 PROCEDURE — A9270 NON-COVERED ITEM OR SERVICE: HCPCS | Performed by: PHYSICAL MEDICINE & REHABILITATION

## 2020-06-26 PROCEDURE — 97112 NEUROMUSCULAR REEDUCATION: CPT

## 2020-06-26 PROCEDURE — 700111 HCHG RX REV CODE 636 W/ 250 OVERRIDE (IP): Performed by: PHYSICAL MEDICINE & REHABILITATION

## 2020-06-26 PROCEDURE — 700112 HCHG RX REV CODE 229: Performed by: PHYSICAL MEDICINE & REHABILITATION

## 2020-06-26 PROCEDURE — 99232 SBSQ HOSP IP/OBS MODERATE 35: CPT | Performed by: PHYSICAL MEDICINE & REHABILITATION

## 2020-06-26 PROCEDURE — 99232 SBSQ HOSP IP/OBS MODERATE 35: CPT | Performed by: HOSPITALIST

## 2020-06-26 PROCEDURE — A9270 NON-COVERED ITEM OR SERVICE: HCPCS | Performed by: HOSPITALIST

## 2020-06-26 PROCEDURE — 700101 HCHG RX REV CODE 250: Performed by: PHYSICAL MEDICINE & REHABILITATION

## 2020-06-26 PROCEDURE — 700102 HCHG RX REV CODE 250 W/ 637 OVERRIDE(OP): Performed by: PHYSICAL MEDICINE & REHABILITATION

## 2020-06-26 PROCEDURE — 97530 THERAPEUTIC ACTIVITIES: CPT

## 2020-06-26 RX ORDER — BISACODYL 10 MG/1
10 SUPPOSITORY RECTAL
Status: DISCONTINUED | OUTPATIENT
Start: 2020-06-26 | End: 2020-07-01

## 2020-06-26 RX ORDER — BACLOFEN 20 MG/1
20 TABLET ORAL 3 TIMES DAILY
Status: DISCONTINUED | OUTPATIENT
Start: 2020-06-26 | End: 2020-07-02

## 2020-06-26 RX ORDER — DOCUSATE SODIUM 100 MG/1
100 CAPSULE, LIQUID FILLED ORAL 2 TIMES DAILY
Status: DISCONTINUED | OUTPATIENT
Start: 2020-06-26 | End: 2020-07-01

## 2020-06-26 RX ORDER — PSEUDOEPHEDRINE HCL 30 MG
60 TABLET ORAL 3 TIMES DAILY
Status: DISCONTINUED | OUTPATIENT
Start: 2020-06-26 | End: 2020-07-08 | Stop reason: HOSPADM

## 2020-06-26 RX ORDER — SENNOSIDES A AND B 8.6 MG/1
25.8 TABLET, FILM COATED ORAL
Status: DISCONTINUED | OUTPATIENT
Start: 2020-06-26 | End: 2020-07-01

## 2020-06-26 RX ADMIN — DOCUSATE SODIUM 100 MG: 100 CAPSULE, LIQUID FILLED ORAL at 08:29

## 2020-06-26 RX ADMIN — ENOXAPARIN SODIUM 40 MG: 100 INJECTION SUBCUTANEOUS at 20:46

## 2020-06-26 RX ADMIN — BACLOFEN 15 MG: 10 TABLET ORAL at 08:36

## 2020-06-26 RX ADMIN — BACLOFEN 20 MG: 20 TABLET ORAL at 14:46

## 2020-06-26 RX ADMIN — GABAPENTIN 800 MG: 400 CAPSULE ORAL at 20:45

## 2020-06-26 RX ADMIN — TAMSULOSIN HYDROCHLORIDE 0.4 MG: 0.4 CAPSULE ORAL at 17:30

## 2020-06-26 RX ADMIN — VITAMIN C 1 TABLET: TAB at 08:30

## 2020-06-26 RX ADMIN — METFORMIN HYDROCHLORIDE 250 MG: 500 TABLET ORAL at 08:29

## 2020-06-26 RX ADMIN — FERROUS SULFATE TAB 325 MG (65 MG ELEMENTAL FE) 325 MG: 325 (65 FE) TAB at 08:35

## 2020-06-26 RX ADMIN — ASPIRIN 81 MG: 81 TABLET, COATED ORAL at 08:40

## 2020-06-26 RX ADMIN — PSEUDOEPHEDRINE HCL 60 MG: 30 TABLET, FILM COATED ORAL at 17:30

## 2020-06-26 RX ADMIN — MELATONIN 2000 UNITS: at 08:29

## 2020-06-26 RX ADMIN — DOCUSATE SODIUM 100 MG: 100 CAPSULE, LIQUID FILLED ORAL at 20:45

## 2020-06-26 RX ADMIN — MULTIPLE VITAMINS W/ MINERALS TAB 1 TABLET: TAB at 08:29

## 2020-06-26 RX ADMIN — MIDODRINE HYDROCHLORIDE 15 MG: 10 TABLET ORAL at 17:31

## 2020-06-26 RX ADMIN — ARIPIPRAZOLE 10 MG: 5 TABLET ORAL at 08:30

## 2020-06-26 RX ADMIN — GABAPENTIN 800 MG: 400 CAPSULE ORAL at 14:46

## 2020-06-26 RX ADMIN — LOVASTATIN 10 MG: 20 TABLET ORAL at 20:45

## 2020-06-26 RX ADMIN — LIDOCAINE 2 PATCH: 50 PATCH TOPICAL at 08:38

## 2020-06-26 RX ADMIN — TRAZODONE HYDROCHLORIDE 50 MG: 50 TABLET ORAL at 20:45

## 2020-06-26 RX ADMIN — BACLOFEN 20 MG: 20 TABLET ORAL at 20:46

## 2020-06-26 RX ADMIN — VENLAFAXINE HYDROCHLORIDE 150 MG: 75 CAPSULE, EXTENDED RELEASE ORAL at 20:45

## 2020-06-26 RX ADMIN — BISACODYL 10 MG: 10 SUPPOSITORY RECTAL at 20:00

## 2020-06-26 RX ADMIN — STANDARDIZED SENNA CONCENTRATE 25.8 MG: 8.6 TABLET ORAL at 11:40

## 2020-06-26 RX ADMIN — MELATONIN 3 MG: at 20:46

## 2020-06-26 RX ADMIN — METFORMIN HYDROCHLORIDE 250 MG: 500 TABLET ORAL at 17:30

## 2020-06-26 RX ADMIN — MIDODRINE HYDROCHLORIDE 15 MG: 10 TABLET ORAL at 11:40

## 2020-06-26 RX ADMIN — PSEUDOEPHEDRINE HCL 60 MG: 30 TABLET, FILM COATED ORAL at 11:50

## 2020-06-26 RX ADMIN — MIDODRINE HYDROCHLORIDE 15 MG: 10 TABLET ORAL at 08:35

## 2020-06-26 RX ADMIN — FINASTERIDE 5 MG: 5 TABLET, FILM COATED ORAL at 08:37

## 2020-06-26 RX ADMIN — VENLAFAXINE HYDROCHLORIDE 150 MG: 75 CAPSULE, EXTENDED RELEASE ORAL at 08:34

## 2020-06-26 RX ADMIN — GABAPENTIN 800 MG: 400 CAPSULE ORAL at 08:30

## 2020-06-26 ASSESSMENT — ENCOUNTER SYMPTOMS
CHILLS: 0
POLYDIPSIA: 0
SHORTNESS OF BREATH: 0
SENSORY CHANGE: 1
VOMITING: 0
EYES NEGATIVE: 1
COUGH: 0
FEVER: 0
ABDOMINAL PAIN: 0
NAUSEA: 0
NECK PAIN: 1
BRUISES/BLEEDS EASILY: 0
PALPITATIONS: 0
FOCAL WEAKNESS: 1

## 2020-06-26 ASSESSMENT — FIBROSIS 4 INDEX: FIB4 SCORE: 0.39

## 2020-06-26 NOTE — THERAPY
"Occupational Therapy  Daily Treatment     Patient Name: Gaurav Lopez  Age:  75 y.o., Sex:  male  Medical Record #: [id number]  Today's Date: 6/26/2020     Precautions  Precautions: Fall Risk  Comments: LLE prosthesis, pacemaker, orthostatic hypotension , abdominal binder     Safety   ADL Safety : Impaired, Requires Supervision for Safety  Bathroom Safety: Impaired, Requires Supervision for Safety    Subjective    \"Good morning!\"     Objective       06/26/20 0831   Precautions   Precautions Fall Risk   Comments LLE prosthesis, pacemaker, orthostatic hypotension , abdominal binder    Vitals   Pulse 84   Patient BP Position Sitting   Blood Pressure  114/72   O2 Delivery Device None - Room Air   Pain   Intervention Declines   Non Verbal Descriptors   Non Verbal Scale  Calm   Cognition    Level of Consciousness Alert   Sleep/Wake Cycle   Sleep & Rest Awake   Sitting Upper Body Exercises   Upper Extremity Bike Level 5 Resistance  (FluidoBike - transition to sitting, 10 mins, .639km, rest br)   Standing Upper Body Exercises   Other Exercises Standing at FluidoBike, 10 mins Level 2, .632km, seated rest break x 2   Bed Mobility    Supine to Sit Moderate Assist   Interdisciplinary Plan of Care Collaboration   IDT Collaboration with  Certified Nursing Assistant;Nursing   Patient Position at End of Therapy Seated;Self Releasing Lap Belt Applied;Tray Table within Reach;Phone within Reach   Collaboration Comments CLOF, meds   OT Total Time Spent   OT Individual Total Time Spent (Mins) 30   OT Charge Group   OT Therapeutic Exercise  2       Assessment    Pt was awake, alert and cooperative w/ tx.  Tx emphasis on TherEx in standing and sitting - see notes above.    Strengths: Motivated for self care and independence, Pleasant and cooperative, Supportive family  Barriers: Orthostatic hypotension, Hypotension    Plan    OT to address strength, endurance, balance + education for AE/DME to facilitate greater independence w/ " ADL's/functional mobility/transfers.    Occupational Therapy Goals     Problem: Dressing     Dates: Start: 06/19/20       Goal: STG-Within one week, patient will dress LB     Dates: Start: 06/19/20       Description: 1) Individualized Goal:  Max assist for LB clothing management via AE/DME PRN  2) Interventions:  OT Self Care/ADL, OT Neuro Re-Ed/Balance, OT Therapeutic Activity, OT Evaluation, and OT Therapeutic Exercise      Note:     Goal Note filed on 06/22/20 1109 by Mesfin Mtz MS,OTR/L    Pt at mod assist via AE/DME                        Problem: OT Long Term Goals     Dates: Start: 06/19/20       Goal: LTG-By discharge, patient will complete basic self care tasks     Dates: Start: 06/19/20       Description: 1) Individualized Goal:  Mod I for BADL's, Sup/SBA for shower via AE/DME PRN  2) Interventions:  OT Self Care/ADL, OT Neuro Re-Ed/Balance, OT Therapeutic Activity, OT Evaluation, and OT Therapeutic Exercise            Goal: LTG-By discharge, patient will perform bathroom transfers     Dates: Start: 06/19/20                   Problem: Toileting     Dates: Start: 06/19/20       Goal: STG-Within one week, patient will complete toileting tasks     Dates: Start: 06/19/20       Description: 1) Individualized Goal:  Max assist for toileting task via AE/DME PRN  2) Interventions:  OT Self Care/ADL, OT Neuro Re-Ed/Balance, OT Therapeutic Activity, OT Evaluation, and OT Therapeutic Exercise      Note:     Goal Note filed on 06/22/20 1109 by Mesfin Mtz MS,OTR/L    Pt at Total assist for toileting task via DME
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
## 2020-06-26 NOTE — THERAPY
Physical Therapy   Daily Treatment     Patient Name: Gaurav Lopez  Age:  75 y.o., Sex:  male  Medical Record #: [id number]  Today's Date: 6/26/2020     Precautions  Precautions: Fall Risk, Other (See Comments)  Comments: LLE prosthesis, pacemaker, orthostatic hypotension , abdominal binder     Subjective    Patient reported lightheadedness with standing BP; agreeable to PT at 1110.     Objective       06/26/20 1037   Cosignature   Documentation Review Approved with modifications made by preceptor in flowsheet   Vitals   Pulse 70   Patient BP Position Sitting   Blood Pressure  120/61   O2 (LPM) 0   O2 Delivery Device None - Room Air   Vitals Comments abdominal binder present.   See flowsheet for orthostatic hypotension values.   Supine Lower Body Exercise   Bridges 2 sets of 10;Two Legged   Bed Mobility    Supine to Sit Moderate Assist   Sit to Supine Minimal Assist   Sit to Stand Moderate Assist  (Gait belt. Min- mod assist. )   Rolling Moderate Assist to Lt.   Interdisciplinary Plan of Care Collaboration   IDT Collaboration with  Nursing   Patient Position at End of Therapy Seated;Bed Alarm On;Self Releasing Lap Belt Applied;Call Light within Reach;Tray Table within Reach;Phone within Reach   Collaboration Comments Nursing informed about consistant low BP when standing.    PT Total Time Spent   PT Individual Total Time Spent (Mins) 30   PT Charge Group   PT Therapeutic Activities 2       Assessment    We assessed sitting and standing BP. BP continues to drop during standing limiting us to sitting and supine exercise. Patient continued to demonstrate some trunk weakness for bed mobility.   Strengths: Able to follow instructions, Effective communication skills, Independent prior level of function, Pleasant and cooperative, Willingly participates in therapeutic activities  Barriers: Bladder incontinence, Bowel incontinence, Confused, Decreased endurance, Generalized weakness, Home accessibility, Hypotension, Orthostatic  hypotension, Impaired activity tolerance, Impaired balance, Impaired carryover of learning, Limited mobility    Plan    Monitor BP, STS work, standing tolerance; progress ambulation distance with FWW; consistent transfer training; LE strengthening, and core strengthening.    Physical Therapy Problems     Problem: Balance     Dates: Start: 06/19/20       Goal: STG-Within one week, patient will maintain static standing     Dates: Start: 06/19/20       Description: 1) Individualized goal:  // bars, c/s collar, L BKA prosthesis, gait belt, at Min A level x3 minutes  2) Interventions:  PT Group Therapy, PT E Stim Attended, PT Prosthetic Training, PT Gait Training, PT Therapeutic Exercises, PT TENS Application, PT Neuro Re-Ed/Balance, PT Therapeutic Activity, and PT Manual Therapy    Note:     Goal Note filed on 06/22/20 0813 by Teofilo Vázquez, PT    Three days since PT eval; will monitor pt's progress towards goals this week.                  Goal: STG-Within one week, patient will maintain static sitting     Dates: Start: 06/19/20       Description: 1) Individualized goal:  EOM or unsupported, c/s collar, L BKA prosthesis, gait belt, at CGA level x3 minutes  2) Interventions:  PT Group Therapy, PT E Stim Attended, PT Prosthetic Training, PT Gait Training, PT Therapeutic Exercises, PT TENS Application, PT Neuro Re-Ed/Balance, PT Therapeutic Activity, and PT Manual Therapy      Note:     Goal Note filed on 06/22/20 0813 by Teofilo Vázquez, PT    Three days since PT eval; will monitor pt's progress towards goals this week.                        Problem: Mobility     Dates: Start: 06/19/20       Goal: STG-Within one week, patient will ambulate household distance     Dates: Start: 06/19/20       Description: 1) Individualized goal:  // bars x10 feet, c/s collar, L BKA prosthesis, gait belt, at Mod A level with w/c follow prn  2) Interventions:  PT Group Therapy, PT E Stim Attended, PT Prosthetic Training, PT  Gait Training, PT Therapeutic Exercises, PT TENS Application, PT Neuro Re-Ed/Balance, PT Therapeutic Activity, and PT Manual Therapy      Note:     Goal Note filed on 06/22/20 0813 by Teofilo Vázquez, PT    Three days since PT eval; will monitor pt's progress towards goals this week.                  Goal: STG-Within one week, patient will propel wheelchair community     Dates: Start: 06/19/20       Description: 1) Individualized goal:  BLE propulsion x150 feet, c/s collar, L BKA prosthesis, gait belt, at SPV level  2) Interventions:  PT Group Therapy, PT E Stim Attended, PT Prosthetic Training, PT Gait Training, PT Therapeutic Exercises, PT TENS Application, PT Neuro Re-Ed/Balance, PT Therapeutic Activity, and PT Manual Therapy      Note:     Goal Note filed on 06/22/20 0813 by Teofilo Vázquez, PT    Three days since PT eval; will monitor pt's progress towards goals this week.                        Problem: Mobility Transfers     Dates: Start: 06/19/20       Goal: STG-Within one week, patient will perform bed mobility     Dates: Start: 06/19/20       Description: 1) Individualized goal:  Mod A supine<>sit, c/s collar, L BKA prosthesis, bed rail  2) Interventions:  PT Group Therapy, PT E Stim Attended, PT Prosthetic Training, PT Gait Training, PT Therapeutic Exercises, PT TENS Application, PT Neuro Re-Ed/Balance, PT Therapeutic Activity, and PT Manual Therapy    Note:     Goal Note filed on 06/22/20 0813 by Toefilo Vázquez, PT    Three days since PT eval; will monitor pt's progress towards goals this week.                    Goal: STG-Within one week, patient will sit to stand     Dates: Start: 06/19/20       Description: 1) Individualized goal:  // bars, c/s collar, L BKA prosthesis, gait belt, at Min A level consistently  2) Interventions:  PT Group Therapy, PT E Stim Attended, PT Prosthetic Training, PT Gait Training, PT Therapeutic Exercises, PT TENS Application, PT Neuro Re-Ed/Balance, PT  Therapeutic Activity, and PT Manual Therapy    Note:     Goal Note filed on 06/22/20 0813 by Teofilo Vázquez, PT    Three days since PT eval; will monitor pt's progress towards goals this week.                  Goal: STG-Within one week, patient will transfer bed to chair     Dates: Start: 06/19/20       Description: 1) Individualized goal:  Mod A supine<>sit, c/s collar, L BKA prosthesis, bed rail, gait belt  2) Interventions:  PT Group Therapy, PT E Stim Attended, PT Prosthetic Training, PT Gait Training, PT Therapeutic Exercises, PT TENS Application, PT Neuro Re-Ed/Balance, PT Therapeutic Activity, and PT Manual Therapy      Note:     Goal Note filed on 06/22/20 0813 by Teofilo Vázquez, PT    Three days since PT eval; will monitor pt's progress towards goals this week.                        Problem: PT-Long Term Goals     Dates: Start: 06/19/20       Goal: LTG-By discharge, patient will ambulate     Dates: Start: 06/19/20       Description: 1) Individualized goal:  150 feet with FWW, c/s collar, L BKA prosthesis, at SBA level  2) Interventions:  PT Group Therapy, PT E Stim Attended, PT Prosthetic Training, PT Gait Training, PT Therapeutic Exercises, PT TENS Application, PT Neuro Re-Ed/Balance, PT Therapeutic Activity, and PT Manual Therapy          Goal: LTG-By discharge, patient will transfer one surface to another     Dates: Start: 06/19/20       Description: 1) Individualized goal:  with FWW, c/s collar, L BKA prosthesis, at SBA level  2) Interventions:  PT Group Therapy, PT E Stim Attended, PT Prosthetic Training, PT Gait Training, PT Therapeutic Exercises, PT TENS Application, PT Neuro Re-Ed/Balance, PT Therapeutic Activity, and PT Manual Therapy            Goal: LTG-By discharge, patient will ambulate up/down 4-6 stairs     Dates: Start: 06/19/20       Description: 1) Individualized goal:  with 1 railing, c/s collar, L BKA prosthesis, gait belt, at Min A level  2) Interventions:  PT Group  Therapy, PT E Stim Attended, PT Prosthetic Training, PT Gait Training, PT Therapeutic Exercises, PT TENS Application, PT Neuro Re-Ed/Balance, PT Therapeutic Activity, and PT Manual Therapy          Goal: LTG-By discharge, patient will transfer in/out of a car     Dates: Start: 06/19/20       Description: 1) Individualized goal:  with FWW, c/s collar, L BKA prosthesis, at SBA level  2) Interventions:  PT Group Therapy, PT E Stim Attended, PT Prosthetic Training, PT Gait Training, PT Therapeutic Exercises, PT TENS Application, PT Neuro Re-Ed/Balance, PT Therapeutic Activity, and PT Manual Therapy

## 2020-06-26 NOTE — THERAPY
"Occupational Therapy  Daily Treatment     Patient Name: Gaurav Lopez  Age:  75 y.o., Sex:  male  Medical Record #: [id number]  Today's Date: 6/26/2020     Precautions  Precautions: Fall Risk, Other (See Comments)  Comments: LLE prosthesis, pacemaker, orthostatic hypotension , abdominal binder     Safety   ADL Safety : Impaired, Requires Supervision for Safety  Bathroom Safety: Impaired, Requires Supervision for Safety    Subjective    \"No, I'd rather not shower now\"     Objective       06/26/20 1301   Precautions   Precautions Fall Risk;Other (See Comments)   Comments LLE prosthesis, pacemaker, orthostatic hypotension , abdominal binder    Vitals   Pulse 76   Patient BP Position Sitting   Blood Pressure  113/62   O2 Delivery Device None - Room Air   Vitals Comments Abdominal binder   Pain   Intervention Declines   Pain 0 - 10 Group   Therapist Pain Assessment 0   Non Verbal Descriptors   Non Verbal Scale  Calm   Cognition    Level of Consciousness Alert   Sleep/Wake Cycle   Sleep & Rest Awake   Functional Level of Assist   Bathing   (Pt declined shower)   Sitting Upper Body Exercises   Bilateral Row 3 sets of 10  (Equalizer, 2x10@30#'s, 1x10@35#'s)   Tricep Press 3 sets of 15  (Rickshaw-Forward @ 30#'s, Reverse @ 25#'s)   Standing Upper Body Exercises   Comments Standing at Parallel Bars (PB) for Ladder Ball (LB) activity - standing at one end of PB's w/ ladder for LB 5' behind pt, pt instructed to navigate 12' to opposite end of PB's to retrieve one LB at a time and return to starting position to toss LB at ladder.  1 LB = 12'x2 = 24'.  Pt completed 2x7 w/ seated rest break between sets.  14x24' = 336; + stepping over bolster 2x14 = 28x's.    Interdisciplinary Plan of Care Collaboration   IDT Collaboration with  Certified Nursing Assistant   Patient Position at End of Therapy Seated;Chair Alarm On;Self Releasing Lap Belt Applied;Call Light within Reach;Tray Table within Reach;Phone within Reach   OT Total Time Spent "   OT Individual Total Time Spent (Mins) 60   OT Charge Group   OT Therapeutic Exercise  4       Assessment    Pt was alert and cooperative w/ tx.  Pt declined shower task.  Scheduling reported no OT over the weekend.  Therapist consulted w/ CNA for evening shower today.  Tx emphasis on standing balance/endurance/functional strength - see notes above.  Strengths: Motivated for self care and independence, Pleasant and cooperative, Supportive family  Barriers: Orthostatic hypotension, Hypotension    Plan  OT to address strength, endurance, balance + education for AE/DME to facilitate greater independence w/ ADL's/functional mobility/transfers.      Occupational Therapy Goals     Problem: Dressing     Dates: Start: 06/19/20       Goal: STG-Within one week, patient will dress LB     Dates: Start: 06/19/20       Description: 1) Individualized Goal:  Max assist for LB clothing management via AE/DME PRN  2) Interventions:  OT Self Care/ADL, OT Neuro Re-Ed/Balance, OT Therapeutic Activity, OT Evaluation, and OT Therapeutic Exercise      Note:     Goal Note filed on 06/22/20 1109 by Mesfin Mtz MS,OTR/L    Pt at mod assist via AE/DME                        Problem: OT Long Term Goals     Dates: Start: 06/19/20       Goal: LTG-By discharge, patient will complete basic self care tasks     Dates: Start: 06/19/20       Description: 1) Individualized Goal:  Mod I for BADL's, Sup/SBA for shower via AE/DME PRN  2) Interventions:  OT Self Care/ADL, OT Neuro Re-Ed/Balance, OT Therapeutic Activity, OT Evaluation, and OT Therapeutic Exercise            Goal: LTG-By discharge, patient will perform bathroom transfers     Dates: Start: 06/19/20                   Problem: Toileting     Dates: Start: 06/19/20       Goal: STG-Within one week, patient will complete toileting tasks     Dates: Start: 06/19/20       Description: 1) Individualized Goal:  Max assist for toileting task via AE/DME PRN  2) Interventions:  OT Self Care/ADL, OT Neuro  Re-Ed/Balance, OT Therapeutic Activity, OT Evaluation, and OT Therapeutic Exercise      Note:     Goal Note filed on 06/22/20 1109 by Mesfin Mtz MS,OTR/L    Pt at Total assist for toileting task via DME

## 2020-06-26 NOTE — THERAPY
Physical Therapy   Daily Treatment     Patient Name: Gaurav Lopez  Age:  75 y.o., Sex:  male  Medical Record #: [id number]  Today's Date: 6/26/2020     Precautions  Precautions: Fall Risk  Comments: LLE prosthesis, pacemaker, orthostatic hypotension , abdominal binder     Subjective    Patient was sitting in the gym without an abdominal binder when we received him for physical therapy at 0900. Patient reports that the lidocaine patches are continuing to help his hip pain. He did report lightheadedness initially when standing. He also reports that he does feel it is getting easier shift weight in his wheelchair.      Objective       06/26/20 0907   Cosignature   Documentation Review Approved with modifications made by preceptor in flowsheet   Vitals   Pulse 86   Patient BP Position Sitting   Blood Pressure  110/86   O2 (LPM) 0   O2 Delivery Device None - Room Air   Vitals Comments no abdominal binder donned; see flowsheet for orthostatic hypotension this session   Pain 0 - 10 Group   Location Hip;Groin   Location Orientation Left   Comfort Goal 0   Therapist Pain Assessment Prior to Activity;During Activity;Post Activity   Sitting Lower Body Exercises   Ankle Pumps Right ;2 sets of 15  (3# ankle weight. )   Hip Abduction 2 sets of 10;Medium Resistance Theraband;Bilateral   Hip Adduction 2 sets of 10;Bilateral;Other Resistance (See Comments)  (Yellow ball resistance. )   Long Arc Quad 2 sets of 10;Bilateral;Other Resistance (See Comments)  (3# ankle weight B. Reciprocal. )   Marching 2 sets of 15;Other Resistance (See Comments);Reciprocal  (3 # ankle weight B. )   Bed Mobility    Sit to Stand Moderate Assist  (Min- Mod assist. Gait belt applied. FWW)   Neuro-Muscular Treatments   Neuro-Muscular Treatments Other (See Comments)   Comments Sitting edge of mat unsupported with perterbations. Anterior posterior lean, medial and lateral leaning. Unsupported Marching.    (Gait belt applied CGA. 10 min. )   Interdisciplinary  Plan of Care Collaboration   IDT Collaboration with  Nursing;Physician   Patient Position at End of Therapy Seated;Chair Alarm On;Self Releasing Lap Belt Applied;Call Light within Reach;Tray Table within Reach;Phone within Reach   Collaboration Comments Notified about low bp with standing.    PT Total Time Spent   PT Individual Total Time Spent (Mins) 60   PT Charge Group   PT Therapeutic Exercise 2   PT Neuromuscular Re-Education / Balance 1   PT Therapeutic Activities 1       Assessment    We were limited to sitting exercises again today due to low BP when standing. Patient also reported some fatigue with sitting exercises and states he felt he may fall asleep. Patient is demonstrating improvements in trunk control with transfers and sitting balance.   Strengths: Able to follow instructions, Effective communication skills, Independent prior level of function, Pleasant and cooperative, Willingly participates in therapeutic activities  Barriers: Bladder incontinence, Bowel incontinence, Confused, Decreased endurance, Generalized weakness, Home accessibility, Hypotension, Orthostatic hypotension, Impaired activity tolerance, Impaired balance, Impaired carryover of learning, Limited mobility    Plan    Monitor BP, STS work, standing tolerance; progress ambulation distance with FWW; consistent transfer training; LE strengthening, and core strengthening.    Physical Therapy Problems     Problem: Balance     Dates: Start: 06/19/20       Goal: STG-Within one week, patient will maintain static standing     Dates: Start: 06/19/20       Description: 1) Individualized goal:  // bars, c/s collar, L BKA prosthesis, gait belt, at Min A level x3 minutes  2) Interventions:  PT Group Therapy, PT E Stim Attended, PT Prosthetic Training, PT Gait Training, PT Therapeutic Exercises, PT TENS Application, PT Neuro Re-Ed/Balance, PT Therapeutic Activity, and PT Manual Therapy    Note:     Goal Note filed on 06/22/20 0813 by Teofilo TITSU  Gurpreet, PT    Three days since PT eval; will monitor pt's progress towards goals this week.                  Goal: STG-Within one week, patient will maintain static sitting     Dates: Start: 06/19/20       Description: 1) Individualized goal:  EOM or unsupported, c/s collar, L BKA prosthesis, gait belt, at CGA level x3 minutes  2) Interventions:  PT Group Therapy, PT E Stim Attended, PT Prosthetic Training, PT Gait Training, PT Therapeutic Exercises, PT TENS Application, PT Neuro Re-Ed/Balance, PT Therapeutic Activity, and PT Manual Therapy      Note:     Goal Note filed on 06/22/20 0813 by Teofilo Vázquez, PT    Three days since PT eval; will monitor pt's progress towards goals this week.                        Problem: Mobility     Dates: Start: 06/19/20       Goal: STG-Within one week, patient will ambulate household distance     Dates: Start: 06/19/20       Description: 1) Individualized goal:  // bars x10 feet, c/s collar, L BKA prosthesis, gait belt, at Mod A level with w/c follow prn  2) Interventions:  PT Group Therapy, PT E Stim Attended, PT Prosthetic Training, PT Gait Training, PT Therapeutic Exercises, PT TENS Application, PT Neuro Re-Ed/Balance, PT Therapeutic Activity, and PT Manual Therapy      Note:     Goal Note filed on 06/22/20 0813 by Teofilo Vázquez, PT    Three days since PT eval; will monitor pt's progress towards goals this week.                  Goal: STG-Within one week, patient will propel wheelchair community     Dates: Start: 06/19/20       Description: 1) Individualized goal:  BLE propulsion x150 feet, c/s collar, L BKA prosthesis, gait belt, at SPV level  2) Interventions:  PT Group Therapy, PT E Stim Attended, PT Prosthetic Training, PT Gait Training, PT Therapeutic Exercises, PT TENS Application, PT Neuro Re-Ed/Balance, PT Therapeutic Activity, and PT Manual Therapy      Note:     Goal Note filed on 06/22/20 0813 by Teofilo Vázquez, PT    Three days since PT eval;  will monitor pt's progress towards goals this week.                        Problem: Mobility Transfers     Dates: Start: 06/19/20       Goal: STG-Within one week, patient will perform bed mobility     Dates: Start: 06/19/20       Description: 1) Individualized goal:  Mod A supine<>sit, c/s collar, L BKA prosthesis, bed rail  2) Interventions:  PT Group Therapy, PT E Stim Attended, PT Prosthetic Training, PT Gait Training, PT Therapeutic Exercises, PT TENS Application, PT Neuro Re-Ed/Balance, PT Therapeutic Activity, and PT Manual Therapy    Note:     Goal Note filed on 06/22/20 0813 by Teofilo Vzáquez, PT    Three days since PT eval; will monitor pt's progress towards goals this week.                    Goal: STG-Within one week, patient will sit to stand     Dates: Start: 06/19/20       Description: 1) Individualized goal:  // bars, c/s collar, L BKA prosthesis, gait belt, at Min A level consistently  2) Interventions:  PT Group Therapy, PT E Stim Attended, PT Prosthetic Training, PT Gait Training, PT Therapeutic Exercises, PT TENS Application, PT Neuro Re-Ed/Balance, PT Therapeutic Activity, and PT Manual Therapy    Note:     Goal Note filed on 06/22/20 0813 by Teofilo Vázquez, PT    Three days since PT eval; will monitor pt's progress towards goals this week.                  Goal: STG-Within one week, patient will transfer bed to chair     Dates: Start: 06/19/20       Description: 1) Individualized goal:  Mod A supine<>sit, c/s collar, L BKA prosthesis, bed rail, gait belt  2) Interventions:  PT Group Therapy, PT E Stim Attended, PT Prosthetic Training, PT Gait Training, PT Therapeutic Exercises, PT TENS Application, PT Neuro Re-Ed/Balance, PT Therapeutic Activity, and PT Manual Therapy      Note:     Goal Note filed on 06/22/20 0813 by Teofilo Vázquez, PT    Three days since PT eval; will monitor pt's progress towards goals this week.                        Problem: PT-Long Term Goals      Dates: Start: 06/19/20       Goal: LTG-By discharge, patient will ambulate     Dates: Start: 06/19/20       Description: 1) Individualized goal:  150 feet with FWW, c/s collar, L BKA prosthesis, at SBA level  2) Interventions:  PT Group Therapy, PT E Stim Attended, PT Prosthetic Training, PT Gait Training, PT Therapeutic Exercises, PT TENS Application, PT Neuro Re-Ed/Balance, PT Therapeutic Activity, and PT Manual Therapy          Goal: LTG-By discharge, patient will transfer one surface to another     Dates: Start: 06/19/20       Description: 1) Individualized goal:  with FWW, c/s collar, L BKA prosthesis, at SBA level  2) Interventions:  PT Group Therapy, PT E Stim Attended, PT Prosthetic Training, PT Gait Training, PT Therapeutic Exercises, PT TENS Application, PT Neuro Re-Ed/Balance, PT Therapeutic Activity, and PT Manual Therapy            Goal: LTG-By discharge, patient will ambulate up/down 4-6 stairs     Dates: Start: 06/19/20       Description: 1) Individualized goal:  with 1 railing, c/s collar, L BKA prosthesis, gait belt, at Min A level  2) Interventions:  PT Group Therapy, PT E Stim Attended, PT Prosthetic Training, PT Gait Training, PT Therapeutic Exercises, PT TENS Application, PT Neuro Re-Ed/Balance, PT Therapeutic Activity, and PT Manual Therapy          Goal: LTG-By discharge, patient will transfer in/out of a car     Dates: Start: 06/19/20       Description: 1) Individualized goal:  with FWW, c/s collar, L BKA prosthesis, at SBA level  2) Interventions:  PT Group Therapy, PT E Stim Attended, PT Prosthetic Training, PT Gait Training, PT Therapeutic Exercises, PT TENS Application, PT Neuro Re-Ed/Balance, PT Therapeutic Activity, and PT Manual Therapy

## 2020-06-26 NOTE — PROGRESS NOTES
"Rehab Progress Note     Encounter Date: 6/26/2020    CC: weakness    Interval Events (Subjective)    He reports difficult night of sleep, had continued vivid dreams this time involving a train.  He again woke up screaming.    Had episodes of low blood pressure this morning when standing.  He did report symptoms of dizziness during this time.    He reports spasticity in hip flexors is improving, still waking him up at night    He is unclear how much fluid he is drinking.      Intake/Output Summary (Last 24 hours) at 6/26/2020 1050  Last data filed at 6/26/2020 0911  Gross per 24 hour   Intake 550 ml   Output 1050 ml   Net -500 ml       Do not have results with bowel training program last night.  bowel: Medium loose BM 6/46 am  Bladder: PVRs improving, currently under 100cc    ROS:  Gen: + fatigue because of decreased sleep, no confusion, significant weight loss  Eyes: no blurry vision, double vision or pain in eyes  ENT: no changes in hearing, runny nose, nose bleeds, sinus pain  CV: No CP, palpitations,   Lungs: no shortness of breath, changes in secretions, changes in cough, pain with coughing  Abd: No abd pain, nausea, vomiting, pain with eating  : no blood in urine, pain during storage phase, bladder spasms, suprapubic pain  Ext: No swelling in legs, asymmetric atrophy  Neuro: no changes in strength or sensation  Skin: no new ulcers/skin breakdown appreciated by patient  Mood: No changes in mood today, increase in depression or anxiety  Heme: no bruising, or bleeding  Rest of 14 point review of systems is negative    Objective:  Vitals: /73   Pulse 83   Temp 36.6 °C (97.8 °F) (Oral)   Resp 17   Ht 1.753 m (5' 9.02\")   Wt 77.4 kg (170 lb 11.2 oz)   SpO2 94%   Gen: NAD, Body mass index is 25.2 kg/m².  HEENT: NC/AT, PERRLA, moist mucous membranes, Aspen collar in place  Cardio: RRR, no mumurs  Pulm: CTAB, with normal respiratory effort  Abd: Soft NTND, active bowel sounds,  Ext: No peripheral edema. No " calf tenderness. No clubbing/cyanosis.   +dorsal pedalis pulses on the right, left BKA      Skin: Incision is clean dry and intact, sutures in place, no signs of dehiscence    Tone: Hip flexion spasticity, 1+-2/4    Recent Results (from the past 72 hour(s))   CBC WITHOUT DIFFERENTIAL    Collection Time: 06/24/20  5:16 AM   Result Value Ref Range    WBC 7.5 4.8 - 10.8 K/uL    RBC 3.31 (L) 4.70 - 6.10 M/uL    Hemoglobin 9.6 (L) 14.0 - 18.0 g/dL    Hematocrit 32.1 (L) 42.0 - 52.0 %    MCV 97.0 81.4 - 97.8 fL    MCH 29.0 27.0 - 33.0 pg    MCHC 29.9 (L) 33.7 - 35.3 g/dL    RDW 50.7 (H) 35.9 - 50.0 fL    Platelet Count 617 (H) 164 - 446 K/uL    MPV 8.8 (L) 9.0 - 12.9 fL   Basic Metabolic Panel    Collection Time: 06/24/20  5:16 AM   Result Value Ref Range    Sodium 142 135 - 145 mmol/L    Potassium 4.0 3.6 - 5.5 mmol/L    Chloride 105 96 - 112 mmol/L    Co2 27 20 - 33 mmol/L    Glucose 97 65 - 99 mg/dL    Bun 15 8 - 22 mg/dL    Creatinine 0.69 0.50 - 1.40 mg/dL    Calcium 9.0 8.5 - 10.5 mg/dL    Anion Gap 10.0 7.0 - 16.0   IRON/TOTAL IRON BIND    Collection Time: 06/24/20  5:16 AM   Result Value Ref Range    Iron 32 (L) 50 - 180 ug/dL    Total Iron Binding 254 250 - 450 ug/dL    Unsat Iron Binding 222 110 - 370 ug/dL    % Saturation 13 (L) 15 - 55 %   ESTIMATED GFR    Collection Time: 06/24/20  5:16 AM   Result Value Ref Range    GFR If African American >60 >60 mL/min/1.73 m 2    GFR If Non African American >60 >60 mL/min/1.73 m 2       Current Facility-Administered Medications   Medication Frequency   • mirtazapine (REMERON) orally disintegrating tab 15 mg QHS   • traZODone (DESYREL) tablet 50 mg QHS   • melatonin tablet 3 mg QHS   • midodrine (PROAMATINE) tablet 15 mg TID   • ferrous sulfate tablet 325 mg QDAY with Breakfast   • baclofen (LIORESAL) tablet 15 mg TID   • docusate sodium (COLACE) capsule 100 mg BID    And   • sennosides (SENOKOT) 8.6 MG tablet 17.2 mg DAILY AT NOON    And   • bisacodyl (THE MAGIC BULLET)  suppository 10 mg QDAY    And   • magnesium hydroxide (MILK OF MAGNESIA) suspension 30 mL QDAY PRN   • gabapentin (NEURONTIN) capsule 800 mg TID   • metFORMIN (GLUCOPHAGE) tablet 250 mg BID AC   • midodrine (PROAMATINE) tablet 5 mg Q4HRS PRN   • aspirin EC (ECOTRIN) tablet 81 mg DAILY   • vitamin D (cholecalciferol) tablet 2,000 Units DAILY   • finasteride (PROSCAR) tablet 5 mg DAILY   • lovastatin (MEVACOR) tablet 10 mg Nightly   • tamsulosin (FLOMAX) capsule 0.4 mg AFTER DINNER   • vitamin B comp+C (ALLBEE WITH C) 1 Tab DAILY   • therapeutic multivitamin-minerals (THERAGRAN-M) tablet 1 Tab DAILY   • Pharmacy Consult Request ...Pain Management Review 1 Each PHARMACY TO DOSE   • Respiratory Therapy Consult Continuous RT   • tramadol (ULTRAM) 50 MG tablet 50 mg Q4HRS PRN   • oxyCODONE immediate-release (ROXICODONE) tablet 5 mg Q3HRS PRN   • oxyCODONE immediate release (ROXICODONE) tablet 10 mg Q3HRS PRN   • acetaminophen (TYLENOL) tablet 650 mg Q4HRS PRN   • enoxaparin (LOVENOX) inj 40 mg QHS   • artificial tears ophthalmic solution 1 Drop PRN   • benzocaine-menthol (CEPACOL) lozenge 1 Lozenge Q2HRS PRN   • mag hydrox-al hydrox-simeth (MAALOX PLUS ES or MYLANTA DS) suspension 20 mL Q2HRS PRN   • ondansetron (ZOFRAN ODT) dispertab 4 mg 4X/DAY PRN    Or   • ondansetron (ZOFRAN) syringe/vial injection 4 mg 4X/DAY PRN   • traZODone (DESYREL) tablet 50 mg QHS PRN   • sodium chloride (OCEAN) 0.65 % nasal spray 2 Spray PRN   • lidocaine (LIDODERM) 5 % 2 Patch Daily-0800   • aripiprazole (ABILIFY) tablet 10 mg DAILY   • venlafaxine XR (EFFEXOR XR) capsule 150 mg BID       Orders Placed This Encounter   Procedures   • Diet Order Diabetic     Standing Status:   Standing     Number of Occurrences:   1     Order Specific Question:   Diet:     Answer:   Diabetic [3]       Assessment:  Active Hospital Problems    Diagnosis   • *Cervical myelopathy (HCC)   • Postoperative pain   • Deep vein thrombosis (HCC)   • Insomnia due to  medical condition   • Neurogenic bowel   • Depression   • Thoracic myelopathy   • Neuropathic pain   • Hyperlipidemia   • Hypertension   • S/P BKA (below knee amputation) ([facility])       Medical Decision Making and Plan:  C2 AIS D, 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  Status post multiple spine surgeries, C3-T7 posterior spinal fusion on 6/5, T12-pelvis posterior spinal fusion on 6/10 by Dr Marsh.   - C-collar on at all times, [place] collar when in shower to be cleared by neurosurgery as outpatient, 8-12 weeks  -  Sutures to be removed 21 days postop, prolonged time secondary to repeat surgeries  -Continue comprehensive acute inpatient rehabilitation    Left BKA: Using [employer] for prosthetists, not following up with physicians  - Prosthesis at bedside  -Follow-up with Dr. 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 noon, decrease Colace 100 mg twice daily, Dulcolax suppository with digital stimulation,   -DC MiraLAX 1 packet daily  -Discussed program with patient and importance of appropriate management of neurogenic bowel with repercussions of colonic dilation and possible rupture  -KUB showed no significant stool load in ascending or transverse colon      orthostatic hypotension: On 6/19- 82/52 with change in position  Because of significant  autonomic dysfunction associated with spinal cord injury, the patient is at high risk for orthostatic hypotension.  - FAMILIA hose on prior to out of bed  -midodrine 15 mg 3 times daily  - P.o. fluid intake, goal 1.5-2 L/day  -Monitor strict I's and O's  -Consult hospitalist, discussion with hospitalist about discontinuing lisinopril  - Amlodipine was DC'd  -Start Sudafed 60 mg 3 times daily    Spasticity: MAS of 1-1+/4 jumping of bilateral lower extremities, greater in hip flexors, waking him up at night  - increase Baclofen 20 mg 3 times daily     Decreased range of motion of right hip: In differential diagnosis includes heterotopic ossification of the right hip after multiple spinal surgeries.  Improved with stretching during acute rehabilitation  - x-ray right hip, showed no signs of heterotopic ossification  -CRP is less than 2 likely negative     Pain:  #Neuropathic pain: Neuropathy, complicated by diabetes and a cervical myelopathy  - Gabapentin 800 mg tid  - DCTylenol 1000 mg 3 times daily     #Postoperative pain:  -Tramadol  mg every 4 hours as needed moderate to severe pain first-line  -Oxycodone 5-10 mg every 3 hours as needed moderate to severe pain, second line  - DC Tylenol 1000 mg 3 times daily, check CMP in a.m. to evaluate for LFTs given high-dose Tylenol  -Lidocaine patches to either side of incision on for 12 hours off for 12 hours     Hypertension:  - All oral antihypertensives have been DC'd  -Appreciate hospitalist input     Type 2 diabetes: With hyperglycemia  -Metformin 500 mg twice daily  -Appreciate hospitalist input    Insomnia:  -DC Remeron 15 mg nightly, given vivid dreams  -trazodone 50 mg nightly  - Melatonin 3 mg nightly     Depression:  -Effexor  mg twice daily     Vitamin D deficiency  -Vitamin D pending     DVT prophylaxis: Patient has previous history of DVT in 2011 making him higher risk for clot formation  -Lovenox 40 mg daily    Patient was seen for 30 minutes on  unit/floor of which > 50% of time was spent on counseling and coordination of care regarding the above, including prognosis, risk reduction, benefits of treatment, and options for next stage of care including insomnia, vivid dreams, DC Remeron, orthostatic hypotension, initiate Sudafed TID, spasticity increase baclofen to 20 mg 3 times daily.      Rj Navarro D.O.

## 2020-06-27 PROCEDURE — 700112 HCHG RX REV CODE 229: Performed by: PHYSICAL MEDICINE & REHABILITATION

## 2020-06-27 PROCEDURE — A9270 NON-COVERED ITEM OR SERVICE: HCPCS | Performed by: PHYSICAL MEDICINE & REHABILITATION

## 2020-06-27 PROCEDURE — A9270 NON-COVERED ITEM OR SERVICE: HCPCS | Performed by: HOSPITALIST

## 2020-06-27 PROCEDURE — 99231 SBSQ HOSP IP/OBS SF/LOW 25: CPT | Performed by: HOSPITALIST

## 2020-06-27 PROCEDURE — 700102 HCHG RX REV CODE 250 W/ 637 OVERRIDE(OP): Performed by: PHYSICAL MEDICINE & REHABILITATION

## 2020-06-27 PROCEDURE — 700102 HCHG RX REV CODE 250 W/ 637 OVERRIDE(OP): Performed by: HOSPITALIST

## 2020-06-27 PROCEDURE — 700101 HCHG RX REV CODE 250: Performed by: PHYSICAL MEDICINE & REHABILITATION

## 2020-06-27 PROCEDURE — 770010 HCHG ROOM/CARE - REHAB SEMI PRIVAT*

## 2020-06-27 PROCEDURE — 700111 HCHG RX REV CODE 636 W/ 250 OVERRIDE (IP): Performed by: PHYSICAL MEDICINE & REHABILITATION

## 2020-06-27 RX ADMIN — BACLOFEN 20 MG: 20 TABLET ORAL at 19:36

## 2020-06-27 RX ADMIN — MELATONIN 3 MG: at 20:35

## 2020-06-27 RX ADMIN — ASPIRIN 81 MG: 81 TABLET, COATED ORAL at 08:11

## 2020-06-27 RX ADMIN — DOCUSATE SODIUM 100 MG: 100 CAPSULE, LIQUID FILLED ORAL at 08:13

## 2020-06-27 RX ADMIN — BACLOFEN 20 MG: 20 TABLET ORAL at 08:12

## 2020-06-27 RX ADMIN — BACLOFEN 20 MG: 20 TABLET ORAL at 14:33

## 2020-06-27 RX ADMIN — FERROUS SULFATE TAB 325 MG (65 MG ELEMENTAL FE) 325 MG: 325 (65 FE) TAB at 08:13

## 2020-06-27 RX ADMIN — GABAPENTIN 800 MG: 400 CAPSULE ORAL at 19:35

## 2020-06-27 RX ADMIN — TAMSULOSIN HYDROCHLORIDE 0.4 MG: 0.4 CAPSULE ORAL at 17:17

## 2020-06-27 RX ADMIN — FINASTERIDE 5 MG: 5 TABLET, FILM COATED ORAL at 08:12

## 2020-06-27 RX ADMIN — LIDOCAINE 2 PATCH: 50 PATCH TOPICAL at 08:14

## 2020-06-27 RX ADMIN — DOCUSATE SODIUM 100 MG: 100 CAPSULE, LIQUID FILLED ORAL at 19:37

## 2020-06-27 RX ADMIN — MIDODRINE HYDROCHLORIDE 15 MG: 10 TABLET ORAL at 11:40

## 2020-06-27 RX ADMIN — PSEUDOEPHEDRINE HCL 60 MG: 30 TABLET, FILM COATED ORAL at 17:21

## 2020-06-27 RX ADMIN — TRAZODONE HYDROCHLORIDE 50 MG: 50 TABLET ORAL at 20:35

## 2020-06-27 RX ADMIN — GABAPENTIN 800 MG: 400 CAPSULE ORAL at 14:33

## 2020-06-27 RX ADMIN — METFORMIN HYDROCHLORIDE 250 MG: 500 TABLET ORAL at 17:17

## 2020-06-27 RX ADMIN — LOVASTATIN 10 MG: 20 TABLET ORAL at 19:36

## 2020-06-27 RX ADMIN — PSEUDOEPHEDRINE HCL 60 MG: 30 TABLET, FILM COATED ORAL at 14:54

## 2020-06-27 RX ADMIN — METFORMIN HYDROCHLORIDE 250 MG: 500 TABLET ORAL at 08:12

## 2020-06-27 RX ADMIN — PSEUDOEPHEDRINE HCL 60 MG: 30 TABLET, FILM COATED ORAL at 08:13

## 2020-06-27 RX ADMIN — ENOXAPARIN SODIUM 40 MG: 100 INJECTION SUBCUTANEOUS at 19:42

## 2020-06-27 RX ADMIN — MIDODRINE HYDROCHLORIDE 15 MG: 10 TABLET ORAL at 17:09

## 2020-06-27 RX ADMIN — STANDARDIZED SENNA CONCENTRATE 25.8 MG: 8.6 TABLET ORAL at 12:00

## 2020-06-27 RX ADMIN — VENLAFAXINE HYDROCHLORIDE 150 MG: 75 CAPSULE, EXTENDED RELEASE ORAL at 19:35

## 2020-06-27 RX ADMIN — MIDODRINE HYDROCHLORIDE 15 MG: 10 TABLET ORAL at 08:11

## 2020-06-27 RX ADMIN — BISACODYL 10 MG: 10 SUPPOSITORY RECTAL at 19:36

## 2020-06-27 RX ADMIN — ARIPIPRAZOLE 10 MG: 5 TABLET ORAL at 08:13

## 2020-06-27 RX ADMIN — GABAPENTIN 800 MG: 400 CAPSULE ORAL at 08:11

## 2020-06-27 RX ADMIN — VENLAFAXINE HYDROCHLORIDE 150 MG: 75 CAPSULE, EXTENDED RELEASE ORAL at 08:13

## 2020-06-27 RX ADMIN — VITAMIN C 1 TABLET: TAB at 08:13

## 2020-06-27 RX ADMIN — MULTIPLE VITAMINS W/ MINERALS TAB 1 TABLET: TAB at 08:13

## 2020-06-27 RX ADMIN — MELATONIN 2000 UNITS: at 08:13

## 2020-06-27 ASSESSMENT — ENCOUNTER SYMPTOMS
EYES NEGATIVE: 1
ABDOMINAL PAIN: 0
POLYDIPSIA: 0
COUGH: 0
PALPITATIONS: 0
CHILLS: 0
SENSORY CHANGE: 1
FEVER: 0
BRUISES/BLEEDS EASILY: 0
NECK PAIN: 1
NAUSEA: 0
FOCAL WEAKNESS: 1
SHORTNESS OF BREATH: 0
VOMITING: 0

## 2020-06-27 ASSESSMENT — PATIENT HEALTH QUESTIONNAIRE - PHQ9
2. FEELING DOWN, DEPRESSED, IRRITABLE, OR HOPELESS: NOT AT ALL
SUM OF ALL RESPONSES TO PHQ9 QUESTIONS 1 AND 2: 0
1. LITTLE INTEREST OR PLEASURE IN DOING THINGS: NOT AT ALL
1. LITTLE INTEREST OR PLEASURE IN DOING THINGS: NOT AT ALL
SUM OF ALL RESPONSES TO PHQ9 QUESTIONS 1 AND 2: 0
2. FEELING DOWN, DEPRESSED, IRRITABLE, OR HOPELESS: NOT AT ALL

## 2020-06-27 ASSESSMENT — FIBROSIS 4 INDEX: FIB4 SCORE: 0.39
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
## 2020-06-27 NOTE — CARE PLAN
Problem: Communication  Goal: The ability to communicate needs accurately and effectively will improve  Outcome: PROGRESSING AS EXPECTED  Patient is able to voice feelings.      Problem: Safety  Goal: Will remain free from falls  Outcome: PROGRESSING AS EXPECTED  Patient is able to call for help and voice feeling.

## 2020-06-28 PROCEDURE — 700102 HCHG RX REV CODE 250 W/ 637 OVERRIDE(OP): Performed by: HOSPITALIST

## 2020-06-28 PROCEDURE — 770010 HCHG ROOM/CARE - REHAB SEMI PRIVAT*

## 2020-06-28 PROCEDURE — 99231 SBSQ HOSP IP/OBS SF/LOW 25: CPT | Performed by: HOSPITALIST

## 2020-06-28 PROCEDURE — 700112 HCHG RX REV CODE 229: Performed by: PHYSICAL MEDICINE & REHABILITATION

## 2020-06-28 PROCEDURE — 700111 HCHG RX REV CODE 636 W/ 250 OVERRIDE (IP): Performed by: PHYSICAL MEDICINE & REHABILITATION

## 2020-06-28 PROCEDURE — 700101 HCHG RX REV CODE 250: Performed by: PHYSICAL MEDICINE & REHABILITATION

## 2020-06-28 PROCEDURE — A9270 NON-COVERED ITEM OR SERVICE: HCPCS | Performed by: HOSPITALIST

## 2020-06-28 PROCEDURE — 700102 HCHG RX REV CODE 250 W/ 637 OVERRIDE(OP): Performed by: PHYSICAL MEDICINE & REHABILITATION

## 2020-06-28 PROCEDURE — A9270 NON-COVERED ITEM OR SERVICE: HCPCS | Performed by: PHYSICAL MEDICINE & REHABILITATION

## 2020-06-28 RX ADMIN — MELATONIN 2000 UNITS: at 08:21

## 2020-06-28 RX ADMIN — METFORMIN HYDROCHLORIDE 250 MG: 500 TABLET ORAL at 08:23

## 2020-06-28 RX ADMIN — BACLOFEN 20 MG: 20 TABLET ORAL at 20:49

## 2020-06-28 RX ADMIN — TAMSULOSIN HYDROCHLORIDE 0.4 MG: 0.4 CAPSULE ORAL at 17:35

## 2020-06-28 RX ADMIN — MIDODRINE HYDROCHLORIDE 15 MG: 10 TABLET ORAL at 11:53

## 2020-06-28 RX ADMIN — PSEUDOEPHEDRINE HCL 60 MG: 30 TABLET, FILM COATED ORAL at 08:22

## 2020-06-28 RX ADMIN — MELATONIN 3 MG: at 20:44

## 2020-06-28 RX ADMIN — VENLAFAXINE HYDROCHLORIDE 150 MG: 75 CAPSULE, EXTENDED RELEASE ORAL at 08:22

## 2020-06-28 RX ADMIN — FINASTERIDE 5 MG: 5 TABLET, FILM COATED ORAL at 08:22

## 2020-06-28 RX ADMIN — MIDODRINE HYDROCHLORIDE 15 MG: 10 TABLET ORAL at 06:20

## 2020-06-28 RX ADMIN — BISACODYL 10 MG: 10 SUPPOSITORY RECTAL at 20:42

## 2020-06-28 RX ADMIN — VITAMIN C 1 TABLET: TAB at 08:23

## 2020-06-28 RX ADMIN — MULTIPLE VITAMINS W/ MINERALS TAB 1 TABLET: TAB at 08:23

## 2020-06-28 RX ADMIN — GABAPENTIN 800 MG: 400 CAPSULE ORAL at 08:22

## 2020-06-28 RX ADMIN — PSEUDOEPHEDRINE HCL 60 MG: 30 TABLET, FILM COATED ORAL at 17:35

## 2020-06-28 RX ADMIN — DOCUSATE SODIUM 100 MG: 100 CAPSULE, LIQUID FILLED ORAL at 20:44

## 2020-06-28 RX ADMIN — METFORMIN HYDROCHLORIDE 250 MG: 500 TABLET ORAL at 17:35

## 2020-06-28 RX ADMIN — GABAPENTIN 800 MG: 400 CAPSULE ORAL at 14:20

## 2020-06-28 RX ADMIN — BACLOFEN 20 MG: 20 TABLET ORAL at 14:20

## 2020-06-28 RX ADMIN — PSEUDOEPHEDRINE HCL 60 MG: 30 TABLET, FILM COATED ORAL at 12:02

## 2020-06-28 RX ADMIN — LOVASTATIN 10 MG: 20 TABLET ORAL at 20:43

## 2020-06-28 RX ADMIN — BACLOFEN 20 MG: 20 TABLET ORAL at 08:23

## 2020-06-28 RX ADMIN — GABAPENTIN 800 MG: 400 CAPSULE ORAL at 20:43

## 2020-06-28 RX ADMIN — VENLAFAXINE HYDROCHLORIDE 150 MG: 75 CAPSULE, EXTENDED RELEASE ORAL at 20:43

## 2020-06-28 RX ADMIN — TRAZODONE HYDROCHLORIDE 50 MG: 50 TABLET ORAL at 20:44

## 2020-06-28 RX ADMIN — ARIPIPRAZOLE 10 MG: 5 TABLET ORAL at 08:21

## 2020-06-28 RX ADMIN — STANDARDIZED SENNA CONCENTRATE 25.8 MG: 8.6 TABLET ORAL at 11:53

## 2020-06-28 RX ADMIN — LIDOCAINE 2 PATCH: 50 PATCH TOPICAL at 08:21

## 2020-06-28 RX ADMIN — FERROUS SULFATE TAB 325 MG (65 MG ELEMENTAL FE) 325 MG: 325 (65 FE) TAB at 08:21

## 2020-06-28 RX ADMIN — MIDODRINE HYDROCHLORIDE 15 MG: 10 TABLET ORAL at 17:35

## 2020-06-28 RX ADMIN — DOCUSATE SODIUM 100 MG: 100 CAPSULE, LIQUID FILLED ORAL at 08:22

## 2020-06-28 RX ADMIN — ENOXAPARIN SODIUM 40 MG: 100 INJECTION SUBCUTANEOUS at 20:44

## 2020-06-28 RX ADMIN — ASPIRIN 81 MG: 81 TABLET, COATED ORAL at 08:22

## 2020-06-28 ASSESSMENT — PATIENT HEALTH QUESTIONNAIRE - PHQ9
SUM OF ALL RESPONSES TO PHQ9 QUESTIONS 1 AND 2: 0
SUM OF ALL RESPONSES TO PHQ9 QUESTIONS 1 AND 2: 0
1. LITTLE INTEREST OR PLEASURE IN DOING THINGS: NOT AT ALL
1. LITTLE INTEREST OR PLEASURE IN DOING THINGS: NOT AT ALL
2. FEELING DOWN, DEPRESSED, IRRITABLE, OR HOPELESS: NOT AT ALL

## 2020-06-28 ASSESSMENT — ENCOUNTER SYMPTOMS
FOCAL WEAKNESS: 1
SENSORY CHANGE: 1
CHILLS: 0
BRUISES/BLEEDS EASILY: 0
ABDOMINAL PAIN: 0
PALPITATIONS: 0
FEVER: 0
SHORTNESS OF BREATH: 0
COUGH: 0
NAUSEA: 0
POLYDIPSIA: 0
NECK PAIN: 1
EYES NEGATIVE: 1
VOMITING: 0

## 2020-06-28 ASSESSMENT — FIBROSIS 4 INDEX: FIB4 SCORE: 0.39

## 2020-06-28 NOTE — CARE PLAN
Problem: Communication  Goal: The ability to communicate needs accurately and effectively will improve  Outcome: PROGRESSING AS EXPECTED  Patient is able to voice feelings and ask for assistance.          Problem: Safety  Goal: Will remain free from injury  Outcome: PROGRESSING AS EXPECTED  Patient utilizes call light when needing help.

## 2020-06-28 NOTE — CARE PLAN
Problem: Safety  Goal: Will remain free from falls  Note: Patient uses call light consistently and appropriately this shift.  Waits for assistance when needed and does not attempt self transfer.  Able to verbalize needs.  Will continue to monitor.      Problem: Infection  Goal: Will remain free from infection  Note: Patient remains free from s/s infection; afebrile.  Will continue to monitor. Surgical incision with island dressing c/d/I, no drainage noted.     Problem: Bowel/Gastric:  Goal: Normal bowel function is maintained or improved  Note: Pt had large bowel movement with bowel program and dig stimulation once     Problem: Pain Management  Goal: Pain level will decrease to patient's comfort goal  Note: Patient able to verbalize pain level and verbalize an acceptable level of pain. Denies pain at this time.
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
## 2020-06-29 PROCEDURE — 700112 HCHG RX REV CODE 229: Performed by: PHYSICAL MEDICINE & REHABILITATION

## 2020-06-29 PROCEDURE — 99233 SBSQ HOSP IP/OBS HIGH 50: CPT | Performed by: PHYSICAL MEDICINE & REHABILITATION

## 2020-06-29 PROCEDURE — 97112 NEUROMUSCULAR REEDUCATION: CPT

## 2020-06-29 PROCEDURE — A9270 NON-COVERED ITEM OR SERVICE: HCPCS | Performed by: PHYSICAL MEDICINE & REHABILITATION

## 2020-06-29 PROCEDURE — 700111 HCHG RX REV CODE 636 W/ 250 OVERRIDE (IP): Performed by: PHYSICAL MEDICINE & REHABILITATION

## 2020-06-29 PROCEDURE — 700101 HCHG RX REV CODE 250: Performed by: PHYSICAL MEDICINE & REHABILITATION

## 2020-06-29 PROCEDURE — 700102 HCHG RX REV CODE 250 W/ 637 OVERRIDE(OP): Performed by: HOSPITALIST

## 2020-06-29 PROCEDURE — 99232 SBSQ HOSP IP/OBS MODERATE 35: CPT | Performed by: HOSPITALIST

## 2020-06-29 PROCEDURE — A9270 NON-COVERED ITEM OR SERVICE: HCPCS | Performed by: HOSPITALIST

## 2020-06-29 PROCEDURE — 97530 THERAPEUTIC ACTIVITIES: CPT

## 2020-06-29 PROCEDURE — 770010 HCHG ROOM/CARE - REHAB SEMI PRIVAT*

## 2020-06-29 PROCEDURE — 97116 GAIT TRAINING THERAPY: CPT

## 2020-06-29 PROCEDURE — 700102 HCHG RX REV CODE 250 W/ 637 OVERRIDE(OP): Performed by: PHYSICAL MEDICINE & REHABILITATION

## 2020-06-29 PROCEDURE — 97110 THERAPEUTIC EXERCISES: CPT

## 2020-06-29 PROCEDURE — 97535 SELF CARE MNGMENT TRAINING: CPT

## 2020-06-29 RX ORDER — FLUDROCORTISONE ACETATE 0.1 MG/1
0.1 TABLET ORAL EVERY MORNING
Status: DISCONTINUED | OUTPATIENT
Start: 2020-06-29 | End: 2020-07-06

## 2020-06-29 RX ADMIN — DOCUSATE SODIUM 100 MG: 100 CAPSULE, LIQUID FILLED ORAL at 20:22

## 2020-06-29 RX ADMIN — VENLAFAXINE HYDROCHLORIDE 150 MG: 75 CAPSULE, EXTENDED RELEASE ORAL at 20:22

## 2020-06-29 RX ADMIN — MULTIPLE VITAMINS W/ MINERALS TAB 1 TABLET: TAB at 08:22

## 2020-06-29 RX ADMIN — TRAZODONE HYDROCHLORIDE 50 MG: 50 TABLET ORAL at 21:53

## 2020-06-29 RX ADMIN — MELATONIN 3 MG: at 21:53

## 2020-06-29 RX ADMIN — GABAPENTIN 800 MG: 400 CAPSULE ORAL at 08:21

## 2020-06-29 RX ADMIN — VITAMIN C 1 TABLET: TAB at 08:21

## 2020-06-29 RX ADMIN — BACLOFEN 20 MG: 20 TABLET ORAL at 14:30

## 2020-06-29 RX ADMIN — PSEUDOEPHEDRINE HCL 60 MG: 30 TABLET, FILM COATED ORAL at 11:58

## 2020-06-29 RX ADMIN — GABAPENTIN 800 MG: 400 CAPSULE ORAL at 20:22

## 2020-06-29 RX ADMIN — METFORMIN HYDROCHLORIDE 250 MG: 500 TABLET ORAL at 17:38

## 2020-06-29 RX ADMIN — FINASTERIDE 5 MG: 5 TABLET, FILM COATED ORAL at 08:21

## 2020-06-29 RX ADMIN — GABAPENTIN 800 MG: 400 CAPSULE ORAL at 14:30

## 2020-06-29 RX ADMIN — ARIPIPRAZOLE 10 MG: 5 TABLET ORAL at 08:20

## 2020-06-29 RX ADMIN — MELATONIN 2000 UNITS: at 08:21

## 2020-06-29 RX ADMIN — BISACODYL 10 MG: 10 SUPPOSITORY RECTAL at 20:25

## 2020-06-29 RX ADMIN — TAMSULOSIN HYDROCHLORIDE 0.4 MG: 0.4 CAPSULE ORAL at 17:38

## 2020-06-29 RX ADMIN — STANDARDIZED SENNA CONCENTRATE 25.8 MG: 8.6 TABLET ORAL at 11:58

## 2020-06-29 RX ADMIN — FLUDROCORTISONE ACETATE 0.1 MG: 0.1 TABLET ORAL at 14:30

## 2020-06-29 RX ADMIN — VENLAFAXINE HYDROCHLORIDE 150 MG: 75 CAPSULE, EXTENDED RELEASE ORAL at 08:21

## 2020-06-29 RX ADMIN — FERROUS SULFATE TAB 325 MG (65 MG ELEMENTAL FE) 325 MG: 325 (65 FE) TAB at 08:19

## 2020-06-29 RX ADMIN — MIDODRINE HYDROCHLORIDE 15 MG: 10 TABLET ORAL at 08:19

## 2020-06-29 RX ADMIN — DOCUSATE SODIUM 100 MG: 100 CAPSULE, LIQUID FILLED ORAL at 09:00

## 2020-06-29 RX ADMIN — MIDODRINE HYDROCHLORIDE 15 MG: 10 TABLET ORAL at 17:38

## 2020-06-29 RX ADMIN — BACLOFEN 20 MG: 20 TABLET ORAL at 20:22

## 2020-06-29 RX ADMIN — MIDODRINE HYDROCHLORIDE 15 MG: 10 TABLET ORAL at 11:58

## 2020-06-29 RX ADMIN — METFORMIN HYDROCHLORIDE 250 MG: 500 TABLET ORAL at 08:21

## 2020-06-29 RX ADMIN — PSEUDOEPHEDRINE HCL 60 MG: 30 TABLET, FILM COATED ORAL at 08:21

## 2020-06-29 RX ADMIN — LIDOCAINE 2 PATCH: 50 PATCH TOPICAL at 08:22

## 2020-06-29 RX ADMIN — BACLOFEN 20 MG: 20 TABLET ORAL at 08:19

## 2020-06-29 RX ADMIN — ASPIRIN 81 MG: 81 TABLET, COATED ORAL at 08:21

## 2020-06-29 RX ADMIN — PSEUDOEPHEDRINE HCL 60 MG: 30 TABLET, FILM COATED ORAL at 17:38

## 2020-06-29 RX ADMIN — ENOXAPARIN SODIUM 40 MG: 100 INJECTION SUBCUTANEOUS at 20:25

## 2020-06-29 RX ADMIN — LOVASTATIN 10 MG: 20 TABLET ORAL at 20:23

## 2020-06-29 ASSESSMENT — GAIT ASSESSMENTS
ASSISTIVE DEVICE: FRONT WHEEL WALKER
GAIT LEVEL OF ASSIST: SUPERVISED

## 2020-06-29 ASSESSMENT — FIBROSIS 4 INDEX: FIB4 SCORE: 0.39

## 2020-06-29 ASSESSMENT — ENCOUNTER SYMPTOMS
SENSORY CHANGE: 1
SHORTNESS OF BREATH: 0
NECK PAIN: 1
EYES NEGATIVE: 1
PALPITATIONS: 0
COUGH: 0
NAUSEA: 0
BRUISES/BLEEDS EASILY: 0
CHILLS: 0
FEVER: 0
FOCAL WEAKNESS: 1
VOMITING: 0
POLYDIPSIA: 0
ABDOMINAL PAIN: 0

## 2020-06-29 ASSESSMENT — ACTIVITIES OF DAILY LIVING (ADL)
TOILET_TRANSFER_DESCRIPTION: GRAB BAR;INCREASED TIME;INITIAL PREPARATION FOR TASK;REQUIRES LIFT;SET-UP OF EQUIPMENT;SUPERVISION FOR SAFETY;VERBAL CUEING
BED_CHAIR_WHEELCHAIR_TRANSFER_DESCRIPTION: INCREASED TIME;SUPERVISION FOR SAFETY;ADAPTIVE EQUIPMENT
TOILETING_LEVEL_OF_ASSIST_DESCRIPTION: ASSIST FOR HYGIENE;ASSIST TO PULL PANTS UP;ASSIST TO PULL PANTS DOWN;GRAB BAR;INCREASED TIME;INITIAL PREPARATION FOR TASK;SET-UP OF EQUIPMENT;SUPERVISION FOR SAFETY;VERBAL CUEING

## 2020-06-29 NOTE — CARE PLAN
Problem: Balance  Goal: STG-Within one week, patient will maintain static standing  Description: 1) Individualized goal:  // bars, c/s collar, L BKA prosthesis, gait belt, at Min A level x3 minutes  2) Interventions:  PT Group Therapy, PT E Stim Attended, PT Prosthetic Training, PT Gait Training, PT Therapeutic Exercises, PT TENS Application, PT Neuro Re-Ed/Balance, PT Therapeutic Activity, and PT Manual Therapy  Outcome: MET  Note: as BP allowed  Goal: STG-Within one week, patient will maintain static sitting  Description: 1) Individualized goal:  EOM or unsupported, c/s collar, L BKA prosthesis, gait belt, at CGA level x3 minutes  2) Interventions:  PT Group Therapy, PT E Stim Attended, PT Prosthetic Training, PT Gait Training, PT Therapeutic Exercises, PT TENS Application, PT Neuro Re-Ed/Balance, PT Therapeutic Activity, and PT Manual Therapy    Outcome: MET     Problem: Mobility  Goal: STG-Within one week, patient will ambulate household distance  Description: 1) Individualized goal:  // bars x10 feet, c/s collar, L BKA prosthesis, gait belt, at Mod A level with w/c follow prn  2) Interventions:  PT Group Therapy, PT E Stim Attended, PT Prosthetic Training, PT Gait Training, PT Therapeutic Exercises, PT TENS Application, PT Neuro Re-Ed/Balance, PT Therapeutic Activity, and PT Manual Therapy    Outcome: MET     Problem: Mobility Transfers  Goal: STG-Within one week, patient will perform bed mobility  Description: 1) Individualized goal:  Mod A supine<>sit, c/s collar, L BKA prosthesis, bed rail  2) Interventions:  PT Group Therapy, PT E Stim Attended, PT Prosthetic Training, PT Gait Training, PT Therapeutic Exercises, PT TENS Application, PT Neuro Re-Ed/Balance, PT Therapeutic Activity, and PT Manual Therapy  Outcome: MET  Goal: STG-Within one week, patient will transfer bed to chair  Description: 1) Individualized goal:  Mod A supine<>sit, c/s collar, L BKA prosthesis, bed rail, gait belt  2) Interventions:   PT Group Therapy, PT E Stim Attended, PT Prosthetic Training, PT Gait Training, PT Therapeutic Exercises, PT TENS Application, PT Neuro Re-Ed/Balance, PT Therapeutic Activity, and PT Manual Therapy    Outcome: MET     Problem: Mobility  Goal: STG-Within one week, patient will propel wheelchair community  Description: 1) Individualized goal:  BLE propulsion x150 feet, c/s collar, L BKA prosthesis, gait belt, at SPV level  2) Interventions:  PT Group Therapy, PT E Stim Attended, PT Prosthetic Training, PT Gait Training, PT Therapeutic Exercises, PT TENS Application, PT Neuro Re-Ed/Balance, PT Therapeutic Activity, and PT Manual Therapy    Outcome: NOT MET  Note: SPV w/c mobility for 100 feet on last attempt     Problem: Mobility Transfers  Goal: STG-Within one week, patient will sit to stand  Description: 1) Individualized goal:  // bars, c/s collar, L BKA prosthesis, gait belt, at Min A level consistently  2) Interventions:  PT Group Therapy, PT E Stim Attended, PT Prosthetic Training, PT Gait Training, PT Therapeutic Exercises, PT TENS Application, PT Neuro Re-Ed/Balance, PT Therapeutic Activity, and PT Manual Therapy  Outcome: NOT MET  Note: Min-Mod A at this time

## 2020-06-29 NOTE — PROGRESS NOTES
"Rehab Progress Note     Encounter Date: 6/29/2020    CC: weakness    Interval Events (Subjective)    He has been having intermittent episodes of low blood pressure, orthostatic hypotension.  Today is improved, was able to ambulate longer distances with contact-guard as per physical therapy.  Patient wants to be able to ambulate without a walker.    Patient and wife are very concerned about discharge happening in the next 1 to 2 weeks.  They are concerned about the therapy support in their current town.    Wife voices concerns about multiple falls prior to surgery.    He describes pain in the right levator scapula, worse when standing up straight.    Bladder: Volitional voiding, PVRs less than 100  Bowel: Last BM: 06/28/20, good results last night with BTP, he reports he is unable to reach given his minimal motion of his spine.    ROS:  Gen: No fatigue, confusion, significant weight loss  Eyes: no blurry vision, double vision or pain in eyes  ENT: no changes in hearing, runny nose, nose bleeds, sinus pain  CV: No CP, palpitations,   Lungs: no shortness of breath, changes in secretions, changes in cough, pain with coughing  Abd: No abd pain, nausea, vomiting, pain with eating  : no blood in urine, pain during storage phase, bladder spasms, suprapubic pain  Ext: No swelling in legs, asymmetric atrophy  Neuro: no changes in strength or sensation  Skin: no new ulcers/skin breakdown appreciated by patient  Mood: No changes in mood today, increase in depression or anxiety  Heme: no bruising, or bleeding  Rest of 14 point review of systems is negative    Objective:  Vitals: /63   Pulse 76   Temp 36.1 °C (96.9 °F) (Temporal)   Resp 18   Ht 1.753 m (5' 9.02\")   Wt 78.6 kg (173 lb 4.5 oz)   SpO2 91%   Gen: NAD, Body mass index is 25.58 kg/m².  HEENT: NC/AT, PERRLA, moist mucous membranes  Cardio: RRR, no mumurs  Pulm: CTAB, with normal respiratory effort  Abd: Soft NTND, active bowel sounds,  Ext: No peripheral " edema. No calf tenderness. No clubbing/cyanosis. +dorsal pedalis pulses on the right, BKA on the left, no erythema and residual limb      Skin: Incision is clean dry and intact, sutures in place, no signs of dehiscence    Tone: Hip flexion spasticity, 1+-2/4    No results found for this or any previous visit (from the past 72 hour(s)).    Current Facility-Administered Medications   Medication Frequency   • pseudoephedrine (SUDAFED) 30 MG tablet 60 mg TID   • sennosides (SENOKOT) 8.6 MG tablet 25.8 mg DAILY AT NOON    And   • docusate sodium (COLACE) capsule 100 mg BID    And   • bisacodyl (THE MAGIC BULLET) suppository 10 mg QDAY    And   • magnesium hydroxide (MILK OF MAGNESIA) suspension 30 mL QDAY PRN   • baclofen (LIORESAL) tablet 20 mg TID   • traZODone (DESYREL) tablet 50 mg QHS   • melatonin tablet 3 mg QHS   • midodrine (PROAMATINE) tablet 15 mg TID   • ferrous sulfate tablet 325 mg QDAY with Breakfast   • gabapentin (NEURONTIN) capsule 800 mg TID   • metFORMIN (GLUCOPHAGE) tablet 250 mg BID AC   • midodrine (PROAMATINE) tablet 5 mg Q4HRS PRN   • aspirin EC (ECOTRIN) tablet 81 mg DAILY   • vitamin D (cholecalciferol) tablet 2,000 Units DAILY   • finasteride (PROSCAR) tablet 5 mg DAILY   • lovastatin (MEVACOR) tablet 10 mg Nightly   • tamsulosin (FLOMAX) capsule 0.4 mg AFTER DINNER   • vitamin B comp+C (ALLBEE WITH C) 1 Tab DAILY   • therapeutic multivitamin-minerals (THERAGRAN-M) tablet 1 Tab DAILY   • Pharmacy Consult Request ...Pain Management Review 1 Each PHARMACY TO DOSE   • Respiratory Therapy Consult Continuous RT   • tramadol (ULTRAM) 50 MG tablet 50 mg Q4HRS PRN   • oxyCODONE immediate-release (ROXICODONE) tablet 5 mg Q3HRS PRN   • oxyCODONE immediate release (ROXICODONE) tablet 10 mg Q3HRS PRN   • acetaminophen (TYLENOL) tablet 650 mg Q4HRS PRN   • enoxaparin (LOVENOX) inj 40 mg QHS   • artificial tears ophthalmic solution 1 Drop PRN   • benzocaine-menthol (CEPACOL) lozenge 1 Lozenge Q2HRS PRN   •  mag hydrox-al hydrox-simeth (MAALOX PLUS ES or MYLANTA DS) suspension 20 mL Q2HRS PRN   • ondansetron (ZOFRAN ODT) dispertab 4 mg 4X/DAY PRN    Or   • ondansetron (ZOFRAN) syringe/vial injection 4 mg 4X/DAY PRN   • traZODone (DESYREL) tablet 50 mg QHS PRN   • sodium chloride (OCEAN) 0.65 % nasal spray 2 Spray PRN   • lidocaine (LIDODERM) 5 % 2 Patch Daily-0800   • aripiprazole (ABILIFY) tablet 10 mg DAILY   • venlafaxine XR (EFFEXOR XR) capsule 150 mg BID       Orders Placed This Encounter   Procedures   • Diet Order Diabetic     Standing Status:   Standing     Number of Occurrences:   1     Order Specific Question:   Diet:     Answer:   Diabetic [3]       Assessment:  Active Hospital Problems    Diagnosis   • *Cervical myelopathy (HCC)   • Postoperative pain   • Deep vein thrombosis (HCC)   • Insomnia due to medical condition   • Neurogenic bowel   • Depression   • Thoracic myelopathy   • Neuropathic pain   • Hyperlipidemia   • Hypertension   • S/P BKA (below knee amputation) ([facility])       Medical Decision Making and Plan:  C2 AIS D, 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  Status post multiple spine surgeries, C3-T7 posterior spinal fusion on 6/5, T12-pelvis posterior spinal fusion on 6/10 by Dr Marsh.   - C-collar on at all times, [place] collar when in shower to be cleared by neurosurgery as outpatient, 8-12 weeks  -  Sutures to be removed 21 days postop, prolonged time secondary to repeat surgeries  - Prolonged discussion with patient and wife about expectations of acute rehabilitation, plan for discharge home in 1 to 2 weeks with transition to home health and then outpatient therapy.  -Continue comprehensive acute inpatient rehabilitation    Left BKA: Using [place]  for prosthetists, not following up with physicians  - Prosthesis at bedside  -Follow-up with Dr. 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 noon, decrease Colace 100 mg twice daily, Dulcolax suppository with digital stimulation,   -DC MiraLAX 1 packet daily  -Discussed program with patient and importance of appropriate management of neurogenic bowel with repercussions of colonic dilation and possible rupture  -KUB showed no significant stool load in ascending or transverse colon      orthostatic hypotension: On 6/19- 82/52 with change in position  Because of significant autonomic dysfunction associated with spinal cord injury, the patient is at high risk for orthostatic hypotension.  - FAMILIA hose on prior to out of bed  -midodrine 15 mg 3 times daily  -Initiate Florinef 0.1 mg every morning, check BMP in a.m., monitor potassium level and sodium level  - P.o. fluid intake, goal 1.5-2 L/day  -Monitor strict I's and O's  -Consult hospitalist, discussion with hospitalist about discontinuing lisinopril  - Amlodipine was DC'd  -Sudafed 60 mg 3 times daily    Spasticity: MAS of 1-1+/4 jumping of bilateral lower extremities, greater in hip flexors, waking him up at night  - Baclofen 20 mg 3 times daily     Decreased range of motion of right hip: In differential diagnosis includes heterotopic ossification of the right hip after multiple spinal surgeries.  Improved with stretching during acute rehabilitation  - x-ray right hip, showed no signs of heterotopic ossification  -CRP is less  than 2 likely negative     Pain:  #Neuropathic pain: Neuropathy, complicated by diabetes and a cervical myelopathy  - Gabapentin 800 mg tid  - DCTylenol 1000 mg 3 times daily     #Postoperative pain:  -Tramadol  mg every 4 hours as needed moderate to severe pain first-line  -Oxycodone 5-10 mg every 3 hours as needed moderate to severe pain, second line  - DC Tylenol 1000 mg 3 times daily, check CMP in a.m. to evaluate for LFTs given high-dose Tylenol  -Lidocaine patches to either side of incision on for 12 hours off for 12 hours     Hypertension:  - All oral antihypertensives have been DC'd  -Appreciate hospitalist input     Type 2 diabetes: With hyperglycemia  -Metformin 500 mg twice daily  -Appreciate hospitalist input    Insomnia:  -DC Remeron 15 mg nightly, given vivid dreams  -trazodone 50 mg nightly  - Melatonin 3 mg nightly     Depression:  -Effexor  mg twice daily     Vitamin D deficiency  -Vitamin D pending     DVT prophylaxis: Patient has previous history of DVT in 2011 making him higher risk for clot formation  -Lovenox 40 mg daily    IDT Team Meeting 6/29/2020    I, Rj Navarro D.O., was present and led the interdisciplinary team conference on 6/29/2020.  I led the IDT conference and agree with the IDT conference documentation and plan of care as noted below.     RN:  Diet Regular diet   % Meal     Pain    Sleep    Bowel BTP good results   Bladder pvr 48   In's & Out's        PT:  Bed Mobility    Transfers    Mobility CGA when BP is stable   Stairs    Tasks need to be structured and safe  Barriers: hypotension limiting the standing, history of multiple falls    FWW    OT:  Eating    Grooming    Bathing    UB Dressing    LB Dressing Difficulty with dressing   Toileting Total A   Shower & Transfer Mod A     Barrier: will need help from spouse on putting on prosthetic         CM:  Continues to work on disposition and DME needs.      DC/Disposition:  7/7    Patient was seen for 40 minutes  on unit/floor of which > 50% of time was spent on counseling and coordination of care regarding the above, including prognosis, risk reduction, benefits of treatment, and options for next stage of care including orthostatic hypotension, initiate Florinef 0.1 mg every morning, high risk medication, check BMP in a.m., incomplete tetraplegia, prolonged discussion with patient and wife about plan and acute rehabilitation.          Rj Navarro D.O.

## 2020-06-29 NOTE — PROGRESS NOTES
Change of shift report obtained, vitals wnl, denies pain, care resumed. HS bowel program performed with pre and post dig, no results when patient was on commode. Patient had BM a few hours later incontinent, PVR WNL no ICP required this shift thus far. Patient slept comfortably all shift.

## 2020-06-29 NOTE — CARE PLAN
Problem: Toileting  Goal: STG-Within one week, patient will complete toileting tasks  Description: 1) Individualized Goal:  Max assist for toileting task via AE/DME PRN  2) Interventions:  OT Self Care/ADL, OT Neuro Re-Ed/Balance, OT Therapeutic Activity, OT Evaluation, and OT Therapeutic Exercise    Outcome: NOT MET  Note: Total assist for toiletting tasks

## 2020-06-29 NOTE — THERAPY
Recreational Therapy  Daily Treatment     Patient Name: Gaurav Lopez  AGE:  75 y.o., SEX:  male  Medical Record #: [id number]  Today's Date: 6/29/2020       Subjective    Pt was sharing his concern about how he feels that he will not be ready to be at his home at a functional level in 2-3 weeks. Pt is concerned with the angle of his driveway being too steep.      Objective       06/29/20 1401   Functional Ability Status - Cognitive   Attention Span Remains on Task   Comprehension Follows Three Step Commands   Judgment Able to Make Independent Decisions   Functional Ability Status - Emotional    Affect Worried;Appropriate   Mood Appropriate   Behavior Appropriate   Emotional Comments Shared he was worried about not being ready to discharge and function at home.    Skilled Intervention    Skilled Intervention Gross Motor Leisure;Community Skills   Skilled Intervention Comments Wheelchair ambulation on uneven surfaces   Interdisciplinary Plan of Care Collaboration   Patient Position at End of Therapy Seated;Tray Table within Reach;Call Light within Reach   Strengths & Barriers   Strengths Able to follow instructions;Motivated for self care and independence;Pleasant and cooperative;Willingly participates in therapeutic activities   Barriers Decreased endurance;Generalized weakness;Impaired activity tolerance;Impaired balance;Limited mobility   Treatment Time   Total Time Spent (mins) 30   Procedural Tracking   Procedural Tracking Community Skills Development;Community Re-Integration       Assessment    Pt was taken outside to work on uneven surface wheelchair ambulation. Pt was taken on cobble stone and an angled bridge. Pt was more successful in getting up the ramp when he was ambulating backwards up the ramp. Pt then switched to forward facign to go down the ramp. Pt braked to an appropriate speed when going down the ramp.     Strengths: Able to follow instructions, Motivated for self care and independence, Pleasant and  cooperative, Willingly participates in therapeutic activities  Barriers: Decreased endurance, Generalized weakness, Impaired activity tolerance, Impaired balance, Limited mobility    Plan    Continue community wheelchair ambulation.

## 2020-06-29 NOTE — THERAPY
Physical Therapy   Daily Treatment     Patient Name: Gaurav Lopez  Age:  75 y.o., Sex:  male  Medical Record #: [id number]  Today's Date: 6/29/2020     Precautions  Precautions: (P) Fall Risk, Other (See Comments)  Comments: (P) LLE prosthesis    Subjective    Pt was sitting in w/c upon arrival and agreeable to tx  Requesting to walk this session  Pt states goal would be to progress away from FWW     Objective       06/29/20 0831   Precautions   Precautions Fall Risk;Other (See Comments)   Comments LLE prosthesis   Gait Functional Level of Assist    Gait Level Of Assist Supervised   Assistive Device Front Wheel Walker   Distance (Feet)   (550)   # of Times Distance was Traveled 1   Deviation   (heavy anterior lean/ UE use with fatigue, L toe catch)   Sitting Lower Body Exercises   Other Exercises 1 x 15 L LE knee flexion   Standing Lower Body Exercises   Hamstring Curl 1 set of 15;Left   Interdisciplinary Plan of Care Collaboration   Patient Position at End of Therapy Seated;Call Light within Reach;Tray Table within Reach;Phone within Reach   PT Total Time Spent   PT Individual Total Time Spent (Mins) 30   PT Charge Group   PT Gait Training 2         Assessment  Pt with poor L LE prosthetic foot clearance and weak hamstring activation through swing phase. Pt may benefit from prosthetic consult to address friction callous on L knee and decreased foot clearance; pt reports reliance on FWW for 3-4 years and poor L LE clearance throughout that time. No complaints of dizziness or dyspnea with AMB; improving endurance and standing tolerance    Strengths: Able to follow instructions, Effective communication skills, Independent prior level of function, Pleasant and cooperative, Willingly participates in therapeutic activities  Barriers: Bladder incontinence, Bowel incontinence, Confused, Decreased endurance, Generalized weakness, Home accessibility, Hypotension, Orthostatic hypotension, Impaired activity tolerance, Impaired  balance, Impaired carryover of learning, Limited mobility    Plan    Monitor BP, STS work, standing tolerance; progress ambulation distance with FWW; consistent transfer training; LE strengthening, and core strengthening.       Physical Therapy Problems     Problem: Mobility     Dates: Start: 06/19/20       Goal: STG-Within one week, patient will propel wheelchair community     Dates: Start: 06/19/20       Description: 1) Individualized goal:  BLE propulsion x150 feet, c/s collar, L BKA prosthesis, gait belt, at SPV level  2) Interventions:  PT Group Therapy, PT E Stim Attended, PT Prosthetic Training, PT Gait Training, PT Therapeutic Exercises, PT TENS Application, PT Neuro Re-Ed/Balance, PT Therapeutic Activity, and PT Manual Therapy      Note:     Goal Note filed on 06/29/20 0820 by Teofilo Vázquez, PT    [facility] w/c mobility for 100 feet on last attempt                  Goal: STG-Within one week, patient will ambulate household distance     Dates: Start: 06/29/20       Description: 1) Individualized goal:  150 feet with FWW, c/s collar, L BKA prosthesis, at CGA level  2) Interventions:  PT Group Therapy, PT E Stim Attended, PT Prosthetic Training, PT Gait Training, PT Therapeutic Exercises, PT TENS Application, PT Neuro Re-Ed/Balance, PT Therapeutic Activity, and PT Manual Therapy            Goal: STG-Within one week, patient will ascend and descend four to six stairs     Dates: Start: 06/29/20       Description: 1) Individualized goal:  with 1 railing, c/s collar, L BKA prosthesis, gait belt, at Mod A level  2) Interventions:  PT Group Therapy, PT E Stim Attended, PT Prosthetic Training, PT Gait Training, PT Therapeutic Exercises, PT TENS Application, PT Neuro Re-Ed/Balance, PT Therapeutic Activity, and PT Manual Therapy                  Problem: Mobility Transfers     Dates: Start: 06/19/20       Goal: STG-Within one week, patient will sit to stand     Dates: Start: 06/19/20       Description: 1)  Individualized goal:  // bars, c/s collar, L BKA prosthesis, gait belt, at Min A level consistently  2) Interventions:  PT Group Therapy, PT E Stim Attended, PT Prosthetic Training, PT Gait Training, PT Therapeutic Exercises, PT TENS Application, PT Neuro Re-Ed/Balance, PT Therapeutic Activity, and PT Manual Therapy    Note:     Goal Note filed on 06/29/20 0820 by Teofilo Vázquez, PT    Min-Mod A at this time                  Goal: STG-Within one week, patient will transfer bed to chair     Dates: Start: 06/29/20       Description: 1) Individualized goal:  with FWW, c/s collar, L BKA prosthesis, at CGA level  2) Interventions:  PT Group Therapy, PT E Stim Attended, PT Prosthetic Training, PT Gait Training, PT Therapeutic Exercises, PT TENS Application, PT Neuro Re-Ed/Balance, PT Therapeutic Activity, and PT Manual Therapy                    Problem: PT-Long Term Goals     Dates: Start: 06/19/20       Goal: LTG-By discharge, patient will ambulate     Dates: Start: 06/19/20       Description: 1) Individualized goal:  150 feet with FWW, c/s collar, L BKA prosthesis, at SBA level  2) Interventions:  PT Group Therapy, PT E Stim Attended, PT Prosthetic Training, PT Gait Training, PT Therapeutic Exercises, PT TENS Application, PT Neuro Re-Ed/Balance, PT Therapeutic Activity, and PT Manual Therapy          Goal: LTG-By discharge, patient will transfer one surface to another     Dates: Start: 06/19/20       Description: 1) Individualized goal:  with FWW, c/s collar, L BKA prosthesis, at SBA level  2) Interventions:  PT Group Therapy, PT E Stim Attended, PT Prosthetic Training, PT Gait Training, PT Therapeutic Exercises, PT TENS Application, PT Neuro Re-Ed/Balance, PT Therapeutic Activity, and PT Manual Therapy            Goal: LTG-By discharge, patient will ambulate up/down 4-6 stairs     Dates: Start: 06/19/20       Description: 1) Individualized goal:  with 1 railing, c/s collar, L BKA prosthesis, gait belt, at Min A  level  2) Interventions:  PT Group Therapy, PT E Stim Attended, PT Prosthetic Training, PT Gait Training, PT Therapeutic Exercises, PT TENS Application, PT Neuro Re-Ed/Balance, PT Therapeutic Activity, and PT Manual Therapy          Goal: LTG-By discharge, patient will transfer in/out of a car     Dates: Start: 06/19/20       Description: 1) Individualized goal:  with FWW, c/s collar, L BKA prosthesis, at SBA level  2) Interventions:  PT Group Therapy, PT E Stim Attended, PT Prosthetic Training, PT Gait Training, PT Therapeutic Exercises, PT TENS Application, PT Neuro Re-Ed/Balance, PT Therapeutic Activity, and PT Manual Therapy

## 2020-06-29 NOTE — THERAPY
"Occupational Therapy  Daily Treatment     Patient Name: Gaurav Lopez  Age:  75 y.o., Sex:  male  Medical Record #: [id number]  Today's Date: 6/29/2020     Precautions  Precautions: (P) Fall Risk, Other (See Comments)  Comments: (P) LLE prosthesis    Safety   ADL Safety : Impaired, Requires Supervision for Safety  Bathroom Safety: Impaired, Requires Supervision for Safety    Subjective    \"I really want to shave my face.\" pt reported at end of session- will have pt work on shaving tomorrow in OT      Objective       06/29/20 0931   Precautions   Precautions Fall Risk;Other (See Comments)   Comments LLE prosthesis   Pain   Intervention Declines   Pain 0 - 10 Group   Pain Rating Scale (NPRS) 0   Therapist Pain Assessment Post Activity Pain Same as Prior to Activity   Cognition    Level of Consciousness Alert   Functional Level of Assist   Grooming Supervision   Grooming Description Increased time;Seated in wheelchair at sink  (washed hands; cues to use water to rinse)   Toileting Maximal Assist   Toileting Description Assist for hygiene;Assist to pull pants up;Assist to pull pants down;Grab bar;Increased time;Initial preparation for task;Set-up of equipment;Supervision for safety;Verbal cueing   Toilet Transfers Minimal Assist   Toilet Transfer Description Grab bar;Increased time;Initial preparation for task;Requires lift;Set-up of equipment;Supervision for safety;Verbal cueing   Interdisciplinary Plan of Care Collaboration   Patient Position at End of Therapy Seated;Self Releasing Lap Belt Applied;Chair Alarm On;Call Light within Reach;Tray Table within Reach;Phone within Reach;Family / Friend in Room  (wife present)   OT Total Time Spent   OT Individual Total Time Spent (Mins) 30   OT Charge Group   OT Self Care / ADL 1   OT Therapeutic Exercise  1     Educated pt on postural strengthening exercises while seated in w/c at sink side using mirror; shoulder elevation/depression and scapular retraction; pt completed 2 sets " x 5 reps holding for 3 seconds at a time; tactile cues needed at start and able to complete without by end. Instructed pt to work on these exercises throughout the day; Pt having difficulty sitting upright in w/c without back support- only able to maintain ~10-15 seconds at a time. Instructed pt to work on increasing sitting tolerance while in w/c without leaning against back support and engaging his abdominal muscles.     Assessment    Pt tolerated session well. Pt completed toileting with max a and min a for xfer using grab bar- cues for safety and CGA during standing for balance- pt attempted to don pants/brief over hips but having increased difficulty removing 1 hand from grab bar at a time. Pt reported minor pain in neck/back during scapular retraction exercises. Pt noted to have increased difficulty with sitting tolerance without back support 2/2 weak core muscles.     Strengths: Able to follow instructions, Adequate strength, Alert and oriented, Effective communication skills, Independent prior level of function  Barriers: Decreased endurance, Impaired activity tolerance    Plan    Shaving at sink side using electric razor and scissors to trim mustache on 6/30; Focus on ADLs, transfers, activity tolerance and functional use of UEs    Occupational Therapy Goals     Problem: Dressing     Dates: Start: 06/29/20       Goal: STG-Within one week, patient will dress LB     Dates: Start: 06/29/20       Description: 1) Individualized Goal:  min assist lower body dressing, using appropriate AE  2) Interventions:  OT Self Care/ADL, OT Therapeutic Activity, and OT Therapeutic Exercise                  Problem: OT Long Term Goals     Dates: Start: 06/19/20       Goal: LTG-By discharge, patient will complete basic self care tasks     Dates: Start: 06/19/20       Description: 1) Individualized Goal:  Mod I for BADL's, Sup/SBA for shower via AE/DME PRN  2) Interventions:  OT Self Care/ADL, OT Neuro Re-Ed/Balance, OT  Therapeutic Activity, OT Evaluation, and OT Therapeutic Exercise            Goal: LTG-By discharge, patient will perform bathroom transfers     Dates: Start: 06/19/20                   Problem: Toileting     Dates: Start: 06/19/20       Goal: STG-Within one week, patient will complete toileting tasks     Dates: Start: 06/19/20       Description: 1) Individualized Goal:  Max assist for toileting task via AE/DME PRN  2) Interventions:  OT Self Care/ADL, OT Neuro Re-Ed/Balance, OT Therapeutic Activity, OT Evaluation, and OT Therapeutic Exercise      Note:     Goal Note filed on 06/29/20 1106 by Cindy Hess OT    Total assist for toiletting tasks

## 2020-06-29 NOTE — CARE PLAN
Problem: Infection  Goal: Will remain free from infection  Outcome: PROGRESSING AS EXPECTED  Patient verbalized understanding infection risk education.     Problem: Bowel/Gastric:  Goal: Normal bowel function is maintained or improved  Outcome: PROGRESSING AS EXPECTED.  Patient has been moving bowels after being constipated.

## 2020-06-29 NOTE — CARE PLAN
Problem: Knowledge Deficit  Goal: Knowledge of the prescribed therapeutic regimen will improve  Outcome: PROGRESSING AS EXPECTED
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
## 2020-06-29 NOTE — THERAPY
"Occupational Therapy  Daily Treatment     Patient Name: Gaurav Lopez  Age:  75 y.o., Sex:  male  Medical Record #: [id number]  Today's Date: 6/29/2020     Precautions  Precautions: Fall Risk, Other (See Comments)  Comments: LLE prosthesis    Safety   ADL Safety : Impaired, Requires Supervision for Safety  Bathroom Safety: Impaired, Requires Supervision for Safety    Subjective    \"I don't want a shower\" agreeable to bathe at sink instead     Objective       06/29/20 0701   Precautions   Precautions Fall Risk;Other (See Comments)   Comments LLE prosthesis   Safety    ADL Safety  Impaired;Requires Supervision for Safety   Bathroom Safety Impaired;Requires Supervision for Safety   Vitals   O2 Delivery Device None - Room Air   Vitals Comments abdominal binder   Pain 0 - 10 Group   Therapist Pain Assessment Post Activity Pain Same as Prior to Activity;Nurse Notified;0   Cognition    Orientation Level Oriented x 4   Level of Consciousness Alert   Ability To Follow Commands 2 Step   Safety Awareness Impaired   Sleep/Wake Cycle   Sleep & Rest Awake   Active ROM Upper Body   Dominant Hand Right   Rt Shoulder Flexion Degrees 90   Lt Shoulder Flexion Degrees 60   Strength Upper Body   Upper Body Strength  X   Comments generalised weakness, equal bilaterally   Functional Level of Assist   Eating Modified Independent   Eating Description Increased time;Set-up of equipment or meal/tube feeding   Grooming Modified Independent   Grooming Description Increased time;Seated in wheelchair at sink;Supervision for safety   Bathing   (declined shower, bathed at sink, wheelchiar level)   Bathing Description Increased time;Verbal cueing;Supervision for safety;Assit with perineal;Assit wtih lower extremities;Assit with back   Upper Body Dressing Moderate Assist   Upper Body Dressing Description Application of orthotic or brace;Assist with pulling shirt over head;Increased time;Supervision for safety   Lower Body Dressing Moderate Assist   Lower " Body Dressing Description Grab bar;Application of orthotic or brace;Assist with threading into pant leg;Verbal cueing   Bed, Chair, Wheelchair Transfer Contact Guard Assist   Bed Chair Wheelchair Transfer Description Increased time;Supervision for safety;Adaptive equipment   Balance   Sitting Balance (Static) Fair -  (needs inital assist, improves over first minute to SBA)   Sitting Balance (Dynamic) Fair -   Standing Balance (Static) Fair -  (with prosthetic in place)   Standing Balance (Dynamic) Fair -   Weight Shift Sitting Fair   Weight Shift Standing Fair   Skilled Intervention Sequencing;Verbal Cuing;Tactile Cuing   Bed Mobility    Supine to Sit Moderate Assist   Sit to Supine   (left seated in bedside chair)   Scooting Stand by Assist   Interdisciplinary Plan of Care Collaboration   IDT Collaboration with  Certified Nursing Assistant;Nursing;Physician   Patient Position at End of Therapy Seated;Chair Alarm On;Self Releasing Lap Belt Applied;Tray Table within Reach;Phone within Reach;Family / Friend in Room   Collaboration Comments aware of functional level/ participation   Strengths & Barriers   Strengths Able to follow instructions;Adequate strength;Alert and oriented;Effective communication skills;Independent prior level of function   Barriers Decreased endurance;Impaired activity tolerance   OT Total Time Spent   OT Individual Total Time Spent (Mins) 60   OT Charge Group   OT Self Care / ADL 4       Assessment    Blood pressure stable throughout session which leads to improved tolerance, and greater level of independence. Continues to refuse showering, but participates well while washing at the sink.  Strengths: Able to follow instructions, Adequate strength, Alert and oriented, Effective communication skills, Independent prior level of function  Barriers: Decreased endurance, Impaired activity tolerance    Plan    Focus on ADLs, transfers, activity tolerance and functional use of UEs    Occupational  Therapy Goals     Problem: Dressing     Dates: Start: 06/19/20       Goal: STG-Within one week, patient will dress LB     Dates: Start: 06/19/20       Description: 1) Individualized Goal:  Max assist for LB clothing management via AE/DME PRN  2) Interventions:  OT Self Care/ADL, OT Neuro Re-Ed/Balance, OT Therapeutic Activity, OT Evaluation, and OT Therapeutic Exercise      Note:     Goal Note filed on 06/22/20 1109 by Mesfin Mtz MS,OTR/L    Pt at mod assist via AE/DME                        Problem: OT Long Term Goals     Dates: Start: 06/19/20       Goal: LTG-By discharge, patient will complete basic self care tasks     Dates: Start: 06/19/20       Description: 1) Individualized Goal:  Mod I for BADL's, Sup/SBA for shower via AE/DME PRN  2) Interventions:  OT Self Care/ADL, OT Neuro Re-Ed/Balance, OT Therapeutic Activity, OT Evaluation, and OT Therapeutic Exercise            Goal: LTG-By discharge, patient will perform bathroom transfers     Dates: Start: 06/19/20                   Problem: Toileting     Dates: Start: 06/19/20       Goal: STG-Within one week, patient will complete toileting tasks     Dates: Start: 06/19/20       Description: 1) Individualized Goal:  Max assist for toileting task via AE/DME PRN  2) Interventions:  OT Self Care/ADL, OT Neuro Re-Ed/Balance, OT Therapeutic Activity, OT Evaluation, and OT Therapeutic Exercise      Note:     Goal Note filed on 06/22/20 1109 by Mesfin Mtz MS,OTR/L    Pt at Total assist for toileting task via DME

## 2020-06-29 NOTE — THERAPY
Physical Therapy   Daily Treatment     Patient Name: Gaurav Lopez  Age:  75 y.o., Sex:  male  Medical Record #: [id number]  Today's Date: 6/29/2020     Precautions  Precautions: Fall Risk, Other (See Comments)  Comments: LLE prosthesis    Subjective    Patient agreeable to PT.  SO present and introduced herself today, reports pt having multiple falls at home prior to surgery.     Objective       06/29/20 1001   Vitals   Pulse 64   Patient BP Position Sitting   Blood Pressure  129/70   O2 (LPM) 0   O2 Delivery Device None - Room Air   Vitals Comments abdominal binder present, See flowsheet for standing BP   Sitting Lower Body Exercises   Sitting Lower Body Exercises Yes   Nustep Resistance Level 7  (BLE only, min cueing, 15 minutes, avg 23 spm)   Standing Lower Body Exercises   Other Exercises Standing reps for 2x 1.5 minutes with BP checks as per flowsheet above, in // bars, gait belt, Min A, cueing.   Bed Mobility    Sit to Stand Moderate Assist  (FWW vs // bars. 6 reps. Working on sequencing with pt.)   Neuro-Muscular Treatments   Comments Standing static balance with standard vs narrow JENNIE on firm surface vs Airex pad with 0-2 UE support; gait belt, mod cueing for increased upright posture, // bars, BKA prosthesis donned as usual, Mod A on Airex but Min A on firm surface; 3 reps up to 30 sec in each position.   IDT Conference   IDT Conference I have reviewed and discussed the POC and barriers to discharge for this patient.  I am knowledgeable of the patient's needs and have attended the IDT Conference.   Interdisciplinary Plan of Care Collaboration   IDT Collaboration with  Family / Caregiver;Other (See Comments);Physical Therapist  (and IDT team)   Patient Position at End of Therapy Seated;Self Releasing Lap Belt Applied;Call Light within Reach;Tray Table within Reach;Phone within Reach;Family / Friend in Room   Collaboration Comments SPT assistance throughout session; SO present observing and discussing POC today;  CLOF and treatment plan with earlier PT.  IDT conference today: aiming for SPV level mobility at FWW level with anticipated d/c on 7/7/2020 pending BP management and CLOF; Home health PT; pt has equipment; revisit pt's specific type of walker; pt has 3 steps to enter home and lives outside of [place], [place].   PT Total Time Spent   PT Individual Total Time Spent (Mins) 60   PT Charge Group   PT Therapeutic Exercise 1   PT Neuromuscular Re-Education / Balance 2   PT Therapeutic Activities 1       Assessment    Patient has improving BP management today; requires cueing for new task learning; improving endurance and strength; pt limited most by forward trunk flexion at hips, posture, and impaired balance; minor cognitive deficits; fair social support but pt's SO unable to provide physical A at d/c.    Strengths: Able to follow instructions, Effective communication skills, Independent prior level of function, Pleasant and cooperative, Willingly participates in therapeutic activities  Barriers: Bladder incontinence, Bowel incontinence, Confused, Decreased endurance, Generalized weakness, Home accessibility, Hypotension, Orthostatic hypotension, Impaired activity tolerance, Impaired balance, Impaired carryover of learning, Limited mobility    Plan    Monitor BP, STS work, standing posture & balance, progress ambulation distance with FWW, safety with transfers, LE & core strengthening.  Monitor pt's fit of L BKA prosthesis, re-assess pt's type of walker at home and its condition, and anticipate d/c at SPV level about 7/7/2020.      Physical Therapy Problems     Problem: Mobility     Dates: Start: 06/19/20       Goal: STG-Within one week, patient will propel wheelchair community     Dates: Start: 06/19/20       Description: 1) Individualized goal:  BLE propulsion x150 feet, c/s collar, L BKA prosthesis, gait belt, at SPV level  2) Interventions:  PT Group Therapy, PT E Stim Attended, PT Prosthetic Training, PT Gait  Training, PT Therapeutic Exercises, PT TENS Application, PT Neuro Re-Ed/Balance, PT Therapeutic Activity, and PT Manual Therapy      Note:     Goal Note filed on 06/29/20 0820 by Teofilo Vázquez, PT    SPV w/c mobility for 100 feet on last attempt                  Goal: STG-Within one week, patient will ambulate household distance     Dates: Start: 06/29/20       Description: 1) Individualized goal:  150 feet with FWW, c/s collar, L BKA prosthesis, at CGA level  2) Interventions:  PT Group Therapy, PT E Stim Attended, PT Prosthetic Training, PT Gait Training, PT Therapeutic Exercises, PT TENS Application, PT Neuro Re-Ed/Balance, PT Therapeutic Activity, and PT Manual Therapy            Goal: STG-Within one week, patient will ascend and descend four to six stairs     Dates: Start: 06/29/20       Description: 1) Individualized goal:  with 1 railing, c/s collar, L BKA prosthesis, gait belt, at Mod A level  2) Interventions:  PT Group Therapy, PT E Stim Attended, PT Prosthetic Training, PT Gait Training, PT Therapeutic Exercises, PT TENS Application, PT Neuro Re-Ed/Balance, PT Therapeutic Activity, and PT Manual Therapy                  Problem: Mobility Transfers     Dates: Start: 06/19/20       Goal: STG-Within one week, patient will sit to stand     Dates: Start: 06/19/20       Description: 1) Individualized goal:  // bars, c/s collar, L BKA prosthesis, gait belt, at Min A level consistently  2) Interventions:  PT Group Therapy, PT E Stim Attended, PT Prosthetic Training, PT Gait Training, PT Therapeutic Exercises, PT TENS Application, PT Neuro Re-Ed/Balance, PT Therapeutic Activity, and PT Manual Therapy    Note:     Goal Note filed on 06/29/20 0820 by Teofilo Vázquez, PT    Min-Mod A at this time                  Goal: STG-Within one week, patient will transfer bed to chair     Dates: Start: 06/29/20       Description: 1) Individualized goal:  with FWW, c/s collar, L BKA prosthesis, at CGA level  2)  Interventions:  PT Group Therapy, PT E Stim Attended, PT Prosthetic Training, PT Gait Training, PT Therapeutic Exercises, PT TENS Application, PT Neuro Re-Ed/Balance, PT Therapeutic Activity, and PT Manual Therapy                    Problem: PT-Long Term Goals     Dates: Start: 06/19/20       Goal: LTG-By discharge, patient will ambulate     Dates: Start: 06/19/20       Description: 1) Individualized goal:  150 feet with FWW, c/s collar, L BKA prosthesis, at SBA level  2) Interventions:  PT Group Therapy, PT E Stim Attended, PT Prosthetic Training, PT Gait Training, PT Therapeutic Exercises, PT TENS Application, PT Neuro Re-Ed/Balance, PT Therapeutic Activity, and PT Manual Therapy          Goal: LTG-By discharge, patient will transfer one surface to another     Dates: Start: 06/19/20       Description: 1) Individualized goal:  with FWW, c/s collar, L BKA prosthesis, at SBA level  2) Interventions:  PT Group Therapy, PT E Stim Attended, PT Prosthetic Training, PT Gait Training, PT Therapeutic Exercises, PT TENS Application, PT Neuro Re-Ed/Balance, PT Therapeutic Activity, and PT Manual Therapy            Goal: LTG-By discharge, patient will ambulate up/down 4-6 stairs     Dates: Start: 06/19/20       Description: 1) Individualized goal:  with 1 railing, c/s collar, L BKA prosthesis, gait belt, at Min A level  2) Interventions:  PT Group Therapy, PT E Stim Attended, PT Prosthetic Training, PT Gait Training, PT Therapeutic Exercises, PT TENS Application, PT Neuro Re-Ed/Balance, PT Therapeutic Activity, and PT Manual Therapy          Goal: LTG-By discharge, patient will transfer in/out of a car     Dates: Start: 06/19/20       Description: 1) Individualized goal:  with FWW, c/s collar, L BKA prosthesis, at SBA level  2) Interventions:  PT Group Therapy, PT E Stim Attended, PT Prosthetic Training, PT Gait Training, PT Therapeutic Exercises, PT TENS Application, PT Neuro Re-Ed/Balance, PT Therapeutic Activity, and PT  Manual Therapy

## 2020-06-30 LAB
ANION GAP SERPL CALC-SCNC: 11 MMOL/L (ref 7–16)
BUN SERPL-MCNC: 18 MG/DL (ref 8–22)
CALCIUM SERPL-MCNC: 9.1 MG/DL (ref 8.5–10.5)
CHLORIDE SERPL-SCNC: 107 MMOL/L (ref 96–112)
CO2 SERPL-SCNC: 25 MMOL/L (ref 20–33)
CREAT SERPL-MCNC: 0.7 MG/DL (ref 0.5–1.4)
GLUCOSE SERPL-MCNC: 93 MG/DL (ref 65–99)
POTASSIUM SERPL-SCNC: 3.9 MMOL/L (ref 3.6–5.5)
SODIUM SERPL-SCNC: 143 MMOL/L (ref 135–145)

## 2020-06-30 PROCEDURE — 700102 HCHG RX REV CODE 250 W/ 637 OVERRIDE(OP): Performed by: HOSPITALIST

## 2020-06-30 PROCEDURE — 770010 HCHG ROOM/CARE - REHAB SEMI PRIVAT*

## 2020-06-30 PROCEDURE — A9270 NON-COVERED ITEM OR SERVICE: HCPCS | Performed by: PHYSICAL MEDICINE & REHABILITATION

## 2020-06-30 PROCEDURE — 99231 SBSQ HOSP IP/OBS SF/LOW 25: CPT | Performed by: HOSPITALIST

## 2020-06-30 PROCEDURE — 700112 HCHG RX REV CODE 229: Performed by: PHYSICAL MEDICINE & REHABILITATION

## 2020-06-30 PROCEDURE — 36415 COLL VENOUS BLD VENIPUNCTURE: CPT

## 2020-06-30 PROCEDURE — 99232 SBSQ HOSP IP/OBS MODERATE 35: CPT | Performed by: PHYSICAL MEDICINE & REHABILITATION

## 2020-06-30 PROCEDURE — 97530 THERAPEUTIC ACTIVITIES: CPT

## 2020-06-30 PROCEDURE — 80048 BASIC METABOLIC PNL TOTAL CA: CPT

## 2020-06-30 PROCEDURE — 97116 GAIT TRAINING THERAPY: CPT

## 2020-06-30 PROCEDURE — A9270 NON-COVERED ITEM OR SERVICE: HCPCS | Performed by: HOSPITALIST

## 2020-06-30 PROCEDURE — 700102 HCHG RX REV CODE 250 W/ 637 OVERRIDE(OP): Performed by: PHYSICAL MEDICINE & REHABILITATION

## 2020-06-30 PROCEDURE — 97535 SELF CARE MNGMENT TRAINING: CPT

## 2020-06-30 PROCEDURE — 97112 NEUROMUSCULAR REEDUCATION: CPT

## 2020-06-30 PROCEDURE — 700101 HCHG RX REV CODE 250: Performed by: PHYSICAL MEDICINE & REHABILITATION

## 2020-06-30 PROCEDURE — 700111 HCHG RX REV CODE 636 W/ 250 OVERRIDE (IP): Performed by: PHYSICAL MEDICINE & REHABILITATION

## 2020-06-30 RX ORDER — ZOLPIDEM TARTRATE 5 MG/1
5 TABLET ORAL
Status: DISCONTINUED | OUTPATIENT
Start: 2020-06-30 | End: 2020-07-08 | Stop reason: HOSPADM

## 2020-06-30 RX ADMIN — BACLOFEN 20 MG: 20 TABLET ORAL at 08:30

## 2020-06-30 RX ADMIN — MIDODRINE HYDROCHLORIDE 15 MG: 10 TABLET ORAL at 11:50

## 2020-06-30 RX ADMIN — VENLAFAXINE HYDROCHLORIDE 150 MG: 75 CAPSULE, EXTENDED RELEASE ORAL at 08:29

## 2020-06-30 RX ADMIN — BISACODYL 10 MG: 10 SUPPOSITORY RECTAL at 20:56

## 2020-06-30 RX ADMIN — MIDODRINE HYDROCHLORIDE 15 MG: 10 TABLET ORAL at 17:49

## 2020-06-30 RX ADMIN — PSEUDOEPHEDRINE HCL 60 MG: 30 TABLET, FILM COATED ORAL at 11:49

## 2020-06-30 RX ADMIN — GABAPENTIN 800 MG: 400 CAPSULE ORAL at 20:53

## 2020-06-30 RX ADMIN — ARIPIPRAZOLE 10 MG: 5 TABLET ORAL at 08:29

## 2020-06-30 RX ADMIN — GABAPENTIN 800 MG: 400 CAPSULE ORAL at 08:29

## 2020-06-30 RX ADMIN — METFORMIN HYDROCHLORIDE 250 MG: 500 TABLET ORAL at 08:30

## 2020-06-30 RX ADMIN — FERROUS SULFATE TAB 325 MG (65 MG ELEMENTAL FE) 325 MG: 325 (65 FE) TAB at 08:30

## 2020-06-30 RX ADMIN — STANDARDIZED SENNA CONCENTRATE 25.8 MG: 8.6 TABLET ORAL at 11:49

## 2020-06-30 RX ADMIN — MELATONIN 3 MG: at 20:53

## 2020-06-30 RX ADMIN — VENLAFAXINE HYDROCHLORIDE 150 MG: 75 CAPSULE, EXTENDED RELEASE ORAL at 20:54

## 2020-06-30 RX ADMIN — VITAMIN C 1 TABLET: TAB at 08:31

## 2020-06-30 RX ADMIN — MULTIPLE VITAMINS W/ MINERALS TAB 1 TABLET: TAB at 08:30

## 2020-06-30 RX ADMIN — FLUDROCORTISONE ACETATE 0.1 MG: 0.1 TABLET ORAL at 08:30

## 2020-06-30 RX ADMIN — MIDODRINE HYDROCHLORIDE 15 MG: 10 TABLET ORAL at 08:30

## 2020-06-30 RX ADMIN — DOCUSATE SODIUM 100 MG: 100 CAPSULE, LIQUID FILLED ORAL at 08:29

## 2020-06-30 RX ADMIN — DOCUSATE SODIUM 100 MG: 100 CAPSULE, LIQUID FILLED ORAL at 20:53

## 2020-06-30 RX ADMIN — BACLOFEN 20 MG: 20 TABLET ORAL at 20:53

## 2020-06-30 RX ADMIN — FINASTERIDE 5 MG: 5 TABLET, FILM COATED ORAL at 08:30

## 2020-06-30 RX ADMIN — LIDOCAINE 2 PATCH: 50 PATCH TOPICAL at 08:33

## 2020-06-30 RX ADMIN — ZOLPIDEM TARTRATE 5 MG: 5 TABLET ORAL at 20:53

## 2020-06-30 RX ADMIN — ASPIRIN 81 MG: 81 TABLET, COATED ORAL at 08:31

## 2020-06-30 RX ADMIN — GABAPENTIN 800 MG: 400 CAPSULE ORAL at 14:23

## 2020-06-30 RX ADMIN — LOVASTATIN 10 MG: 20 TABLET ORAL at 20:54

## 2020-06-30 RX ADMIN — BACLOFEN 20 MG: 20 TABLET ORAL at 14:23

## 2020-06-30 RX ADMIN — PSEUDOEPHEDRINE HCL 60 MG: 30 TABLET, FILM COATED ORAL at 08:30

## 2020-06-30 RX ADMIN — ENOXAPARIN SODIUM 40 MG: 100 INJECTION SUBCUTANEOUS at 20:56

## 2020-06-30 RX ADMIN — MELATONIN 2000 UNITS: at 08:30

## 2020-06-30 ASSESSMENT — PATIENT HEALTH QUESTIONNAIRE - PHQ9
2. FEELING DOWN, DEPRESSED, IRRITABLE, OR HOPELESS: NOT AT ALL
1. LITTLE INTEREST OR PLEASURE IN DOING THINGS: NOT AT ALL
SUM OF ALL RESPONSES TO PHQ9 QUESTIONS 1 AND 2: 0
1. LITTLE INTEREST OR PLEASURE IN DOING THINGS: NOT AT ALL
SUM OF ALL RESPONSES TO PHQ9 QUESTIONS 1 AND 2: 0
2. FEELING DOWN, DEPRESSED, IRRITABLE, OR HOPELESS: NOT AT ALL

## 2020-06-30 ASSESSMENT — ENCOUNTER SYMPTOMS
NERVOUS/ANXIOUS: 0
VOMITING: 0
DIARRHEA: 0
SHORTNESS OF BREATH: 0
CHILLS: 0
ABDOMINAL PAIN: 0
FEVER: 0
NAUSEA: 0

## 2020-06-30 ASSESSMENT — ACTIVITIES OF DAILY LIVING (ADL)
TUB_SHOWER_TRANSFER_DESCRIPTION: ADAPTIVE EQUIPMENT;GRAB BAR;INCREASED TIME;INITIAL PREPARATION FOR TASK;SET-UP OF EQUIPMENT;SUPERVISION FOR SAFETY;VERBAL CUEING
BED_CHAIR_WHEELCHAIR_TRANSFER_DESCRIPTION: INCREASED TIME;INITIAL PREPARATION FOR TASK;SET-UP OF EQUIPMENT;SUPERVISION FOR SAFETY;VERBAL CUEING

## 2020-06-30 ASSESSMENT — GAIT ASSESSMENTS
GAIT LEVEL OF ASSIST: CONTACT GUARD ASSIST
ASSISTIVE DEVICE: FRONT WHEEL WALKER
DEVIATION: BRADYKINETIC;DECREASED HEEL STRIKE;OTHER (COMMENT)

## 2020-06-30 NOTE — PROGRESS NOTES
"Rehab Progress Note     Encounter Date: 6/30/2020    CC: weakness    Interval Events (Subjective)  Is complaining about vivid, weird dreams last night.  He associates this with the sleeping medication.  He also did not sleep very well last night.  He is trialed sleeping medications in the past cannot remember the names, was not taking anything prior to admission.    He denies any dizziness when standing.  Pressure remained greater than 100 during physical therapy today.  He brought in his front wheel walker to [facility], did not remember bringing it into [facility].  He is concerned that it was left on the ambulance.      Last BM: 06/28/20, multiple large loose incontinence on 6/28    ROS:  Gen: No fatigue, confusion, significant weight loss  Eyes: no blurry vision, double vision or pain in eyes  ENT: no changes in hearing, runny nose, nose bleeds, sinus pain  CV: No CP, palpitations, symptoms of AUTONOMIC DYSREFLEXIA  Lungs: no shortness of breath, changes in secretions, changes in cough, pain with coughing  Abd: No abd pain, nausea, vomiting, pain with eating  : no blood in urine, pain during storage phase, bladder spasms, suprapubic pain  Ext: No swelling in legs, asymmetric atrophy  Neuro: no changes in strength or sensation  Skin: no new ulcers/skin breakdown appreciated by patient  Mood: No changes in mood today, increase in depression or anxiety  Heme: no bruising, or bleeding  Rest of 14 point review of systems is negative    Objective:  Vitals: /78   Pulse (!) 57   Temp 36.6 °C (97.9 °F) (Temporal)   Resp 18   Ht 1.753 m (5' 9.02\")   Wt 78.6 kg (173 lb 4.5 oz)   SpO2 96%   Gen: NAD, Body mass index is 25.58 kg/m².  HEENT: NC/AT, PERRLA, moist mucous membranes  Cardio: RRR, no mumurs  Pulm: CTAB, with normal respiratory effort  Abd: Soft NTND, active bowel sounds  Ext: No peripheral edema. No calf tenderness. No clubbing/cyanosis. +dorsal pedalis pulses right and BKA on the " left.      Skin: Incision is clean dry and intact, sutures in place, no signs of dehiscence    Tone: Hip flexion spasticity, 0-1/4    Recent Results (from the past 72 hour(s))   Basic Metabolic Panel    Collection Time: 06/30/20  5:44 AM   Result Value Ref Range    Sodium 143 135 - 145 mmol/L    Potassium 3.9 3.6 - 5.5 mmol/L    Chloride 107 96 - 112 mmol/L    Co2 25 20 - 33 mmol/L    Glucose 93 65 - 99 mg/dL    Bun 18 8 - 22 mg/dL    Creatinine 0.70 0.50 - 1.40 mg/dL    Calcium 9.1 8.5 - 10.5 mg/dL    Anion Gap 11.0 7.0 - 16.0   ESTIMATED GFR    Collection Time: 06/30/20  5:44 AM   Result Value Ref Range    GFR If African American >60 >60 mL/min/1.73 m 2    GFR If Non African American >60 >60 mL/min/1.73 m 2       Current Facility-Administered Medications   Medication Frequency   • fludrocortisone (FLORINEF) tablet 0.1 mg QAM   • pseudoephedrine (SUDAFED) 30 MG tablet 60 mg TID   • sennosides (SENOKOT) 8.6 MG tablet 25.8 mg DAILY AT NOON    And   • docusate sodium (COLACE) capsule 100 mg BID    And   • bisacodyl (THE MAGIC BULLET) suppository 10 mg QDAY    And   • magnesium hydroxide (MILK OF MAGNESIA) suspension 30 mL QDAY PRN   • baclofen (LIORESAL) tablet 20 mg TID   • traZODone (DESYREL) tablet 50 mg QHS   • melatonin tablet 3 mg QHS   • midodrine (PROAMATINE) tablet 15 mg TID   • ferrous sulfate tablet 325 mg QDAY with Breakfast   • gabapentin (NEURONTIN) capsule 800 mg TID   • metFORMIN (GLUCOPHAGE) tablet 250 mg BID AC   • midodrine (PROAMATINE) tablet 5 mg Q4HRS PRN   • aspirin EC (ECOTRIN) tablet 81 mg DAILY   • vitamin D (cholecalciferol) tablet 2,000 Units DAILY   • finasteride (PROSCAR) tablet 5 mg DAILY   • lovastatin (MEVACOR) tablet 10 mg Nightly   • tamsulosin (FLOMAX) capsule 0.4 mg AFTER DINNER   • vitamin B comp+C (ALLBEE WITH C) 1 Tab DAILY   • therapeutic multivitamin-minerals (THERAGRAN-M) tablet 1 Tab DAILY   • Pharmacy Consult Request ...Pain Management Review 1 Each PHARMACY TO DOSE   •  Respiratory Therapy Consult Continuous RT   • tramadol (ULTRAM) 50 MG tablet 50 mg Q4HRS PRN   • oxyCODONE immediate-release (ROXICODONE) tablet 5 mg Q3HRS PRN   • oxyCODONE immediate release (ROXICODONE) tablet 10 mg Q3HRS PRN   • acetaminophen (TYLENOL) tablet 650 mg Q4HRS PRN   • enoxaparin (LOVENOX) inj 40 mg QHS   • artificial tears ophthalmic solution 1 Drop PRN   • benzocaine-menthol (CEPACOL) lozenge 1 Lozenge Q2HRS PRN   • mag hydrox-al hydrox-simeth (MAALOX PLUS ES or MYLANTA DS) suspension 20 mL Q2HRS PRN   • ondansetron (ZOFRAN ODT) dispertab 4 mg 4X/DAY PRN    Or   • ondansetron (ZOFRAN) syringe/vial injection 4 mg 4X/DAY PRN   • traZODone (DESYREL) tablet 50 mg QHS PRN   • sodium chloride (OCEAN) 0.65 % nasal spray 2 Spray PRN   • lidocaine (LIDODERM) 5 % 2 Patch Daily-0800   • aripiprazole (ABILIFY) tablet 10 mg DAILY   • venlafaxine XR (EFFEXOR XR) capsule 150 mg BID       Orders Placed This Encounter   Procedures   • Diet Order Diabetic     Standing Status:   Standing     Number of Occurrences:   1     Order Specific Question:   Diet:     Answer:   Diabetic [3]       Assessment:  Active Hospital Problems    Diagnosis   • *Cervical myelopathy (HCC)   • Postoperative pain   • Deep vein thrombosis (HCC)   • Insomnia due to medical condition   • Neurogenic bowel   • Depression   • Thoracic myelopathy   • Neuropathic pain   • Hyperlipidemia   • Hypertension   • S/P BKA (below knee amputation) (HCC)       Medical Decision Making and Plan:  C2 AIS D, 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  Status post multiple spine surgeries, C3-T7 posterior spinal fusion on 6/5, T12-pelvis posterior spinal fusion on 6/10 by Dr Marsh.   - C-collar on at all times, Lyman collar when in shower to be cleared by neurosurgery as outpatient, 8-12 weeks  -  Sutures to be removed 21 days postop, prolonged time secondary to repeat surgeries  - Prolonged discussion with patient  and wife about expectations of acute rehabilitation, plan for discharge home in 1 to 2 weeks with transition to home health and then outpatient therapy.  -Continue comprehensive acute inpatient rehabilitation    Left BKA: Using Sac  for prosthetists, not following up with physicians  - Prosthesis at bedside  -Follow-up with Dr. 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 noon, decrease Colace 100 mg twice daily, Dulcolax suppository with digital stimulation,   -DC MiraLAX 1 packet daily  -Discussed program with patient and importance of appropriate management of neurogenic bowel with repercussions of colonic dilation and possible rupture  -KUB showed no significant stool load in ascending or transverse colon      orthostatic hypotension: On 6/19- 82/52 with change in position  Because of significant autonomic dysfunction associated with spinal cord injury, the patient is at high risk for orthostatic hypotension.  - FAMILIA hose on prior to out of bed  -midodrine 15 mg 3 times daily  -Florinef 0.1 mg every morning, check BMP in a.m., monitor potassium level and sodium level,   - P.o. fluid intake, goal 1.5-2 L/day  -Monitor strict I's and O's  -Consult hospitalist, discussion with hospitalist about discontinuing lisinopril  - Amlodipine was DC'd  -Sudafed 60 mg 3 times daily    Spasticity: MAS  of 0-1/4 jumping of bilateral lower extremities, greater in hip flexors, waking him up at night  - Baclofen 20 mg 3 times daily, with significant improvement     Decreased range of motion of right hip: In differential diagnosis includes heterotopic ossification of the right hip after multiple spinal surgeries.  Improved with stretching during acute rehabilitation  - x-ray right hip, showed no signs of heterotopic ossification  -CRP is less than 2 likely negative     Pain:  #Neuropathic pain: Neuropathy, complicated by diabetes and a cervical myelopathy  - Gabapentin 800 mg tid  - DCTylenol 1000 mg 3 times daily     #Postoperative pain:  -Tramadol  mg every 4 hours as needed moderate to severe pain first-line  -Oxycodone 5-10 mg every 3 hours as needed moderate to severe pain, second line  - DC Tylenol 1000 mg 3 times daily  -Lidocaine patches to either side of incision on for 12 hours off for 12 hours     Hypertension:  - All oral antihypertensives have been DC'd  -Appreciate hospitalist input     Type 2 diabetes: With hyperglycemia  -Metformin 500 mg twice daily  -Appreciate hospitalist input    Insomnia:  -DC Remeron 15 mg nightly, given vivid dreams  - Trial Ambien 5mg qhs  - Melatonin 3 mg nightly     Depression:  -Effexor  mg twice daily     Vitamin D deficiency  -Vitamin D pending     DVT prophylaxis: Patient has previous history of DVT in 2011 making him higher risk for clot formation  -Lovenox 40 mg daily    Patient was seen for 26 minutes on unit/floor of which > 50% of time was spent on counseling and coordination of care regarding the above, including prognosis, risk reduction, benefits of treatment, and options for next stage of care including insomnia, DC trazodone, trial Ambien 5 mg nightly, monitor for grogginess and vivid dreams, orthostatic hypotension, check BMP in a.m. given Jeronimo..    Rj Navarro D.O.

## 2020-06-30 NOTE — DISCHARGE PLANNING
"Met w/ patient at bedside after IDT to review recommendations & targeted DC date 7.7.2020. States \"only a week from tomorrow? That soon? I don't want to leave.\" Reviewed post acute mgmt & recovery mgmt. Discussed family training w/ wife will be set up prior to discharge[place] Discussed HH referral to [facility] . Has wheelchair & walker, prev left BKA. Questions answered. Emotional support provided.   "
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
## 2020-06-30 NOTE — THERAPY
Physical Therapy   Daily Treatment     Patient Name: Gaurav Lopez  Age:  75 y.o., Sex:  male  Medical Record #: [id number]  Today's Date: 6/30/2020     Precautions  Precautions: Fall Risk, Other (See Comments)  Comments: LLE prosthesis    Subjective    Patient agreeable to PT; reports not sleeping well last night but otherwise feeling good.     Objective       06/30/20 0831   Vitals   Pulse 78   Patient BP Position Sitting   Blood Pressure  124/73   O2 (LPM) 0   O2 Delivery Device None - Room Air   Vitals Comments without abdominal binder today (standing BP at 112/55)   Gait Functional Level of Assist    Gait Level Of Assist Contact Guard Assist   Assistive Device Front Wheel Walker  (and Gait belt)   Distance (Feet)   (140 ft and 180 ft indoors)   # of Times Distance was Traveled   ( )   Deviation Bradykinetic;Decreased Heel Strike;Other (Comment)  (heavy anterior lean/ UE use with fatigue, L toe catch)   Bed Mobility    Sit to Stand   (Max A for 1st sit<>stand, Min A to CGA for next 2 reps. FWW)   Interdisciplinary Plan of Care Collaboration   IDT Collaboration with  Nursing;Other (See Comments)   Patient Position at End of Therapy Seated;Self Releasing Lap Belt Applied;Call Light within Reach;Tray Table within Reach;Phone within Reach   Collaboration Comments RN meds admin initially; SPT assistance throughout treatment   PT Total Time Spent   PT Individual Total Time Spent (Mins) 30   PT Charge Group   PT Gait Training 1   PT Therapeutic Activities 1       Assessment    Mild cognitive impairments during first sit<>stand rep today but BP within normal range; BP improved today compared with session later yesterday; good participation and ambulation outside of a structured therapy area; no anxiety noted; endurance is decreased today but improved activity tolerance noted.    Strengths: Able to follow instructions, Effective communication skills, Independent prior level of function, Pleasant and cooperative, Willingly  participates in therapeutic activities  Barriers: Bladder incontinence, Bowel incontinence, Confused, Decreased endurance, Generalized weakness, Home accessibility, Hypotension, Orthostatic hypotension, Impaired activity tolerance, Impaired balance, Impaired carryover of learning, Limited mobility    Plan    Monitor BP, STS work, standing posture & balance, progress ambulation distance with FWW, safety with transfers, LE & core strengthening.  Monitor pt's fit of L BKA prosthesis, re-assess pt's type of walker at home and its condition, and anticipate d/c at SPV level about 7/7/2020.      Physical Therapy Problems     Problem: Mobility     Dates: Start: 06/19/20       Goal: STG-Within one week, patient will propel wheelchair community     Dates: Start: 06/19/20       Description: 1) Individualized goal:  BLE propulsion x150 feet, c/s collar, L BKA prosthesis, gait belt, at SPV level  2) Interventions:  PT Group Therapy, PT E Stim Attended, PT Prosthetic Training, PT Gait Training, PT Therapeutic Exercises, PT TENS Application, PT Neuro Re-Ed/Balance, PT Therapeutic Activity, and PT Manual Therapy      Note:     Goal Note filed on 06/29/20 0820 by Teofilo Vázquez, PT    [facility] w/c mobility for 100 feet on last attempt                  Goal: STG-Within one week, patient will ambulate household distance     Dates: Start: 06/29/20       Description: 1) Individualized goal:  150 feet with FWW, c/s collar, L BKA prosthesis, at CGA level  2) Interventions:  PT Group Therapy, PT E Stim Attended, PT Prosthetic Training, PT Gait Training, PT Therapeutic Exercises, PT TENS Application, PT Neuro Re-Ed/Balance, PT Therapeutic Activity, and PT Manual Therapy            Goal: STG-Within one week, patient will ascend and descend four to six stairs     Dates: Start: 06/29/20       Description: 1) Individualized goal:  with 1 railing, c/s collar, L BKA prosthesis, gait belt, at Mod A level  2) Interventions:  PT Group Therapy, PT  E Stim Attended, PT Prosthetic Training, PT Gait Training, PT Therapeutic Exercises, PT TENS Application, PT Neuro Re-Ed/Balance, PT Therapeutic Activity, and PT Manual Therapy                  Problem: Mobility Transfers     Dates: Start: 06/19/20       Goal: STG-Within one week, patient will sit to stand     Dates: Start: 06/19/20       Description: 1) Individualized goal:  // bars, c/s collar, L BKA prosthesis, gait belt, at Min A level consistently  2) Interventions:  PT Group Therapy, PT E Stim Attended, PT Prosthetic Training, PT Gait Training, PT Therapeutic Exercises, PT TENS Application, PT Neuro Re-Ed/Balance, PT Therapeutic Activity, and PT Manual Therapy    Note:     Goal Note filed on 06/29/20 0820 by Teofilo Vázquez, PT    Min-Mod A at this time                  Goal: STG-Within one week, patient will transfer bed to chair     Dates: Start: 06/29/20       Description: 1) Individualized goal:  with FWW, c/s collar, L BKA prosthesis, at CGA level  2) Interventions:  PT Group Therapy, PT E Stim Attended, PT Prosthetic Training, PT Gait Training, PT Therapeutic Exercises, PT TENS Application, PT Neuro Re-Ed/Balance, PT Therapeutic Activity, and PT Manual Therapy                    Problem: PT-Long Term Goals     Dates: Start: 06/19/20       Goal: LTG-By discharge, patient will ambulate     Dates: Start: 06/19/20       Description: 1) Individualized goal:  150 feet with FWW, c/s collar, L BKA prosthesis, at SBA level  2) Interventions:  PT Group Therapy, PT E Stim Attended, PT Prosthetic Training, PT Gait Training, PT Therapeutic Exercises, PT TENS Application, PT Neuro Re-Ed/Balance, PT Therapeutic Activity, and PT Manual Therapy          Goal: LTG-By discharge, patient will transfer one surface to another     Dates: Start: 06/19/20       Description: 1) Individualized goal:  with FWW, c/s collar, L BKA prosthesis, at SBA level  2) Interventions:  PT Group Therapy, PT E Stim Attended, PT Prosthetic  Training, PT Gait Training, PT Therapeutic Exercises, PT TENS Application, PT Neuro Re-Ed/Balance, PT Therapeutic Activity, and PT Manual Therapy            Goal: LTG-By discharge, patient will ambulate up/down 4-6 stairs     Dates: Start: 06/19/20       Description: 1) Individualized goal:  with 1 railing, c/s collar, L BKA prosthesis, gait belt, at Min A level  2) Interventions:  PT Group Therapy, PT E Stim Attended, PT Prosthetic Training, PT Gait Training, PT Therapeutic Exercises, PT TENS Application, PT Neuro Re-Ed/Balance, PT Therapeutic Activity, and PT Manual Therapy          Goal: LTG-By discharge, patient will transfer in/out of a car     Dates: Start: 06/19/20       Description: 1) Individualized goal:  with FWW, c/s collar, L BKA prosthesis, at SBA level  2) Interventions:  PT Group Therapy, PT E Stim Attended, PT Prosthetic Training, PT Gait Training, PT Therapeutic Exercises, PT TENS Application, PT Neuro Re-Ed/Balance, PT Therapeutic Activity, and PT Manual Therapy

## 2020-06-30 NOTE — THERAPY
"Occupational Therapy  Daily Treatment     Patient Name: Gaurav Lopez  Age:  75 y.o., Sex:  male  Medical Record #: [id number]  Today's Date: 6/30/2020     Precautions  Precautions: Fall Risk, Other (See Comments)  Comments: LLE prosthesis    Safety   ADL Safety : Impaired, Requires Supervision for Safety  Bathroom Safety: Impaired, Requires Supervision for Safety    Subjective    \"I need help with my lower body dressing.\"      Objective       06/30/20 0701   Precautions   Precautions Fall Risk;Other (See Comments)   Comments LLE prosthesis   Pain   Intervention Declines   Pain 0 - 10 Group   Pain Rating Scale (NPRS) 0   Therapist Pain Assessment Post Activity Pain Same as Prior to Activity   Cognition    Level of Consciousness Alert   Functional Level of Assist   Eating Modified Independent   Eating Description Increased time;Insert dentures   Bathing Maximal Assist   Bathing Description Grab bar;Hand held shower;Adaptive equipment;Assit with back;Assit wtih lower extremities;Assit with perineal;Increased time;Initial preparation for task;Set-up of equipment;Supervision for safety;Verbal cueing   Upper Body Dressing Moderate Assist   Upper Body Dressing Description Verbal cueing;Supervision for safety;Set-up of equipment;Initial preparation for task;Increased time;Assist with pulling shirt over head   Lower Body Dressing Maximal Assist   Lower Body Dressing Description Grab bar;Cues for spinal precautions;Assist with threading into pant leg;Application of orthotic or brace;Initial preparation for task;Increased time;Set-up of equipment;Supervision for safety;Verbal cueing   Bed, Chair, Wheelchair Transfer Moderate Assist   Bed Chair Wheelchair Transfer Description Increased time;Initial preparation for task;Set-up of equipment;Supervision for safety;Verbal cueing   Tub / Shower Transfers Minimal Assist   Tub Shower Transfer Description Adaptive equipment;Grab bar;Increased time;Initial preparation for task;Set-up of " equipment;Supervision for safety;Verbal cueing  (walking xfer with FWW)   Interdisciplinary Plan of Care Collaboration   Patient Position at End of Therapy Seated;Self Releasing Lap Belt Applied;Call Light within Reach;Tray Table within Reach;Phone within Reach   OT Total Time Spent   OT Individual Total Time Spent (Mins) 75   OT Charge Group   OT Self Care / ADL 5       Assessment    Pt tolerated session well. Pt completed shower/dressing assessment this morning. Pt uses a rolling shower chair with removable arms in shower stall, might be able to complete next shower without chair to increase pt's independence with showering, Pt will benefit from use of long handled sponge to reach RLE and back due to limited ROM at B shoulders. Pt requires increased time for all tasks and would benefit from having 90 minutes for shower assessment next time to give adequate time to complete more tasks on his own. Pt completed shower transfer with FWW, ambulated from bed to bathroom and back with CGA. Pt required assist to don/doff c-collar. No c/o pain throughout session.     Strengths: Able to follow instructions, Adequate strength, Alert and oriented, Effective communication skills, Independent prior level of function  Barriers: Decreased endurance, Impaired activity tolerance    Plan    Educate pt on AE for LBD- reacher, sock aid, dressing stick; Focus on ADLs, transfers, activity tolerance and functional use of UEs, standing bal/tolerance.     Occupational Therapy Goals     Problem: Dressing     Dates: Start: 06/29/20       Goal: STG-Within one week, patient will dress LB     Dates: Start: 06/29/20       Description: 1) Individualized Goal:  min assist lower body dressing, using appropriate AE  2) Interventions:  OT Self Care/ADL, OT Therapeutic Activity, and OT Therapeutic Exercise                  Problem: OT Long Term Goals     Dates: Start: 06/19/20       Goal: LTG-By discharge, patient will complete basic self care tasks      Dates: Start: 06/19/20       Description: 1) Individualized Goal:  Mod I for BADL's, Sup/SBA for shower via AE/DME PRN  2) Interventions:  OT Self Care/ADL, OT Neuro Re-Ed/Balance, OT Therapeutic Activity, OT Evaluation, and OT Therapeutic Exercise            Goal: LTG-By discharge, patient will perform bathroom transfers     Dates: Start: 06/19/20                   Problem: Toileting     Dates: Start: 06/19/20       Goal: STG-Within one week, patient will complete toileting tasks     Dates: Start: 06/19/20       Description: 1) Individualized Goal:  Max assist for toileting task via AE/DME PRN  2) Interventions:  OT Self Care/ADL, OT Neuro Re-Ed/Balance, OT Therapeutic Activity, OT Evaluation, and OT Therapeutic Exercise      Note:     Goal Note filed on 06/29/20 1106 by Cindy Hess, OT    Total assist for toiletting tasks

## 2020-06-30 NOTE — CARE PLAN
Problem: Safety  Goal: Will remain free from injury  Outcome: PROGRESSING AS EXPECTED  Note: Reviewed fall and safety precautions with patient and reminded patient to call for assistance before getting out of bed. Patient verbalized understanding and has not attempted self transfer this shift.      Problem: Pain Management  Goal: Pain level will decrease to patient's comfort goal  Outcome: PROGRESSING AS EXPECTED  Note: Patient denied any pain during shift.

## 2020-06-30 NOTE — THERAPY
"Occupational Therapy  Daily Treatment     Patient Name: Gaurav Lopez  Age:  75 y.o., Sex:  male  Medical Record #: [id number]  Today's Date: 6/30/2020     Precautions  Precautions: (P) Fall Risk, Other (See Comments), Cervical Collar    Comments: (P) LLE prosthesis    Safety   ADL Safety : Impaired, Requires Supervision for Safety  Bathroom Safety: Impaired, Requires Supervision for Safety    Subjective    \"Be careful, it's going to bit me.\" pt referring to the electric razor      Objective       06/30/20 1001   Precautions   Precautions Fall Risk;Other (See Comments);Cervical Collar     Comments LLE prosthesis   Pain   Intervention Declines   Pain 0 - 10 Group   Pain Rating Scale (NPRS) 0   Therapist Pain Assessment Post Activity Pain Same as Prior to Activity   Cognition    Level of Consciousness Alert   Functional Level of Assist   Grooming Minimal Assist   Grooming Description Increased time;Initial preparation for task;Seated in wheelchair at sink;Set-up of equipment;Supervision for safety;Verbal cueing  (shaved face w/ electric and straight razor)   Interdisciplinary Plan of Care Collaboration   Patient Position at End of Therapy Seated;Self Releasing Lap Belt Applied;Call Light within Reach;Tray Table within Reach;Phone within Reach;Family / Friend in Room  (wife present )   OT Total Time Spent   OT Individual Total Time Spent (Mins) 45   OT Charge Group   OT Self Care / ADL 3       Assessment    Pt tolerated session well. Pt very motivated to shave face at sink side. Pt required min a for thoroughness using electric razor on L side of face and around the ears and under the chin. Pt able to use straight razor on cheeks afterward with shaving cream and SBA.     Strengths: Able to follow instructions, Adequate strength, Alert and oriented, Effective communication skills, Independent prior level of function  Barriers: Decreased endurance, Impaired activity tolerance    Plan    Educate pt on AE for LBD- reacher, " sock aid, dressing stick; Focus on ADLs, transfers, activity tolerance and functional use of UEs, standing bal/tolerance.     Occupational Therapy Goals     Problem: Dressing     Dates: Start: 06/29/20       Goal: STG-Within one week, patient will dress LB     Dates: Start: 06/29/20       Description: 1) Individualized Goal:  min assist lower body dressing, using appropriate AE  2) Interventions:  OT Self Care/ADL, OT Therapeutic Activity, and OT Therapeutic Exercise                  Problem: OT Long Term Goals     Dates: Start: 06/19/20       Goal: LTG-By discharge, patient will complete basic self care tasks     Dates: Start: 06/19/20       Description: 1) Individualized Goal:  Mod I for BADL's, Sup/SBA for shower via AE/DME PRN  2) Interventions:  OT Self Care/ADL, OT Neuro Re-Ed/Balance, OT Therapeutic Activity, OT Evaluation, and OT Therapeutic Exercise            Goal: LTG-By discharge, patient will perform bathroom transfers     Dates: Start: 06/19/20                   Problem: Toileting     Dates: Start: 06/19/20       Goal: STG-Within one week, patient will complete toileting tasks     Dates: Start: 06/19/20       Description: 1) Individualized Goal:  Max assist for toileting task via AE/DME PRN  2) Interventions:  OT Self Care/ADL, OT Neuro Re-Ed/Balance, OT Therapeutic Activity, OT Evaluation, and OT Therapeutic Exercise      Note:     Goal Note filed on 06/29/20 1106 by Cindy Hess, OT    Total assist for toiletting tasks                               6

## 2020-07-01 LAB
ANION GAP SERPL CALC-SCNC: 10 MMOL/L (ref 7–16)
BUN SERPL-MCNC: 15 MG/DL (ref 8–22)
CALCIUM SERPL-MCNC: 9.1 MG/DL (ref 8.5–10.5)
CHLORIDE SERPL-SCNC: 105 MMOL/L (ref 96–112)
CO2 SERPL-SCNC: 27 MMOL/L (ref 20–33)
CREAT SERPL-MCNC: 0.76 MG/DL (ref 0.5–1.4)
GLUCOSE SERPL-MCNC: 83 MG/DL (ref 65–99)
POTASSIUM SERPL-SCNC: 3.8 MMOL/L (ref 3.6–5.5)
SODIUM SERPL-SCNC: 142 MMOL/L (ref 135–145)

## 2020-07-01 PROCEDURE — 700102 HCHG RX REV CODE 250 W/ 637 OVERRIDE(OP): Performed by: PHYSICAL MEDICINE & REHABILITATION

## 2020-07-01 PROCEDURE — 97530 THERAPEUTIC ACTIVITIES: CPT

## 2020-07-01 PROCEDURE — A9270 NON-COVERED ITEM OR SERVICE: HCPCS | Performed by: PHYSICAL MEDICINE & REHABILITATION

## 2020-07-01 PROCEDURE — A9270 NON-COVERED ITEM OR SERVICE: HCPCS | Performed by: HOSPITALIST

## 2020-07-01 PROCEDURE — 36415 COLL VENOUS BLD VENIPUNCTURE: CPT

## 2020-07-01 PROCEDURE — 97116 GAIT TRAINING THERAPY: CPT

## 2020-07-01 PROCEDURE — 700101 HCHG RX REV CODE 250: Performed by: PHYSICAL MEDICINE & REHABILITATION

## 2020-07-01 PROCEDURE — 97110 THERAPEUTIC EXERCISES: CPT

## 2020-07-01 PROCEDURE — 700111 HCHG RX REV CODE 636 W/ 250 OVERRIDE (IP): Performed by: PHYSICAL MEDICINE & REHABILITATION

## 2020-07-01 PROCEDURE — 770010 HCHG ROOM/CARE - REHAB SEMI PRIVAT*

## 2020-07-01 PROCEDURE — 700102 HCHG RX REV CODE 250 W/ 637 OVERRIDE(OP): Performed by: HOSPITALIST

## 2020-07-01 PROCEDURE — 80048 BASIC METABOLIC PNL TOTAL CA: CPT

## 2020-07-01 PROCEDURE — 99231 SBSQ HOSP IP/OBS SF/LOW 25: CPT | Performed by: HOSPITALIST

## 2020-07-01 PROCEDURE — 97535 SELF CARE MNGMENT TRAINING: CPT

## 2020-07-01 PROCEDURE — 700112 HCHG RX REV CODE 229: Performed by: PHYSICAL MEDICINE & REHABILITATION

## 2020-07-01 PROCEDURE — 99232 SBSQ HOSP IP/OBS MODERATE 35: CPT | Performed by: PHYSICAL MEDICINE & REHABILITATION

## 2020-07-01 RX ORDER — DOCUSATE SODIUM 100 MG/1
200 CAPSULE, LIQUID FILLED ORAL 2 TIMES DAILY
Status: DISCONTINUED | OUTPATIENT
Start: 2020-07-01 | End: 2020-07-08 | Stop reason: HOSPADM

## 2020-07-01 RX ORDER — SENNOSIDES A AND B 8.6 MG/1
25.8 TABLET, FILM COATED ORAL
Status: DISCONTINUED | OUTPATIENT
Start: 2020-07-01 | End: 2020-07-08 | Stop reason: HOSPADM

## 2020-07-01 RX ORDER — BISACODYL 10 MG/1
10 SUPPOSITORY RECTAL
Status: DISCONTINUED | OUTPATIENT
Start: 2020-07-01 | End: 2020-07-08 | Stop reason: HOSPADM

## 2020-07-01 RX ADMIN — MIDODRINE HYDROCHLORIDE 15 MG: 10 TABLET ORAL at 17:31

## 2020-07-01 RX ADMIN — PSEUDOEPHEDRINE HCL 60 MG: 30 TABLET, FILM COATED ORAL at 11:39

## 2020-07-01 RX ADMIN — FERROUS SULFATE TAB 325 MG (65 MG ELEMENTAL FE) 325 MG: 325 (65 FE) TAB at 08:05

## 2020-07-01 RX ADMIN — LIDOCAINE 2 PATCH: 50 PATCH TOPICAL at 08:08

## 2020-07-01 RX ADMIN — ENOXAPARIN SODIUM 40 MG: 100 INJECTION SUBCUTANEOUS at 21:00

## 2020-07-01 RX ADMIN — FINASTERIDE 5 MG: 5 TABLET, FILM COATED ORAL at 08:06

## 2020-07-01 RX ADMIN — DOCUSATE SODIUM 100 MG: 100 CAPSULE, LIQUID FILLED ORAL at 08:06

## 2020-07-01 RX ADMIN — BACLOFEN 20 MG: 20 TABLET ORAL at 08:06

## 2020-07-01 RX ADMIN — MELATONIN 3 MG: at 21:00

## 2020-07-01 RX ADMIN — ASPIRIN 81 MG: 81 TABLET, COATED ORAL at 08:06

## 2020-07-01 RX ADMIN — MULTIPLE VITAMINS W/ MINERALS TAB 1 TABLET: TAB at 08:06

## 2020-07-01 RX ADMIN — METFORMIN HYDROCHLORIDE 250 MG: 500 TABLET ORAL at 17:31

## 2020-07-01 RX ADMIN — FLUDROCORTISONE ACETATE 0.1 MG: 0.1 TABLET ORAL at 08:06

## 2020-07-01 RX ADMIN — BACLOFEN 20 MG: 20 TABLET ORAL at 14:46

## 2020-07-01 RX ADMIN — BACLOFEN 20 MG: 20 TABLET ORAL at 21:00

## 2020-07-01 RX ADMIN — BISACODYL 10 MG: 10 SUPPOSITORY RECTAL at 21:00

## 2020-07-01 RX ADMIN — METFORMIN HYDROCHLORIDE 250 MG: 500 TABLET ORAL at 08:05

## 2020-07-01 RX ADMIN — MIDODRINE HYDROCHLORIDE 15 MG: 10 TABLET ORAL at 05:09

## 2020-07-01 RX ADMIN — MELATONIN 2000 UNITS: at 08:06

## 2020-07-01 RX ADMIN — VENLAFAXINE HYDROCHLORIDE 150 MG: 75 CAPSULE, EXTENDED RELEASE ORAL at 08:06

## 2020-07-01 RX ADMIN — LOVASTATIN 10 MG: 20 TABLET ORAL at 21:00

## 2020-07-01 RX ADMIN — GABAPENTIN 800 MG: 400 CAPSULE ORAL at 14:46

## 2020-07-01 RX ADMIN — PSEUDOEPHEDRINE HCL 60 MG: 30 TABLET, FILM COATED ORAL at 17:31

## 2020-07-01 RX ADMIN — GABAPENTIN 800 MG: 400 CAPSULE ORAL at 21:00

## 2020-07-01 RX ADMIN — STANDARDIZED SENNA CONCENTRATE 25.8 MG: 8.6 TABLET ORAL at 11:39

## 2020-07-01 RX ADMIN — MIDODRINE HYDROCHLORIDE 15 MG: 10 TABLET ORAL at 11:39

## 2020-07-01 RX ADMIN — VENLAFAXINE HYDROCHLORIDE 150 MG: 75 CAPSULE, EXTENDED RELEASE ORAL at 21:00

## 2020-07-01 RX ADMIN — ARIPIPRAZOLE 10 MG: 5 TABLET ORAL at 08:06

## 2020-07-01 RX ADMIN — VITAMIN C 1 TABLET: TAB at 08:05

## 2020-07-01 RX ADMIN — GABAPENTIN 800 MG: 400 CAPSULE ORAL at 08:05

## 2020-07-01 RX ADMIN — DOCUSATE SODIUM 200 MG: 100 CAPSULE, LIQUID FILLED ORAL at 21:00

## 2020-07-01 RX ADMIN — ZOLPIDEM TARTRATE 5 MG: 5 TABLET ORAL at 21:00

## 2020-07-01 ASSESSMENT — PATIENT HEALTH QUESTIONNAIRE - PHQ9
2. FEELING DOWN, DEPRESSED, IRRITABLE, OR HOPELESS: NOT AT ALL
SUM OF ALL RESPONSES TO PHQ9 QUESTIONS 1 AND 2: 0
SUM OF ALL RESPONSES TO PHQ9 QUESTIONS 1 AND 2: 0
1. LITTLE INTEREST OR PLEASURE IN DOING THINGS: NOT AT ALL
1. LITTLE INTEREST OR PLEASURE IN DOING THINGS: NOT AT ALL
2. FEELING DOWN, DEPRESSED, IRRITABLE, OR HOPELESS: NOT AT ALL

## 2020-07-01 ASSESSMENT — GAIT ASSESSMENTS
ASSISTIVE DEVICE: FRONT WHEEL WALKER
ASSISTIVE DEVICE: FRONT WHEEL WALKER
DEVIATION: BRADYKINETIC;DECREASED HEEL STRIKE
GAIT LEVEL OF ASSIST: CONTACT GUARD ASSIST
GAIT LEVEL OF ASSIST: CONTACT GUARD ASSIST
DISTANCE (FEET): 225
DISTANCE (FEET): 380
DEVIATION: BRADYKINETIC;DECREASED HEEL STRIKE

## 2020-07-01 ASSESSMENT — ENCOUNTER SYMPTOMS
BLURRED VISION: 0
FEVER: 0
SHORTNESS OF BREATH: 0
PALPITATIONS: 0
VOMITING: 0
HEADACHES: 0
DIZZINESS: 0
NAUSEA: 0
HALLUCINATIONS: 0

## 2020-07-01 ASSESSMENT — ACTIVITIES OF DAILY LIVING (ADL): BED_CHAIR_WHEELCHAIR_TRANSFER_DESCRIPTION: ADAPTIVE EQUIPMENT

## 2020-07-01 NOTE — THERAPY
Recreational Therapy  Daily Treatment     Patient Name: Gaurav Lopez  AGE:  75 y.o., SEX:  male  Medical Record #: [id number]  Today's Date: 7/1/2020       Subjective    Pt was on the phone upon entering room. Pt requested this writer write down a number from the person that was on the phone with him.      Objective       07/01/20 1401   Functional Ability Status - Cognitive   Attention Span Remains on Task   Comprehension Follows Three Step Commands   Judgment Able to Make Independent Decisions   Functional Ability Status - Emotional    Affect Appropriate   Mood Appropriate   Behavior Appropriate   Skilled Intervention    Skilled Intervention Gross Motor Leisure   Skilled Intervention Comments Standing duel tasking   Interdisciplinary Plan of Care Collaboration   Patient Position at End of Therapy Seated;Tray Table within Reach;Call Light within Reach   Strengths & Barriers   Strengths Able to follow instructions;Effective communication skills;Good carryover of learning;Motivated for self care and independence;Pleasant and cooperative;Willingly participates in therapeutic activities   Barriers Decreased endurance;Generalized weakness  (R BKA)   Treatment Time   Total Time Spent (mins) 30   Procedural Tracking   Procedural Tracking Gross Motor Functional Leisure Skills       Assessment    Pt stood for 10 minutes x1 CGA with Min A for his sit to stands. Cueing for upright posture.     Strengths: (P) Able to follow instructions, Effective communication skills, Good carryover of learning, Motivated for self care and independence, Pleasant and cooperative, Willingly participates in therapeutic activities  Barriers: (P) Decreased endurance, Generalized weakness(R BKA)    Plan    Continue standing duel tasking.

## 2020-07-01 NOTE — THERAPY
"Occupational Therapy  Daily Treatment     Patient Name: Gaurav Lopez  Age:  75 y.o., Sex:  male  Medical Record #: [id number]  Today's Date: 7/1/2020     Precautions  Precautions: (P) Fall Risk, Cervical Collar  , Other (See Comments)  Comments: (P) LLE prosthesis    Safety   ADL Safety : (P) Requires Supervision for Safety, Requires Physical Assist for Safety, Impaired  Bathroom Safety: Impaired, Requires Supervision for Safety    Subjective    \"It does get easier after your try a few times.\"  (sock aid, reacher)      Objective       07/01/20 0831   Precautions   Precautions Fall Risk;Cervical Collar  ;Other (See Comments)   Comments LLE prosthesis   Safety    ADL Safety  Requires Supervision for Safety;Requires Physical Assist for Safety;Impaired   Pain 0 - 10 Group   Therapist Pain Assessment During Activity;0   Non Verbal Descriptors   Non Verbal Scale  Calm   Cognition    Cognition / Consciousness WDL   Orientation Level Oriented x 4   Level of Consciousness Alert   Ability To Follow Commands 2 Step   New Learning Impaired  (Consistent cues/reinforcement with AE)   Attention Impaired   ABS (Agitated Behavior Scale)   Agitated Behavior Scale Performed No   Sleep/Wake Cycle   Sleep & Rest Awake   Functional Level of Assist   Grooming Modified Independent   Grooming Description Increased time;Seated in wheelchair at sink   Lower Body Dressing Moderate Assist   Lower Body Dressing Description Sock aid;Shoe horn;Reacher  (Blocked don/doff practice with AE)   Strengths & Barriers   Strengths Able to follow instructions;Adequate strength;Alert and oriented;Effective communication skills;Independent prior level of function   Barriers Decreased endurance;Impaired activity tolerance   OT Total Time Spent   OT Individual Total Time Spent (Mins) 60   OT Charge Group   Charges Yes   OT Self Care / ADL 3   OT Therapy Activity 1       Assessment    Patient pleasant and cooperative with LB AE focused treatment following seated " oral care activity at sink-side.  In-room mobility completed from FWW level at a SBA level secondary to fair environmental safety awareness and fair- dynamic standing balance.  LB dressing adaptive equipment introduced with focus on footwear don/dof.  Visual cues and verbal reinforcement required to sequence sock-aid donning and shoe management. With leg , patient is able to place RLE in figure four position and maintain position without external support to don lace up shoes. Elastic shoelace placed to minimize FMC demands. Despite multiple repetitions, patient requires consistent cues to complete footwear dressing with adaptive equipment.     Strengths: (P) Able to follow instructions, Adequate strength, Alert and oriented, Effective communication skills, Independent prior level of function  Barriers: (P) Decreased endurance, Impaired activity tolerance    Plan    Continue AE reinforcement for LB dressing.  Figure four from supine with leg  may be a good option to minimize AE involvement if patient can tolerate position.     Occupational Therapy Goals     Problem: Dressing     Dates: Start: 06/29/20       Goal: STG-Within one week, patient will dress LB     Dates: Start: 06/29/20       Description: 1) Individualized Goal:  min assist lower body dressing, using appropriate AE  2) Interventions:  OT Self Care/ADL, OT Therapeutic Activity, and OT Therapeutic Exercise                  Problem: OT Long Term Goals     Dates: Start: 06/19/20       Goal: LTG-By discharge, patient will complete basic self care tasks     Dates: Start: 06/19/20       Description: 1) Individualized Goal:  Mod I for BADL's, Sup/SBA for shower via AE/DME PRN  2) Interventions:  OT Self Care/ADL, OT Neuro Re-Ed/Balance, OT Therapeutic Activity, OT Evaluation, and OT Therapeutic Exercise            Goal: LTG-By discharge, patient will perform bathroom transfers     Dates: Start: 06/19/20                   Problem: Toileting     Dates:  Start: 06/19/20       Goal: STG-Within one week, patient will complete toileting tasks     Dates: Start: 06/19/20       Description: 1) Individualized Goal:  Max assist for toileting task via AE/DME PRN  2) Interventions:  OT Self Care/ADL, OT Neuro Re-Ed/Balance, OT Therapeutic Activity, OT Evaluation, and OT Therapeutic Exercise      Note:     Goal Note filed on 06/29/20 1106 by Cindy eHss, OT    Total assist for toiletting tasks

## 2020-07-01 NOTE — THERAPY
07/01/20 1029   Precautions   Precautions Fall Risk;Other (See Comments);Cervical Collar     Comments LLE prosthesis, pacemaker, monitor for orthostatic hypotension   Pain 0 - 10 Group   Therapist Pain Assessment Post Activity Pain Same as Prior to Activity   Cognition    Level of Consciousness Alert   Ability To Follow Commands 2 Step   Safety Awareness Impaired   New Learning Impaired   Attention Impaired   Gait Functional Level of Assist    Gait Level Of Assist Contact Guard Assist   Assistive Device Front Wheel Walker   Distance (Feet) 380   # of Times Distance was Traveled 1   Deviation Bradykinetic;Decreased Heel Strike   Wheelchair Functional Level of Assist   Wheelchair Assist Supervised   Distance Wheelchair (Feet or Distance) 165   Wheelchair Description Extra time;Supervision for safety   Standing Lower Body Exercises   Hip Flexion 1 set of 10;Bilateral   Hip Extension 1 set of 10;Bilateral    Hip Abduction 1 set of 10;Bilateral   Mini Squat 1 set of 10;Partial   PT Total Time Spent   PT Individual Total Time Spent (Mins) 60   PT Charge Group   PT Gait Training 1   PT Therapeutic Exercise 1   PT Therapeutic Activities 2   Physical Therapy   Daily Treatment     Patient Name: Gaurav Lopez  Age:  75 y.o., Sex:  male  Medical Record #: [id number]  Today's Date: 7/1/2020     Precautions  Precautions: Fall Risk, Other (See Comments), Cervical Collar    Comments: LLE prosthesis, pacemaker, monitor for orthostatic hypotension    Subjective    The patient agreed to PT.     Objective    The patient participated in wheelchair mobility approximately 165 FT, SBA.  He also participated in gait training with a fww intermittent CGA and tolerated 380 FT x1.  In the parallel bars he participated in standing lower extremity therapeutic exercise indicated above.  When he stood up to do the exercises he became lightheaded and pale in complexion.  As he stood his symptoms improved.  Regarding the prosthesis and catching of the  foot on each step forward, PT will contact [facility] for a consultation to adjust the prosthesis.    Assessment    It would be beneficial to monitor for symptomatic hypotension and for a representative from [facility] to adjust the foot on the prosthesis.  Tolerance for gait improved to 380 FT.  Standing exercises are a challenging activity.  Strengths: Able to follow instructions, Effective communication skills, Independent prior level of function, Pleasant and cooperative, Willingly participates in therapeutic activities  Barriers: Bladder incontinence, Bowel incontinence, Confused, Decreased endurance, Generalized weakness, Home accessibility, Hypotension, Orthostatic hypotension, Impaired activity tolerance, Impaired balance, Impaired carryover of learning, Limited mobility    Plan    Bed mobility and transfer training, safety education, therapeutic exercise, gait training and other functional standing activities as tolerated    Physical Therapy Problems     Problem: Mobility     Dates: Start: 06/19/20       Goal: STG-Within one week, patient will propel wheelchair community     Dates: Start: 06/19/20       Description: 1) Individualized goal:  BLE propulsion x150 feet, c/s collar, L BKA prosthesis, gait belt, at SPV level  2) Interventions:  PT Group Therapy, PT E Stim Attended, PT Prosthetic Training, PT Gait Training, PT Therapeutic Exercises, PT TENS Application, PT Neuro Re-Ed/Balance, PT Therapeutic Activity, and PT Manual Therapy      Note:     Goal Note filed on 06/29/20 0820 by Teofilo Vázquez, PT    SPV w/c mobility for 100 feet on last attempt                  Goal: STG-Within one week, patient will ambulate household distance     Dates: Start: 06/29/20       Description: 1) Individualized goal:  150 feet with FWW, c/s collar, L BKA prosthesis, at CGA level  2) Interventions:  PT Group Therapy, PT E Stim Attended, PT Prosthetic Training, PT Gait Training, PT Therapeutic Exercises, PT TENS Application,  PT Neuro Re-Ed/Balance, PT Therapeutic Activity, and PT Manual Therapy            Goal: STG-Within one week, patient will ascend and descend four to six stairs     Dates: Start: 06/29/20       Description: 1) Individualized goal:  with 1 railing, c/s collar, L BKA prosthesis, gait belt, at Mod A level  2) Interventions:  PT Group Therapy, PT E Stim Attended, PT Prosthetic Training, PT Gait Training, PT Therapeutic Exercises, PT TENS Application, PT Neuro Re-Ed/Balance, PT Therapeutic Activity, and PT Manual Therapy                  Problem: Mobility Transfers     Dates: Start: 06/19/20       Goal: STG-Within one week, patient will sit to stand     Dates: Start: 06/19/20       Description: 1) Individualized goal:  // bars, c/s collar, L BKA prosthesis, gait belt, at Min A level consistently  2) Interventions:  PT Group Therapy, PT E Stim Attended, PT Prosthetic Training, PT Gait Training, PT Therapeutic Exercises, PT TENS Application, PT Neuro Re-Ed/Balance, PT Therapeutic Activity, and PT Manual Therapy    Note:     Goal Note filed on 06/29/20 0820 by Teofilo Vázquez, PT    Min-Mod A at this time                  Goal: STG-Within one week, patient will transfer bed to chair     Dates: Start: 06/29/20       Description: 1) Individualized goal:  with FWW, c/s collar, L BKA prosthesis, at CGA level  2) Interventions:  PT Group Therapy, PT E Stim Attended, PT Prosthetic Training, PT Gait Training, PT Therapeutic Exercises, PT TENS Application, PT Neuro Re-Ed/Balance, PT Therapeutic Activity, and PT Manual Therapy                    Problem: PT-Long Term Goals     Dates: Start: 06/19/20       Goal: LTG-By discharge, patient will ambulate     Dates: Start: 06/19/20       Description: 1) Individualized goal:  150 feet with FWW, c/s collar, L BKA prosthesis, at SBA level  2) Interventions:  PT Group Therapy, PT E Stim Attended, PT Prosthetic Training, PT Gait Training, PT Therapeutic Exercises, PT TENS Application, PT  Neuro Re-Ed/Balance, PT Therapeutic Activity, and PT Manual Therapy          Goal: LTG-By discharge, patient will transfer one surface to another     Dates: Start: 06/19/20       Description: 1) Individualized goal:  with FWW, c/s collar, L BKA prosthesis, at SBA level  2) Interventions:  PT Group Therapy, PT E Stim Attended, PT Prosthetic Training, PT Gait Training, PT Therapeutic Exercises, PT TENS Application, PT Neuro Re-Ed/Balance, PT Therapeutic Activity, and PT Manual Therapy            Goal: LTG-By discharge, patient will ambulate up/down 4-6 stairs     Dates: Start: 06/19/20       Description: 1) Individualized goal:  with 1 railing, c/s collar, L BKA prosthesis, gait belt, at Min A level  2) Interventions:  PT Group Therapy, PT E Stim Attended, PT Prosthetic Training, PT Gait Training, PT Therapeutic Exercises, PT TENS Application, PT Neuro Re-Ed/Balance, PT Therapeutic Activity, and PT Manual Therapy          Goal: LTG-By discharge, patient will transfer in/out of a car     Dates: Start: 06/19/20       Description: 1) Individualized goal:  with FWW, c/s collar, L BKA prosthesis, at SBA level  2) Interventions:  PT Group Therapy, PT E Stim Attended, PT Prosthetic Training, PT Gait Training, PT Therapeutic Exercises, PT TENS Application, PT Neuro Re-Ed/Balance, PT Therapeutic Activity, and PT Manual Therapy

## 2020-07-01 NOTE — CARE PLAN
Problem: Safety  Goal: Will remain free from falls  Intervention: Implement fall precautions  Note: Use of call light encouraged to make needs known.Fall precautions and frequent rounding in place for safety.Call light within reach.Will continue to monitor and assess needs and safety.     Problem: Bowel/Gastric:  Goal: Will not experience complications related to bowel motility  Intervention: Implement Bowel Protocol, if applicable  Note: Scheduled suppository given, digital stimulation performed with good results.LBM 06/30.Will continue to monitor.     Problem: Pain Management  Goal: Pain level will decrease to patient's comfort goal  Intervention: Follow pain managment plan developed in collaboration with patient and Interdisciplinary Team  Note: Pain is manageable with scheduled medication.Repositioned with pillows for comfort.Will continue to monitor and assess pain level and medicate as needed.

## 2020-07-01 NOTE — THERAPY
Occupational Therapy  Daily Treatment     Patient Name: Gaurav Lopez  Age:  75 y.o., Sex:  male  Medical Record #: [id number]  Today's Date: 7/1/2020     Precautions  Precautions: (P) Fall Risk, Cervical Collar  , Spinal / Back Precautions , Other (See Comments)  Comments: (P) LLE prosthesis, pacemaker, monitor for orthostatic hypotension    Safety   ADL Safety : Requires Supervision for Safety, Requires Physical Assist for Safety, Impaired  Bathroom Safety: Impaired, Requires Supervision for Safety    Subjective    Pt stated that he wanted to stand at fluido bike to get a better exercise.     Objective       07/01/20 1301   Cosignature   Documentation Review Approved as originally documented and filed.   Precautions   Precautions Fall Risk;Cervical Collar  ;Spinal / Back Precautions ;Other (See Comments)   Comments LLE prosthesis, pacemaker, monitor for orthostatic hypotension   Sitting Upper Body Exercises   Bicep Curls 3 sets of 10;Bilateral  (1#)   Standing Upper Body Exercises   Other Exercises fluido bike for BUE strength and balance, 5 min x2, .8km   Interdisciplinary Plan of Care Collaboration   Patient Position at End of Therapy Seated;Chair Alarm On;Self Releasing Lap Belt Applied;Call Light within Reach;Tray Table within Reach;Phone within Reach   OT Total Time Spent   OT Individual Total Time Spent (Mins) 30   OT Charge Group   OT Therapeutic Exercise  2       Assessment    Pt completed BUE exercises to increase strength and endurance.Pt continues to show L UE weakness. Pt maintained spinal precautions during exercises.    Strengths: Able to follow instructions, Alert and oriented, Effective communication skills, Independent prior level of function  Barriers: Decreased endurance, Impaired activity tolerance    Plan    Continue AE reinforcement for LB dressing.  Figure four from supine with leg  may be a good option to minimize AE involvement if patient can tolerate position. Focus on ADLs, transfers,  activity tolerance and functional use of UEs, standing bal/tolerance.    Occupational Therapy Goals     Problem: Dressing     Dates: Start: 06/29/20       Goal: STG-Within one week, patient will dress LB     Dates: Start: 06/29/20       Description: 1) Individualized Goal:  min assist lower body dressing, using appropriate AE  2) Interventions:  OT Self Care/ADL, OT Therapeutic Activity, and OT Therapeutic Exercise                  Problem: OT Long Term Goals     Dates: Start: 06/19/20       Goal: LTG-By discharge, patient will complete basic self care tasks     Dates: Start: 06/19/20       Description: 1) Individualized Goal:  Mod I for BADL's, Sup/SBA for shower via AE/DME PRN  2) Interventions:  OT Self Care/ADL, OT Neuro Re-Ed/Balance, OT Therapeutic Activity, OT Evaluation, and OT Therapeutic Exercise            Goal: LTG-By discharge, patient will perform bathroom transfers     Dates: Start: 06/19/20                   Problem: Toileting     Dates: Start: 06/19/20       Goal: STG-Within one week, patient will complete toileting tasks     Dates: Start: 06/19/20       Description: 1) Individualized Goal:  Max assist for toileting task via AE/DME PRN  2) Interventions:  OT Self Care/ADL, OT Neuro Re-Ed/Balance, OT Therapeutic Activity, OT Evaluation, and OT Therapeutic Exercise      Note:     Goal Note filed on 06/29/20 1106 by Cindy Hess, OT    Total assist for toiletting tasks

## 2020-07-01 NOTE — PROGRESS NOTES
"Rehab Progress Note     Encounter Date: 7/1/2020    CC: weakness    Interval Events (Subjective)    He reports dizziness during standing, staff reported him going pale, no episodes of passing out.  Blood pressure was not tested during this time.  Symptoms improved with activity.    He had a blood pressure elevation this morning, systolic blood pressure of 176  He refused Sudafed this morning, and last night  He refused Flomax last night.    He had to have multiple blood draws this morning because of cell lysis.     Bowel: Large hard BM last night  Bladder: PVR/void, 174/100, 46/400, 157/200    ROS:  Gen: No fatigue, confusion, significant weight loss  Eyes: no blurry vision, double vision or pain in eyes  ENT: no changes in hearing, runny nose, nose bleeds, sinus pain  CV: No CP, palpitations,   Lungs: no shortness of breath, changes in secretions, changes in cough, pain with coughing  Abd: No abd pain, nausea, vomiting, pain with eating  : no blood in urine, suprapubic pain  Ext: No swelling in legs, asymmetric atrophy  Neuro: no changes in strength or sensation  Skin: no new ulcers/skin breakdown appreciated by patient  Mood: No changes in mood today, increase in depression or anxiety  Heme: no bruising, or bleeding  Rest of 14 point review of systems is negative    Objective:  Vitals: /74   Pulse 60   Temp 36.2 °C (97.2 °F) (Temporal)   Resp 18   Ht 1.753 m (5' 9.02\")   Wt 78.6 kg (173 lb 4.5 oz)   SpO2 90%   Gen: NAD, Body mass index is 25.58 kg/m².  Working out with physical therapy and patella bars   HEENT: NC/AT, PERRLA, moist mucous membranes, Aspen collar in place  Cardio: RRR, no mumurs  Pulm: CTAB, with normal respiratory effort  Abd: Soft NTND, active bowel sounds,   Ext: No peripheral edema. No calf tenderness. No clubbing/cyanosis. +dorsal pedalis pulses bilaterally.      Skin: Incision is clean dry and intact, sutures in place, no signs of dehiscence    Tone: Hip flexion spasticity, " 0-1/4, cogwheeling tone in the left hip flexor    Recent Results (from the past 72 hour(s))   Basic Metabolic Panel    Collection Time: 06/30/20  5:44 AM   Result Value Ref Range    Sodium 143 135 - 145 mmol/L    Potassium 3.9 3.6 - 5.5 mmol/L    Chloride 107 96 - 112 mmol/L    Co2 25 20 - 33 mmol/L    Glucose 93 65 - 99 mg/dL    Bun 18 8 - 22 mg/dL    Creatinine 0.70 0.50 - 1.40 mg/dL    Calcium 9.1 8.5 - 10.5 mg/dL    Anion Gap 11.0 7.0 - 16.0   ESTIMATED GFR    Collection Time: 06/30/20  5:44 AM   Result Value Ref Range    GFR If African American >60 >60 mL/min/1.73 m 2    GFR If Non African American >60 >60 mL/min/1.73 m 2   Basic Metabolic Panel    Collection Time: 07/01/20  5:46 AM   Result Value Ref Range    Sodium 142 135 - 145 mmol/L    Potassium 3.8 3.6 - 5.5 mmol/L    Chloride 105 96 - 112 mmol/L    Co2 27 20 - 33 mmol/L    Glucose 83 65 - 99 mg/dL    Bun 15 8 - 22 mg/dL    Creatinine 0.76 0.50 - 1.40 mg/dL    Calcium 9.1 8.5 - 10.5 mg/dL    Anion Gap 10.0 7.0 - 16.0   ESTIMATED GFR    Collection Time: 07/01/20  5:46 AM   Result Value Ref Range    GFR If African American >60 >60 mL/min/1.73 m 2    GFR If Non African American >60 >60 mL/min/1.73 m 2       Current Facility-Administered Medications   Medication Frequency   • zolpidem (AMBIEN) tablet 5 mg QHS   • fludrocortisone (FLORINEF) tablet 0.1 mg QAM   • pseudoephedrine (SUDAFED) 30 MG tablet 60 mg TID   • sennosides (SENOKOT) 8.6 MG tablet 25.8 mg DAILY AT NOON    And   • docusate sodium (COLACE) capsule 100 mg BID    And   • bisacodyl (THE MAGIC BULLET) suppository 10 mg QDAY    And   • magnesium hydroxide (MILK OF MAGNESIA) suspension 30 mL QDAY PRN   • baclofen (LIORESAL) tablet 20 mg TID   • melatonin tablet 3 mg QHS   • midodrine (PROAMATINE) tablet 15 mg TID   • ferrous sulfate tablet 325 mg QDAY with Breakfast   • gabapentin (NEURONTIN) capsule 800 mg TID   • metFORMIN (GLUCOPHAGE) tablet 250 mg BID AC   • midodrine (PROAMATINE) tablet 5 mg  Q4HRS PRN   • aspirin EC (ECOTRIN) tablet 81 mg DAILY   • vitamin D (cholecalciferol) tablet 2,000 Units DAILY   • finasteride (PROSCAR) tablet 5 mg DAILY   • lovastatin (MEVACOR) tablet 10 mg Nightly   • tamsulosin (FLOMAX) capsule 0.4 mg AFTER DINNER   • vitamin B comp+C (ALLBEE WITH C) 1 Tab DAILY   • therapeutic multivitamin-minerals (THERAGRAN-M) tablet 1 Tab DAILY   • Pharmacy Consult Request ...Pain Management Review 1 Each PHARMACY TO DOSE   • Respiratory Therapy Consult Continuous RT   • tramadol (ULTRAM) 50 MG tablet 50 mg Q4HRS PRN   • oxyCODONE immediate-release (ROXICODONE) tablet 5 mg Q3HRS PRN   • oxyCODONE immediate release (ROXICODONE) tablet 10 mg Q3HRS PRN   • acetaminophen (TYLENOL) tablet 650 mg Q4HRS PRN   • enoxaparin (LOVENOX) inj 40 mg QHS   • artificial tears ophthalmic solution 1 Drop PRN   • benzocaine-menthol (CEPACOL) lozenge 1 Lozenge Q2HRS PRN   • mag hydrox-al hydrox-simeth (MAALOX PLUS ES or MYLANTA DS) suspension 20 mL Q2HRS PRN   • ondansetron (ZOFRAN ODT) dispertab 4 mg 4X/DAY PRN    Or   • ondansetron (ZOFRAN) syringe/vial injection 4 mg 4X/DAY PRN   • traZODone (DESYREL) tablet 50 mg QHS PRN   • sodium chloride (OCEAN) 0.65 % nasal spray 2 Spray PRN   • lidocaine (LIDODERM) 5 % 2 Patch Daily-0800   • aripiprazole (ABILIFY) tablet 10 mg DAILY   • venlafaxine XR (EFFEXOR XR) capsule 150 mg BID       Orders Placed This Encounter   Procedures   • Diet Order Diabetic     Standing Status:   Standing     Number of Occurrences:   1     Order Specific Question:   Diet:     Answer:   Diabetic [3]       Assessment:  Active Hospital Problems    Diagnosis   • *Cervical myelopathy (HCC)   • Postoperative pain   • Deep vein thrombosis (HCC)   • Insomnia due to medical condition   • Neurogenic bowel   • Depression   • Thoracic myelopathy   • Neuropathic pain   • Hyperlipidemia   • Hypertension   • S/P BKA (below knee amputation) (HCC)       Medical Decision Making and Plan:  C2 AIS D,  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  Status post multiple spine surgeries, C3-T7 posterior spinal fusion on 6/5, T12-pelvis posterior spinal fusion on 6/10 by Dr Marsh.   - C-collar on at all times, [place] collar when in shower to be cleared by neurosurgery as outpatient, 8-12 weeks  -  Sutures to be removed 21 days postop, prolonged time secondary to repeat surgeries  - Prolonged discussion with patient and wife about expectations of acute rehabilitation, plan for discharge home in 1 to 2 weeks with transition to home health and then outpatient therapy.  -Continue comprehensive acute inpatient rehabilitation    Left BKA: Using [employer] for prosthetists, not following up with physicians  - Prosthesis at bedside  -Follow-up with Dr. Lawson as an outpatient, prosthetic may require modifications given fixation of the pelvis  -Discussed with patient and physical therapy about stretching of hip flexor on the left.    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 noon, decrease Colace 100 mg twice daily, Dulcolax suppository with digital stimulation,   -DC MiraLAX 1 packet daily  -Discussed program with patient and importance of appropriate management of neurogenic bowel with repercussions of colonic dilation and possible rupture  -KUB showed no significant stool load in ascending or transverse  colon      orthostatic hypotension: On 6/19- 82/52 with change in position  Because of significant autonomic dysfunction associated with spinal cord injury, the patient is at high risk for orthostatic hypotension.  - FAMILIA hose on prior to out of bed  -midodrine 15 mg 3 times daily  -Florinef 0.1 mg every morning, monitor potassium level and sodium level, he is having episodes of hypertension while supine.  - P.o. fluid intake, goal 1.5-2 L/day  -Monitor strict I's and O's  - Amlodipine was DC'd, DC lisinopril  -Sudafed 60 mg 3 times daily  -Discontinued Flomax on 7/1    Spasticity: MAS of 0-1/4 jumping of bilateral lower extremities, greater in hip flexors, waking him up at night  - Baclofen 20 mg 3 times daily, with significant improvement  -Educated patient on stretching of the left hip flexor    Decreased range of motion of right hip: In differential diagnosis includes heterotopic ossification of the right hip after multiple spinal surgeries.  Improved with stretching during acute rehabilitation  - x-ray right hip, showed no signs of heterotopic ossification  -CRP is less than 2 likely negative     Pain:  #Neuropathic pain: Neuropathy, complicated by diabetes and a cervical myelopathy  - Gabapentin 800 mg tid  - DCTylenol 1000 mg 3 times daily     #Postoperative pain:  -Tramadol  mg every 4 hours as needed moderate to severe pain first-line  -Oxycodone 5-10 mg every 3 hours as needed moderate to severe pain, second line  - DC Tylenol 1000 mg 3 times daily  -Lidocaine patches to either side of incision on for 12 hours off for 12 hours     Hypertension:  - All oral antihypertensives have been DC'd  -Appreciate hospitalist input     Type 2 diabetes: With hyperglycemia  -Metformin 500 mg twice daily  -Appreciate hospitalist input    Insomnia:  -DC Remeron 15 mg nightly, given vivid dreams  - Trial Ambien 5mg qhs  - Melatonin 3 mg nightly     Depression:  -Effexor  mg twice daily     Vitamin D  deficiency  -Vitamin D pending     DVT prophylaxis: Patient has previous history of DVT in 2011 making him higher risk for clot formation  -Lovenox 40 mg daily    Patient was seen for 28 minutes on unit/floor of which > 50% of time was spent on counseling and coordination of care regarding the above, including prognosis, risk reduction, benefits of treatment, and options for next stage of care including orthostatic hypotension, DC Flomax, given elevation in supine blood pressure will hold off on increasing Florinef, evaluate labs this morning no electrolyte abnormalities discussed value of Sudafed, neurogenic bladder, monitor PVRs given DC Flomax.        Rj Navarro D.O.

## 2020-07-01 NOTE — THERAPY
Physical Therapy   Daily Treatment     Patient Name: Gaurav Lopez  Age:  75 y.o., Sex:  male  Medical Record #: [id number]  Today's Date: 6/30/2020     Precautions  Precautions: Fall Risk, Other (See Comments), Cervical Collar    Comments: LLE prosthesis    Subjective    Patient supine in bed upon arrival. Patient agreeable to treatment. Patient reported lightheadedness during some standing exercise.      Objective       06/30/20 1431   Cosignature   Documentation Review Approved with modifications made by preceptor in flowsheet   Vitals   Pulse 60   Patient BP Position Sitting   Blood Pressure  141/85   O2 (LPM) 0   O2 Delivery Device None - Room Air   Vitals Comments abdominal binder and comression stockings, see flowsheet for standing BP   Transfer Functional Level of Assist   Bed, Chair, Wheelchair Transfer Maximal Assist  (Max assits from supine to sit. )   Bed Chair Wheelchair Transfer Description Adaptive equipment;Assist with two limbs;Increased time;Initial preparation for task;Requires lift;Set-up of equipment;Supervision for safety;Verbal cueing;Other (comment)  (log roll education, sequencing education. )   Bed Mobility    Supine to Sit Moderate Assist  (assistance with B LE for LOG roll, verbal cueing)   Sit to Supine Maximal Assist  (LOG roll, verbal cueing, sequencing, assistance with trunk.)   Sit to Stand Moderate Assist  (min-mod assist. mod assist when tired. verbal cueing. x  8 )   Scooting Moderate Assist  (Mod assist when fatigued for shifting hips in bed. )   Rolling Moderate Assist to Rt.;Moderate Assist to Lt.  (Verbal/tactile cueing to bend knees, Min-Mod. )   Neuro-Muscular Treatments   Neuro-Muscular Treatments Other (See Comments);Weight Shift Right;Weight Shift Left;Verbal Cuing;Anterior weight shift  (Standing balance in // bars, gait belt, CGA- min assistance.)   Comments Interventions in // bars: Static Standing WBOS, Static Standing NBOS, Marching, Medial and Lateral weight shift, A/P  weight shift.  approximatly 45 seconds each, min-mod assistance for balance.  Seated breaks between the 45 sec reps. Total time x20 minutes.   Interdisciplinary Plan of Care Collaboration   Patient Position at End of Therapy In Bed;Bed Alarm On;Call Light within Reach;Tray Table within Reach;Phone within Reach;Other (Comments)  (abdominal binder removed due to high BP in sitting. )   PT Total Time Spent   PT Individual Total Time Spent (Mins) 60   PT Charge Group   PT Neuromuscular Re-Education / Balance 1   PT Therapeutic Activities 3       Assessment    We worked on bed mobility, balance, and sit to stand safety today. Patient still requires cueing for sit to stand set up including keeping feet behind him, forward trunk lean and pushing off wheelchair. Patient required more assistance when fatigued. Patient's BP dropped in standing but still had a systolic BP above 100.   Strengths: Able to follow instructions, Effective communication skills, Independent prior level of function, Pleasant and cooperative, Willingly participates in therapeutic activities  Barriers: Bladder incontinence, Bowel incontinence, Confused, Decreased endurance, Generalized weakness, Home accessibility, Hypotension, Orthostatic hypotension, Impaired activity tolerance, Impaired balance, Impaired carryover of learning, Limited mobility    Plan    Monitor BP, STS work, standing posture & balance, progress ambulation distance with FWW, safety with transfers, LE & core strengthening.  Monitor pt's fit of L BKA prosthesis, re-assess pt's type of walker at home and its condition, and anticipate d/c at SPV level about 7/7/2020.    Physical Therapy Problems     Problem: Mobility     Dates: Start: 06/19/20       Goal: STG-Within one week, patient will propel wheelchair community     Dates: Start: 06/19/20       Description: 1) Individualized goal:  BLE propulsion x150 feet, c/s collar, L BKA prosthesis, gait belt, at SPV level  2) Interventions:  PT  Group Therapy, PT E Stim Attended, PT Prosthetic Training, PT Gait Training, PT Therapeutic Exercises, PT TENS Application, PT Neuro Re-Ed/Balance, PT Therapeutic Activity, and PT Manual Therapy      Note:     Goal Note filed on 06/29/20 0820 by Teofilo Vázquez, PT    SPV w/c mobility for 100 feet on last attempt                  Goal: STG-Within one week, patient will ambulate household distance     Dates: Start: 06/29/20       Description: 1) Individualized goal:  150 feet with FWW, c/s collar, L BKA prosthesis, at CGA level  2) Interventions:  PT Group Therapy, PT E Stim Attended, PT Prosthetic Training, PT Gait Training, PT Therapeutic Exercises, PT TENS Application, PT Neuro Re-Ed/Balance, PT Therapeutic Activity, and PT Manual Therapy            Goal: STG-Within one week, patient will ascend and descend four to six stairs     Dates: Start: 06/29/20       Description: 1) Individualized goal:  with 1 railing, c/s collar, L BKA prosthesis, gait belt, at Mod A level  2) Interventions:  PT Group Therapy, PT E Stim Attended, PT Prosthetic Training, PT Gait Training, PT Therapeutic Exercises, PT TENS Application, PT Neuro Re-Ed/Balance, PT Therapeutic Activity, and PT Manual Therapy                  Problem: Mobility Transfers     Dates: Start: 06/19/20       Goal: STG-Within one week, patient will sit to stand     Dates: Start: 06/19/20       Description: 1) Individualized goal:  // bars, c/s collar, L BKA prosthesis, gait belt, at Min A level consistently  2) Interventions:  PT Group Therapy, PT E Stim Attended, PT Prosthetic Training, PT Gait Training, PT Therapeutic Exercises, PT TENS Application, PT Neuro Re-Ed/Balance, PT Therapeutic Activity, and PT Manual Therapy    Note:     Goal Note filed on 06/29/20 0820 by Teofilo Vázquez, PT    Min-Mod A at this time                  Goal: STG-Within one week, patient will transfer bed to chair     Dates: Start: 06/29/20       Description: 1) Individualized  goal:  with FWW, c/s collar, L BKA prosthesis, at CGA level  2) Interventions:  PT Group Therapy, PT E Stim Attended, PT Prosthetic Training, PT Gait Training, PT Therapeutic Exercises, PT TENS Application, PT Neuro Re-Ed/Balance, PT Therapeutic Activity, and PT Manual Therapy                    Problem: PT-Long Term Goals     Dates: Start: 06/19/20       Goal: LTG-By discharge, patient will ambulate     Dates: Start: 06/19/20       Description: 1) Individualized goal:  150 feet with FWW, c/s collar, L BKA prosthesis, at SBA level  2) Interventions:  PT Group Therapy, PT E Stim Attended, PT Prosthetic Training, PT Gait Training, PT Therapeutic Exercises, PT TENS Application, PT Neuro Re-Ed/Balance, PT Therapeutic Activity, and PT Manual Therapy          Goal: LTG-By discharge, patient will transfer one surface to another     Dates: Start: 06/19/20       Description: 1) Individualized goal:  with FWW, c/s collar, L BKA prosthesis, at SBA level  2) Interventions:  PT Group Therapy, PT E Stim Attended, PT Prosthetic Training, PT Gait Training, PT Therapeutic Exercises, PT TENS Application, PT Neuro Re-Ed/Balance, PT Therapeutic Activity, and PT Manual Therapy            Goal: LTG-By discharge, patient will ambulate up/down 4-6 stairs     Dates: Start: 06/19/20       Description: 1) Individualized goal:  with 1 railing, c/s collar, L BKA prosthesis, gait belt, at Min A level  2) Interventions:  PT Group Therapy, PT E Stim Attended, PT Prosthetic Training, PT Gait Training, PT Therapeutic Exercises, PT TENS Application, PT Neuro Re-Ed/Balance, PT Therapeutic Activity, and PT Manual Therapy          Goal: LTG-By discharge, patient will transfer in/out of a car     Dates: Start: 06/19/20       Description: 1) Individualized goal:  with FWW, c/s collar, L BKA prosthesis, at SBA level  2) Interventions:  PT Group Therapy, PT E Stim Attended, PT Prosthetic Training, PT Gait Training, PT Therapeutic Exercises, PT TENS  Application, PT Neuro Re-Ed/Balance, PT Therapeutic Activity, and PT Manual Therapy
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
## 2020-07-02 ENCOUNTER — APPOINTMENT (OUTPATIENT)
Dept: RADIOLOGY | Facility: REHABILITATION | Age: 75
DRG: 559 | End: 2020-07-02
Attending: PHYSICAL MEDICINE & REHABILITATION
Payer: MEDICARE

## 2020-07-02 PROCEDURE — 770010 HCHG ROOM/CARE - REHAB SEMI PRIVAT*

## 2020-07-02 PROCEDURE — 97116 GAIT TRAINING THERAPY: CPT

## 2020-07-02 PROCEDURE — A9270 NON-COVERED ITEM OR SERVICE: HCPCS | Performed by: PHYSICAL MEDICINE & REHABILITATION

## 2020-07-02 PROCEDURE — 700111 HCHG RX REV CODE 636 W/ 250 OVERRIDE (IP): Performed by: PHYSICAL MEDICINE & REHABILITATION

## 2020-07-02 PROCEDURE — 700102 HCHG RX REV CODE 250 W/ 637 OVERRIDE(OP): Performed by: PHYSICAL MEDICINE & REHABILITATION

## 2020-07-02 PROCEDURE — 97110 THERAPEUTIC EXERCISES: CPT | Mod: CO

## 2020-07-02 PROCEDURE — 700112 HCHG RX REV CODE 229: Performed by: PHYSICAL MEDICINE & REHABILITATION

## 2020-07-02 PROCEDURE — 99232 SBSQ HOSP IP/OBS MODERATE 35: CPT | Performed by: PHYSICAL MEDICINE & REHABILITATION

## 2020-07-02 PROCEDURE — A9270 NON-COVERED ITEM OR SERVICE: HCPCS | Performed by: HOSPITALIST

## 2020-07-02 PROCEDURE — 97530 THERAPEUTIC ACTIVITIES: CPT

## 2020-07-02 PROCEDURE — 700101 HCHG RX REV CODE 250: Performed by: PHYSICAL MEDICINE & REHABILITATION

## 2020-07-02 PROCEDURE — 99231 SBSQ HOSP IP/OBS SF/LOW 25: CPT | Performed by: HOSPITALIST

## 2020-07-02 PROCEDURE — 700102 HCHG RX REV CODE 250 W/ 637 OVERRIDE(OP): Performed by: HOSPITALIST

## 2020-07-02 PROCEDURE — 97110 THERAPEUTIC EXERCISES: CPT

## 2020-07-02 PROCEDURE — 71045 X-RAY EXAM CHEST 1 VIEW: CPT

## 2020-07-02 PROCEDURE — 97535 SELF CARE MNGMENT TRAINING: CPT

## 2020-07-02 RX ORDER — ZOLPIDEM TARTRATE 5 MG/1
5 TABLET ORAL
Qty: 3 TAB | Refills: 0
Start: 2020-07-02 | End: 2020-07-05

## 2020-07-02 RX ORDER — TRAMADOL HYDROCHLORIDE 50 MG/1
50 TABLET ORAL EVERY 4 HOURS PRN
Qty: 18 TAB | Refills: 0 | Status: SHIPPED | OUTPATIENT
Start: 2020-07-02 | End: 2020-07-05

## 2020-07-02 RX ORDER — BISACODYL 10 MG/1
10 SUPPOSITORY RECTAL
Qty: 30 SUPPOSITORY
Start: 2020-07-02 | End: 2021-08-12

## 2020-07-02 RX ORDER — BACLOFEN 10 MG/1
15 TABLET ORAL 3 TIMES DAILY
Status: DISCONTINUED | OUTPATIENT
Start: 2020-07-02 | End: 2020-07-08 | Stop reason: HOSPADM

## 2020-07-02 RX ORDER — LIDOCAINE 50 MG/G
2 PATCH TOPICAL EVERY 12 HOURS
Qty: 10 PATCH
Start: 2020-07-02 | End: 2021-08-12

## 2020-07-02 RX ORDER — LANOLIN ALCOHOL/MO/W.PET/CERES
3 CREAM (GRAM) TOPICAL
Qty: 30 TAB
Start: 2020-07-02 | End: 2021-08-12

## 2020-07-02 RX ORDER — SENNOSIDES A AND B 8.6 MG/1
25.8 TABLET, FILM COATED ORAL DAILY
Qty: 30 TAB | Refills: 0
Start: 2020-07-02 | End: 2021-08-12

## 2020-07-02 RX ORDER — ALUMINA, MAGNESIA, AND SIMETHICONE 2400; 2400; 240 MG/30ML; MG/30ML; MG/30ML
20 SUSPENSION ORAL
Qty: 560 ML
Start: 2020-07-02 | End: 2021-08-12

## 2020-07-02 RX ORDER — ONDANSETRON 4 MG/1
4 TABLET, ORALLY DISINTEGRATING ORAL 4 TIMES DAILY PRN
Qty: 10 TAB | Refills: 0
Start: 2020-07-02 | End: 2021-08-12

## 2020-07-02 RX ORDER — POLYVINYL ALCOHOL 14 MG/ML
1 SOLUTION/ DROPS OPHTHALMIC PRN
Qty: 1 BOTTLE | Refills: 3
Start: 2020-07-02 | End: 2021-08-12

## 2020-07-02 RX ORDER — VENLAFAXINE HYDROCHLORIDE 150 MG/1
150 CAPSULE, EXTENDED RELEASE ORAL 2 TIMES DAILY
Qty: 30 CAP | Refills: 3
Start: 2020-07-02 | End: 2021-08-12

## 2020-07-02 RX ORDER — FINASTERIDE 5 MG/1
5 TABLET, FILM COATED ORAL DAILY
Qty: 30 TAB
Start: 2020-07-03

## 2020-07-02 RX ORDER — TRAZODONE HYDROCHLORIDE 50 MG/1
50 TABLET ORAL
Qty: 30 TAB | Refills: 3
Start: 2020-07-02

## 2020-07-02 RX ORDER — ARIPIPRAZOLE 10 MG/1
10 TABLET ORAL DAILY
Qty: 30 TAB
Start: 2020-07-03 | End: 2020-07-08

## 2020-07-02 RX ORDER — BACLOFEN 5 MG/1
15 TABLET ORAL 3 TIMES DAILY
Qty: 90 TAB
Start: 2020-07-02 | End: 2021-08-12

## 2020-07-02 RX ORDER — MIDODRINE HYDROCHLORIDE 5 MG/1
15 TABLET ORAL 3 TIMES DAILY
Qty: 90 TAB
Start: 2020-07-02 | End: 2021-08-12

## 2020-07-02 RX ORDER — VITAMIN C
1 TAB ORAL DAILY
Start: 2020-07-03 | End: 2021-08-12

## 2020-07-02 RX ORDER — FERROUS SULFATE 325(65) MG
325 TABLET ORAL
Qty: 30 TAB
Start: 2020-07-03

## 2020-07-02 RX ORDER — ACETAMINOPHEN 325 MG/1
650 TABLET ORAL EVERY 4 HOURS PRN
Qty: 30 TAB | Refills: 0
Start: 2020-07-02

## 2020-07-02 RX ORDER — PSEUDOEPHEDRINE HYDROCHLORIDE 60 MG/1
60 TABLET, FILM COATED ORAL 3 TIMES DAILY
Qty: 120 TAB | Refills: 6
Start: 2020-07-02 | End: 2021-08-12

## 2020-07-02 RX ORDER — GABAPENTIN 400 MG/1
800 CAPSULE ORAL 3 TIMES DAILY
Qty: 90 CAP
Start: 2020-07-02 | End: 2021-08-12

## 2020-07-02 RX ORDER — LOVASTATIN 10 MG/1
10 TABLET ORAL DAILY
Qty: 30 TAB
Start: 2020-07-02 | End: 2021-08-12

## 2020-07-02 RX ORDER — M-VIT,TX,IRON,MINS/CALC/FOLIC 27MG-0.4MG
1 TABLET ORAL DAILY
Qty: 30 TAB | Refills: 11
Start: 2020-07-03 | End: 2021-08-12

## 2020-07-02 RX ORDER — ECHINACEA PURPUREA EXTRACT 125 MG
2 TABLET ORAL PRN
Qty: 1 BOTTLE | Refills: 3
Start: 2020-07-02 | End: 2021-08-12

## 2020-07-02 RX ORDER — PSEUDOEPHEDRINE HCL 30 MG
200 TABLET ORAL 2 TIMES DAILY
Qty: 60 CAP
Start: 2020-07-02 | End: 2021-08-12

## 2020-07-02 RX ADMIN — ASPIRIN 81 MG: 81 TABLET, COATED ORAL at 08:14

## 2020-07-02 RX ADMIN — GABAPENTIN 800 MG: 400 CAPSULE ORAL at 20:06

## 2020-07-02 RX ADMIN — ZOLPIDEM TARTRATE 5 MG: 5 TABLET ORAL at 20:07

## 2020-07-02 RX ADMIN — FINASTERIDE 5 MG: 5 TABLET, FILM COATED ORAL at 08:14

## 2020-07-02 RX ADMIN — FLUDROCORTISONE ACETATE 0.1 MG: 0.1 TABLET ORAL at 08:14

## 2020-07-02 RX ADMIN — MIDODRINE HYDROCHLORIDE 15 MG: 10 TABLET ORAL at 11:47

## 2020-07-02 RX ADMIN — ENOXAPARIN SODIUM 40 MG: 100 INJECTION SUBCUTANEOUS at 20:07

## 2020-07-02 RX ADMIN — BACLOFEN 15 MG: 10 TABLET ORAL at 16:00

## 2020-07-02 RX ADMIN — PSEUDOEPHEDRINE HCL 60 MG: 30 TABLET, FILM COATED ORAL at 11:47

## 2020-07-02 RX ADMIN — LOVASTATIN 10 MG: 20 TABLET ORAL at 20:06

## 2020-07-02 RX ADMIN — VENLAFAXINE HYDROCHLORIDE 150 MG: 75 CAPSULE, EXTENDED RELEASE ORAL at 20:06

## 2020-07-02 RX ADMIN — METFORMIN HYDROCHLORIDE 250 MG: 500 TABLET ORAL at 17:27

## 2020-07-02 RX ADMIN — VITAMIN C 1 TABLET: TAB at 08:14

## 2020-07-02 RX ADMIN — MULTIPLE VITAMINS W/ MINERALS TAB 1 TABLET: TAB at 08:14

## 2020-07-02 RX ADMIN — MELATONIN 3 MG: at 20:06

## 2020-07-02 RX ADMIN — GABAPENTIN 800 MG: 400 CAPSULE ORAL at 16:00

## 2020-07-02 RX ADMIN — MELATONIN 2000 UNITS: at 08:14

## 2020-07-02 RX ADMIN — METFORMIN HYDROCHLORIDE 250 MG: 500 TABLET ORAL at 08:14

## 2020-07-02 RX ADMIN — BACLOFEN 15 MG: 10 TABLET ORAL at 20:06

## 2020-07-02 RX ADMIN — MIDODRINE HYDROCHLORIDE 15 MG: 10 TABLET ORAL at 17:27

## 2020-07-02 RX ADMIN — MIDODRINE HYDROCHLORIDE 15 MG: 10 TABLET ORAL at 05:16

## 2020-07-02 RX ADMIN — DOCUSATE SODIUM 200 MG: 100 CAPSULE, LIQUID FILLED ORAL at 08:14

## 2020-07-02 RX ADMIN — PSEUDOEPHEDRINE HCL 60 MG: 30 TABLET, FILM COATED ORAL at 08:14

## 2020-07-02 RX ADMIN — STANDARDIZED SENNA CONCENTRATE 25.8 MG: 8.6 TABLET ORAL at 11:47

## 2020-07-02 RX ADMIN — FERROUS SULFATE TAB 325 MG (65 MG ELEMENTAL FE) 325 MG: 325 (65 FE) TAB at 08:14

## 2020-07-02 RX ADMIN — BACLOFEN 20 MG: 20 TABLET ORAL at 08:14

## 2020-07-02 RX ADMIN — GABAPENTIN 800 MG: 400 CAPSULE ORAL at 08:14

## 2020-07-02 RX ADMIN — VENLAFAXINE HYDROCHLORIDE 150 MG: 75 CAPSULE, EXTENDED RELEASE ORAL at 08:14

## 2020-07-02 RX ADMIN — PSEUDOEPHEDRINE HCL 60 MG: 30 TABLET, FILM COATED ORAL at 18:00

## 2020-07-02 RX ADMIN — ARIPIPRAZOLE 10 MG: 5 TABLET ORAL at 08:14

## 2020-07-02 RX ADMIN — BISACODYL 10 MG: 10 SUPPOSITORY RECTAL at 20:07

## 2020-07-02 RX ADMIN — LIDOCAINE 2 PATCH: 50 PATCH TOPICAL at 08:13

## 2020-07-02 RX ADMIN — DOCUSATE SODIUM 200 MG: 100 CAPSULE, LIQUID FILLED ORAL at 20:06

## 2020-07-02 ASSESSMENT — PATIENT HEALTH QUESTIONNAIRE - PHQ9
1. LITTLE INTEREST OR PLEASURE IN DOING THINGS: NOT AT ALL
SUM OF ALL RESPONSES TO PHQ9 QUESTIONS 1 AND 2: 0
2. FEELING DOWN, DEPRESSED, IRRITABLE, OR HOPELESS: NOT AT ALL
SUM OF ALL RESPONSES TO PHQ9 QUESTIONS 1 AND 2: 0
2. FEELING DOWN, DEPRESSED, IRRITABLE, OR HOPELESS: NOT AT ALL
1. LITTLE INTEREST OR PLEASURE IN DOING THINGS: NOT AT ALL

## 2020-07-02 ASSESSMENT — GAIT ASSESSMENTS
ASSISTIVE DEVICE: FRONT WHEEL WALKER
GAIT LEVEL OF ASSIST: CONTACT GUARD ASSIST
DEVIATION: BRADYKINETIC;SHUFFLED GAIT;DECREASED BASE OF SUPPORT
DISTANCE (FEET): 206

## 2020-07-02 ASSESSMENT — ENCOUNTER SYMPTOMS
DIZZINESS: 0
COUGH: 0
NERVOUS/ANXIOUS: 0
FEVER: 0
DIARRHEA: 0
BLURRED VISION: 0

## 2020-07-02 ASSESSMENT — FIBROSIS 4 INDEX: FIB4 SCORE: 0.39

## 2020-07-02 NOTE — THERAPY
Physical Therapy   Daily Treatment     Patient Name: Gaurav Lopez  Age:  75 y.o., Sex:  male  Medical Record #: [id number]  Today's Date: 7/2/2020     Precautions  Precautions: Fall Risk, Cervical Collar  , Spinal / Back Precautions   Comments: LLE prosthesis, pacemaker, monitor for orthostatic hypotension, abdominal binder placed on abdomen!     Subjective    Patient was seated in wheelchair upon arrival talking to nursing. Patient agreeable to treatment session. Patient reports that he is feeling good today.      Objective       07/02/20 1231   Cosignature   Documentation Review Approved with modifications made by preceptor in flowsheet   Gait Functional Level of Assist    Gait Level Of Assist Contact Guard Assist  (FWW, Gait belt. )   Assistive Device Front Wheel Walker   Distance (Feet) 206   # of Times Distance was Traveled 2   Deviation Bradykinetic;Shuffled Gait;Decreased Base Of Support  (Left toe catches in swing)   Bed Mobility    Sit to Stand Minimal Assist  (Gait belt, FWW, Min assist for balance. Less cueing set up. )   Interdisciplinary Plan of Care Collaboration   IDT Collaboration with  Nursing;Occupational Therapist   Patient Position at End of Therapy Chair Alarm On;Seated;Self Releasing Lap Belt Applied;Other (Comments)  (Hand off to OT. )   Collaboration Comments Discussed patient CLOF and what we did in PT.    PT Total Time Spent   PT Individual Total Time Spent (Mins) 30   PT Charge Group   PT Gait Training 2       Assessment    Patient demnstrated improvements in energy today. His sit to stands required less assistance and he was able to walk farther today.   Strengths: Able to follow instructions, Effective communication skills, Independent prior level of function, Pleasant and cooperative, Willingly participates in therapeutic activities  Barriers: Bladder incontinence, Bowel incontinence, Confused, Decreased endurance, Generalized weakness, Home accessibility, Hypotension, Orthostatic  hypotension, Impaired activity tolerance, Impaired balance, Impaired carryover of learning, Limited mobility    Plan    Monitor BP, STS work, standing posture & balance, progress ambulation distance with FWW, safety with transfers, LE & core strengthening.  Monitor pt's fit of L BKA prosthesis, re-assess pt's type of walker at home and its condition, and anticipate d/c at SPV level about 7/7/2020.      Physical Therapy Problems     Problem: Mobility     Dates: Start: 06/19/20       Goal: STG-Within one week, patient will propel wheelchair community     Dates: Start: 06/19/20       Description: 1) Individualized goal:  BLE propulsion x150 feet, c/s collar, L BKA prosthesis, gait belt, at SPV level  2) Interventions:  PT Group Therapy, PT E Stim Attended, PT Prosthetic Training, PT Gait Training, PT Therapeutic Exercises, PT TENS Application, PT Neuro Re-Ed/Balance, PT Therapeutic Activity, and PT Manual Therapy      Note:     Goal Note filed on 06/29/20 0820 by Teofilo Vázquez, PT    V w/c mobility for 100 feet on last attempt                  Goal: STG-Within one week, patient will ambulate household distance     Dates: Start: 06/29/20       Description: 1) Individualized goal:  150 feet with FWW, c/s collar, L BKA prosthesis, at CGA level  2) Interventions:  PT Group Therapy, PT E Stim Attended, PT Prosthetic Training, PT Gait Training, PT Therapeutic Exercises, PT TENS Application, PT Neuro Re-Ed/Balance, PT Therapeutic Activity, and PT Manual Therapy            Goal: STG-Within one week, patient will ascend and descend four to six stairs     Dates: Start: 06/29/20       Description: 1) Individualized goal:  with 1 railing, c/s collar, L BKA prosthesis, gait belt, at Mod A level  2) Interventions:  PT Group Therapy, PT E Stim Attended, PT Prosthetic Training, PT Gait Training, PT Therapeutic Exercises, PT TENS Application, PT Neuro Re-Ed/Balance, PT Therapeutic Activity, and PT Manual Therapy                   Problem: Mobility Transfers     Dates: Start: 06/19/20       Goal: STG-Within one week, patient will sit to stand     Dates: Start: 06/19/20       Description: 1) Individualized goal:  // bars, c/s collar, L BKA prosthesis, gait belt, at Min A level consistently  2) Interventions:  PT Group Therapy, PT E Stim Attended, PT Prosthetic Training, PT Gait Training, PT Therapeutic Exercises, PT TENS Application, PT Neuro Re-Ed/Balance, PT Therapeutic Activity, and PT Manual Therapy    Note:     Goal Note filed on 06/29/20 0820 by Teofilo Vázquez, PT    Min-Mod A at this time                  Goal: STG-Within one week, patient will transfer bed to chair     Dates: Start: 06/29/20       Description: 1) Individualized goal:  with FWW, c/s collar, L BKA prosthesis, at CGA level  2) Interventions:  PT Group Therapy, PT E Stim Attended, PT Prosthetic Training, PT Gait Training, PT Therapeutic Exercises, PT TENS Application, PT Neuro Re-Ed/Balance, PT Therapeutic Activity, and PT Manual Therapy                    Problem: PT-Long Term Goals     Dates: Start: 06/19/20       Goal: LTG-By discharge, patient will ambulate     Dates: Start: 06/19/20       Description: 1) Individualized goal:  150 feet with FWW, c/s collar, L BKA prosthesis, at SBA level  2) Interventions:  PT Group Therapy, PT E Stim Attended, PT Prosthetic Training, PT Gait Training, PT Therapeutic Exercises, PT TENS Application, PT Neuro Re-Ed/Balance, PT Therapeutic Activity, and PT Manual Therapy          Goal: LTG-By discharge, patient will transfer one surface to another     Dates: Start: 06/19/20       Description: 1) Individualized goal:  with FWW, c/s collar, L BKA prosthesis, at SBA level  2) Interventions:  PT Group Therapy, PT E Stim Attended, PT Prosthetic Training, PT Gait Training, PT Therapeutic Exercises, PT TENS Application, PT Neuro Re-Ed/Balance, PT Therapeutic Activity, and PT Manual Therapy            Goal: LTG-By discharge, patient  will ambulate up/down 4-6 stairs     Dates: Start: 06/19/20       Description: 1) Individualized goal:  with 1 railing, c/s collar, L BKA prosthesis, gait belt, at Min A level  2) Interventions:  PT Group Therapy, PT E Stim Attended, PT Prosthetic Training, PT Gait Training, PT Therapeutic Exercises, PT TENS Application, PT Neuro Re-Ed/Balance, PT Therapeutic Activity, and PT Manual Therapy          Goal: LTG-By discharge, patient will transfer in/out of a car     Dates: Start: 06/19/20       Description: 1) Individualized goal:  with FWW, c/s collar, L BKA prosthesis, at SBA level  2) Interventions:  PT Group Therapy, PT E Stim Attended, PT Prosthetic Training, PT Gait Training, PT Therapeutic Exercises, PT TENS Application, PT Neuro Re-Ed/Balance, PT Therapeutic Activity, and PT Manual Therapy

## 2020-07-02 NOTE — THERAPY
07/01/20 1559   Precautions   Precautions Fall Risk;Cervical Collar  ;Spinal / Back Precautions    Comments LLE prosthesis, pacemaker, monitor for orthostatic hypotension, abdominal binder placed on abdomen!   Pain 0 - 10 Group   Therapist Pain Assessment Post Activity Pain Same as Prior to Activity   Cognition    Level of Consciousness Alert   Ability To Follow Commands 2 Step   Safety Awareness Impaired   New Learning Impaired   Attention Impaired   Gait Functional Level of Assist    Gait Level Of Assist Contact Guard Assist   Assistive Device Front Wheel Walker   Distance (Feet) 225   # of Times Distance was Traveled 1   Deviation Bradykinetic;Decreased Heel Strike   Transfer Functional Level of Assist   Bed, Chair, Wheelchair Transfer Minimal Assist   Bed Chair Wheelchair Transfer Description Adaptive equipment  (FWW, Glenna sit to supine)   Bed Mobility    Sit to Supine Minimal Assist   Sit to Stand Contact Guard Assist   Rolling Supervised   PT Total Time Spent   PT Individual Total Time Spent (Mins) 30   PT Charge Group   PT Gait Training 1   PT Therapeutic Activities 1   Physical Therapy   Daily Treatment     Patient Name: Gaurav Lopez  Age:  75 y.o., Sex:  male  Medical Record #: [id number]  Today's Date: 7/1/2020     Precautions  Precautions: Fall Risk, Cervical Collar  , Spinal / Back Precautions   Comments: LLE prosthesis, pacemaker, monitor for orthostatic hypotension, abdominal binder placed on abdomen!    Subjective    The patient agreed to PT.  He indicated that  provided his prosthesis which needs to be adjusted at the foot/ankle     Objective    The patient practiced sit to/from stand, standing tolerance to properly apply the abdominal binder, gait training with a fww.  PT also instructed the patient to remind helpers in the morning to put on the abdominal binder for him and to make sure that it is on the abdomen.  PT also contacted Richelle during the session to have a representative stop in to  see the patient and to adjust his prosthesis at the foot/ankle so he does not continuously catch the toe during swing.    Assessment    The patient had an episode of hypotension this morning during the session when he was standing in the parallel bars.  However, he did not have on the abdominal binder.  With it on this afternoon he had no incident of hypotension.  He also needs to improve the way he scoots forward in the chair to stand up.  It would be safer and more efficient if he would left and scoot or shift left and right to scoot forward.   representative will visit the patient to assess the left prosthesis.  Strengths: Able to follow instructions, Effective communication skills, Independent prior level of function, Pleasant and cooperative, Willingly participates in therapeutic activities  Barriers: Bladder incontinence, Bowel incontinence, Confused, Decreased endurance, Generalized weakness, Home accessibility, Hypotension, Orthostatic hypotension, Impaired activity tolerance, Impaired balance, Impaired carryover of learning, Limited mobility    Plan    Bed mobility and transfer training with a logroll strategy, improve seated scooting, gait training as tolerated, abdominal binder, therapeutic exercise, safety education, attention to task    Physical Therapy Problems     Problem: Mobility     Dates: Start: 06/19/20       Goal: STG-Within one week, patient will propel wheelchair community     Dates: Start: 06/19/20       Description: 1) Individualized goal:  BLE propulsion x150 feet, c/s collar, L BKA prosthesis, gait belt, at SPV level  2) Interventions:  PT Group Therapy, PT E Stim Attended, PT Prosthetic Training, PT Gait Training, PT Therapeutic Exercises, PT TENS Application, PT Neuro Re-Ed/Balance, PT Therapeutic Activity, and PT Manual Therapy      Note:     Goal Note filed on 06/29/20 0820 by Teofilo Vázquez, PT    SPV w/c mobility for 100 feet on last attempt                  Goal:  STG-Within one week, patient will ambulate household distance     Dates: Start: 06/29/20       Description: 1) Individualized goal:  150 feet with FWW, c/s collar, L BKA prosthesis, at CGA level  2) Interventions:  PT Group Therapy, PT E Stim Attended, PT Prosthetic Training, PT Gait Training, PT Therapeutic Exercises, PT TENS Application, PT Neuro Re-Ed/Balance, PT Therapeutic Activity, and PT Manual Therapy            Goal: STG-Within one week, patient will ascend and descend four to six stairs     Dates: Start: 06/29/20       Description: 1) Individualized goal:  with 1 railing, c/s collar, L BKA prosthesis, gait belt, at Mod A level  2) Interventions:  PT Group Therapy, PT E Stim Attended, PT Prosthetic Training, PT Gait Training, PT Therapeutic Exercises, PT TENS Application, PT Neuro Re-Ed/Balance, PT Therapeutic Activity, and PT Manual Therapy                  Problem: Mobility Transfers     Dates: Start: 06/19/20       Goal: STG-Within one week, patient will sit to stand     Dates: Start: 06/19/20       Description: 1) Individualized goal:  // bars, c/s collar, L BKA prosthesis, gait belt, at Min A level consistently  2) Interventions:  PT Group Therapy, PT E Stim Attended, PT Prosthetic Training, PT Gait Training, PT Therapeutic Exercises, PT TENS Application, PT Neuro Re-Ed/Balance, PT Therapeutic Activity, and PT Manual Therapy    Note:     Goal Note filed on 06/29/20 0820 by Teofilo Vázquez, PT    Min-Mod A at this time                  Goal: STG-Within one week, patient will transfer bed to chair     Dates: Start: 06/29/20       Description: 1) Individualized goal:  with FWW, c/s collar, L BKA prosthesis, at CGA level  2) Interventions:  PT Group Therapy, PT E Stim Attended, PT Prosthetic Training, PT Gait Training, PT Therapeutic Exercises, PT TENS Application, PT Neuro Re-Ed/Balance, PT Therapeutic Activity, and PT Manual Therapy                    Problem: PT-Long Term Goals     Dates: Start:  06/19/20       Goal: LTG-By discharge, patient will ambulate     Dates: Start: 06/19/20       Description: 1) Individualized goal:  150 feet with FWW, c/s collar, L BKA prosthesis, at SBA level  2) Interventions:  PT Group Therapy, PT E Stim Attended, PT Prosthetic Training, PT Gait Training, PT Therapeutic Exercises, PT TENS Application, PT Neuro Re-Ed/Balance, PT Therapeutic Activity, and PT Manual Therapy          Goal: LTG-By discharge, patient will transfer one surface to another     Dates: Start: 06/19/20       Description: 1) Individualized goal:  with FWW, c/s collar, L BKA prosthesis, at SBA level  2) Interventions:  PT Group Therapy, PT E Stim Attended, PT Prosthetic Training, PT Gait Training, PT Therapeutic Exercises, PT TENS Application, PT Neuro Re-Ed/Balance, PT Therapeutic Activity, and PT Manual Therapy            Goal: LTG-By discharge, patient will ambulate up/down 4-6 stairs     Dates: Start: 06/19/20       Description: 1) Individualized goal:  with 1 railing, c/s collar, L BKA prosthesis, gait belt, at Min A level  2) Interventions:  PT Group Therapy, PT E Stim Attended, PT Prosthetic Training, PT Gait Training, PT Therapeutic Exercises, PT TENS Application, PT Neuro Re-Ed/Balance, PT Therapeutic Activity, and PT Manual Therapy          Goal: LTG-By discharge, patient will transfer in/out of a car     Dates: Start: 06/19/20       Description: 1) Individualized goal:  with FWW, c/s collar, L BKA prosthesis, at SBA level  2) Interventions:  PT Group Therapy, PT E Stim Attended, PT Prosthetic Training, PT Gait Training, PT Therapeutic Exercises, PT TENS Application, PT Neuro Re-Ed/Balance, PT Therapeutic Activity, and PT Manual Therapy

## 2020-07-02 NOTE — DISCHARGE PLANNING
SNF referral sent to [facility] [place] [facility] per choice form.  Awaiting response.
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
## 2020-07-02 NOTE — THERAPY
"Occupational Therapy  Daily Treatment     Patient Name: Gaurav Lopez  Age:  75 y.o., Sex:  male  Medical Record #: [id number]  Today's Date: 7/2/2020     Precautions  Precautions: (P) Fall Risk, Cervical Collar  , Spinal / Back Precautions   Comments: (P) LLE prosthesis, pacemaker, monitor for orthostatic hypotension, abdominal binder placed on abdomen!     Safety   ADL Safety : Requires Supervision for Safety, Requires Physical Assist for Safety, Impaired  Bathroom Safety: Impaired, Requires Supervision for Safety    Subjective    \" I can't call out. This phone doesn't work.\"     Objective       07/02/20 0931   Precautions   Precautions Fall Risk;Cervical Collar  ;Spinal / Back Precautions    Comments LLE prosthesis, pacemaker, monitor for orthostatic hypotension, abdominal binder placed on abdomen!    Functional Level of Assist   Memory Supervision  (cues for sequencing for calling outside line  )   Sitting Upper Body Exercises   Chest Press 2 sets of 10;Right ;Left   Bicep Curls 2 sets of 10;Right ;Left   Pronation / Supination 2 sets of 10;Right ;Left   Comments performed with 1lb weight    Interdisciplinary Plan of Care Collaboration   Patient Position at End of Therapy Seated;Call Light within Reach;Tray Table within Reach   OT Total Time Spent   OT Individual Total Time Spent (Mins) 60   OT Charge Group   OT Therapy Activity 3   OT Therapeutic Exercise  1      Assist with operation of phone for dialing outside number.    Min assist  To recall sequence  9-( phonenumber)  And then option 3 from the menu of the party he was attempting to contact     Ladder ball toss in // bars   Toss balls  Walk length of bars stepping over  8 inch bolster  Retrieve balls from  Top 2 rungs  And return to starting position.   Performed   X 3 reps    CGA      CGA  Sit <-> stands       Tub bench transfer performed  2 times with SBA  And cues       Assessment     reported moderate  fatigue at end of session     Strengths: Able to " follow instructions, Adequate strength, Alert and oriented, Effective communication skills, Independent prior level of function  Barriers: Decreased endurance, Impaired activity tolerance    Plan    Continue AE reinforcement for LB dressing.  Figure four from supine with leg  may be a good option to minimize AE involvement if patient can tolerate position. Focus on ADLs, transfers, activity tolerance and functional use of UEs, standing bal/tolerance.       Occupational Therapy Goals     Problem: Dressing     Dates: Start: 06/29/20       Goal: STG-Within one week, patient will dress LB     Dates: Start: 06/29/20       Description: 1) Individualized Goal:  min assist lower body dressing, using appropriate AE  2) Interventions:  OT Self Care/ADL, OT Therapeutic Activity, and OT Therapeutic Exercise                  Problem: OT Long Term Goals     Dates: Start: 06/19/20       Goal: LTG-By discharge, patient will complete basic self care tasks     Dates: Start: 06/19/20       Description: 1) Individualized Goal:  Mod I for BADL's, Sup/SBA for shower via AE/DME PRN  2) Interventions:  OT Self Care/ADL, OT Neuro Re-Ed/Balance, OT Therapeutic Activity, OT Evaluation, and OT Therapeutic Exercise            Goal: LTG-By discharge, patient will perform bathroom transfers     Dates: Start: 06/19/20                   Problem: Toileting     Dates: Start: 06/19/20       Goal: STG-Within one week, patient will complete toileting tasks     Dates: Start: 06/19/20       Description: 1) Individualized Goal:  Max assist for toileting task via AE/DME PRN  2) Interventions:  OT Self Care/ADL, OT Neuro Re-Ed/Balance, OT Therapeutic Activity, OT Evaluation, and OT Therapeutic Exercise      Note:     Goal Note filed on 06/29/20 1106 by Cindy Hess OT    Total assist for toiletting tasks

## 2020-07-02 NOTE — PROGRESS NOTES
"Rehab Progress Note     Encounter Date: 7/2/2020    CC: weakness    Interval Events (Subjective)    He denies any dizziness when getting out of bed this morning.    Blood pressure max 180 this morning.  He had a drop from systolic blood pressure 179 down to 138 with standing.  No significant change in heart rate      He is very concerned about his discharge date.  He reports he does not think that he will be able to get home safely.  He does report that he is ambulating quite well with the front wheel walker.  He is interested in being able to ambulate without assistive device.  He is concerned that his wife is been having some issues with her hip and cannot assist with manual activities.     Pain in neck and back is well controlled.    Bladder: PVR/void 71/150, 49/150, 136/200  Last BM: 07/01/20, large hard BM last night with BTP    ROS:  Gen: No fatigue, confusion, significant weight loss  Eyes: no blurry vision, double vision or pain in eyes  ENT: no changes in hearing, runny nose, nose bleeds, sinus pain  CV: No CP, palpitations  Lungs: no shortness of breath, changes in secretions, changes in cough, pain with coughing  Abd: No abd pain, nausea, vomiting, pain with eating  : no blood in urine, suprapubic pain  Ext: No swelling in legs, asymmetric atrophy  Neuro: no changes in strength or sensation  Skin: no new ulcers/skin breakdown appreciated by patient  Mood: No changes in mood today, increase in depression or anxiety  Heme: no bruising, or bleeding  Rest of 14 point review of systems is negative    Objective:  Vitals: /88   Pulse 62   Temp 36.7 °C (98 °F) (Temporal)   Resp 17   Ht 1.753 m (5' 9.02\")   Wt 77.6 kg (171 lb)   SpO2 96%   Gen: NAD, Body mass index is 25.24 kg/m².  HEENT: NC/AT, PERRLA, moist mucous membranes  Cardio: RRR, no mumurs  Pulm: CTAB, with normal respiratory effort  Abd: Soft NTND, active bowel sounds  Ext: No peripheral edema. No calf tenderness. No clubbing/cyanosis. " +dorsal pedalis pulses bilaterally.      Skin: Incision is clean dry and intact, sutures in place, no signs of dehiscence    Tone: Hip flexion spasticity, 0-1/4, cogwheeling tone in the left hip flexor    Recent Results (from the past 72 hour(s))   Basic Metabolic Panel    Collection Time: 06/30/20  5:44 AM   Result Value Ref Range    Sodium 143 135 - 145 mmol/L    Potassium 3.9 3.6 - 5.5 mmol/L    Chloride 107 96 - 112 mmol/L    Co2 25 20 - 33 mmol/L    Glucose 93 65 - 99 mg/dL    Bun 18 8 - 22 mg/dL    Creatinine 0.70 0.50 - 1.40 mg/dL    Calcium 9.1 8.5 - 10.5 mg/dL    Anion Gap 11.0 7.0 - 16.0   ESTIMATED GFR    Collection Time: 06/30/20  5:44 AM   Result Value Ref Range    GFR If African American >60 >60 mL/min/1.73 m 2    GFR If Non African American >60 >60 mL/min/1.73 m 2   Basic Metabolic Panel    Collection Time: 07/01/20  5:46 AM   Result Value Ref Range    Sodium 142 135 - 145 mmol/L    Potassium 3.8 3.6 - 5.5 mmol/L    Chloride 105 96 - 112 mmol/L    Co2 27 20 - 33 mmol/L    Glucose 83 65 - 99 mg/dL    Bun 15 8 - 22 mg/dL    Creatinine 0.76 0.50 - 1.40 mg/dL    Calcium 9.1 8.5 - 10.5 mg/dL    Anion Gap 10.0 7.0 - 16.0   ESTIMATED GFR    Collection Time: 07/01/20  5:46 AM   Result Value Ref Range    GFR If African American >60 >60 mL/min/1.73 m 2    GFR If Non African American >60 >60 mL/min/1.73 m 2       Current Facility-Administered Medications   Medication Frequency   • docusate sodium (COLACE) capsule 200 mg BID    And   • sennosides (SENOKOT) 8.6 MG tablet 25.8 mg DAILY AT NOON    And   • bisacodyl (THE MAGIC BULLET) suppository 10 mg QDAY    And   • magnesium hydroxide (MILK OF MAGNESIA) suspension 30 mL QDAY PRN   • zolpidem (AMBIEN) tablet 5 mg QHS   • fludrocortisone (FLORINEF) tablet 0.1 mg QAM   • pseudoephedrine (SUDAFED) 30 MG tablet 60 mg TID   • baclofen (LIORESAL) tablet 20 mg TID   • melatonin tablet 3 mg QHS   • midodrine (PROAMATINE) tablet 15 mg TID   • ferrous sulfate tablet 325 mg  QDAY with Breakfast   • gabapentin (NEURONTIN) capsule 800 mg TID   • metFORMIN (GLUCOPHAGE) tablet 250 mg BID AC   • midodrine (PROAMATINE) tablet 5 mg Q4HRS PRN   • aspirin EC (ECOTRIN) tablet 81 mg DAILY   • vitamin D (cholecalciferol) tablet 2,000 Units DAILY   • finasteride (PROSCAR) tablet 5 mg DAILY   • lovastatin (MEVACOR) tablet 10 mg Nightly   • vitamin B comp+C (ALLBEE WITH C) 1 Tab DAILY   • therapeutic multivitamin-minerals (THERAGRAN-M) tablet 1 Tab DAILY   • Pharmacy Consult Request ...Pain Management Review 1 Each PHARMACY TO DOSE   • Respiratory Therapy Consult Continuous RT   • tramadol (ULTRAM) 50 MG tablet 50 mg Q4HRS PRN   • oxyCODONE immediate-release (ROXICODONE) tablet 5 mg Q3HRS PRN   • oxyCODONE immediate release (ROXICODONE) tablet 10 mg Q3HRS PRN   • acetaminophen (TYLENOL) tablet 650 mg Q4HRS PRN   • enoxaparin (LOVENOX) inj 40 mg QHS   • artificial tears ophthalmic solution 1 Drop PRN   • benzocaine-menthol (CEPACOL) lozenge 1 Lozenge Q2HRS PRN   • mag hydrox-al hydrox-simeth (MAALOX PLUS ES or MYLANTA DS) suspension 20 mL Q2HRS PRN   • ondansetron (ZOFRAN ODT) dispertab 4 mg 4X/DAY PRN    Or   • ondansetron (ZOFRAN) syringe/vial injection 4 mg 4X/DAY PRN   • traZODone (DESYREL) tablet 50 mg QHS PRN   • sodium chloride (OCEAN) 0.65 % nasal spray 2 Spray PRN   • lidocaine (LIDODERM) 5 % 2 Patch Daily-0800   • aripiprazole (ABILIFY) tablet 10 mg DAILY   • venlafaxine XR (EFFEXOR XR) capsule 150 mg BID       Orders Placed This Encounter   Procedures   • Diet Order Diabetic     Standing Status:   Standing     Number of Occurrences:   1     Order Specific Question:   Diet:     Answer:   Diabetic [3]       Assessment:  Active Hospital Problems    Diagnosis   • *Cervical myelopathy (HCC)   • Postoperative pain   • Deep vein thrombosis (HCC)   • Insomnia due to medical condition   • Neurogenic bowel   • Depression   • Thoracic myelopathy   • Neuropathic pain   • Hyperlipidemia   • Hypertension    • S/P BKA (below knee amputation) ([facility])       Medical Decision Making and Plan:  C2 AIS D, 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  Status post multiple spine surgeries, C3-T7 posterior spinal fusion on 6/5, T12-pelvis posterior spinal fusion on 6/10 by Dr Marsh.   - C-collar on at all times, [place] collar when in shower to be cleared by neurosurgery as outpatient, 8-12 weeks  -  Sutures to be removed 21 days postop, prolonged time secondary to repeat surgeries, remove sutures on 7/2  - Prolonged discussion with patient and wife about expectations of acute rehabilitation, plan for discharge home in 1 to 2 weeks with transition to home health and then outpatient therapy.  -Continue comprehensive acute inpatient rehabilitation    Left BKA: Using [employer] for prosthetists, not following up with physicians  - Prosthesis at bedside  -Follow-up with Dr. Lawson as an outpatient, prosthetic may require modifications given fixation of the pelvis  -Discussed with patient and physical therapy about stretching of hip flexor on the left.    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 noon, decrease Colace 100 mg twice daily, Dulcolax suppository with digital stimulation,   -DC MiraLAX 1 packet daily  -Discussed program with patient and importance of appropriate management  of neurogenic bowel with repercussions of colonic dilation and possible rupture  -KUB showed no significant stool load in ascending or transverse colon      orthostatic hypotension: On 6/19- 82/52 with change in position  Because of significant autonomic dysfunction associated with spinal cord injury, the patient is at high risk for orthostatic hypotension.  Given baseline elevation in blood pressure, goal of decreasing Florinef over the next couple of days  - FAMILIA hose on prior to out of bed  -midodrine 15 mg 3 times daily  -Florinef 0.1 mg every morning, monitor potassium level and sodium level, he is having episodes of hypertension while supine.  - P.o. fluid intake, goal 1.5-2 L/day  -Monitor strict I's and O's  - Amlodipine was DC'd, DC lisinopril  -Sudafed 60 mg 3 times daily  -Discontinued Flomax on 7/1    Spasticity: MAS of 0-1/4 jumping of bilateral lower extremities, greater in hip flexors, waking him up at night  - Decrease Baclofen 15 mg 3 times daily  -Educated patient on stretching of the left hip flexor    Decreased range of motion of right hip: In differential diagnosis includes heterotopic ossification of the right hip after multiple spinal surgeries.  Improved with stretching during acute rehabilitation  - x-ray right hip, showed no signs of heterotopic ossification  -CRP is less than 2 likely negative     Pain:  #Neuropathic pain: Neuropathy, complicated by diabetes and a cervical myelopathy  - Gabapentin 800 mg tid  - DCTylenol 1000 mg 3 times daily     #Postoperative pain:  -Tramadol  mg every 4 hours as needed moderate to severe pain first-line  -Oxycodone 5-10 mg every 3 hours as needed moderate to severe pain, second line  - DC Tylenol 1000 mg 3 times daily  -Lidocaine patches to either side of incision on for 12 hours off for 12 hours     Hypertension:  - All oral antihypertensives have been DC'd  -Appreciate hospitalist input     Type 2 diabetes: With hyperglycemia  -Metformin 500 mg  twice daily  -Appreciate hospitalist input    Insomnia:  -DC Remeron 15 mg nightly, given vivid dreams  - Trial Ambien 5mg qhs  - Melatonin 3 mg nightly     Depression:  -Effexor  mg twice daily     Vitamin D deficiency  -Vitamin D pending     DVT prophylaxis: Patient has previous history of DVT in 2011 making him higher risk for clot formation  -Lovenox 40 mg daily    Patient was seen for 28 minutes on unit/floor of which > 50% of time was spent on counseling and coordination of care regarding the above, including prognosis, risk reduction, benefits of treatment, and options for next stage of care including spasticity, decrease baclofen to 15 mg 3 times daily, orthostatic hypotension with baseline hypertension, discussed with patient, goal of decreasing Florinef, check BMP on 7/6.      Rj Navarro D.O.

## 2020-07-02 NOTE — THERAPY
Physical Therapy   Daily Treatment     Patient Name: Gaurav Lopez  Age:  75 y.o., Sex:  male  Medical Record #: [id number]  Today's Date: 7/2/2020     Precautions  Precautions: Fall Risk, Cervical Collar  , Spinal / Back Precautions   Comments: LLE prosthesis, pacemaker, monitor for orthostatic hypotension, abdominal binder placed on abdomen!     Subjective    Patient was seated upon arrival talking with  about potential d/c to SNF on Monday. Patient was agreeable to PT treatment session.      Objective       07/02/20 1431   Vitals   O2 (LPM) 0   O2 Delivery Device None - Room Air   Supine Lower Body Exercise   Supine Lower Body Exercises Yes   Bridges Two Legged;1 set of 10  ([facility]AROM PT: 25-50% Bolster. 2nd set attempted pain limited.  )   Hip Abduction Side Lying;1 set of 10  (AAROM PT did 50%. Pt likes to sub TFL. )   Straight Leg Raises 2 sets of 10;Bilateral  (AAROM PT assist 0-25%. Extension Lag noted. )   Bed Mobility    Supine to Sit Moderate Assist  (Log roll. Assistance with rolling and elevating trunk. )   Sit to Supine Minimal Assist  (Assist B LE lift. )   Sit to Stand Moderate Assist  (x 3, Gait belt, FWW. Min-Mod assist. )   Scooting Moderate Assist  (Upper trunk assistance. )   Rolling Moderate Assist to Rt.  (Assistance with set up, Assist trunk control. )   Interdisciplinary Plan of Care Collaboration   IDT Collaboration with  ;Physician   Patient Position at End of Therapy In Bed;Bed Alarm On;Call Light within Reach;Tray Table within Reach;Phone within Reach  (TV in reach. )   Collaboration Comments Discussed SNF options for pt.   (Approximatly x 5 minutes with  and MD.)   PT Total Time Spent   PT Individual Total Time Spent (Mins) 60   PT Charge Group   PT Therapeutic Exercise 1   PT Therapeutic Activities 3     Patient Education:   - Patient had questions regarding SNF including potential options and what therapy would be like in this setting.   - Showed patient  medicare website for SNF and demonstrated how to compare different sites in his area. We also went through some of the different sites near him.   - We helped him write down the phone numbers and questions for the sites that he was interested in.   - 20 minutes.     Assessment    We worked on bed mobility and strengthening today patient tolerated this well. Patient requires more assistance at the end of therapy for transfers and for bed mobility when fatigued. Patient had questions about SNF today and required some education.   Strengths: Able to follow instructions, Effective communication skills, Independent prior level of function, Pleasant and cooperative, Willingly participates in therapeutic activities  Barriers: Bladder incontinence, Bowel incontinence, Confused, Decreased endurance, Generalized weakness, Home accessibility, Hypotension, Orthostatic hypotension, Impaired activity tolerance, Impaired balance, Impaired carryover of learning, Limited mobility    Plan    Monitor pt's fit of L BKA prosthesis, re-assess pt's type of walker at home and its condition, possibly complete d/c [facility] tomorrow, and anticipate d/c at SP level home about 7/7/2020 or potential d/c to SNF earlier to continue prolonged rehab.    Monitor BP, STS work, standing posture & balance, progress ambulation distance with FWW, safety with transfers, LE & core strengthening.      Physical Therapy Problems     Problem: Mobility     Dates: Start: 06/19/20       Goal: STG-Within one week, patient will propel wheelchair community     Dates: Start: 06/19/20       Description: 1) Individualized goal:  BLE propulsion x150 feet, c/s collar, L BKA prosthesis, gait belt, at SPV level  2) Interventions:  PT Group Therapy, PT E Stim Attended, PT Prosthetic Training, PT Gait Training, PT Therapeutic Exercises, PT TENS Application, PT Neuro Re-Ed/Balance, PT Therapeutic Activity, and PT Manual Therapy      Note:     Goal Note filed on 06/29/20 8129  by Teofilo Vázquez, PT    SPV w/c mobility for 100 feet on last attempt                  Goal: STG-Within one week, patient will ambulate household distance     Dates: Start: 06/29/20       Description: 1) Individualized goal:  150 feet with FWW, c/s collar, L BKA prosthesis, at CGA level  2) Interventions:  PT Group Therapy, PT E Stim Attended, PT Prosthetic Training, PT Gait Training, PT Therapeutic Exercises, PT TENS Application, PT Neuro Re-Ed/Balance, PT Therapeutic Activity, and PT Manual Therapy            Goal: STG-Within one week, patient will ascend and descend four to six stairs     Dates: Start: 06/29/20       Description: 1) Individualized goal:  with 1 railing, c/s collar, L BKA prosthesis, gait belt, at Mod A level  2) Interventions:  PT Group Therapy, PT E Stim Attended, PT Prosthetic Training, PT Gait Training, PT Therapeutic Exercises, PT TENS Application, PT Neuro Re-Ed/Balance, PT Therapeutic Activity, and PT Manual Therapy                  Problem: Mobility Transfers     Dates: Start: 06/19/20       Goal: STG-Within one week, patient will sit to stand     Dates: Start: 06/19/20       Description: 1) Individualized goal:  // bars, c/s collar, L BKA prosthesis, gait belt, at Min A level consistently  2) Interventions:  PT Group Therapy, PT E Stim Attended, PT Prosthetic Training, PT Gait Training, PT Therapeutic Exercises, PT TENS Application, PT Neuro Re-Ed/Balance, PT Therapeutic Activity, and PT Manual Therapy    Note:     Goal Note filed on 06/29/20 0820 by Teofilo Vázquez, PT    Min-Mod A at this time                  Goal: STG-Within one week, patient will transfer bed to chair     Dates: Start: 06/29/20       Description: 1) Individualized goal:  with FWW, c/s collar, L BKA prosthesis, at CGA level  2) Interventions:  PT Group Therapy, PT E Stim Attended, PT Prosthetic Training, PT Gait Training, PT Therapeutic Exercises, PT TENS Application, PT Neuro Re-Ed/Balance, PT  Therapeutic Activity, and PT Manual Therapy                    Problem: PT-Long Term Goals     Dates: Start: 06/19/20       Goal: LTG-By discharge, patient will ambulate     Dates: Start: 06/19/20       Description: 1) Individualized goal:  150 feet with FWW, c/s collar, L BKA prosthesis, at SBA level  2) Interventions:  PT Group Therapy, PT E Stim Attended, PT Prosthetic Training, PT Gait Training, PT Therapeutic Exercises, PT TENS Application, PT Neuro Re-Ed/Balance, PT Therapeutic Activity, and PT Manual Therapy          Goal: LTG-By discharge, patient will transfer one surface to another     Dates: Start: 06/19/20       Description: 1) Individualized goal:  with FWW, c/s collar, L BKA prosthesis, at SBA level  2) Interventions:  PT Group Therapy, PT E Stim Attended, PT Prosthetic Training, PT Gait Training, PT Therapeutic Exercises, PT TENS Application, PT Neuro Re-Ed/Balance, PT Therapeutic Activity, and PT Manual Therapy            Goal: LTG-By discharge, patient will ambulate up/down 4-6 stairs     Dates: Start: 06/19/20       Description: 1) Individualized goal:  with 1 railing, c/s collar, L BKA prosthesis, gait belt, at Min A level  2) Interventions:  PT Group Therapy, PT E Stim Attended, PT Prosthetic Training, PT Gait Training, PT Therapeutic Exercises, PT TENS Application, PT Neuro Re-Ed/Balance, PT Therapeutic Activity, and PT Manual Therapy          Goal: LTG-By discharge, patient will transfer in/out of a car     Dates: Start: 06/19/20       Description: 1) Individualized goal:  with FWW, c/s collar, L BKA prosthesis, at SBA level  2) Interventions:  PT Group Therapy, PT E Stim Attended, PT Prosthetic Training, PT Gait Training, PT Therapeutic Exercises, PT TENS Application, PT Neuro Re-Ed/Balance, PT Therapeutic Activity, and PT Manual Therapy

## 2020-07-02 NOTE — CARE PLAN
Problem: Bowel/Gastric:  Goal: Will not experience complications related to bowel motility  Intervention: Implement Bowel Protocol, if applicable  Note: Scheduled suppository given, digital stimulation performed with good results.Will continue to monitor.     Problem: Pain Management  Goal: Pain level will decrease to patient's comfort goal  Note: Pt is calm, comfortable and no sign of acute distress noted.Repositioned with pillows for comfort.Will continue to monitor and assess pain level and medicate as needed.

## 2020-07-02 NOTE — CARE PLAN
Problem: Safety  Goal: Will remain free from injury  Outcome: PROGRESSING AS EXPECTED  Note: Reviewed fall and safety precautions with patient and reminded patient to call for assistance before getting out of bed. Patient verbalized understanding and has not attempted self transfer this shift.      Problem: Bowel/Gastric:  Goal: Normal bowel function is maintained or improved  Outcome: PROGRESSING AS EXPECTED  Note: Patient had BM today in bathroom. Remains continent of bowel.      Problem: Pain Management  Goal: Pain level will decrease to patient's comfort goal  Outcome: PROGRESSING AS EXPECTED  Note: Patient denied any pain during shift.
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
## 2020-07-02 NOTE — DISCHARGE PLANNING
Per Cait at [facility], they will pick patient up tomorrow, 7/2 at 1600.  Dr. Roman is accepting physician.  CM notified.

## 2020-07-02 NOTE — THERAPY
Occupational Therapy  Daily Treatment     Patient Name: Gaurav Lopez  Age:  75 y.o., Sex:  male  Medical Record #: [id number]  Today's Date: 7/2/2020     Precautions  Precautions: Fall Risk, Cervical Collar  , Spinal / Back Precautions   Comments: LLE prosthesis, pacemaker, monitor for orthostatic hypotension, abdominal binder placed on abdomen!     Safety   ADL Safety : Requires Supervision for Safety, Requires Physical Assist for Safety, Impaired  Bathroom Safety: Impaired, Requires Supervision for Safety    Subjective       Objective       07/02/20 0831   Precautions   Precautions Fall Risk;Cervical Collar  ;Spinal / Back Precautions    Comments LLE prosthesis, pacemaker, monitor for orthostatic hypotension, abdominal binder placed on abdomen!    Vitals   O2 Delivery Device None - Room Air   Pain   Intervention Declines   Pain 0 - 10 Group   Therapist Pain Assessment 0   Non Verbal Descriptors   Non Verbal Scale  Calm   Cognition    Level of Consciousness Alert   Functional Level of Assist   Eating Modified Independent   Eating Description Insert dentures   Grooming Modified Independent;Seated   Grooming Description Seated in wheelchair at sink   Standing Upper Body Exercises   Other Exercises Standing at FluidoBike, Level 3, 5 mins x 3 w/ seated rest breaks between, .971km   Interdisciplinary Plan of Care Collaboration   IDT Collaboration with  Certified Nursing Assistant   Patient Position at End of Therapy Seated;Call Light within Reach;Tray Table within Reach;Phone within Reach   OT Total Time Spent   OT Individual Total Time Spent (Mins) 30   OT Charge Group   OT Self Care / ADL 1   OT Therapeutic Exercise  1       Assessment    Pt was alert and cooperative w/ tx.  Tx emphasis on seated ADL's and standing UB TherEx - see above.    Strengths: Able to follow instructions, Adequate strength, Alert and oriented, Effective communication skills, Independent prior level of function  Barriers: Decreased endurance,  Impaired activity tolerance    Plan    Continue AE reinforcement for LB dressing.  Figure four from supine with leg  may be a good option to minimize AE involvement if patient can tolerate position. Focus on ADLs, transfers, activity tolerance and functional use of UEs, standing bal/tolerance.    Occupational Therapy Goals     Problem: Dressing     Dates: Start: 06/29/20       Goal: STG-Within one week, patient will dress LB     Dates: Start: 06/29/20       Description: 1) Individualized Goal:  min assist lower body dressing, using appropriate AE  2) Interventions:  OT Self Care/ADL, OT Therapeutic Activity, and OT Therapeutic Exercise                  Problem: OT Long Term Goals     Dates: Start: 06/19/20       Goal: LTG-By discharge, patient will complete basic self care tasks     Dates: Start: 06/19/20       Description: 1) Individualized Goal:  Mod I for BADL's, Sup/SBA for shower via AE/DME PRN  2) Interventions:  OT Self Care/ADL, OT Neuro Re-Ed/Balance, OT Therapeutic Activity, OT Evaluation, and OT Therapeutic Exercise            Goal: LTG-By discharge, patient will perform bathroom transfers     Dates: Start: 06/19/20                   Problem: Toileting     Dates: Start: 06/19/20       Goal: STG-Within one week, patient will complete toileting tasks     Dates: Start: 06/19/20       Description: 1) Individualized Goal:  Max assist for toileting task via AE/DME PRN  2) Interventions:  OT Self Care/ADL, OT Neuro Re-Ed/Balance, OT Therapeutic Activity, OT Evaluation, and OT Therapeutic Exercise      Note:     Goal Note filed on 06/29/20 1106 by Cindy Hess OT    Total assist for toiletting tasks

## 2020-07-02 NOTE — THERAPY
Occupational Therapy  Daily Treatment     Patient Name: Gaurav Lopez  Age:  75 y.o., Sex:  male  Medical Record #: [id number]  Today's Date: 7/2/2020     Precautions  Precautions: Fall Risk, Cervical Collar  , Spinal / Back Precautions   Comments: LLE prosthesis, pacemaker, monitor for orthostatic hypotension, abdominal binder placed on abdomen!     Safety   ADL Safety : Requires Supervision for Safety, Requires Physical Assist for Safety, Impaired  Bathroom Safety: Impaired, Requires Supervision for Safety    Subjective       Objective       07/02/20 1301   Precautions   Precautions Fall Risk;Cervical Collar  ;Spinal / Back Precautions    Comments LLE prosthesis, pacemaker, monitor for orthostatic hypotension, abdominal binder placed on abdomen!    Vitals   O2 Delivery Device None - Room Air   Pain   Intervention Declines   Pain 0 - 10 Group   Therapist Pain Assessment 0   Non Verbal Descriptors   Non Verbal Scale  Calm   Cognition    Level of Consciousness Alert   Sleep/Wake Cycle   Sleep & Rest Awake   Functional Level of Assist   Lower Body Dressing Moderate Assist   Lower Body Dressing Description Dressing stick;Grab bar;Reacher;Sock aid;Increased time;Set-up of equipment;Verbal cueing;Supervision for safety   Interdisciplinary Plan of Care Collaboration   IDT Collaboration with  Certified Nursing Assistant   Patient Position at End of Therapy Seated;Self Releasing Lap Belt Applied;Call Light within Reach;Tray Table within Reach;Phone within Reach   OT Total Time Spent   OT Individual Total Time Spent (Mins) 30   OT Charge Group   OT Self Care / ADL 1   OT Therapy Activity 1       Assessment    Pt was alert and cooperative w/ tx.  Tx emphasis on LB clothing management via AE/DME - see notes above + cervical collar wear/care (including swapping out pads and cleaning/drying pads).  Cervical + spinal/back precautions, impaired standing balance and endurance, decreased strength.  Strengths: Able to follow  instructions, Adequate strength, Alert and oriented, Effective communication skills, Independent prior level of function  Barriers: Decreased endurance, Impaired activity tolerance    Plan    Continue AE reinforcement for LB dressing.  Figure four from supine with leg  may be a good option to minimize AE involvement if patient can tolerate position. Focus on ADLs, transfers, activity tolerance and functional use of UEs, standing bal/tolerance.    Occupational Therapy Goals     Problem: Dressing     Dates: Start: 06/29/20       Goal: STG-Within one week, patient will dress LB     Dates: Start: 06/29/20       Description: 1) Individualized Goal:  min assist lower body dressing, using appropriate AE  2) Interventions:  OT Self Care/ADL, OT Therapeutic Activity, and OT Therapeutic Exercise                  Problem: OT Long Term Goals     Dates: Start: 06/19/20       Goal: LTG-By discharge, patient will complete basic self care tasks     Dates: Start: 06/19/20       Description: 1) Individualized Goal:  Mod I for BADL's, Sup/SBA for shower via AE/DME PRN  2) Interventions:  OT Self Care/ADL, OT Neuro Re-Ed/Balance, OT Therapeutic Activity, OT Evaluation, and OT Therapeutic Exercise            Goal: LTG-By discharge, patient will perform bathroom transfers     Dates: Start: 06/19/20                   Problem: Toileting     Dates: Start: 06/19/20       Goal: STG-Within one week, patient will complete toileting tasks     Dates: Start: 06/19/20       Description: 1) Individualized Goal:  Max assist for toileting task via AE/DME PRN  2) Interventions:  OT Self Care/ADL, OT Neuro Re-Ed/Balance, OT Therapeutic Activity, OT Evaluation, and OT Therapeutic Exercise      Note:     Goal Note filed on 06/29/20 1106 by Cindy Hess OT    Total assist for toiletting tasks

## 2020-07-02 NOTE — DISCHARGE PLANNING
Per Ami at [place], referral accepted.  Per Cait at [facility], referral accepted.

## 2020-07-02 NOTE — THERAPY
"Recreational Therapy  Daily Treatment     Patient Name: Gaurav Lopez  AGE:  75 y.o., SEX:  male  Medical Record #: [id number]  Today's Date: 7/2/2020       Subjective    Pt reporting having a poor start to start to his day. Pt was able to share that he was willing to learn a new leisure activity.      Objective       07/02/20 1101   Functional Ability Status - Cognitive   Attention Span Remains on Task   Comprehension Follows Three Step Commands   Judgment Able to Make Independent Decisions   Functional Ability Status - Emotional    Affect Appropriate   Mood Appropriate   Behavior Appropriate   Skilled Intervention    Skilled Intervention Relaxation / Coping Skills;Gross Motor Leisure   Skilled Intervention Comments Pt was taught a new leisure activity using his R UE.    Interdisciplinary Plan of Care Collaboration   Patient Position at End of Therapy Seated;Tray Table within Reach;Call Light within Reach   Strengths & Barriers   Strengths Able to follow instructions;Willingly participates in therapeutic activities;Pleasant and cooperative;Motivated for self care and independence   Barriers Decreased endurance;Generalized weakness;Impaired activity tolerance   Treatment Time   Total Time Spent (mins) 30   Procedural Tracking   Procedural Tracking Leisure Skills Development       Assessment    Pt was taught a new leisure activity he could do from a seated position. Pt required some verbal cues to distinguish which buttons to press and the technique to \"throw\" the bowling ball.     Strengths: (P) Able to follow instructions, Willingly participates in therapeutic activities, Pleasant and cooperative, Motivated for self care and independence  Barriers: (P) Decreased endurance, Generalized weakness, Impaired activity tolerance    Plan    Continue to teach new and positive leisure activities.   "

## 2020-07-03 PROCEDURE — 700112 HCHG RX REV CODE 229: Performed by: PHYSICAL MEDICINE & REHABILITATION

## 2020-07-03 PROCEDURE — 99231 SBSQ HOSP IP/OBS SF/LOW 25: CPT | Performed by: HOSPITALIST

## 2020-07-03 PROCEDURE — 700102 HCHG RX REV CODE 250 W/ 637 OVERRIDE(OP): Performed by: PHYSICAL MEDICINE & REHABILITATION

## 2020-07-03 PROCEDURE — 700102 HCHG RX REV CODE 250 W/ 637 OVERRIDE(OP): Performed by: HOSPITALIST

## 2020-07-03 PROCEDURE — 700101 HCHG RX REV CODE 250: Performed by: PHYSICAL MEDICINE & REHABILITATION

## 2020-07-03 PROCEDURE — A9270 NON-COVERED ITEM OR SERVICE: HCPCS | Performed by: PHYSICAL MEDICINE & REHABILITATION

## 2020-07-03 PROCEDURE — 97116 GAIT TRAINING THERAPY: CPT

## 2020-07-03 PROCEDURE — A9270 NON-COVERED ITEM OR SERVICE: HCPCS | Performed by: HOSPITALIST

## 2020-07-03 PROCEDURE — 97530 THERAPEUTIC ACTIVITIES: CPT

## 2020-07-03 PROCEDURE — 97535 SELF CARE MNGMENT TRAINING: CPT

## 2020-07-03 PROCEDURE — 700111 HCHG RX REV CODE 636 W/ 250 OVERRIDE (IP): Performed by: PHYSICAL MEDICINE & REHABILITATION

## 2020-07-03 PROCEDURE — 97110 THERAPEUTIC EXERCISES: CPT

## 2020-07-03 PROCEDURE — 99231 SBSQ HOSP IP/OBS SF/LOW 25: CPT | Performed by: PHYSICAL MEDICINE & REHABILITATION

## 2020-07-03 PROCEDURE — 770010 HCHG ROOM/CARE - REHAB SEMI PRIVAT*

## 2020-07-03 RX ADMIN — BACLOFEN 15 MG: 10 TABLET ORAL at 20:20

## 2020-07-03 RX ADMIN — MULTIPLE VITAMINS W/ MINERALS TAB 1 TABLET: TAB at 08:24

## 2020-07-03 RX ADMIN — BACLOFEN 15 MG: 10 TABLET ORAL at 15:17

## 2020-07-03 RX ADMIN — STANDARDIZED SENNA CONCENTRATE 25.8 MG: 8.6 TABLET ORAL at 11:47

## 2020-07-03 RX ADMIN — ENOXAPARIN SODIUM 40 MG: 100 INJECTION SUBCUTANEOUS at 20:23

## 2020-07-03 RX ADMIN — ZOLPIDEM TARTRATE 5 MG: 5 TABLET ORAL at 20:20

## 2020-07-03 RX ADMIN — DOCUSATE SODIUM 200 MG: 100 CAPSULE, LIQUID FILLED ORAL at 08:24

## 2020-07-03 RX ADMIN — PSEUDOEPHEDRINE HCL 60 MG: 30 TABLET, FILM COATED ORAL at 17:10

## 2020-07-03 RX ADMIN — FERROUS SULFATE TAB 325 MG (65 MG ELEMENTAL FE) 325 MG: 325 (65 FE) TAB at 08:25

## 2020-07-03 RX ADMIN — LIDOCAINE 2 PATCH: 50 PATCH TOPICAL at 08:23

## 2020-07-03 RX ADMIN — GABAPENTIN 800 MG: 400 CAPSULE ORAL at 20:22

## 2020-07-03 RX ADMIN — PSEUDOEPHEDRINE HCL 60 MG: 30 TABLET, FILM COATED ORAL at 11:50

## 2020-07-03 RX ADMIN — MELATONIN 3 MG: at 20:20

## 2020-07-03 RX ADMIN — MIDODRINE HYDROCHLORIDE 15 MG: 10 TABLET ORAL at 17:10

## 2020-07-03 RX ADMIN — MELATONIN 2000 UNITS: at 08:23

## 2020-07-03 RX ADMIN — VENLAFAXINE HYDROCHLORIDE 150 MG: 75 CAPSULE, EXTENDED RELEASE ORAL at 08:23

## 2020-07-03 RX ADMIN — DOCUSATE SODIUM 200 MG: 100 CAPSULE, LIQUID FILLED ORAL at 20:19

## 2020-07-03 RX ADMIN — GABAPENTIN 800 MG: 400 CAPSULE ORAL at 08:24

## 2020-07-03 RX ADMIN — MIDODRINE HYDROCHLORIDE 15 MG: 10 TABLET ORAL at 08:25

## 2020-07-03 RX ADMIN — ASPIRIN 81 MG: 81 TABLET, COATED ORAL at 08:23

## 2020-07-03 RX ADMIN — PSEUDOEPHEDRINE HCL 60 MG: 30 TABLET, FILM COATED ORAL at 08:24

## 2020-07-03 RX ADMIN — BACLOFEN 15 MG: 10 TABLET ORAL at 08:25

## 2020-07-03 RX ADMIN — VENLAFAXINE HYDROCHLORIDE 150 MG: 75 CAPSULE, EXTENDED RELEASE ORAL at 20:19

## 2020-07-03 RX ADMIN — LOVASTATIN 10 MG: 20 TABLET ORAL at 20:20

## 2020-07-03 RX ADMIN — ARIPIPRAZOLE 10 MG: 5 TABLET ORAL at 08:24

## 2020-07-03 RX ADMIN — FLUDROCORTISONE ACETATE 0.1 MG: 0.1 TABLET ORAL at 08:24

## 2020-07-03 RX ADMIN — MIDODRINE HYDROCHLORIDE 15 MG: 10 TABLET ORAL at 11:47

## 2020-07-03 RX ADMIN — BISACODYL 10 MG: 10 SUPPOSITORY RECTAL at 20:19

## 2020-07-03 RX ADMIN — VITAMIN C 1 TABLET: TAB at 08:23

## 2020-07-03 RX ADMIN — FINASTERIDE 5 MG: 5 TABLET, FILM COATED ORAL at 08:26

## 2020-07-03 RX ADMIN — METFORMIN HYDROCHLORIDE 250 MG: 500 TABLET ORAL at 08:24

## 2020-07-03 RX ADMIN — GABAPENTIN 800 MG: 400 CAPSULE ORAL at 15:17

## 2020-07-03 RX ADMIN — METFORMIN HYDROCHLORIDE 250 MG: 500 TABLET ORAL at 17:10

## 2020-07-03 ASSESSMENT — GAIT ASSESSMENTS
DISTANCE (FEET): 206
GAIT LEVEL OF ASSIST: CONTACT GUARD ASSIST
ASSISTIVE DEVICE: FRONT WHEEL WALKER

## 2020-07-03 ASSESSMENT — ENCOUNTER SYMPTOMS
DIARRHEA: 0
FEVER: 0
SHORTNESS OF BREATH: 0
ABDOMINAL PAIN: 0
NAUSEA: 0
NERVOUS/ANXIOUS: 0
VOMITING: 0
CHILLS: 0

## 2020-07-03 ASSESSMENT — ACTIVITIES OF DAILY LIVING (ADL)
TOILET_TRANSFER_DESCRIPTION: GRAB BAR;SUPERVISION FOR SAFETY;VERBAL CUEING
SHOWER_TRANSFER_LEVEL_OF_ASSIST: REQUIRES PHYSICAL ASSIST WITH SHOWER TRANSFER
TOILET_TRANSFER_LEVEL_OF_ASSIST: REQUIRES PHYSICAL ASSIST WITH TOILET TRANSFER
TOILETING_LEVEL_OF_ASSIST_DESCRIPTION: ASSIST FOR HYGIENE;GRAB BAR;INCREASED TIME;INITIAL PREPARATION FOR TASK;SET-UP OF EQUIPMENT;SUPERVISION FOR SAFETY;VERBAL CUEING
BED_CHAIR_WHEELCHAIR_TRANSFER_DESCRIPTION: ADAPTIVE EQUIPMENT;ASSIST WITH TWO LIMBS;INCREASED TIME;SET-UP OF EQUIPMENT;SUPERVISION FOR SAFETY;VERBAL CUEING
TOILETING_LEVEL_OF_ASSIST: REQUIRES PHYSICAL ASSIST WITH TOILETING

## 2020-07-03 ASSESSMENT — PATIENT HEALTH QUESTIONNAIRE - PHQ9
SUM OF ALL RESPONSES TO PHQ9 QUESTIONS 1 AND 2: 0
2. FEELING DOWN, DEPRESSED, IRRITABLE, OR HOPELESS: NOT AT ALL
1. LITTLE INTEREST OR PLEASURE IN DOING THINGS: NOT AT ALL

## 2020-07-03 ASSESSMENT — FIBROSIS 4 INDEX: FIB4 SCORE: 0.39

## 2020-07-03 NOTE — THERAPY
Occupational Therapy  Daily Treatment     Patient Name: Gaurav Lopez  Age:  75 y.o., Sex:  male  Medical Record #: [id number]  Today's Date: 7/3/2020     Precautions  Precautions: Fall Risk, Cervical Collar  , Spinal / Back Precautions   Comments: LLE prosthesis, pacemaker, monitor for orthostatic hypotension, abdominal binder placed on abdomen!     Safety   ADL Safety : Requires Supervision for Safety, Requires Physical Assist for Safety, Impaired  Bathroom Safety: Impaired, Requires Supervision for Safety    Subjective       Objective       07/03/20 1401   Precautions   Precautions Fall Risk;Cervical Collar  ;Spinal / Back Precautions    Comments LLE prosthesis, pacemaker, monitor for orthostatic hypotension, abdominal binder placed on abdomen!    Vitals   O2 Delivery Device None - Room Air   Vitals Comments abdominal binder   Non Verbal Descriptors   Non Verbal Scale  Calm   Cognition    Level of Consciousness Alert   Sleep/Wake Cycle   Sleep & Rest Awake   Standing Upper Body Exercises   Other Exercises CGA standing at FluidoBike w/ gait belt, 3x5 mins w/ rest breaks between, .964km   Comments Standing at Parallel Bars (PB) for Ladder Ball (LB) activity - standing at one end of PB's w/ ladder for LB 5' behind pt, pt instructed to navigate 12' to opposite end of PB's to retrieve one LB at a time and return to starting position to toss LB at ladder.  1 LB = 12'x2 = 24'.  Pt completed 1x3 = 24' x 3 = 72'   Interdisciplinary Plan of Care Collaboration   Patient Position at End of Therapy Seated;Self Releasing Lap Belt Applied;Chair Alarm On   OT Total Time Spent   OT Individual Total Time Spent (Mins) 30   OT Charge Group   OT Therapeutic Exercise  2       Assessment    Pt was alert and cooperative w/ tx. Tx emphasis on UB TherEx in standing - refer to notes above.     Strengths: Able to follow instructions, Adequate strength, Alert and oriented, Effective communication skills, Independent prior level of  function  Barriers: Decreased endurance, Impaired activity tolerance    Plan    Pt to DC tomorrow    Occupational Therapy Goals     Problem: Dressing     Dates: Start: 06/29/20       Goal: STG-Within one week, patient will dress LB     Dates: Start: 06/29/20       Description: 1) Individualized Goal:  min assist lower body dressing, using appropriate AE  2) Interventions:  OT Self Care/ADL, OT Therapeutic Activity, and OT Therapeutic Exercise                  Problem: OT Long Term Goals     Dates: Start: 06/19/20       Goal: LTG-By discharge, patient will complete basic self care tasks     Dates: Start: 06/19/20       Description: 1) Individualized Goal:  Mod I for BADL's, Sup/SBA for shower via AE/DME PRN  2) Interventions:  OT Self Care/ADL, OT Neuro Re-Ed/Balance, OT Therapeutic Activity, OT Evaluation, and OT Therapeutic Exercise            Goal: LTG-By discharge, patient will perform bathroom transfers     Dates: Start: 06/19/20                   Problem: Toileting     Dates: Start: 06/19/20       Goal: STG-Within one week, patient will complete toileting tasks     Dates: Start: 06/19/20       Description: 1) Individualized Goal:  Max assist for toileting task via AE/DME PRN  2) Interventions:  OT Self Care/ADL, OT Neuro Re-Ed/Balance, OT Therapeutic Activity, OT Evaluation, and OT Therapeutic Exercise      Note:     Goal Note filed on 06/29/20 1106 by Cindy Hess, OT    Total assist for toiletting tasks

## 2020-07-03 NOTE — PROGRESS NOTES
Spoke with Dr Navarro x 2 regarding discharge; it is postponed at least till tomorrow. Spoke with pt's wife x 2 and she is aware and agreeable.

## 2020-07-03 NOTE — CARE PLAN
Problem: Safety  Goal: Will remain free from injury  Note: Patient demonstrates good safety technique this shift.  Asks for assistance when needed and does not attempt self transfer.  Able to verbalize needs.  Will continue to monitor.      Problem: Infection  Goal: Will remain free from infection  Note: Patient remains free from s/s infection; afebrile.  Will continue to monitor.

## 2020-07-03 NOTE — PROGRESS NOTES
"Rehab Progress Note     Date of Service: 7/3/2020  Chief Complaint: follow up cervical SCI    Interval Events (Subjective)    Patient's discharge on hold today as wife is appealing. Was supposed to go to [facility] this afternoon. Patient is happy he is able to stay.    He denies any pain, reports he's sleeping well, moving his bowels and denies any issues with his bladder.    Objective:  VITAL SIGNS: /81   Pulse 60   Temp 36.8 °C (98.3 °F) (Temporal)   Resp 18   Ht 1.753 m (5' 9.02\")   Wt 81.4 kg (179 lb 8 oz)   SpO2 95%   BMI 26.50 kg/m²   Gen: alert, no apparent distress  HEENT: cervical collar in place  Msk: LLE prosthesis      Recent Results (from the past 72 hour(s))   Basic Metabolic Panel    Collection Time: 07/01/20  5:46 AM   Result Value Ref Range    Sodium 142 135 - 145 mmol/L    Potassium 3.8 3.6 - 5.5 mmol/L    Chloride 105 96 - 112 mmol/L    Co2 27 20 - 33 mmol/L    Glucose 83 65 - 99 mg/dL    Bun 15 8 - 22 mg/dL    Creatinine 0.76 0.50 - 1.40 mg/dL    Calcium 9.1 8.5 - 10.5 mg/dL    Anion Gap 10.0 7.0 - 16.0   ESTIMATED GFR    Collection Time: 07/01/20  5:46 AM   Result Value Ref Range    GFR If African American >60 >60 mL/min/1.73 m 2    GFR If Non African American >60 >60 mL/min/1.73 m 2       Current Facility-Administered Medications   Medication Frequency   • baclofen (LIORESAL) tablet 15 mg TID   • docusate sodium (COLACE) capsule 200 mg BID    And   • sennosides (SENOKOT) 8.6 MG tablet 25.8 mg DAILY AT NOON    And   • bisacodyl (THE MAGIC BULLET) suppository 10 mg QDAY    And   • magnesium hydroxide (MILK OF MAGNESIA) suspension 30 mL QDAY PRN   • zolpidem (AMBIEN) tablet 5 mg QHS   • fludrocortisone (FLORINEF) tablet 0.1 mg QAM   • pseudoephedrine (SUDAFED) 30 MG tablet 60 mg TID   • melatonin tablet 3 mg QHS   • midodrine (PROAMATINE) tablet 15 mg TID   • ferrous sulfate tablet 325 mg QDAY with Breakfast   • gabapentin (NEURONTIN) capsule 800 mg TID   • metFORMIN (GLUCOPHAGE) tablet " 250 mg BID AC   • midodrine (PROAMATINE) tablet 5 mg Q4HRS PRN   • aspirin EC (ECOTRIN) tablet 81 mg DAILY   • vitamin D (cholecalciferol) tablet 2,000 Units DAILY   • finasteride (PROSCAR) tablet 5 mg DAILY   • lovastatin (MEVACOR) tablet 10 mg Nightly   • vitamin B comp+C (ALLBEE WITH C) 1 Tab DAILY   • therapeutic multivitamin-minerals (THERAGRAN-M) tablet 1 Tab DAILY   • Pharmacy Consult Request ...Pain Management Review 1 Each PHARMACY TO DOSE   • Respiratory Therapy Consult Continuous RT   • tramadol (ULTRAM) 50 MG tablet 50 mg Q4HRS PRN   • oxyCODONE immediate-release (ROXICODONE) tablet 5 mg Q3HRS PRN   • oxyCODONE immediate release (ROXICODONE) tablet 10 mg Q3HRS PRN   • acetaminophen (TYLENOL) tablet 650 mg Q4HRS PRN   • enoxaparin (LOVENOX) inj 40 mg QHS   • artificial tears ophthalmic solution 1 Drop PRN   • benzocaine-menthol (CEPACOL) lozenge 1 Lozenge Q2HRS PRN   • mag hydrox-al hydrox-simeth (MAALOX PLUS ES or MYLANTA DS) suspension 20 mL Q2HRS PRN   • ondansetron (ZOFRAN ODT) dispertab 4 mg 4X/DAY PRN    Or   • ondansetron (ZOFRAN) syringe/vial injection 4 mg 4X/DAY PRN   • traZODone (DESYREL) tablet 50 mg QHS PRN   • sodium chloride (OCEAN) 0.65 % nasal spray 2 Spray PRN   • lidocaine (LIDODERM) 5 % 2 Patch Daily-0800   • aripiprazole (ABILIFY) tablet 10 mg DAILY   • venlafaxine XR (EFFEXOR XR) capsule 150 mg BID       Orders Placed This Encounter   Procedures   • Diet Order Diabetic     Standing Status:   Standing     Number of Occurrences:   1     Order Specific Question:   Diet:     Answer:   Diabetic [3]       Assessment:  Active Hospital Problems    Diagnosis   • *Cervical myelopathy (HCC)   • Vitamin D insufficiency   • Diabetes (HCC)   • Postoperative pain   • BPH (benign prostatic hyperplasia)   • Insomnia due to medical condition   • Neurogenic bowel   • Depression   • Thoracic myelopathy   • Neuropathic pain   • Anemia   • Hyperlipidemia   • Hypotension   • S/P BKA (below knee amputation)  (HCC)       Medical Decision Making and Plan:    Continue full rehab program while waiting for disposition to be determined.      Total time:  15 minutes.  I spent greater than 50% of the time for patient care and coordination on this date, including unit/floor time, and face-to-face time with the patient as per assessment and plan above.    Cassandra Norman M.D.   Physical Medicine and Rehabilitation

## 2020-07-03 NOTE — THERAPY
Physical Therapy   Daily Treatment     Patient Name: Gaurav Lopez  Age:  75 y.o., Sex:  male  Medical Record #: [id number]  Today's Date: 7/3/2020     Precautions  Precautions: Fall Risk, Cervical Collar  , Spinal / Back Precautions   Comments: LLE prosthesis, pacemaker, monitor for orthostatic hypotension, abdominal binder placed on abdomen!    Subjective    Patient was seated in his room upon arrival. Patient agreeable to treatment session. Patient had questions regarding which SNF he was going to.       Objective       07/03/20 0931   Cosignature   Documentation Review Approved    Vitals   Pulse 81   Patient BP Position Standing 1 minute   Blood Pressure  104/65   O2 (LPM) 0   O2 Delivery Device None - Room Air   Vitals Comments Abdominal binder present   Gait Functional Level of Assist    Gait Level Of Assist Contact Guard Assist   Assistive Device Front Wheel Walker  (Gait belt. )   Distance (Feet) 206   # of Times Distance was Traveled 2   Deviation Decreased Base Of Support;Trendelenberg;Shuffled Gait;Bradykinetic  (Left toe catches. Prosthetist present making adjustments. )   Bed Mobility    Sit to Supine Moderate Assist  (B LE assist into bed. )   Sit to Stand Maximal Assist  (x 6. Mas assist low surface, otherwise mid-mod. FWW, gait b )   Scooting Moderate Assist  (Assist with trunk movement. )   Interdisciplinary Plan of Care Collaboration   IDT Collaboration with  Other (See Comments)  (Prosthetist. )   Patient Position at End of Therapy Bed Alarm On;In Bed;Call Light within Reach;Tray Table within Reach;Phone within Reach   Collaboration Comments Prosthetist present during treatment session making alterations on prosthetic limb and discussing potential causes for gait deviations such as weak abductors and weak hip flexors. Cueing to correct gait.     PT Total Time Spent   PT Individual Total Time Spent (Mins) 60   PT Charge Group   PT Gait Training 2   PT Therapeutic Activities 2      07/03/20 0931   Sit  to Lying   Assistance Needed Physical assistance   Physical Assistance Level 50%-74%   CARE Score 2   Sit to Stand   Assistance Needed Physical assistance   Physical Assistance Level 75% or more   CARE Score 2   Car Transfer   Assistance Needed Physical assistance   Physical Assistance Level Total assistance   CARE Score 1   Walk 10 Feet   Assistance Needed Incidental touching   CARE Score 4   Walk 50 Feet with Two Turns   Assistance Needed Physical assistance   Physical Assistance Level Less than 25%   CARE Score 3   Walk 150 Feet   Assistance Needed Incidental touching   CARE Score 4       Assessment    We worked on ambulation with changes in the prosthesis today. Patient was educated at length regarding upright posture and using his hip flexors and abductors more during gait. We also educated the patient on which SNF he would be attending.    Strengths: Able to follow instructions, Effective communication skills, Independent prior level of function, Pleasant and cooperative, Willingly participates in therapeutic activities  Barriers: Bladder incontinence, Bowel incontinence, Confused, Decreased endurance, Generalized weakness, Home accessibility, Hypotension, Orthostatic hypotension, Impaired activity tolerance, Impaired balance, Impaired carryover of learning, Limited mobility    Plan    Patient to d/c to SNF tomorrow or later today. Will continue to perform [facility] [place] this afternoon.     Physical Therapy Problems     Problem: Mobility     Dates: Start: 06/19/20       Goal: STG-Within one week, patient will propel wheelchair community     Dates: Start: 06/19/20       Description: 1) Individualized goal:  BLE propulsion x150 feet, c/s collar, L BKA prosthesis, gait belt, at SPV level  2) Interventions:  PT Group Therapy, PT E Stim Attended, PT Prosthetic Training, PT Gait Training, PT Therapeutic Exercises, PT TENS Application, PT Neuro Re-Ed/Balance, PT Therapeutic Activity, and PT Manual Therapy      Note:      Goal Note filed on 06/29/20 0820 by Teofilo Vázquez, PT    SPV w/c mobility for 100 feet on last attempt                  Goal: STG-Within one week, patient will ambulate household distance     Dates: Start: 06/29/20       Description: 1) Individualized goal:  150 feet with FWW, c/s collar, L BKA prosthesis, at CGA level  2) Interventions:  PT Group Therapy, PT E Stim Attended, PT Prosthetic Training, PT Gait Training, PT Therapeutic Exercises, PT TENS Application, PT Neuro Re-Ed/Balance, PT Therapeutic Activity, and PT Manual Therapy            Goal: STG-Within one week, patient will ascend and descend four to six stairs     Dates: Start: 06/29/20       Description: 1) Individualized goal:  with 1 railing, c/s collar, L BKA prosthesis, gait belt, at Mod A level  2) Interventions:  PT Group Therapy, PT E Stim Attended, PT Prosthetic Training, PT Gait Training, PT Therapeutic Exercises, PT TENS Application, PT Neuro Re-Ed/Balance, PT Therapeutic Activity, and PT Manual Therapy                  Problem: Mobility Transfers     Dates: Start: 06/19/20       Goal: STG-Within one week, patient will sit to stand     Dates: Start: 06/19/20       Description: 1) Individualized goal:  // bars, c/s collar, L BKA prosthesis, gait belt, at Min A level consistently  2) Interventions:  PT Group Therapy, PT E Stim Attended, PT Prosthetic Training, PT Gait Training, PT Therapeutic Exercises, PT TENS Application, PT Neuro Re-Ed/Balance, PT Therapeutic Activity, and PT Manual Therapy    Note:     Goal Note filed on 06/29/20 0820 by Teofilo Vázquez, PT    Min-Mod A at this time                  Goal: STG-Within one week, patient will transfer bed to chair     Dates: Start: 06/29/20       Description: 1) Individualized goal:  with FWW, c/s collar, L BKA prosthesis, at CGA level  2) Interventions:  PT Group Therapy, PT E Stim Attended, PT Prosthetic Training, PT Gait Training, PT Therapeutic Exercises, PT TENS Application, PT  Neuro Re-Ed/Balance, PT Therapeutic Activity, and PT Manual Therapy                    Problem: PT-Long Term Goals     Dates: Start: 06/19/20       Goal: LTG-By discharge, patient will ambulate     Dates: Start: 06/19/20       Description: 1) Individualized goal:  150 feet with FWW, c/s collar, L BKA prosthesis, at SBA level  2) Interventions:  PT Group Therapy, PT E Stim Attended, PT Prosthetic Training, PT Gait Training, PT Therapeutic Exercises, PT TENS Application, PT Neuro Re-Ed/Balance, PT Therapeutic Activity, and PT Manual Therapy          Goal: LTG-By discharge, patient will transfer one surface to another     Dates: Start: 06/19/20       Description: 1) Individualized goal:  with FWW, c/s collar, L BKA prosthesis, at SBA level  2) Interventions:  PT Group Therapy, PT E Stim Attended, PT Prosthetic Training, PT Gait Training, PT Therapeutic Exercises, PT TENS Application, PT Neuro Re-Ed/Balance, PT Therapeutic Activity, and PT Manual Therapy            Goal: LTG-By discharge, patient will ambulate up/down 4-6 stairs     Dates: Start: 06/19/20       Description: 1) Individualized goal:  with 1 railing, c/s collar, L BKA prosthesis, gait belt, at Min A level  2) Interventions:  PT Group Therapy, PT E Stim Attended, PT Prosthetic Training, PT Gait Training, PT Therapeutic Exercises, PT TENS Application, PT Neuro Re-Ed/Balance, PT Therapeutic Activity, and PT Manual Therapy          Goal: LTG-By discharge, patient will transfer in/out of a car     Dates: Start: 06/19/20       Description: 1) Individualized goal:  with FWW, c/s collar, L BKA prosthesis, at SBA level  2) Interventions:  PT Group Therapy, PT E Stim Attended, PT Prosthetic Training, PT Gait Training, PT Therapeutic Exercises, PT TENS Application, PT Neuro Re-Ed/Balance, PT Therapeutic Activity, and PT Manual Therapy

## 2020-07-03 NOTE — THERAPY
"Recreational Therapy  Daily Treatment     Patient Name: Gaurav Lopez  AGE:  75 y.o., SEX:  male  Medical Record #: [id number]  Today's Date: 7/3/2020       Subjective    \"I didn't like that game\" in a joking manner. When asked why he stated that it was hard.      Objective       07/03/20 1431   Functional Ability Status - Cognitive   Attention Span Remains on Task   Comprehension Follows One Step Commands   Judgment Needs Assistance   Cognitive Comments Cognitive leisure activity in which he was required to manipulate object to a certain location by problem solving.    Functional Ability Status - Emotional    Affect Appropriate   Mood Appropriate   Behavior Appropriate   Skilled Intervention    Skilled Intervention Cognitive Leisure   Interdisciplinary Plan of Care Collaboration   Patient Position at End of Therapy Seated;Call Light within Reach;Tray Table within Reach   Strengths & Barriers   Strengths Able to follow instructions;Motivated for self care and independence;Pleasant and cooperative;Willingly participates in therapeutic activities   Barriers Impaired activity tolerance;Impaired carryover of learning   Treatment Time   Total Time Spent (mins) 30   Procedural Tracking   Procedural Tracking Cognitive Skills Training       Assessment    Pt was given game pieces in which he had to manipulate the game piece to solve a maze like problem. Pt required Min A verbal cues.     Strengths: (P) Able to follow instructions, Motivated for self care and independence, Pleasant and cooperative, Willingly participates in therapeutic activities  Barriers: (P) Impaired activity tolerance, Impaired carryover of learning    Plan    Cognitive and fine motor.   "
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
## 2020-07-03 NOTE — DISCHARGE PLANNING
"Met w/ patient to discuss DC planning. States \"I'm not ready to go home yet. I need more time. I want to stay here if possible.\" Reviewed level of care for inpatient rehab & SNF. Patient does not require inpatient level of care, but requesting longer therapy recovery time. Reviewed SNF therapy & skilled care assist. Provided & reviewed SNF choice form. After discussion w/ therapy, requested referral to [facility] in [place] area & requests contact w/ SNF in [place], [place] (closer proximity to home address). Informed Medicare will not cover transportation to SNF outside of area, covers transport to nearest facility. Wife would need to provide transportation to [place] if bed available & accepted. Verbalizes agreement & understanding of short term SNF placement.   Contacted [facility] to inquire if they have SNF beds available. Per long term care nursing station, \"we have a waiting list & you need to speak w/ DON about therapy availability for treatment here.\" Phone message left for DON regarding admit process for referral & bed availability.  Rec'd acceptance from [facility] SNF. Updated Dr. Navarro & patient. Patient speaking w/ wife via phone & wife updated regarding [facility] acceptance & transport tomorrow at 4pm.  COBRA completed.  Phone # for admit coordinator [facility] provider to patient for future reference per Dr. Navarro's request.  "

## 2020-07-04 LAB — GLUCOSE BLD-MCNC: 90 MG/DL (ref 65–99)

## 2020-07-04 PROCEDURE — 700112 HCHG RX REV CODE 229: Performed by: PHYSICAL MEDICINE & REHABILITATION

## 2020-07-04 PROCEDURE — 82962 GLUCOSE BLOOD TEST: CPT

## 2020-07-04 PROCEDURE — 700111 HCHG RX REV CODE 636 W/ 250 OVERRIDE (IP): Performed by: PHYSICAL MEDICINE & REHABILITATION

## 2020-07-04 PROCEDURE — 770010 HCHG ROOM/CARE - REHAB SEMI PRIVAT*

## 2020-07-04 PROCEDURE — A9270 NON-COVERED ITEM OR SERVICE: HCPCS | Performed by: HOSPITALIST

## 2020-07-04 PROCEDURE — 700102 HCHG RX REV CODE 250 W/ 637 OVERRIDE(OP): Performed by: HOSPITALIST

## 2020-07-04 PROCEDURE — 99231 SBSQ HOSP IP/OBS SF/LOW 25: CPT | Performed by: HOSPITALIST

## 2020-07-04 PROCEDURE — 700101 HCHG RX REV CODE 250: Performed by: PHYSICAL MEDICINE & REHABILITATION

## 2020-07-04 PROCEDURE — A9270 NON-COVERED ITEM OR SERVICE: HCPCS | Performed by: PHYSICAL MEDICINE & REHABILITATION

## 2020-07-04 PROCEDURE — 700102 HCHG RX REV CODE 250 W/ 637 OVERRIDE(OP): Performed by: PHYSICAL MEDICINE & REHABILITATION

## 2020-07-04 RX ADMIN — BACLOFEN 15 MG: 10 TABLET ORAL at 20:06

## 2020-07-04 RX ADMIN — MIDODRINE HYDROCHLORIDE 15 MG: 10 TABLET ORAL at 08:43

## 2020-07-04 RX ADMIN — GABAPENTIN 800 MG: 400 CAPSULE ORAL at 20:05

## 2020-07-04 RX ADMIN — PSEUDOEPHEDRINE HCL 60 MG: 30 TABLET, FILM COATED ORAL at 17:15

## 2020-07-04 RX ADMIN — LOVASTATIN 10 MG: 20 TABLET ORAL at 20:06

## 2020-07-04 RX ADMIN — ENOXAPARIN SODIUM 40 MG: 100 INJECTION SUBCUTANEOUS at 20:07

## 2020-07-04 RX ADMIN — MIDODRINE HYDROCHLORIDE 15 MG: 10 TABLET ORAL at 17:16

## 2020-07-04 RX ADMIN — ASPIRIN 81 MG: 81 TABLET, COATED ORAL at 08:41

## 2020-07-04 RX ADMIN — GABAPENTIN 800 MG: 400 CAPSULE ORAL at 14:46

## 2020-07-04 RX ADMIN — FLUDROCORTISONE ACETATE 0.1 MG: 0.1 TABLET ORAL at 08:43

## 2020-07-04 RX ADMIN — BACLOFEN 15 MG: 10 TABLET ORAL at 08:42

## 2020-07-04 RX ADMIN — LIDOCAINE 2 PATCH: 50 PATCH TOPICAL at 08:44

## 2020-07-04 RX ADMIN — DOCUSATE SODIUM 200 MG: 100 CAPSULE, LIQUID FILLED ORAL at 08:41

## 2020-07-04 RX ADMIN — BISACODYL 10 MG: 10 SUPPOSITORY RECTAL at 20:08

## 2020-07-04 RX ADMIN — ZOLPIDEM TARTRATE 5 MG: 5 TABLET ORAL at 20:06

## 2020-07-04 RX ADMIN — PSEUDOEPHEDRINE HCL 60 MG: 30 TABLET, FILM COATED ORAL at 11:36

## 2020-07-04 RX ADMIN — MULTIPLE VITAMINS W/ MINERALS TAB 1 TABLET: TAB at 08:41

## 2020-07-04 RX ADMIN — FERROUS SULFATE TAB 325 MG (65 MG ELEMENTAL FE) 325 MG: 325 (65 FE) TAB at 08:40

## 2020-07-04 RX ADMIN — VENLAFAXINE HYDROCHLORIDE 150 MG: 75 CAPSULE, EXTENDED RELEASE ORAL at 20:05

## 2020-07-04 RX ADMIN — DOCUSATE SODIUM 200 MG: 100 CAPSULE, LIQUID FILLED ORAL at 20:06

## 2020-07-04 RX ADMIN — VITAMIN C 1 TABLET: TAB at 08:41

## 2020-07-04 RX ADMIN — MELATONIN 3 MG: at 20:05

## 2020-07-04 RX ADMIN — METFORMIN HYDROCHLORIDE 250 MG: 500 TABLET ORAL at 17:16

## 2020-07-04 RX ADMIN — VENLAFAXINE HYDROCHLORIDE 150 MG: 75 CAPSULE, EXTENDED RELEASE ORAL at 08:42

## 2020-07-04 RX ADMIN — ARIPIPRAZOLE 10 MG: 5 TABLET ORAL at 08:41

## 2020-07-04 RX ADMIN — FINASTERIDE 5 MG: 5 TABLET, FILM COATED ORAL at 08:43

## 2020-07-04 RX ADMIN — MELATONIN 2000 UNITS: at 08:43

## 2020-07-04 RX ADMIN — BACLOFEN 15 MG: 10 TABLET ORAL at 14:46

## 2020-07-04 RX ADMIN — PSEUDOEPHEDRINE HCL 60 MG: 30 TABLET, FILM COATED ORAL at 08:00

## 2020-07-04 RX ADMIN — GABAPENTIN 800 MG: 400 CAPSULE ORAL at 08:43

## 2020-07-04 RX ADMIN — STANDARDIZED SENNA CONCENTRATE 25.8 MG: 8.6 TABLET ORAL at 11:35

## 2020-07-04 RX ADMIN — METFORMIN HYDROCHLORIDE 250 MG: 500 TABLET ORAL at 08:42

## 2020-07-04 RX ADMIN — MIDODRINE HYDROCHLORIDE 15 MG: 10 TABLET ORAL at 11:36

## 2020-07-04 ASSESSMENT — ENCOUNTER SYMPTOMS
NAUSEA: 0
BLURRED VISION: 0
HALLUCINATIONS: 0
DIZZINESS: 0
VOMITING: 0
PALPITATIONS: 0
SHORTNESS OF BREATH: 0
FEVER: 0
HEADACHES: 0

## 2020-07-04 ASSESSMENT — FIBROSIS 4 INDEX: FIB4 SCORE: 0.39

## 2020-07-04 NOTE — CARE PLAN
Problem: Mobility  Goal: STG-Within one week, patient will propel wheelchair community  Description: 1) Individualized goal:  BLE propulsion x150 feet, c/s collar, L BKA prosthesis, gait belt, at SPV level  2) Interventions:  PT Group Therapy, PT E Stim Attended, PT Prosthetic Training, PT Gait Training, PT Therapeutic Exercises, PT TENS Application, PT Neuro Re-Ed/Balance, PT Therapeutic Activity, and PT Manual Therapy    Outcome: MET  Goal: STG-Within one week, patient will ambulate household distance  Description: 1) Individualized goal:  150 feet with FWW, c/s collar, L BKA prosthesis, at CGA level  2) Interventions:  PT Group Therapy, PT E Stim Attended, PT Prosthetic Training, PT Gait Training, PT Therapeutic Exercises, PT TENS Application, PT Neuro Re-Ed/Balance, PT Therapeutic Activity, and PT Manual Therapy    Outcome: MET     Problem: Mobility  Goal: STG-Within one week, patient will ascend and descend four to six stairs  Description: 1) Individualized goal:  with 1 railing, c/s collar, L BKA prosthesis, gait belt, at Mod A level  2) Interventions:  PT Group Therapy, PT E Stim Attended, PT Prosthetic Training, PT Gait Training, PT Therapeutic Exercises, PT TENS Application, PT Neuro Re-Ed/Balance, PT Therapeutic Activity, and PT Manual Therapy    Outcome: DISCHARGED-GOAL NOT MET     Problem: PT-Long Term Goals  Goal: LTG-By discharge, patient will ambulate  Description: 1) Individualized goal:  150 feet with FWW, c/s collar, L BKA prosthesis, at SBA level  2) Interventions:  PT Group Therapy, PT E Stim Attended, PT Prosthetic Training, PT Gait Training, PT Therapeutic Exercises, PT TENS Application, PT Neuro Re-Ed/Balance, PT Therapeutic Activity, and PT Manual Therapy  Outcome: DISCHARGED-GOAL NOT MET  Goal: LTG-By discharge, patient will transfer one surface to another  Description: 1) Individualized goal:  with FWW, c/s collar, L BKA prosthesis, at SBA level  2) Interventions:  PT Group Therapy, PT E  Stim Attended, PT Prosthetic Training, PT Gait Training, PT Therapeutic Exercises, PT TENS Application, PT Neuro Re-Ed/Balance, PT Therapeutic Activity, and PT Manual Therapy    Outcome: DISCHARGED-GOAL NOT MET  Goal: LTG-By discharge, patient will ambulate up/down 4-6 stairs  Description: 1) Individualized goal:  with 1 railing, c/s collar, L BKA prosthesis, gait belt, at Min A level  2) Interventions:  PT Group Therapy, PT E Stim Attended, PT Prosthetic Training, PT Gait Training, PT Therapeutic Exercises, PT TENS Application, PT Neuro Re-Ed/Balance, PT Therapeutic Activity, and PT Manual Therapy  Outcome: DISCHARGED-GOAL NOT MET  Goal: LTG-By discharge, patient will transfer in/out of a car  Description: 1) Individualized goal:  with FWW, c/s collar, L BKA prosthesis, at SBA level  2) Interventions:  PT Group Therapy, PT E Stim Attended, PT Prosthetic Training, PT Gait Training, PT Therapeutic Exercises, PT TENS Application, PT Neuro Re-Ed/Balance, PT Therapeutic Activity, and PT Manual Therapy    Outcome: DISCHARGED-GOAL NOT MET     Problem: Mobility Transfers  Goal: STG-Within one week, patient will sit to stand  Description: 1) Individualized goal:  // bars, c/s collar, L BKA prosthesis, gait belt, at Min A level consistently  2) Interventions:  PT Group Therapy, PT E Stim Attended, PT Prosthetic Training, PT Gait Training, PT Therapeutic Exercises, PT TENS Application, PT Neuro Re-Ed/Balance, PT Therapeutic Activity, and PT Manual Therapy  Outcome: DISCHARGED-GOAL NOT MET  Goal: STG-Within one week, patient will transfer bed to chair  Description: 1) Individualized goal:  with FWW, c/s collar, L BKA prosthesis, at CGA level  2) Interventions:  PT Group Therapy, PT E Stim Attended, PT Prosthetic Training, PT Gait Training, PT Therapeutic Exercises, PT TENS Application, PT Neuro Re-Ed/Balance, PT Therapeutic Activity, and PT Manual Therapy      Outcome: DISCHARGED-GOAL NOT MET
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
## 2020-07-04 NOTE — CARE PLAN
Problem: Safety  Goal: Will remain free from injury  Note: Patient uses call light consistently and appropriately this shift.  Waits for assistance when needed and does not attempt self transfer.  Able to verbalize needs.  Will continue to monitor.      Problem: Venous Thromboembolism (VTW)/Deep Vein Thrombosis (DVT) Prevention:  Goal: Patient will participate in Venous Thrombosis (VTE)/Deep Vein Thrombosis (DVT)Prevention Measures  Note: Pt on lovenox daily for dvt prophylaxis

## 2020-07-04 NOTE — THERAPY
Physical Therapy   Daily Treatment     Patient Name: Gaurav Lopez  Age:  75 y.o., Sex:  male  Medical Record #: [id number]  Today's Date: 7/3/2020     Precautions  Precautions: Fall Risk, Cervical Collar  , Spinal / Back Precautions   Comments: LLE prosthesis, pacemaker, monitor for orthostatic hypotension, abdominal binder placed on abdomen!     Subjective    Patient agreeable to PT. Pt still unsure of place and day of SNF d/c.     Objective       07/03/20 1331   Vitals   O2 (LPM) 0   O2 Delivery Device None - Room Air   Wheelchair Functional Level of Assist   Wheelchair Assist Modified Independent   Distance Wheelchair (Feet or Distance) 155  (not limited by fatigue)   Wheelchair Description Extra time   Stairs Functional Level of Assist   Level of Assist with Stairs Unable to Participate   # of Stairs Climbed 0   Stairs Description Safety concerns   Transfer Functional Level of Assist   Bed, Chair, Wheelchair Transfer Moderate Assist   Bed Chair Wheelchair Transfer Description Adaptive equipment;Assist with two limbs;Increased time;Set-up of equipment;Supervision for safety;Verbal cueing   Bed Mobility    Supine to Sit Moderate Assist   Sit to Supine Maximal Assist   Sit to Stand Moderate Assist   Scooting Moderate Assist   Rolling Refused   Interdisciplinary Plan of Care Collaboration   IDT Collaboration with  Other (See Comments)   Collaboration Comments SPT assistance   PT Total Time Spent   PT Individual Total Time Spent (Mins) 30   PT Charge Group   PT Gait Training 1   PT Therapeutic Activities 1       Assessment    Patient is ready for next level of care; exhibits increased anxiety and patient has required increased A in recent days due to anxiety of a d/c home; patient will benefit from prolonged therapy plan to push towards Mod I with FWW and address pt's balance impairments leading to a pt reported high number of falls for PLOF.    Strengths: Able to follow instructions, Effective communication skills,  Independent prior level of function, Pleasant and cooperative, Willingly participates in therapeutic activities  Barriers: Bladder incontinence, Bowel incontinence, Confused, Decreased endurance, Generalized weakness, Home accessibility, Hypotension, Orthostatic hypotension, Impaired activity tolerance, Impaired balance, Impaired carryover of learning, Limited mobility    Plan    D/c to SNF for prolonged stay of rehab    Physical Therapy Problems (Active)      There are no active problems.

## 2020-07-05 PROCEDURE — 700102 HCHG RX REV CODE 250 W/ 637 OVERRIDE(OP): Performed by: PHYSICAL MEDICINE & REHABILITATION

## 2020-07-05 PROCEDURE — 700102 HCHG RX REV CODE 250 W/ 637 OVERRIDE(OP): Performed by: HOSPITALIST

## 2020-07-05 PROCEDURE — 770010 HCHG ROOM/CARE - REHAB SEMI PRIVAT*

## 2020-07-05 PROCEDURE — 700112 HCHG RX REV CODE 229: Performed by: PHYSICAL MEDICINE & REHABILITATION

## 2020-07-05 PROCEDURE — 99231 SBSQ HOSP IP/OBS SF/LOW 25: CPT | Performed by: HOSPITALIST

## 2020-07-05 PROCEDURE — 700111 HCHG RX REV CODE 636 W/ 250 OVERRIDE (IP): Performed by: PHYSICAL MEDICINE & REHABILITATION

## 2020-07-05 PROCEDURE — 700101 HCHG RX REV CODE 250: Performed by: PHYSICAL MEDICINE & REHABILITATION

## 2020-07-05 PROCEDURE — A9270 NON-COVERED ITEM OR SERVICE: HCPCS | Performed by: PHYSICAL MEDICINE & REHABILITATION

## 2020-07-05 PROCEDURE — 97116 GAIT TRAINING THERAPY: CPT

## 2020-07-05 PROCEDURE — 97530 THERAPEUTIC ACTIVITIES: CPT

## 2020-07-05 PROCEDURE — A9270 NON-COVERED ITEM OR SERVICE: HCPCS | Performed by: HOSPITALIST

## 2020-07-05 RX ADMIN — BACLOFEN 15 MG: 10 TABLET ORAL at 15:52

## 2020-07-05 RX ADMIN — LIDOCAINE 2 PATCH: 50 PATCH TOPICAL at 08:19

## 2020-07-05 RX ADMIN — FERROUS SULFATE TAB 325 MG (65 MG ELEMENTAL FE) 325 MG: 325 (65 FE) TAB at 08:08

## 2020-07-05 RX ADMIN — PSEUDOEPHEDRINE HCL 60 MG: 30 TABLET, FILM COATED ORAL at 11:56

## 2020-07-05 RX ADMIN — BISACODYL 10 MG: 10 SUPPOSITORY RECTAL at 21:05

## 2020-07-05 RX ADMIN — MIDODRINE HYDROCHLORIDE 15 MG: 10 TABLET ORAL at 11:56

## 2020-07-05 RX ADMIN — STANDARDIZED SENNA CONCENTRATE 25.8 MG: 8.6 TABLET ORAL at 11:56

## 2020-07-05 RX ADMIN — VITAMIN C 1 TABLET: TAB at 08:06

## 2020-07-05 RX ADMIN — MIDODRINE HYDROCHLORIDE 15 MG: 10 TABLET ORAL at 15:55

## 2020-07-05 RX ADMIN — METFORMIN HYDROCHLORIDE 250 MG: 500 TABLET ORAL at 18:08

## 2020-07-05 RX ADMIN — VENLAFAXINE HYDROCHLORIDE 150 MG: 75 CAPSULE, EXTENDED RELEASE ORAL at 08:07

## 2020-07-05 RX ADMIN — ZOLPIDEM TARTRATE 5 MG: 5 TABLET ORAL at 20:53

## 2020-07-05 RX ADMIN — ASPIRIN 81 MG: 81 TABLET, COATED ORAL at 08:08

## 2020-07-05 RX ADMIN — DOCUSATE SODIUM 200 MG: 100 CAPSULE, LIQUID FILLED ORAL at 20:53

## 2020-07-05 RX ADMIN — MIDODRINE HYDROCHLORIDE 15 MG: 10 TABLET ORAL at 08:09

## 2020-07-05 RX ADMIN — MELATONIN 3 MG: at 20:53

## 2020-07-05 RX ADMIN — FINASTERIDE 5 MG: 5 TABLET, FILM COATED ORAL at 08:07

## 2020-07-05 RX ADMIN — MULTIPLE VITAMINS W/ MINERALS TAB 1 TABLET: TAB at 08:06

## 2020-07-05 RX ADMIN — VENLAFAXINE HYDROCHLORIDE 150 MG: 75 CAPSULE, EXTENDED RELEASE ORAL at 20:53

## 2020-07-05 RX ADMIN — MELATONIN 2000 UNITS: at 08:06

## 2020-07-05 RX ADMIN — FLUDROCORTISONE ACETATE 0.1 MG: 0.1 TABLET ORAL at 08:12

## 2020-07-05 RX ADMIN — BACLOFEN 15 MG: 10 TABLET ORAL at 08:08

## 2020-07-05 RX ADMIN — PSEUDOEPHEDRINE HCL 60 MG: 30 TABLET, FILM COATED ORAL at 08:07

## 2020-07-05 RX ADMIN — METFORMIN HYDROCHLORIDE 250 MG: 500 TABLET ORAL at 08:10

## 2020-07-05 RX ADMIN — GABAPENTIN 800 MG: 400 CAPSULE ORAL at 08:07

## 2020-07-05 RX ADMIN — DOCUSATE SODIUM 200 MG: 100 CAPSULE, LIQUID FILLED ORAL at 08:07

## 2020-07-05 RX ADMIN — GABAPENTIN 800 MG: 400 CAPSULE ORAL at 15:51

## 2020-07-05 RX ADMIN — LOVASTATIN 10 MG: 20 TABLET ORAL at 20:53

## 2020-07-05 RX ADMIN — GABAPENTIN 800 MG: 400 CAPSULE ORAL at 20:53

## 2020-07-05 RX ADMIN — ARIPIPRAZOLE 10 MG: 5 TABLET ORAL at 08:07

## 2020-07-05 RX ADMIN — PSEUDOEPHEDRINE HCL 60 MG: 30 TABLET, FILM COATED ORAL at 18:09

## 2020-07-05 RX ADMIN — ENOXAPARIN SODIUM 40 MG: 100 INJECTION SUBCUTANEOUS at 21:05

## 2020-07-05 RX ADMIN — BACLOFEN 15 MG: 10 TABLET ORAL at 20:53

## 2020-07-05 ASSESSMENT — FIBROSIS 4 INDEX: FIB4 SCORE: 0.39

## 2020-07-05 ASSESSMENT — ENCOUNTER SYMPTOMS
DIARRHEA: 0
NERVOUS/ANXIOUS: 0
DIZZINESS: 0
FEVER: 0
BLURRED VISION: 0
COUGH: 0

## 2020-07-05 ASSESSMENT — ACTIVITIES OF DAILY LIVING (ADL)
BED_CHAIR_WHEELCHAIR_TRANSFER_DESCRIPTION: ADAPTIVE EQUIPMENT;INCREASED TIME;NON-HOSPITAL BED;SET-UP OF EQUIPMENT;SUPERVISION FOR SAFETY;VERBAL CUEING

## 2020-07-05 NOTE — THERAPY
Physical Therapy   Daily Treatment     Patient Name: Gaurav Lopez  Age:  75 y.o., Sex:  male  Medical Record #: [id number]  Today's Date: 7/5/2020     Precautions  Precautions: Spinal / Back Precautions , Cervical Collar  , Fall Risk  Comments: LLE prosthesis, pacemaker, orthostatic hypotension, abdominal binder, Lumbar, Cervical    Subjective    Patient agreeable to PT.     Objective       07/05/20 0831   Precautions   Precautions Spinal / Back Precautions ;Cervical Collar  ;Fall Risk   Comments LLE prosthesis, pacemaker, orthostatic hypotension, abdominal binder, Lumbar, Cervical   Vitals   Pulse 65   Patient BP Position Sitting   Blood Pressure  153/89   O2 (LPM) 0   O2 Delivery Device None - Room Air   Vitals Comments resting; abdominal binder in place   Pain   Non Verbal Scale  Calm   Pain 0 - 10 Group   Comfort Goal 0   Wheelchair Functional Level of Assist   Wheelchair Assist Modified Independent   Distance Wheelchair (Feet or Distance) 165  (not limited by fatigue)   Wheelchair Description Extra time  (BLE propulsion as usual)   Transfer Functional Level of Assist   Bed, Chair, Wheelchair Transfer Minimal Assist   Bed Chair Wheelchair Transfer Description Adaptive equipment;Increased time;Non-hospital bed;Set-up of equipment;Supervision for safety;Verbal cueing   Bed Mobility    Supine to Sit Minimal Assist   Sit to Supine Contact Guard Assist   Sit to Stand Minimal Assist   Scooting Contact Guard Assist   Rolling   (SBA)   Interdisciplinary Plan of Care Collaboration   IDT Collaboration with  Other (See Comments);Nursing;Certified Nursing Assistant   Patient Position at End of Therapy Seated;Self Releasing Lap Belt Applied;Call Light within Reach;Tray Table within Reach;Phone within Reach   Collaboration Comments SPT assistance throughout session; RN present at beginning for pain patch placement; patient requesting to use restroom at end of session   PT Total Time Spent   PT Individual Total Time Spent (Mins)  60   PT Charge Group   PT Gait Training 2   PT Therapeutic Activities 2        07/05/20 0831   Roll Left and Right   Assistance Needed Verbal cues   Physical Assistance Level No physical assistance   CARE Score 4   Sit to Lying   Assistance Needed Incidental touching   Physical Assistance Level No physical assistance   CARE Score 4   Lying to Sitting on Side of Bed   Assistance Needed Physical assistance   Physical Assistance Level Less than 25%   CARE Score 3   Sit to Stand   Assistance Needed Physical assistance   Physical Assistance Level Less than 25%   CARE Score 3   Chair/Bed-to-Chair Transfer   Assistance Needed Physical assistance   Physical Assistance Level Less than 25%   CARE Score 3   Walk 10 Feet   Assistance Needed Incidental touching   Physical Assistance Level No physical assistance   CARE Score 4   Walk 50 Feet with Two Turns   Assistance Needed Incidental touching   Physical Assistance Level No physical assistance   CARE Score 4   Walk 150 Feet   Assistance Needed Incidental touching   Physical Assistance Level No physical assistance   CARE Score 4   Walking 10 Feet on Uneven Surfaces   Assistance Needed Incidental touching   Physical Assistance Level No physical assistance   CARE Score 4   1 Step (Curb)   Assistance Needed Incidental touching   Physical Assistance Level No physical assistance   CARE Score 4   Picking Up Object   Assistance Needed Incidental touching   Physical Assistance Level No physical assistance   CARE Score 4   Wheel 50 Feet with Two Turns   Assistance Needed Adaptive equipment   Physical Assistance Level No physical assistance   CARE Score 6   Type of Wheelchair/Scooter Manual   Wheel 150 Feet   Assistance Needed Adaptive equipment   Physical Assistance Level No physical assistance   CARE Score 6   Type of Wheelchair/Scooter Manual       Assessment    Patient only required physical assistance with sit to stand and supine to sit today; improving strength and safety; BP in  standing was appropriate; good motivation and patient participation; awaiting patient's discharge to SNF for prolonged therapy.    Strengths: Able to follow instructions, Effective communication skills, Independent prior level of function, Pleasant and cooperative, Willingly participates in therapeutic activities  Barriers: Bladder incontinence, Bowel incontinence, Confused, Decreased endurance, Generalized weakness, Home accessibility, Hypotension, Orthostatic hypotension, Impaired activity tolerance, Impaired balance, Impaired carryover of learning, Limited mobility    Plan    Day-to-day d/c to SNF; as tolerated, initiate stairs and re-visit car transfers    Physical Therapy Problems (Active)      There are no active problems.
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
## 2020-07-06 LAB
ANION GAP SERPL CALC-SCNC: 9 MMOL/L (ref 7–16)
BUN SERPL-MCNC: 21 MG/DL (ref 8–22)
CALCIUM SERPL-MCNC: 9.1 MG/DL (ref 8.5–10.5)
CHLORIDE SERPL-SCNC: 105 MMOL/L (ref 96–112)
CO2 SERPL-SCNC: 28 MMOL/L (ref 20–33)
CREAT SERPL-MCNC: 0.78 MG/DL (ref 0.5–1.4)
GLUCOSE SERPL-MCNC: 87 MG/DL (ref 65–99)
POTASSIUM SERPL-SCNC: 3.9 MMOL/L (ref 3.6–5.5)
SODIUM SERPL-SCNC: 142 MMOL/L (ref 135–145)

## 2020-07-06 PROCEDURE — 80048 BASIC METABOLIC PNL TOTAL CA: CPT

## 2020-07-06 PROCEDURE — 99233 SBSQ HOSP IP/OBS HIGH 50: CPT | Performed by: PHYSICAL MEDICINE & REHABILITATION

## 2020-07-06 PROCEDURE — 700102 HCHG RX REV CODE 250 W/ 637 OVERRIDE(OP): Performed by: HOSPITALIST

## 2020-07-06 PROCEDURE — 97110 THERAPEUTIC EXERCISES: CPT

## 2020-07-06 PROCEDURE — 770010 HCHG ROOM/CARE - REHAB SEMI PRIVAT*

## 2020-07-06 PROCEDURE — 97112 NEUROMUSCULAR REEDUCATION: CPT

## 2020-07-06 PROCEDURE — A9270 NON-COVERED ITEM OR SERVICE: HCPCS | Performed by: PHYSICAL MEDICINE & REHABILITATION

## 2020-07-06 PROCEDURE — 700102 HCHG RX REV CODE 250 W/ 637 OVERRIDE(OP): Performed by: PHYSICAL MEDICINE & REHABILITATION

## 2020-07-06 PROCEDURE — 97535 SELF CARE MNGMENT TRAINING: CPT

## 2020-07-06 PROCEDURE — 97116 GAIT TRAINING THERAPY: CPT

## 2020-07-06 PROCEDURE — 36415 COLL VENOUS BLD VENIPUNCTURE: CPT

## 2020-07-06 PROCEDURE — A9270 NON-COVERED ITEM OR SERVICE: HCPCS | Performed by: HOSPITALIST

## 2020-07-06 PROCEDURE — 700101 HCHG RX REV CODE 250: Performed by: PHYSICAL MEDICINE & REHABILITATION

## 2020-07-06 PROCEDURE — 97530 THERAPEUTIC ACTIVITIES: CPT

## 2020-07-06 PROCEDURE — 700111 HCHG RX REV CODE 636 W/ 250 OVERRIDE (IP): Performed by: PHYSICAL MEDICINE & REHABILITATION

## 2020-07-06 RX ADMIN — ASPIRIN 81 MG: 81 TABLET, COATED ORAL at 08:17

## 2020-07-06 RX ADMIN — BACLOFEN 15 MG: 10 TABLET ORAL at 21:53

## 2020-07-06 RX ADMIN — GABAPENTIN 800 MG: 400 CAPSULE ORAL at 21:53

## 2020-07-06 RX ADMIN — LIDOCAINE 2 PATCH: 50 PATCH TOPICAL at 08:18

## 2020-07-06 RX ADMIN — BACLOFEN 15 MG: 10 TABLET ORAL at 15:43

## 2020-07-06 RX ADMIN — ARIPIPRAZOLE 10 MG: 5 TABLET ORAL at 08:16

## 2020-07-06 RX ADMIN — MIDODRINE HYDROCHLORIDE 15 MG: 10 TABLET ORAL at 15:44

## 2020-07-06 RX ADMIN — MULTIPLE VITAMINS W/ MINERALS TAB 1 TABLET: TAB at 08:16

## 2020-07-06 RX ADMIN — GABAPENTIN 800 MG: 400 CAPSULE ORAL at 15:43

## 2020-07-06 RX ADMIN — PSEUDOEPHEDRINE HCL 60 MG: 30 TABLET, FILM COATED ORAL at 08:15

## 2020-07-06 RX ADMIN — FLUDROCORTISONE ACETATE 0.1 MG: 0.1 TABLET ORAL at 08:17

## 2020-07-06 RX ADMIN — VENLAFAXINE HYDROCHLORIDE 150 MG: 75 CAPSULE, EXTENDED RELEASE ORAL at 08:16

## 2020-07-06 RX ADMIN — MIDODRINE HYDROCHLORIDE 15 MG: 10 TABLET ORAL at 12:05

## 2020-07-06 RX ADMIN — METFORMIN HYDROCHLORIDE 250 MG: 500 TABLET ORAL at 08:15

## 2020-07-06 RX ADMIN — METFORMIN HYDROCHLORIDE 250 MG: 500 TABLET ORAL at 18:09

## 2020-07-06 RX ADMIN — FINASTERIDE 5 MG: 5 TABLET, FILM COATED ORAL at 08:16

## 2020-07-06 RX ADMIN — BISACODYL 10 MG: 10 SUPPOSITORY RECTAL at 20:55

## 2020-07-06 RX ADMIN — FERROUS SULFATE TAB 325 MG (65 MG ELEMENTAL FE) 325 MG: 325 (65 FE) TAB at 08:17

## 2020-07-06 RX ADMIN — VENLAFAXINE HYDROCHLORIDE 150 MG: 75 CAPSULE, EXTENDED RELEASE ORAL at 21:54

## 2020-07-06 RX ADMIN — PSEUDOEPHEDRINE HCL 60 MG: 30 TABLET, FILM COATED ORAL at 18:09

## 2020-07-06 RX ADMIN — STANDARDIZED SENNA CONCENTRATE 8.6 MG: 8.6 TABLET ORAL at 12:07

## 2020-07-06 RX ADMIN — PSEUDOEPHEDRINE HCL 60 MG: 30 TABLET, FILM COATED ORAL at 12:04

## 2020-07-06 RX ADMIN — MELATONIN 3 MG: at 21:54

## 2020-07-06 RX ADMIN — ZOLPIDEM TARTRATE 5 MG: 5 TABLET ORAL at 21:54

## 2020-07-06 RX ADMIN — MELATONIN 2000 UNITS: at 08:16

## 2020-07-06 RX ADMIN — LOVASTATIN 10 MG: 20 TABLET ORAL at 21:54

## 2020-07-06 RX ADMIN — GABAPENTIN 800 MG: 400 CAPSULE ORAL at 08:16

## 2020-07-06 RX ADMIN — BACLOFEN 15 MG: 10 TABLET ORAL at 08:16

## 2020-07-06 RX ADMIN — ENOXAPARIN SODIUM 40 MG: 100 INJECTION SUBCUTANEOUS at 20:46

## 2020-07-06 RX ADMIN — VITAMIN C 1 TABLET: TAB at 08:16

## 2020-07-06 ASSESSMENT — ACTIVITIES OF DAILY LIVING (ADL)
TUB_SHOWER_TRANSFER_DESCRIPTION: GRAB BAR;INCREASED TIME;INITIAL PREPARATION FOR TASK;SET-UP OF EQUIPMENT;SUPERVISION FOR SAFETY;VERBAL CUEING
TOILETING_LEVEL_OF_ASSIST_DESCRIPTION: ASSIST FOR HYGIENE;ASSIST TO PULL PANTS UP;ASSIST TO PULL PANTS DOWN;ASSIST FOR STANDING BALANCE;GRAB BAR;INCREASED TIME;INITIAL PREPARATION FOR TASK;SET-UP OF EQUIPMENT;SUPERVISION FOR SAFETY;VERBAL CUEING

## 2020-07-06 ASSESSMENT — GAIT ASSESSMENTS
DISTANCE (FEET): 103
ASSISTIVE DEVICE: 4 WHEEL WALKER
GAIT LEVEL OF ASSIST: CONTACT GUARD ASSIST

## 2020-07-06 ASSESSMENT — FIBROSIS 4 INDEX: FIB4 SCORE: 0.39

## 2020-07-06 NOTE — PROGRESS NOTES
"Rehab Progress Note     Encounter Date: 7/6/2020    CC: weakness    Interval Events (Subjective)  No issues with orthostatic hypotension over the weekend.  He denies any dizziness when standing.  He complains of itchiness in his skin, improved with lotion  His wife and him are very clear that he is not ready to go home, they are requesting that he stay here for another couple of months to improve endurance and strength.  They want him to be at a place where he can walk without assistive device, be able to walk up his inclined driveway on his own.  Stating that his wife cannot assist him at all.  He is also asking when he can drive.  Family wants him to be discharged to [facility] for continued acute rehabilitation.    Family has appealed discharge through Medicare.    ROS:  Gen: No fatigue, confusion, significant weight loss  Eyes: no blurry vision, double vision or pain in eyes  ENT: no changes in hearing, runny nose, nose bleeds, sinus pain  CV: No CP, palpitations  Lungs: no shortness of breath, changes in secretions, changes in cough, pain with coughing  Abd: No abd pain, nausea, vomiting, pain with eating  : no blood in urine, suprapubic pain  Ext: No swelling in legs, asymmetric atrophy  Neuro: no changes in strength or sensation  Skin: no new ulcers/skin breakdown appreciated by patient  Mood: No changes in mood today, increase in depression or anxiety  Heme: no bruising, or bleeding  Rest of 14 point review of systems is negative    Objective:  Vitals: /78   Pulse 60   Temp 36.3 °C (97.3 °F) (Temporal)   Resp 18   Ht 1.753 m (5' 9.02\")   Wt 82.6 kg (182 lb 1.6 oz)   SpO2 96%   Gen: NAD, Body mass index is 26.88 kg/m².  HEENT: NC/AT, PERRLA, moist mucous membranes  Cardio: RRR, no mumurs  Pulm: CTAB, with normal respiratory effort  Abd: Soft NTND, active bowel sounds  Ext: No peripheral edema. No calf tenderness. No clubbing/cyanosis. +dorsal pedalis pulses on the right, left BKA.    Skin: " Incision is clean dry and intact, sutures in place, no signs of dehiscence    Tone: Hip flexion spasticity, 0-1/4, cogwheeling tone in the left hip flexor    Recent Results (from the past 72 hour(s))   ACCU-CHEK GLUCOSE    Collection Time: 07/04/20  2:55 PM   Result Value Ref Range    Glucose - Accu-Ck 90 65 - 99 mg/dL   Basic Metabolic Panel    Collection Time: 07/06/20  6:02 AM   Result Value Ref Range    Sodium 142 135 - 145 mmol/L    Potassium 3.9 3.6 - 5.5 mmol/L    Chloride 105 96 - 112 mmol/L    Co2 28 20 - 33 mmol/L    Glucose 87 65 - 99 mg/dL    Bun 21 8 - 22 mg/dL    Creatinine 0.78 0.50 - 1.40 mg/dL    Calcium 9.1 8.5 - 10.5 mg/dL    Anion Gap 9.0 7.0 - 16.0   ESTIMATED GFR    Collection Time: 07/06/20  6:02 AM   Result Value Ref Range    GFR If African American >60 >60 mL/min/1.73 m 2    GFR If Non African American >60 >60 mL/min/1.73 m 2       Current Facility-Administered Medications   Medication Frequency   • baclofen (LIORESAL) tablet 15 mg TID   • docusate sodium (COLACE) capsule 200 mg BID    And   • sennosides (SENOKOT) 8.6 MG tablet 25.8 mg DAILY AT NOON    And   • bisacodyl (THE MAGIC BULLET) suppository 10 mg QDAY    And   • magnesium hydroxide (MILK OF MAGNESIA) suspension 30 mL QDAY PRN   • zolpidem (AMBIEN) tablet 5 mg QHS   • fludrocortisone (FLORINEF) tablet 0.1 mg QAM   • pseudoephedrine (SUDAFED) 30 MG tablet 60 mg TID   • melatonin tablet 3 mg QHS   • midodrine (PROAMATINE) tablet 15 mg TID   • ferrous sulfate tablet 325 mg QDAY with Breakfast   • gabapentin (NEURONTIN) capsule 800 mg TID   • metFORMIN (GLUCOPHAGE) tablet 250 mg BID AC   • midodrine (PROAMATINE) tablet 5 mg Q4HRS PRN   • aspirin EC (ECOTRIN) tablet 81 mg DAILY   • vitamin D (cholecalciferol) tablet 2,000 Units DAILY   • finasteride (PROSCAR) tablet 5 mg DAILY   • lovastatin (MEVACOR) tablet 10 mg Nightly   • vitamin B comp+C (ALLBEE WITH C) 1 Tab DAILY   • therapeutic multivitamin-minerals (THERAGRAN-M) tablet 1 Tab  DAILY   • Pharmacy Consult Request ...Pain Management Review 1 Each PHARMACY TO DOSE   • Respiratory Therapy Consult Continuous RT   • tramadol (ULTRAM) 50 MG tablet 50 mg Q4HRS PRN   • oxyCODONE immediate-release (ROXICODONE) tablet 5 mg Q3HRS PRN   • oxyCODONE immediate release (ROXICODONE) tablet 10 mg Q3HRS PRN   • acetaminophen (TYLENOL) tablet 650 mg Q4HRS PRN   • enoxaparin (LOVENOX) inj 40 mg QHS   • artificial tears ophthalmic solution 1 Drop PRN   • benzocaine-menthol (CEPACOL) lozenge 1 Lozenge Q2HRS PRN   • mag hydrox-al hydrox-simeth (MAALOX PLUS ES or MYLANTA DS) suspension 20 mL Q2HRS PRN   • ondansetron (ZOFRAN ODT) dispertab 4 mg 4X/DAY PRN    Or   • ondansetron (ZOFRAN) syringe/vial injection 4 mg 4X/DAY PRN   • traZODone (DESYREL) tablet 50 mg QHS PRN   • sodium chloride (OCEAN) 0.65 % nasal spray 2 Spray PRN   • lidocaine (LIDODERM) 5 % 2 Patch Daily-0800   • aripiprazole (ABILIFY) tablet 10 mg DAILY   • venlafaxine XR (EFFEXOR XR) capsule 150 mg BID       Orders Placed This Encounter   Procedures   • Diet Order Diabetic     Standing Status:   Standing     Number of Occurrences:   1     Order Specific Question:   Diet:     Answer:   Diabetic [3]       Assessment:  Active Hospital Problems    Diagnosis   • *Cervical myelopathy (HCC)   • Postoperative pain   • Deep vein thrombosis (HCC)   • Insomnia due to medical condition   • Neurogenic bowel   • Depression   • Thoracic myelopathy   • Neuropathic pain   • Hyperlipidemia   • Hypertension   • S/P BKA (below knee amputation) (HCC)       Medical Decision Making and Plan:  C2 AIS D, 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  Status post multiple spine surgeries, C3-T7 posterior spinal fusion on 6/5, T12-pelvis posterior spinal fusion on 6/10 by Dr Marsh.   - C-collar on at all times, Bolivar collar when in shower to be cleared by neurosurgery as outpatient, 8-12 weeks  -  Sutures to be removed 21 days  postop, prolonged time secondary to repeat surgeries, remove sutures on 7/2  - Prolonged discussion with patient and wife about expectations of acute rehabilitation, plan for discharge home in 1 to 2 weeks with transition to home health and then outpatient therapy.  -Continue comprehensive acute inpatient rehabilitation, discussed with patient and wife at length patient will be discharging to skilled nursing facility once appeal has been addressed.    Left BKA: Using [employer]  for prosthetists, not following up with physicians  - Prosthesis at bedside  -Follow-up with Dr. Lawson as an outpatient, prosthetic may require modifications given fixation of the pelvis  -Discussed with patient and physical therapy about stretching of hip flexor on the left.    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 noon, decrease Colace 100 mg twice daily, Dulcolax suppository with digital stimulation,   -DC MiraLAX 1 packet daily  -Discussed program with patient and importance of appropriate management of neurogenic bowel with repercussions of colonic dilation and possible rupture  -KUB showed no significant stool load in ascending or transverse colon      orthostatic hypotension: On 6/19- 82/52 with change in position  Because of significant autonomic dysfunction associated with spinal cord injury, the patient is at high risk  for orthostatic hypotension.  Given baseline elevation in blood pressure, goal of decreasing Florinef over the next couple of days  - FAMILIA hose on prior to out of bed  -midodrine 15 mg 3 times daily  -DC'd Florinef 0.1 mg every morning, since supine BP is been elevated, up to systolic BP of 180.  - P.o. fluid intake, goal 1.5-2 L/day  -Monitor strict I's and O's  - Amlodipine was DC'd, DC lisinopril  -Sudafed 60 mg 3 times daily, wean Sudafed as tolerated, then progressed to Midodrine.  -Discontinued Flomax on 7/1    Spasticity: MAS of 0-1/4 jumping of bilateral lower extremities, greater in hip flexors, waking him up at night  - Decrease Baclofen 15 mg 3 times daily  -Educated patient on stretching of the left hip flexor    Decreased range of motion of right hip: In differential diagnosis includes heterotopic ossification of the right hip after multiple spinal surgeries.  Improved with stretching during acute rehabilitation  - x-ray right hip, showed no signs of heterotopic ossification  -CRP is less than 2 likely negative     Pain:  #Neuropathic pain: Neuropathy, complicated by diabetes and a cervical myelopathy  - Gabapentin 800 mg tid  - DCTylenol 1000 mg 3 times daily     #Postoperative pain:  -Tramadol  mg every 4 hours as needed moderate to severe pain first-line  -Oxycodone 5-10 mg every 3 hours as needed moderate to severe pain, second line  - DC Tylenol 1000 mg 3 times daily  -Lidocaine patches to either side of incision on for 12 hours off for 12 hours     Hypertension:  - All oral antihypertensives have been DC'd  -Appreciate hospitalist input     Type 2 diabetes: With hyperglycemia  -Metformin 500 mg twice daily  -Appreciate hospitalist input    Insomnia:  -DC Remeron 15 mg nightly, given vivid dreams  - Trial Ambien 5mg qhs  - Melatonin 3 mg nightly     Depression:  -Effexor  mg twice daily     Vitamin D deficiency  -Vitamin D pending     DVT prophylaxis: Patient has previous history of DVT  in 2011 making him higher risk for clot formation  -Lovenox 40 mg daily    Discharge planning issues:  Prolonged discussion with patient and wife on process of appeal, fact the patient no longer meets requirements for acute rehabilitation as discharge community is not expected.    IDT Team Meeting 7/6/2020    I, Rj Navarro D.O., was present and led the interdisciplinary team conference on 7/6/2020.  I led the IDT conference and agree with the IDT conference documentation and plan of care as noted below.     RN:  Diet Regular diet   % Meal  100%   Pain    Sleep    Bowel cont   Bladder cont   In's & Out's        PT:  Bed Mobility Min A - mod A   Transfers    Mobility CGA with FWW   Stairs    Will dc to SNF  Barriers: limited by BP, balance    OT:  Eating    Grooming    Bathing    UB Dressing    LB Dressing    Toileting Max A   Shower & Transfer Min A with FWW   Mod I seated adl's  STG 1/2   Barriers: decreased endurance, and poor carryover      CM:  Continues to work on disposition and DME needs.      DC/Disposition:  Wife and pt are clear that he is not going home until he can fully take care of himself.     Discharge to SNF    Patient was seen for 38 minutes on unit/floor of which > 50% of time was spent on counseling and coordination of care regarding the above, including prognosis, risk reduction, benefits of treatment, and options for next stage of care including orthostatic hypotension, DC Florinef, discharge planning issues, coordination of care as per IDT above, working on appeal.      Rj Navarro D.O.

## 2020-07-06 NOTE — THERAPY
Occupational Therapy  Daily Treatment     Patient Name: Gaurav Lopez  Age:  75 y.o., Sex:  male  Medical Record #: [id number]  Today's Date: 7/6/2020     Precautions  Precautions: Spinal / Back Precautions , Cervical Collar  , Fall Risk, Other (See Comments)  Comments: LLE prosthesis, pacemaker, orthostatic hypotension, abdominal binder, Lumbar, Cervical    Safety   ADL Safety : Requires Supervision for Safety, Requires Physical Assist for Safety, Impaired  Bathroom Safety: Impaired, Requires Supervision for Safety    Subjective       Objective       07/06/20 1331   Precautions   Precautions Spinal / Back Precautions ;Cervical Collar  ;Fall Risk;Other (See Comments)   Comments LLE prosthesis, pacemaker, orthostatic hypotension, abdominal binder, Lumbar, Cervical   Vitals   O2 Delivery Device None - Room Air   Vitals Comments Abdominal binder in place   Non Verbal Descriptors   Non Verbal Scale  Calm   Sitting Upper Body Exercises   Upper Extremity Bike Level 4 Resistance  (FluidoBike, Level 4, 20 mins, rest break x 2, 1.015km)   Interdisciplinary Plan of Care Collaboration   IDT Collaboration with  Physical Therapist   Patient Position at End of Therapy Seated;Self Releasing Lap Belt Applied;Chair Alarm On   OT Total Time Spent   OT Individual Total Time Spent (Mins) 30   OT Charge Group   OT Therapeutic Exercise  2       Assessment    Pt was alert and cooperative w/ tx.  Tx emphasis on UB TherEx - see notes above.    Strengths: Alert and oriented, Effective communication skills, Pleasant and cooperative, Willingly participates in therapeutic activities  Barriers: Decreased endurance, Generalized weakness, Orthostatic hypotension, Impaired balance, Limited mobility    Plan    Continue AE reinforcement for LB dressing.  Figure four from supine with leg  may be a good option to minimize AE involvement if patient can tolerate position. Focus on ADLs, transfers, activity tolerance and functional use of UEs, standing  bal/tolerance.    Occupational Therapy Goals     Problem: Dressing     Dates: Start: 06/29/20       Goal: STG-Within one week, patient will dress LB     Dates: Start: 06/29/20       Description: 1) Individualized Goal:  min assist lower body dressing, using appropriate AE  2) Interventions:  OT Self Care/ADL, OT Therapeutic Activity, and OT Therapeutic Exercise      Note:     Goal Note filed on 07/06/20 1100 by Mesfin Mtz MS,OTR/L    Max assist for LB clothing management via AE/DME                        Problem: OT Long Term Goals     Dates: Start: 06/19/20       Goal: LTG-By discharge, patient will complete basic self care tasks     Dates: Start: 06/19/20       Description: 1) Individualized Goal:  Mod I for BADL's, Sup/SBA for shower via AE/DME PRN  2) Interventions:  OT Self Care/ADL, OT Neuro Re-Ed/Balance, OT Therapeutic Activity, OT Evaluation, and OT Therapeutic Exercise            Goal: LTG-By discharge, patient will perform bathroom transfers     Dates: Start: 06/19/20

## 2020-07-06 NOTE — CARE PLAN
Problem: Recreation Therapy  Goal: STG-Within one week, patient will demonstrate leisure problem solving  Description: By stating 2 leisure activities he would like to participate in during his free time or during session and any barriers to his participation.   Outcome: MET  Goal: STG-Within one week, patient will  Description: Participate in a fine motor leisure activity with pieces one inch or larger without dropping them.   Outcome: MET  Goal: LTG-By discharge, patient will demonstrate leisure problem solving  Description: By stating 2 leisure activities he would like to participate in following discharge and any barriers to his participation.   Outcome: MET     Problem: Recreation Therapy  Goal: LTG-By discharge, patient will  Description: Participate in a fine motor leisure activity with pieces smaller than an inch without dropping them.   Outcome: PROGRESSING AS EXPECTED

## 2020-07-06 NOTE — THERAPY
"Occupational Therapy  Daily Treatment     Patient Name: Gaurav Lopez  Age:  75 y.o., Sex:  male  Medical Record #: [id number]  Today's Date: 7/6/2020     Precautions  Precautions: Spinal / Back Precautions , Cervical Collar  , Fall Risk, Other (See Comments)  Comments: LLE prosthesis, pacemaker, orthostatic hypotension, abdominal binder, Lumbar, Cervical    Safety   ADL Safety : Requires Supervision for Safety, Requires Physical Assist for Safety, Impaired  Bathroom Safety: Impaired, Requires Supervision for Safety    Subjective  \"Hi, a shower would be good\"     Objective       07/06/20 0831   Precautions   Precautions Spinal / Back Precautions ;Cervical Collar  ;Fall Risk;Other (See Comments)   Comments LLE prosthesis, pacemaker, orthostatic hypotension, abdominal binder, Lumbar, Cervical   Vitals   O2 Delivery Device None - Room Air   Vitals Comments Abdominal binder in place   Non Verbal Descriptors   Non Verbal Scale  Calm   Cognition    Level of Consciousness Alert   Sleep/Wake Cycle   Sleep & Rest Awake   Functional Level of Assist   Eating Modified Independent   Eating Description Increased time;Insert dentures   Grooming Modified Independent;Seated   Grooming Description Seated in wheelchair at sink;Increased time   Bathing Moderate Assist   Bathing Description Long handled bath tool;Hand held shower;Grab bar;Tub bench;Assit with back;Assit wtih lower extremities;Assit with perineal;Increased time;Set-up of equipment;Supervision for safety;Verbal cueing   Upper Body Dressing Moderate Assist   Upper Body Dressing Description Application of orthotic or brace;Assist with pulling shirt over head;Increased time;Supervision for safety;Verbal cueing;Other (comment)  (Assist to pull shirt down back)   Lower Body Dressing Maximal Assist   Lower Body Dressing Description Grab bar;Reacher;Shoe horn;Sock aid;Cues for spinal precautions;Increased time;Initial preparation for task;Set-up of equipment;Supervision for " safety;Verbal cueing  (Mod I to manage L prosthesis, Max assist to threadR foot thr)   Toileting Maximal Assist   Toileting Description Assist for hygiene;Assist to pull pants up;Assist to pull pants down;Assist for standing balance;Grab bar;Increased time;Initial preparation for task;Set-up of equipment;Supervision for safety;Verbal cueing   Toilet Transfers Minimal Assist   Toilet Transfer Description Grab bar;Increased time;Supervision for safety;Verbal cueing;Verbal cues for spinal precautions   Tub / Shower Transfers Minimal Assist   Tub Shower Transfer Description Grab bar;Increased time;Initial preparation for task;Set-up of equipment;Supervision for safety;Verbal cueing   Interdisciplinary Plan of Care Collaboration   IDT Collaboration with  Nursing   Patient Position at End of Therapy Seated;Chair Alarm On;Self Releasing Lap Belt Applied;Call Light within Reach;Tray Table within Reach;Phone within Reach   Collaboration Comments CLOF   Strengths & Barriers   Strengths Alert and oriented;Effective communication skills;Pleasant and cooperative;Willingly participates in therapeutic activities   Barriers Decreased endurance;Generalized weakness;Orthostatic hypotension;Impaired balance;Limited mobility   OT Total Time Spent   OT Individual Total Time Spent (Mins) 60   OT Charge Group   OT Self Care / ADL 4       Assessment    Pt was alert and cooperative w/ tx.  Tx emphasis on bathroom ADL's - see notes above.  Limited above head activity secondary chronic shoulder limiting shoulder flexion   Strengths: Alert and oriented, Effective communication skills, Pleasant and cooperative, Willingly participates in therapeutic activities to 90 degrees - assist for above shoulder back, impaired standing balance + spinal/cervical precautions.  Poor carry over of instruction - AE for LB dressing  Barriers: Decreased endurance, Generalized weakness, Orthostatic hypotension, Impaired balance, Limited mobility    Plan    Pt to  [facility] tomorrow    Occupational Therapy Goals     Problem: Dressing     Dates: Start: 06/29/20       Goal: STG-Within one week, patient will dress LB     Dates: Start: 06/29/20       Description: 1) Individualized Goal:  min assist lower body dressing, using appropriate AE  2) Interventions:  OT Self Care/ADL, OT Therapeutic Activity, and OT Therapeutic Exercise      Note:     Goal Note filed on 07/06/20 1100 by Mesfin Mtz MS,OTR/L    Max assist for LB clothing management via AE/DME                        Problem: OT Long Term Goals     Dates: Start: 06/19/20       Goal: LTG-By discharge, patient will complete basic self care tasks     Dates: Start: 06/19/20       Description: 1) Individualized Goal:  Mod I for BADL's, Sup/SBA for shower via AE/DME PRN  2) Interventions:  OT Self Care/ADL, OT Neuro Re-Ed/Balance, OT Therapeutic Activity, OT Evaluation, and OT Therapeutic Exercise            Goal: LTG-By discharge, patient will perform bathroom transfers     Dates: Start: 06/19/20

## 2020-07-06 NOTE — THERAPY
Physical Therapy   Daily Treatment     Patient Name: Gaurav Lopez  Age:  75 y.o., Sex:  male  Medical Record #: [id number]  Today's Date: 7/6/2020     Precautions  Precautions: Spinal / Back Precautions , Cervical Collar  , Fall Risk, Other (See Comments)  Comments: LLE prosthesis, pacemaker, orthostatic hypotension, abdominal binder, Lumbar, Cervical    Subjective    Patient seated in wheelchair upon arrival. Patient agreeable to physical therapy intervention. Patient reports that he is a little tired as he did not sleep well last night.      Objective       07/06/20 1001   Cosignature   Documentation Review Approved    Vitals   Pulse 80   Patient BP Position Sitting   Blood Pressure  124/79   O2 (LPM) 0   O2 Delivery Device None - Room Air   Vitals Comments abdominal binder in place, see flowsheet for standing vitals.    Gait Functional Level of Assist    Gait Level Of Assist Contact Guard Assist   Assistive Device 4 Wheel Walker   Distance (Feet) 103   # of Times Distance was Traveled 2   Deviation Decreased Base Of Support;Bradykinetic;Trendelenberg  (Left toe occasionally catches. )   Bed Mobility    Sit to Stand Moderate Assist  (x3 from wheelchair, gait belt, FWW, )   Interdisciplinary Plan of Care Collaboration   IDT Collaboration with  Certified Nursing Assistant   Patient Position at End of Therapy Seated;Self Releasing Lap Belt Applied;Chair Alarm On   Collaboration Comments Hand off to CNA so pt could use restroom.    PT Total Time Spent   PT Individual Total Time Spent (Mins) 30   PT Charge Group   PT Gait Training 2     Patient reported that one of the wheels on his FWW was sticking and so we used a new one. Also adjusted FWW height as it was too low.     Assessment    We were originally going to work on stairs today however patient did demonstrate more difficulty with his sit to stand transfer and some altered balance when taking his standing BP and so we decided to try this another time.   Strengths:  Able to follow instructions, Effective communication skills, Independent prior level of function, Pleasant and cooperative, Willingly participates in therapeutic activities  Barriers: Bladder incontinence, Bowel incontinence, Confused, Decreased endurance, Generalized weakness, Home accessibility, Hypotension, Orthostatic hypotension, Impaired activity tolerance, Impaired balance, Impaired carryover of learning, Limited mobility    Plan    Day-to-day d/c to SNF; as tolerated, initiate stairs and re-visit car transfers    Physical Therapy Problems     Problem: PT-Long Term Goals     Dates: Start: 06/19/20       Goal: LTG-By discharge, patient will ambulate     Dates: Start: 06/19/20       Description: 1) Individualized goal:  150 feet with FWW, c/s collar, L BKA prosthesis, at SBA level  2) Interventions:  PT Group Therapy, PT E Stim Attended, PT Prosthetic Training, PT Gait Training, PT Therapeutic Exercises, PT TENS Application, PT Neuro Re-Ed/Balance, PT Therapeutic Activity, and PT Manual Therapy    Note:     Goal Note filed on 07/06/20 0807 by Cori Morris, Student    Partially met patient was CGA assist 310 feet with FWW.                  Goal: LTG-By discharge, patient will transfer one surface to another     Dates: Start: 06/19/20       Description: 1) Individualized goal:  with FWW, c/s collar, L BKA prosthesis, at SBA level  2) Interventions:  PT Group Therapy, PT E Stim Attended, PT Prosthetic Training, PT Gait Training, PT Therapeutic Exercises, PT TENS Application, PT Neuro Re-Ed/Balance, PT Therapeutic Activity, and PT Manual Therapy      Note:     Goal Note filed on 07/06/20 0807 by Cori Morris, Student    Min assist with supine to sit and sit to stands with FWW.                   Goal: LTG-By discharge, patient will ambulate up/down 4-6 stairs     Dates: Start: 06/19/20       Description: 1) Individualized goal:  with 1 railing, c/s collar, L BKA prosthesis, gait belt, at Min A level  2)  Interventions:  PT Group Therapy, PT E Stim Attended, PT Prosthetic Training, PT Gait Training, PT Therapeutic Exercises, PT TENS Application, PT Neuro Re-Ed/Balance, PT Therapeutic Activity, and PT Manual Therapy    Note:     Goal Note filed on 07/06/20 0807 by Cori Morris, Student    Patient did not complete stairs recently. Occasionally due to orthostatic hypotension.                   Goal: LTG-By discharge, patient will transfer in/out of a car     Dates: Start: 06/19/20       Description: 1) Individualized goal:  with FWW, c/s collar, L BKA prosthesis, at SBA level  2) Interventions:  PT Group Therapy, PT E Stim Attended, PT Prosthetic Training, PT Gait Training, PT Therapeutic Exercises, PT TENS Application, PT Neuro Re-Ed/Balance, PT Therapeutic Activity, and PT Manual Therapy      Note:     Goal Note filed on 07/06/20 0807 by Cori Morris, Student    Patient was max assist for car transfer on last attempt.

## 2020-07-06 NOTE — CARE PLAN
Problem: Safety  Goal: Will remain free from falls  Note: Call light kept within reach and encouraged to use for assistance and with toileting needs. Encouraged to call staff and to wait for staff before transfers.  Bed alarm is in place.  Hourly rounding is in effect.     Problem: Bowel/Gastric:  Goal: Will not experience complications related to bowel motility  Note: Pt is on a nightly bowel program.  Had + results tonight.  See doc flow sheet.  Will continue to monitor patient.

## 2020-07-06 NOTE — PROGRESS NOTES
Received bedside shift report from Radha APONTE RN regarding patient and assumed care. Patient awake, calm and stable, currently positioned in bed for comfort and safety; call light within reach. Denies pain or discomfort at this time. Will continue to monitor.

## 2020-07-06 NOTE — CARE PLAN
Problem: PT-Long Term Goals  Goal: LTG-By discharge, patient will ambulate  Description: 1) Individualized goal:  150 feet with FWW, c/s collar, L BKA prosthesis, at SBA level  2) Interventions:  PT Group Therapy, PT E Stim Attended, PT Prosthetic Training, PT Gait Training, PT Therapeutic Exercises, PT TENS Application, PT Neuro Re-Ed/Balance, PT Therapeutic Activity, and PT Manual Therapy  7/6/2020 0807 by Cori Morris, Student  Outcome: NOT MET  Note: Partially met patient was CGA assist 310 feet with FWW.  7/6/2020 0807 by Cori Morris, Student  Reactivated  Goal: LTG-By discharge, patient will transfer one surface to another  Description: 1) Individualized goal:  with FWW, c/s collar, L BKA prosthesis, at SBA level  2) Interventions:  PT Group Therapy, PT E Stim Attended, PT Prosthetic Training, PT Gait Training, PT Therapeutic Exercises, PT TENS Application, PT Neuro Re-Ed/Balance, PT Therapeutic Activity, and PT Manual Therapy    7/6/2020 0807 by Cori Morris, Student  Outcome: NOT MET  Note: Min assist with supine to sit and sit to stands with FWW.   7/6/2020 0807 by Cori Morris, Student  Reactivated  Goal: LTG-By discharge, patient will ambulate up/down 4-6 stairs  Description: 1) Individualized goal:  with 1 railing, c/s collar, L BKA prosthesis, gait belt, at Min A level  2) Interventions:  PT Group Therapy, PT E Stim Attended, PT Prosthetic Training, PT Gait Training, PT Therapeutic Exercises, PT TENS Application, PT Neuro Re-Ed/Balance, PT Therapeutic Activity, and PT Manual Therapy  7/6/2020 0807 by Cori Morris, Student  Outcome: NOT MET  Note: Patient did not complete stairs recently. Occasionally due to orthostatic hypotension.   7/6/2020 0807 by Cori Morris, Student  Reactivated  Goal: LTG-By discharge, patient will transfer in/out of a car  Description: 1) Individualized goal:  with FWW, c/s collar, L BKA prosthesis, at SBA level  2) Interventions:  PT Group  Therapy, PT E Stim Attended, PT Prosthetic Training, PT Gait Training, PT Therapeutic Exercises, PT TENS Application, PT Neuro Re-Ed/Balance, PT Therapeutic Activity, and PT Manual Therapy    7/6/2020 0807 by Cori Morris, Student  Outcome: NOT MET  Note: Patient was max assist for car transfer on last attempt.   7/6/2020 0807 by Cori Morris, Student  Reactivated

## 2020-07-06 NOTE — THERAPY
Physical Therapy   Daily Treatment     Patient Name: Gaurav Lopez  Age:  75 y.o., Sex:  male  Medical Record #: [id number]  Today's Date: 7/6/2020     Precautions  Precautions: Spinal / Back Precautions , Cervical Collar  , Fall Risk, Other (See Comments)  Comments: LLE prosthesis, pacemaker, orthostatic hypotension, abdominal binder, Lumbar, Cervical    Subjective    Patient seated in therapy gym upon arrival. Patient agreeable to physical therapy intervention. Patient reported he has been asymptomatic with blood pressure.      Objective     07/06/20 1431   Cosignature   Documentation Review Approved with modifications made by preceptor in flowsheet   Vitals   Pulse 90   Patient BP Position Standing 1 minute   Blood Pressure  100/42   O2 (LPM) 0   O2 Delivery Device None - Room Air   Vitals Comments abdominal binder in place. Asymptmatic.   Stairs Functional Level of Assist   Level of Assist with Stairs Moderate Assist  (Min assist ascending 2 rails, Mod assist descending 2 rails.)   # of Stairs Climbed 4   Stairs Description Hand rails;Extra time;Limited by fatigue;Safety concerns;Supervision for safety;Verbal cueing   Neuro-Muscular Treatments   Neuro-Muscular Treatments Other (See Comments)   Comments Balance training in // bars, Static standing WBOS EC x 3 1 minute holds, Static standing NBOS x 1 minute.    IDT Conference   IDT Conference I have reviewed and discussed the POC and barriers to discharge for this patient.  I am knowledgeable of the patient's needs and have attended the IDT Conference.   Interdisciplinary Plan of Care Collaboration   IDT Collaboration with  Occupational Therapist  (and IDT)   Patient Position at End of Therapy Seated;Chair Alarm On;Self Releasing Lap Belt Applied;Call Light within Reach;Tray Table within Reach;Phone within Reach   Collaboration Comments OT hand off to PT.  IDT conference: patient is to d/c to SNF day-to-day to continue prolonged therapy; SO unable to provide physical  A; pt reports multiple falls as PLOF; prosthetist provided adjustments within past week; strengths and barriers discussed.   PT Total Time Spent   PT Individual Total Time Spent (Mins) 60   PT Charge Group   PT Gait Training 1   PT Neuromuscular Re-Education / Balance 1   PT Therapeutic Activities 2       Car Transfer:   - FWW, Gait belt, multiple verbal cues and demonstration on technique.   - 2 person Assistance.   -  Max Assistance for sit to stand from low surface. Min-Mod assistance for trunk control during movement in car.        07/06/20 1431   Sit to Stand   Assistance Needed Physical assistance   Physical Assistance Level 50%-74%   CARE Score 2   Car Transfer   Assistance Needed Physical assistance   Physical Assistance Level Total assistance   CARE Score 1   1 Step (Curb)   Assistance Needed Physical assistance   Physical Assistance Level 25%-49%   CARE Score 3   4 Steps   Assistance Needed Physical assistance   Physical Assistance Level 25%-49%   CARE Score 3   12 Steps   Reason if not Attempted Safety concerns   CARE Score 88       Assessment    Patient had good energy this afternoon. He tolerated stairs well requiring more assistance for descending then ascending. We worked on technique with car transfer. We also worked on standing balance. Patient demonstrated improvements with forward and backward lean during balance activity.   Strengths: Able to follow instructions, Effective communication skills, Independent prior level of function, Pleasant and cooperative, Willingly participates in therapeutic activities  Barriers: Bladder incontinence, Bowel incontinence, Confused, Decreased endurance, Generalized weakness, Home accessibility, Hypotension, Orthostatic hypotension, Impaired activity tolerance, Impaired balance, Impaired carryover of learning, Limited mobility    Plan    Day-to-day d/c to SNF; update [facility] tomorrow, continue education on precautions    Physical Therapy Problems     Problem:  PT-Long Term Goals     Dates: Start: 06/19/20       Goal: LTG-By discharge, patient will ambulate     Dates: Start: 06/19/20       Description: 1) Individualized goal:  150 feet with FWW, c/s collar, L BKA prosthesis, at SBA level  2) Interventions:  PT Group Therapy, PT E Stim Attended, PT Prosthetic Training, PT Gait Training, PT Therapeutic Exercises, PT TENS Application, PT Neuro Re-Ed/Balance, PT Therapeutic Activity, and PT Manual Therapy    Note:     Goal Note filed on 07/06/20 0807 by Cori Morris, Student    Partially met patient was CGA assist 310 feet with FWW.                  Goal: LTG-By discharge, patient will transfer one surface to another     Dates: Start: 06/19/20       Description: 1) Individualized goal:  with FWW, c/s collar, L BKA prosthesis, at SBA level  2) Interventions:  PT Group Therapy, PT E Stim Attended, PT Prosthetic Training, PT Gait Training, PT Therapeutic Exercises, PT TENS Application, PT Neuro Re-Ed/Balance, PT Therapeutic Activity, and PT Manual Therapy      Note:     Goal Note filed on 07/06/20 0807 by Cori Morris, Student    Min assist with supine to sit and sit to stands with FWW.                   Goal: LTG-By discharge, patient will ambulate up/down 4-6 stairs     Dates: Start: 06/19/20       Description: 1) Individualized goal:  with 1 railing, c/s collar, L BKA prosthesis, gait belt, at Min A level  2) Interventions:  PT Group Therapy, PT E Stim Attended, PT Prosthetic Training, PT Gait Training, PT Therapeutic Exercises, PT TENS Application, PT Neuro Re-Ed/Balance, PT Therapeutic Activity, and PT Manual Therapy    Note:     Goal Note filed on 07/06/20 0807 by Cori Morris, Student    Patient did not complete stairs recently. Occasionally due to orthostatic hypotension.                   Goal: LTG-By discharge, patient will transfer in/out of a car     Dates: Start: 06/19/20       Description: 1) Individualized goal:  with FWW, c/s collar, L BKA  prosthesis, at SBA level  2) Interventions:  PT Group Therapy, PT E Stim Attended, PT Prosthetic Training, PT Gait Training, PT Therapeutic Exercises, PT TENS Application, PT Neuro Re-Ed/Balance, PT Therapeutic Activity, and PT Manual Therapy      Note:     Goal Note filed on 07/06/20 0807 by Cori Morris, Student    Patient was max assist for car transfer on last attempt.

## 2020-07-06 NOTE — THERAPY
Recreational Therapy  Daily Treatment     Patient Name: Gaurav Lopez  AGE:  75 y.o., SEX:  male  Medical Record #: [id number]  Today's Date: 7/6/2020       Subjective    Pt was ready and willing for session following working with the CNA.      Objective       07/06/20 1031   Functional Ability Status - Cognitive   Attention Span Remains on Task   Comprehension Follows Three Step Commands   Judgment Able to Make Independent Decisions   Functional Ability Status - Emotional    Affect Appropriate   Mood Appropriate   Behavior Appropriate   Skilled Intervention    Skilled Intervention Relaxation / Coping Skills   Skilled Intervention Comments Claudia strickland   Interdisciplinary Plan of Care Collaboration   IDT Collaboration with  Certified Nursing Assistant   Patient Position at End of Therapy Seated;Tray Table within Reach;Call Light within Reach   Collaboration Comments CNA handing off to this writer following using the restroom.    Strengths & Barriers   Strengths Able to follow instructions;Pleasant and cooperative;Motivated for self care and independence;Willingly participates in therapeutic activities   Barriers Decreased endurance;Generalized weakness;Impaired activity tolerance;Impaired balance   Treatment Time   Total Time Spent (mins) 30   Procedural Tracking   Procedural Tracking Leisure Skills Development       Assessment    Pt was taken to the gym to work on a new leisure activity of Claudia strickland.     Strengths: (P) Able to follow instructions, Pleasant and cooperative, Motivated for self care and independence, Willingly participates in therapeutic activities  Barriers: (P) Decreased endurance, Generalized weakness, Impaired activity tolerance, Impaired balance    Plan    Standing duel tasking

## 2020-07-07 PROCEDURE — 770010 HCHG ROOM/CARE - REHAB SEMI PRIVAT*

## 2020-07-07 PROCEDURE — 700111 HCHG RX REV CODE 636 W/ 250 OVERRIDE (IP): Performed by: PHYSICAL MEDICINE & REHABILITATION

## 2020-07-07 PROCEDURE — 97110 THERAPEUTIC EXERCISES: CPT

## 2020-07-07 PROCEDURE — 99232 SBSQ HOSP IP/OBS MODERATE 35: CPT | Performed by: PHYSICAL MEDICINE & REHABILITATION

## 2020-07-07 PROCEDURE — A9270 NON-COVERED ITEM OR SERVICE: HCPCS | Performed by: PHYSICAL MEDICINE & REHABILITATION

## 2020-07-07 PROCEDURE — A9270 NON-COVERED ITEM OR SERVICE: HCPCS | Performed by: HOSPITALIST

## 2020-07-07 PROCEDURE — 700112 HCHG RX REV CODE 229: Performed by: PHYSICAL MEDICINE & REHABILITATION

## 2020-07-07 PROCEDURE — 700102 HCHG RX REV CODE 250 W/ 637 OVERRIDE(OP): Performed by: PHYSICAL MEDICINE & REHABILITATION

## 2020-07-07 PROCEDURE — 97535 SELF CARE MNGMENT TRAINING: CPT

## 2020-07-07 PROCEDURE — 97530 THERAPEUTIC ACTIVITIES: CPT

## 2020-07-07 PROCEDURE — 97116 GAIT TRAINING THERAPY: CPT

## 2020-07-07 PROCEDURE — 700102 HCHG RX REV CODE 250 W/ 637 OVERRIDE(OP): Performed by: HOSPITALIST

## 2020-07-07 PROCEDURE — 700101 HCHG RX REV CODE 250: Performed by: PHYSICAL MEDICINE & REHABILITATION

## 2020-07-07 RX ORDER — ARIPIPRAZOLE 5 MG/1
5 TABLET ORAL DAILY
Status: DISCONTINUED | OUTPATIENT
Start: 2020-07-08 | End: 2020-07-08 | Stop reason: HOSPADM

## 2020-07-07 RX ADMIN — FINASTERIDE 5 MG: 5 TABLET, FILM COATED ORAL at 08:14

## 2020-07-07 RX ADMIN — ENOXAPARIN SODIUM 40 MG: 100 INJECTION SUBCUTANEOUS at 21:00

## 2020-07-07 RX ADMIN — METFORMIN HYDROCHLORIDE 250 MG: 500 TABLET ORAL at 17:40

## 2020-07-07 RX ADMIN — BISACODYL 10 MG: 10 SUPPOSITORY RECTAL at 21:00

## 2020-07-07 RX ADMIN — METFORMIN HYDROCHLORIDE 250 MG: 500 TABLET ORAL at 08:13

## 2020-07-07 RX ADMIN — DOCUSATE SODIUM 200 MG: 100 CAPSULE, LIQUID FILLED ORAL at 20:50

## 2020-07-07 RX ADMIN — VENLAFAXINE HYDROCHLORIDE 150 MG: 75 CAPSULE, EXTENDED RELEASE ORAL at 08:13

## 2020-07-07 RX ADMIN — MIDODRINE HYDROCHLORIDE 15 MG: 10 TABLET ORAL at 11:36

## 2020-07-07 RX ADMIN — GABAPENTIN 800 MG: 400 CAPSULE ORAL at 08:13

## 2020-07-07 RX ADMIN — LOVASTATIN 10 MG: 20 TABLET ORAL at 21:02

## 2020-07-07 RX ADMIN — VENLAFAXINE HYDROCHLORIDE 150 MG: 75 CAPSULE, EXTENDED RELEASE ORAL at 21:01

## 2020-07-07 RX ADMIN — ARIPIPRAZOLE 10 MG: 5 TABLET ORAL at 08:13

## 2020-07-07 RX ADMIN — PSEUDOEPHEDRINE HCL 60 MG: 30 TABLET, FILM COATED ORAL at 11:36

## 2020-07-07 RX ADMIN — FERROUS SULFATE TAB 325 MG (65 MG ELEMENTAL FE) 325 MG: 325 (65 FE) TAB at 08:14

## 2020-07-07 RX ADMIN — MELATONIN 3 MG: at 21:01

## 2020-07-07 RX ADMIN — MIDODRINE HYDROCHLORIDE 15 MG: 10 TABLET ORAL at 17:40

## 2020-07-07 RX ADMIN — BACLOFEN 15 MG: 10 TABLET ORAL at 21:01

## 2020-07-07 RX ADMIN — STANDARDIZED SENNA CONCENTRATE 25.8 MG: 8.6 TABLET ORAL at 11:36

## 2020-07-07 RX ADMIN — MIDODRINE HYDROCHLORIDE 15 MG: 10 TABLET ORAL at 05:26

## 2020-07-07 RX ADMIN — PSEUDOEPHEDRINE HCL 60 MG: 30 TABLET, FILM COATED ORAL at 17:40

## 2020-07-07 RX ADMIN — GABAPENTIN 800 MG: 400 CAPSULE ORAL at 14:00

## 2020-07-07 RX ADMIN — GABAPENTIN 800 MG: 400 CAPSULE ORAL at 21:01

## 2020-07-07 RX ADMIN — BACLOFEN 15 MG: 10 TABLET ORAL at 14:00

## 2020-07-07 RX ADMIN — ASPIRIN 81 MG: 81 TABLET, COATED ORAL at 08:13

## 2020-07-07 RX ADMIN — DOCUSATE SODIUM 200 MG: 100 CAPSULE, LIQUID FILLED ORAL at 08:14

## 2020-07-07 RX ADMIN — VITAMIN C 1 TABLET: TAB at 08:14

## 2020-07-07 RX ADMIN — LIDOCAINE 2 PATCH: 50 PATCH TOPICAL at 08:15

## 2020-07-07 RX ADMIN — BACLOFEN 15 MG: 10 TABLET ORAL at 08:13

## 2020-07-07 RX ADMIN — MULTIPLE VITAMINS W/ MINERALS TAB 1 TABLET: TAB at 08:13

## 2020-07-07 RX ADMIN — MELATONIN 2000 UNITS: at 08:13

## 2020-07-07 RX ADMIN — PSEUDOEPHEDRINE HCL 60 MG: 30 TABLET, FILM COATED ORAL at 08:14

## 2020-07-07 RX ADMIN — ZOLPIDEM TARTRATE 5 MG: 5 TABLET ORAL at 21:07

## 2020-07-07 ASSESSMENT — GAIT ASSESSMENTS
DISTANCE (FEET): 300
ASSISTIVE DEVICE: FRONT WHEEL WALKER
GAIT LEVEL OF ASSIST: CONTACT GUARD ASSIST
DEVIATION: DECREASED BASE OF SUPPORT

## 2020-07-07 ASSESSMENT — FIBROSIS 4 INDEX: FIB4 SCORE: 0.39

## 2020-07-07 NOTE — CARE PLAN
Problem: Safety  Goal: Will remain free from falls  Note: Call light kept within reach and encouraged to use for assistance and with toileting needs. Encouraged to call staff and to wait for staff before transfers.  Bed alarm is in place.  Hourly rounding is in effect.     Problem: Bowel/Gastric:  Goal: Will not experience complications related to bowel motility  Note: Pt is on a nightly bowel program.  Only requested to have one round of digital stimulation.  Educated pt regarding bowel program and series of digital stimulations but refused to have more than one round.  Had + results tonight.  See doc flow sheet.  Will continue to monitor patient.

## 2020-07-07 NOTE — CONSULTS
BRIEF PSYCHIATRIC CONSULT NOTE: not seen. Request is for possible dyskinesia of jaw. The only obvious possibility is Abilify. Would consider dc of it and monitor. If not resolved in 36-48 hours would consider a neurological consult.

## 2020-07-07 NOTE — PROGRESS NOTES
"Rehab Progress Note     Encounter Date: 7/7/2020    CC: weakness    Interval Events (Subjective)    In discussions with patient abnormal movements of the jaw started approximately 2 years ago, he is not sought medical care.  Does not appear to be causing him any significant limitations, he denies any weird tingling in his upper extremities.    He denies any dizziness when sitting.    He denies any pain  He is participating well with therapy      ROS:  Gen: No fatigue, confusion, significant weight loss  Eyes: no blurry vision, double vision or pain in eyes  ENT: no changes in hearing, runny nose, nose bleeds, sinus pain  CV: No CP, palpitations  Lungs: no shortness of breath, changes in secretions, changes in cough, pain with coughing  Abd: No abd pain, nausea, vomiting, pain with eating  : no blood in urine, suprapubic pain  Ext: No swelling in legs, asymmetric atrophy  Neuro: no changes in strength or sensation  Skin: no new ulcers/skin breakdown appreciated by patient  Mood: No changes in mood today, increase in depression or anxiety  Heme: no bruising, or bleeding  Rest of 14 point review of systems is negative    Objective:  Vitals: /73   Pulse 73   Temp 36.4 °C (97.6 °F) (Temporal)   Resp 18   Ht 1.753 m (5' 9.02\")   Wt 67.8 kg (149 lb 8 oz)   SpO2 97%   Gen: NAD, Body mass index is 22.07 kg/m².  Good color, able to hold full conversation  HEENT: NC/AT, PERRLA, moist mucous membranes, dyskinetic movements of the jaw and lips  Cardio: RRR, no mumurs  Pulm: CTAB, with normal respiratory effort  Abd: Soft NTND, active bowel sounds  Ext: No peripheral edema. No calf tenderness. No clubbing/cyanosis. +dorsal pedalis pulses on the right, left BKA.      Skin: Incision is clean dry and intact, sutures in place, no signs of dehiscence    Tone: Hip flexion spasticity, 0-1/4, cogwheeling tone in the left hip flexor    Recent Results (from the past 72 hour(s))   ACCU-CHEK GLUCOSE    Collection Time: 07/04/20 "  2:55 PM   Result Value Ref Range    Glucose - Accu-Ck 90 65 - 99 mg/dL   Basic Metabolic Panel    Collection Time: 07/06/20  6:02 AM   Result Value Ref Range    Sodium 142 135 - 145 mmol/L    Potassium 3.9 3.6 - 5.5 mmol/L    Chloride 105 96 - 112 mmol/L    Co2 28 20 - 33 mmol/L    Glucose 87 65 - 99 mg/dL    Bun 21 8 - 22 mg/dL    Creatinine 0.78 0.50 - 1.40 mg/dL    Calcium 9.1 8.5 - 10.5 mg/dL    Anion Gap 9.0 7.0 - 16.0   ESTIMATED GFR    Collection Time: 07/06/20  6:02 AM   Result Value Ref Range    GFR If African American >60 >60 mL/min/1.73 m 2    GFR If Non African American >60 >60 mL/min/1.73 m 2       Current Facility-Administered Medications   Medication Frequency   • baclofen (LIORESAL) tablet 15 mg TID   • docusate sodium (COLACE) capsule 200 mg BID    And   • sennosides (SENOKOT) 8.6 MG tablet 25.8 mg DAILY AT NOON    And   • bisacodyl (THE MAGIC BULLET) suppository 10 mg QDAY    And   • magnesium hydroxide (MILK OF MAGNESIA) suspension 30 mL QDAY PRN   • zolpidem (AMBIEN) tablet 5 mg QHS   • pseudoephedrine (SUDAFED) 30 MG tablet 60 mg TID   • melatonin tablet 3 mg QHS   • midodrine (PROAMATINE) tablet 15 mg TID   • ferrous sulfate tablet 325 mg QDAY with Breakfast   • gabapentin (NEURONTIN) capsule 800 mg TID   • metFORMIN (GLUCOPHAGE) tablet 250 mg BID AC   • midodrine (PROAMATINE) tablet 5 mg Q4HRS PRN   • aspirin EC (ECOTRIN) tablet 81 mg DAILY   • vitamin D (cholecalciferol) tablet 2,000 Units DAILY   • finasteride (PROSCAR) tablet 5 mg DAILY   • lovastatin (MEVACOR) tablet 10 mg Nightly   • vitamin B comp+C (ALLBEE WITH C) 1 Tab DAILY   • therapeutic multivitamin-minerals (THERAGRAN-M) tablet 1 Tab DAILY   • Pharmacy Consult Request ...Pain Management Review 1 Each PHARMACY TO DOSE   • Respiratory Therapy Consult Continuous RT   • tramadol (ULTRAM) 50 MG tablet 50 mg Q4HRS PRN   • oxyCODONE immediate-release (ROXICODONE) tablet 5 mg Q3HRS PRN   • oxyCODONE immediate release (ROXICODONE) tablet  10 mg Q3HRS PRN   • acetaminophen (TYLENOL) tablet 650 mg Q4HRS PRN   • enoxaparin (LOVENOX) inj 40 mg QHS   • artificial tears ophthalmic solution 1 Drop PRN   • benzocaine-menthol (CEPACOL) lozenge 1 Lozenge Q2HRS PRN   • mag hydrox-al hydrox-simeth (MAALOX PLUS ES or MYLANTA DS) suspension 20 mL Q2HRS PRN   • ondansetron (ZOFRAN ODT) dispertab 4 mg 4X/DAY PRN    Or   • ondansetron (ZOFRAN) syringe/vial injection 4 mg 4X/DAY PRN   • traZODone (DESYREL) tablet 50 mg QHS PRN   • sodium chloride (OCEAN) 0.65 % nasal spray 2 Spray PRN   • lidocaine (LIDODERM) 5 % 2 Patch Daily-0800   • aripiprazole (ABILIFY) tablet 10 mg DAILY   • venlafaxine XR (EFFEXOR XR) capsule 150 mg BID       Orders Placed This Encounter   Procedures   • Diet Order Diabetic     Standing Status:   Standing     Number of Occurrences:   1     Order Specific Question:   Diet:     Answer:   Diabetic [3]       Assessment:  Active Hospital Problems    Diagnosis   • *Cervical myelopathy (HCC)   • Postoperative pain   • Deep vein thrombosis (HCC)   • Insomnia due to medical condition   • Neurogenic bowel   • Depression   • Thoracic myelopathy   • Neuropathic pain   • Hyperlipidemia   • Hypertension   • S/P BKA (below knee amputation) (HCC)       Medical Decision Making and Plan:  C2 AIS D, 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  Status post multiple spine surgeries, C3-T7 posterior spinal fusion on 6/5, T12-pelvis posterior spinal fusion on 6/10 by Dr Marsh.   - C-collar on at all times, Lyman collar when in shower to be cleared by neurosurgery as outpatient, 8-12 weeks  -  Sutures to be removed 21 days postop, prolonged time secondary to repeat surgeries, remove sutures on 7/2  - Prolonged discussion with patient and wife about expectations of acute rehabilitation, plan for discharge home in 1 to 2 weeks with transition to home health and then outpatient therapy.  -Continue comprehensive acute  inpatient rehabilitation, will plan to discharge to skilled nursing facility, place consults today as patient is not planning on going home.  He reports expectation of getting to a place where he no longer needs an assistive device and can ambulate up an incline hill prior to going home.    Left BKA: Using [place]  for prosthetists, not following up with physicians  - Prosthesis at bedside  -Follow-up with Dr. Lawson as an outpatient, prosthetic may require modifications given fixation of the pelvis  -Discussed with patient and physical therapy about stretching of hip flexor on the left.    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 noon, decrease Colace 100 mg twice daily, Dulcolax suppository with digital stimulation,   -DC MiraLAX 1 packet daily  -Discussed program with patient and importance of appropriate management of neurogenic bowel with repercussions of colonic dilation and possible rupture  -KUB showed no significant stool load in ascending or transverse colon      orthostatic hypotension: On 6/19- 82/52 with change in position  Because of significant autonomic dysfunction associated with spinal cord injury, the patient is at high risk for orthostatic hypotension.  Given baseline elevation in blood pressure, goal of decreasing Florinef over the next couple of days  - FAMILIA hose on prior to out of  bed  -midodrine 15 mg 3 times daily  -DC'd Florinef 0.1 mg every morning, since supine BP is been elevated, up to systolic BP of 180.  - P.o. fluid intake, goal 1.5-2 L/day  -Monitor strict I's and O's  - Amlodipine was DC'd, DC lisinopril  -Sudafed 60 mg 3 times daily, wean Sudafed as tolerated, then progressed to Midodrine.  -Discontinued Flomax on 7/1    Spasticity: MAS of 0-1/4 jumping of bilateral lower extremities, greater in hip flexors, waking him up at night  - Decrease Baclofen 15 mg 3 times daily  -Educated patient on stretching of the left hip flexor    Decreased range of motion of right hip: In differential diagnosis includes heterotopic ossification of the right hip after multiple spinal surgeries.  Improved with stretching during acute rehabilitation  - x-ray right hip, showed no signs of heterotopic ossification  -CRP is less than 2 likely negative     Pain:  #Neuropathic pain: Neuropathy, complicated by diabetes and a cervical myelopathy  - Gabapentin 800 mg tid  - DCTylenol 1000 mg 3 times daily     #Postoperative pain:  -Tramadol  mg every 4 hours as needed moderate to severe pain first-line  -Oxycodone 5-10 mg every 3 hours as needed moderate to severe pain, second line  - DC Tylenol 1000 mg 3 times daily  -Lidocaine patches to either side of incision on for 12 hours off for 12 hours     Hypertension:  - All oral antihypertensives have been DC'd  -Appreciate hospitalist input     Type 2 diabetes: With hyperglycemia  -Metformin 500 mg twice daily  -Appreciate hospitalist input    Insomnia:  -DC Remeron 15 mg nightly, given vivid dreams  - Trial Ambien 5mg qhs  - Melatonin 3 mg nightly     Depression: Concerned about dyskinetic movements of the jaw, akathisias  -Effexor  mg twice daily  -Decrease Abilify to 5 mg daily  -Consult psychiatry    Vitamin D deficiency  -Vitamin D pending     DVT prophylaxis: Patient has previous history of DVT in 2011 making him higher risk for clot  formation  -Lovenox 40 mg daily    Discharge planning issues:  Prolonged discussion with patient and wife on process of appeal, fact the patient no longer meets requirements for acute rehabilitation as discharge community is not expected.  Family wants patient to be in acute rehabilitation for months.     Patient was seen for 26 minutes on unit/floor of which > 50% of time was spent on counseling and coordination of care regarding the above, including prognosis, risk reduction, benefits of treatment, and options for next stage of care including discharge planning issues, placement of discharge order this afternoon, consult sent for skilled nursing facilities, dyskinetic movements of the jaw, decreased dose of Abilify, consults psychiatry.      Rj Navarro D.O.

## 2020-07-07 NOTE — THERAPY
Occupational Therapy  Daily Treatment     Patient Name: Gaurav Lopez  Age:  75 y.o., Sex:  male  Medical Record #: [id number]  Today's Date: 7/7/2020     Precautions  Precautions: Fall Risk, Spinal / Back Precautions , Cervical Collar  , Other (See Comments)  Comments: LLE prosthesis, pacemaker, orthostatic hypotension     Safety   ADL Safety : Requires Supervision for Safety, Requires Physical Assist for Safety  Bathroom Safety: Impaired, Requires Supervision for Safety    Subjective    Pt was seated in w/c having completed toileting with RN. Pt was agreeable to therapy. Pt reported that he did not know how to don/doff his c-collar or how to replace the pads.      Objective       07/07/20 1231   Precautions   Precautions Fall Risk;Spinal / Back Precautions ;Cervical Collar  ;Other (See Comments)   Comments LLE prosthesis, pacemaker, orthostatic hypotension    Safety    ADL Safety  Requires Supervision for Safety;Requires Physical Assist for Safety   Pain 0 - 10 Group   Pain Rating Scale (NPRS) 0   Cognition    Level of Consciousness Alert   Ability To Follow Commands 2 Step   New Learning Impaired   Attention Impaired   Sleep/Wake Cycle   Sleep & Rest Awake   Functional Level of Assist   Upper Body Dressing Moderate Assist   Upper Body Dressing Description Application of orthotic or brace;Supervision for safety;Verbal cueing  (Assist for doff/don C-collar )   Toileting Maximal Assist   Toileting Description Assist for hygiene;Assist to pull pants up;Assist to pull pants down;Grab bar;Assist for standing balance;Set-up of equipment;Supervision for safety;Verbal cueing   Toilet Transfers Minimal Assist   Toilet Transfer Description Grab bar;Requires lift;Supervision for safety;Verbal cueing   Sitting Upper Body Exercises   Chest Press 3 sets of 15;Bilateral;Weight (See Comments for lbs)  (15#)   Bilateral Row 3 sets of 15;Bilateral;Weight (See Comments for lbs)  (10#)   Balance   Sitting Balance (Static) Fair -    Sitting Balance (Dynamic) Fair -   Weight Shift Sitting Fair   Interdisciplinary Plan of Care Collaboration   Patient Position at End of Therapy Seated;Chair Alarm On;Self Releasing Lap Belt Applied;Call Light within Reach;Tray Table within Reach;Phone within Reach   OT Total Time Spent   OT Individual Total Time Spent (Mins) 30   OT Charge Group   OT Self Care / ADL 1   OT Therapeutic Exercise  1     Pt self-propelled w/c 120 ft using B LE and 15 using B UE    Assessment    Pt participated in therapeutic exercise focusing on B UE strength without complaint of fatigue or increased pain. Pt was receptive to education on c-collar wear and care, able to independently doff c-collar with increased time. Pt was able to verbally demonstrate c-collar pad placement for switching pads as well as care of pads. Pt required Mod A to don c-collar as he is unable to place back portion due to limited shoulder AROM.     Strengths: Alert and oriented, Effective communication skills, Pleasant and cooperative, Willingly participates in therapeutic activities  Barriers: Decreased endurance, Generalized weakness, Orthostatic hypotension, Impaired balance, Limited mobility    Plan  Focus on ADLs, transfers, activity tolerance and functional use of UEs, standing bal/tolerance.    Occupational Therapy Goals     Problem: Dressing     Dates: Start: 06/29/20       Goal: STG-Within one week, patient will dress LB     Dates: Start: 06/29/20       Description: 1) Individualized Goal:  min assist lower body dressing, using appropriate AE  2) Interventions:  OT Self Care/ADL, OT Therapeutic Activity, and OT Therapeutic Exercise      Note:     Goal Note filed on 07/06/20 1100 by Mesfin Mtz MS,OTR/L    Max assist for LB clothing management via AE/DME                        Problem: OT Long Term Goals     Dates: Start: 06/19/20       Goal: LTG-By discharge, patient will complete basic self care tasks     Dates: Start: 06/19/20       Description:  1) Individualized Goal:  Mod I for BADL's, Sup/SBA for shower via AE/DME PRN  2) Interventions:  OT Self Care/ADL, OT Neuro Re-Ed/Balance, OT Therapeutic Activity, OT Evaluation, and OT Therapeutic Exercise            Goal: LTG-By discharge, patient will perform bathroom transfers     Dates: Start: 06/19/20

## 2020-07-07 NOTE — DISCHARGE PLANNING
"Spoke w/ wife, Tina, via phone this morning to review [employer] appeal upheld for patient on Sunday & update IDT recommendations for ongoing therapy at skilled level. Will resume DC planning for short term SNF placement this afternoon if patient feels he is not ready to discharge home w/ wife to [place], [place]. Reviewed choice form for SNF w/ wife. States \"I hope they will still be able to take him at [facility]. That is where we would prefer to continue therapy.\" Reviewed IMM via phone w/ wife. Updated wife I will review it with patient again this afternoon. States \"ultimately, [facility] [place].\" Inquired if patient has registered for [facility] services. Wife states \"yes, I did that for him last month. He just hasn't gone there for medical care yet.\" Questions answered. Emotional support provided.     Met w/ patient this afternoon to review SNF choice form & IMM. Patient requests referral to [facility]. States \"I want to go to a good place like [facility] where I'll get therapy.\" Questions answered. Emotional support provided.  "

## 2020-07-07 NOTE — THERAPY
Physical Therapy   Daily Treatment     Patient Name: Gaurav Lopez  Age:  75 y.o., Sex:  male  Medical Record #: [id number]  Today's Date: 7/7/2020     Precautions  Precautions: Spinal / Back Precautions   Comments: LLE prosthesis, pacemaker, orthostatic hypotension    Subjective    Patient concerned about LE management during transfers, car transfers, and ambulation across uneven terrain; accepts d/c to SNF ([facility]) ASAP to continue strengthening       Objective       07/07/20 0831   Precautions   Precautions Spinal / Back Precautions    Comments LLE prosthesis, pacemaker, orthostatic hypotension   Vitals   Room Air Oximetry 92  (after 300ft ambulation activity)   Gait Functional Level of Assist    Gait Level Of Assist Contact Guard Assist   Assistive Device Front Wheel Walker   Distance (Feet) 300   Deviation Decreased Base Of Support  (left leg toe drag consistently; reports  adjusting)   Wheelchair Functional Level of Assist   Wheelchair Assist Modified Independent   Distance Wheelchair (Feet or Distance) 200   Stairs Functional Level of Assist   Level of Assist with Stairs Contact Guard Assist   # of Stairs Climbed 4  (6in)   Stairs Description Hand rails  (foot-to-foot patttern)   Sitting Lower Body Exercises   Other Exercises Water wheel for UE strengthening 15min at level #3   PT Total Time Spent   PT Individual Total Time Spent (Mins) 60   PT Charge Group   PT Gait Training 1   PT Therapeutic Exercise 1   PT Therapeutic Activities 2       Assessment    Stairs require CGA for safety- patient compliant with foot-to-foot sequencing; reliant on UE support which he does not have with home set-up    Strengths: Able to follow instructions, Effective communication skills, Independent prior level of function, Pleasant and cooperative, Willingly participates in therapeutic activities  Barriers: Bladder incontinence, Bowel incontinence, Confused, Decreased endurance, Generalized weakness, Home accessibility,  Hypotension, Orthostatic hypotension, Impaired activity tolerance, Impaired balance, Impaired carryover of learning, Limited mobility    Plan    Continue stair training (eliminate use of railings), continue FWW ambulation indoors/4WW outdoors; LE strengthening, transfer training; prepare for pending d/c SNF ([facility])    Physical Therapy Problems     Problem: PT-Long Term Goals     Dates: Start: 06/19/20       Goal: LTG-By discharge, patient will ambulate     Dates: Start: 06/19/20       Description: 1) Individualized goal:  150 feet with FWW, c/s collar, L BKA prosthesis, at SBA level  2) Interventions:  PT Group Therapy, PT E Stim Attended, PT Prosthetic Training, PT Gait Training, PT Therapeutic Exercises, PT TENS Application, PT Neuro Re-Ed/Balance, PT Therapeutic Activity, and PT Manual Therapy    Note:     Goal Note filed on 07/06/20 0807 by Cori Morris, Student    Partially met patient was CGA assist 310 feet with FWW.                  Goal: LTG-By discharge, patient will transfer one surface to another     Dates: Start: 06/19/20       Description: 1) Individualized goal:  with FWW, c/s collar, L BKA prosthesis, at SBA level  2) Interventions:  PT Group Therapy, PT E Stim Attended, PT Prosthetic Training, PT Gait Training, PT Therapeutic Exercises, PT TENS Application, PT Neuro Re-Ed/Balance, PT Therapeutic Activity, and PT Manual Therapy      Note:     Goal Note filed on 07/06/20 0807 by Cori Morris, Student    Min assist with supine to sit and sit to stands with FWW.                   Goal: LTG-By discharge, patient will ambulate up/down 4-6 stairs     Dates: Start: 06/19/20       Description: 1) Individualized goal:  with 1 railing, c/s collar, L BKA prosthesis, gait belt, at Min A level  2) Interventions:  PT Group Therapy, PT E Stim Attended, PT Prosthetic Training, PT Gait Training, PT Therapeutic Exercises, PT TENS Application, PT Neuro Re-Ed/Balance, PT Therapeutic Activity, and PT Manual  Therapy    Note:     Goal Note filed on 07/06/20 0807 by Cori Morris, Student    Patient did not complete stairs recently. Occasionally due to orthostatic hypotension.                   Goal: LTG-By discharge, patient will transfer in/out of a car     Dates: Start: 06/19/20       Description: 1) Individualized goal:  with FWW, c/s collar, L BKA prosthesis, at SBA level  2) Interventions:  PT Group Therapy, PT E Stim Attended, PT Prosthetic Training, PT Gait Training, PT Therapeutic Exercises, PT TENS Application, PT Neuro Re-Ed/Balance, PT Therapeutic Activity, and PT Manual Therapy      Note:     Goal Note filed on 07/06/20 0807 by Cori Morris, Student    Patient was max assist for car transfer on last attempt.

## 2020-07-07 NOTE — THERAPY
"Occupational Therapy  Daily Treatment     Patient Name: Gaurav Lopez  Age:  75 y.o., Sex:  male  Medical Record #: [id number]  Today's Date: 7/7/2020     Precautions  Precautions: Fall Risk, Cervical Collar  , Spinal / Back Precautions   Comments: LLE prosthesis, pacemaker, orthostatic hypotension     Safety   ADL Safety : Requires Supervision for Safety, Requires Physical Assist for Safety  Bathroom Safety: Impaired, Requires Supervision for Safety    Subjective       Objective       07/07/20 1401   Precautions   Precautions Fall Risk;Cervical Collar  ;Spinal / Back Precautions    Comments LLE prosthesis, pacemaker, orthostatic hypotension    Vitals   O2 Delivery Device None - Room Air   Pain   Intervention Declines   Pain 0 - 10 Group   Therapist Pain Assessment 0   Non Verbal Descriptors   Non Verbal Scale  Calm   Cognition    Level of Consciousness Alert   Sleep/Wake Cycle   Sleep & Rest Awake   Sitting Upper Body Exercises   Front Arm Raise 3 sets of 10  (2# wand to 70degrees shoulder flexion)   Bicep Curls 3 sets of 10  (5# wand)   Tricep Press 3 sets of 10  (Rickshaw-Forward 3x10@35#'s, Reverse 3x10@30#'s)   Upper Extremity Bike Level 4 Resistance  (FluidoBike, 5 mins forward @ .129km, 5 mins rev @ .143km)   Other Exercise Seated Row @ \"Equalizer\" 3x10@15#'s   Standing Upper Body Exercises   Other Exercises CGA standing 4' from rebounder using small yellow physioball, CGA via gait belt, 1x15 - noted poor standing balance w/ posterior lean despite VQ's to correct, dropped balls x 2, LOB w/ therapist assisted correction x 5   Interdisciplinary Plan of Care Collaboration   Patient Position at End of Therapy Seated;Chair Alarm On;Self Releasing Lap Belt Applied;Call Light within Reach;Tray Table within Reach;Phone within Reach   OT Total Time Spent   OT Individual Total Time Spent (Mins) 60   OT Charge Group   OT Therapeutic Exercise  4       Assessment    Pt was alert and cooperative w/ tx.  Tx emphasis on UB " seated/standing TherEx - see notes above.  Noted impaired standing balance/endurance, decreased functional strength bilaterally, impaired endurance and limited mobility  Strengths: Alert and oriented, Effective communication skills, Pleasant and cooperative, Willingly participates in therapeutic activities  Barriers: Decreased endurance, Generalized weakness, Orthostatic hypotension, Impaired balance, Limited mobility    Plan    AM shower.  Focus on ADLs, transfers, activity tolerance and functional use of UEs, standing bal/tolerance.    Occupational Therapy Goals     Problem: Dressing     Dates: Start: 06/29/20       Goal: STG-Within one week, patient will dress LB     Dates: Start: 06/29/20       Description: 1) Individualized Goal:  min assist lower body dressing, using appropriate AE  2) Interventions:  OT Self Care/ADL, OT Therapeutic Activity, and OT Therapeutic Exercise      Note:     Goal Note filed on 07/06/20 1100 by Mesfin Mtz MS,OTR/L    Max assist for LB clothing management via AE/DME                        Problem: OT Long Term Goals     Dates: Start: 06/19/20       Goal: LTG-By discharge, patient will complete basic self care tasks     Dates: Start: 06/19/20       Description: 1) Individualized Goal:  Mod I for BADL's, Sup/SBA for shower via AE/DME PRN  2) Interventions:  OT Self Care/ADL, OT Neuro Re-Ed/Balance, OT Therapeutic Activity, OT Evaluation, and OT Therapeutic Exercise            Goal: LTG-By discharge, patient will perform bathroom transfers     Dates: Start: 06/19/20

## 2020-07-08 VITALS
HEIGHT: 69 IN | OXYGEN SATURATION: 94 % | DIASTOLIC BLOOD PRESSURE: 66 MMHG | WEIGHT: 149.5 LBS | SYSTOLIC BLOOD PRESSURE: 127 MMHG | RESPIRATION RATE: 18 BRPM | BODY MASS INDEX: 22.14 KG/M2 | TEMPERATURE: 97.6 F | HEART RATE: 68 BPM

## 2020-07-08 PROBLEM — Z78.9 IMPAIRED MOBILITY AND ADLS: Status: ACTIVE | Noted: 2020-07-08

## 2020-07-08 PROBLEM — Z74.09 IMPAIRED MOBILITY AND ADLS: Status: ACTIVE | Noted: 2020-07-08

## 2020-07-08 PROCEDURE — 99239 HOSP IP/OBS DSCHRG MGMT >30: CPT | Performed by: PHYSICAL MEDICINE & REHABILITATION

## 2020-07-08 PROCEDURE — 700102 HCHG RX REV CODE 250 W/ 637 OVERRIDE(OP): Performed by: HOSPITALIST

## 2020-07-08 PROCEDURE — A9270 NON-COVERED ITEM OR SERVICE: HCPCS | Performed by: HOSPITALIST

## 2020-07-08 PROCEDURE — 97110 THERAPEUTIC EXERCISES: CPT

## 2020-07-08 PROCEDURE — 97530 THERAPEUTIC ACTIVITIES: CPT

## 2020-07-08 PROCEDURE — 97112 NEUROMUSCULAR REEDUCATION: CPT

## 2020-07-08 PROCEDURE — 97535 SELF CARE MNGMENT TRAINING: CPT

## 2020-07-08 PROCEDURE — 700101 HCHG RX REV CODE 250: Performed by: PHYSICAL MEDICINE & REHABILITATION

## 2020-07-08 PROCEDURE — A9270 NON-COVERED ITEM OR SERVICE: HCPCS | Performed by: PHYSICAL MEDICINE & REHABILITATION

## 2020-07-08 PROCEDURE — 700112 HCHG RX REV CODE 229: Performed by: PHYSICAL MEDICINE & REHABILITATION

## 2020-07-08 PROCEDURE — 700102 HCHG RX REV CODE 250 W/ 637 OVERRIDE(OP): Performed by: PHYSICAL MEDICINE & REHABILITATION

## 2020-07-08 RX ORDER — ARIPIPRAZOLE 10 MG/1
5 TABLET ORAL DAILY
Qty: 30 TAB
Start: 2020-07-08 | End: 2021-08-12

## 2020-07-08 RX ADMIN — LIDOCAINE 2 PATCH: 50 PATCH TOPICAL at 08:15

## 2020-07-08 RX ADMIN — METFORMIN HYDROCHLORIDE 250 MG: 500 TABLET ORAL at 08:16

## 2020-07-08 RX ADMIN — MIDODRINE HYDROCHLORIDE 15 MG: 10 TABLET ORAL at 05:24

## 2020-07-08 RX ADMIN — FINASTERIDE 5 MG: 5 TABLET, FILM COATED ORAL at 08:15

## 2020-07-08 RX ADMIN — DOCUSATE SODIUM 200 MG: 100 CAPSULE, LIQUID FILLED ORAL at 08:16

## 2020-07-08 RX ADMIN — STANDARDIZED SENNA CONCENTRATE 25.8 MG: 8.6 TABLET ORAL at 11:32

## 2020-07-08 RX ADMIN — ASPIRIN 81 MG: 81 TABLET, COATED ORAL at 08:16

## 2020-07-08 RX ADMIN — FERROUS SULFATE TAB 325 MG (65 MG ELEMENTAL FE) 325 MG: 325 (65 FE) TAB at 08:15

## 2020-07-08 RX ADMIN — VITAMIN C 1 TABLET: TAB at 08:16

## 2020-07-08 RX ADMIN — GABAPENTIN 800 MG: 400 CAPSULE ORAL at 08:15

## 2020-07-08 RX ADMIN — MELATONIN 2000 UNITS: at 08:15

## 2020-07-08 RX ADMIN — MIDODRINE HYDROCHLORIDE 15 MG: 10 TABLET ORAL at 11:32

## 2020-07-08 RX ADMIN — MULTIPLE VITAMINS W/ MINERALS TAB 1 TABLET: TAB at 08:15

## 2020-07-08 RX ADMIN — PSEUDOEPHEDRINE HCL 60 MG: 30 TABLET, FILM COATED ORAL at 08:15

## 2020-07-08 RX ADMIN — BACLOFEN 15 MG: 10 TABLET ORAL at 08:15

## 2020-07-08 RX ADMIN — PSEUDOEPHEDRINE HCL 60 MG: 30 TABLET, FILM COATED ORAL at 11:32

## 2020-07-08 RX ADMIN — VENLAFAXINE HYDROCHLORIDE 150 MG: 75 CAPSULE, EXTENDED RELEASE ORAL at 08:15

## 2020-07-08 RX ADMIN — ARIPIPRAZOLE 5 MG: 5 TABLET ORAL at 08:16

## 2020-07-08 ASSESSMENT — ACTIVITIES OF DAILY LIVING (ADL)
TOILET_TRANSFER_DESCRIPTION: GRAB BAR;INCREASED TIME;INITIAL PREPARATION FOR TASK;VERBAL CUEING;SUPERVISION FOR SAFETY
TOILETING_LEVEL_OF_ASSIST: REQUIRES PHYSICAL ASSIST WITH TOILETING
TOILET_TRANSFER_DESCRIPTION: GRAB BAR;INCREASED TIME;INITIAL PREPARATION FOR TASK;SET-UP OF EQUIPMENT;SUPERVISION FOR SAFETY;VERBAL CUEING
TOILET_TRANSFER_LEVEL_OF_ASSIST: REQUIRES PHYSICAL ASSIST WITH TOILET TRANSFER
TUB_SHOWER_TRANSFER_DESCRIPTION: GRAB BAR;SHOWER BENCH;SET-UP OF EQUIPMENT;SUPERVISION FOR SAFETY;VERBAL CUEING
SHOWER_TRANSFER_LEVEL_OF_ASSIST: REQUIRES PHYSICAL ASSIST WITH SHOWER TRANSFER

## 2020-07-08 ASSESSMENT — PATIENT HEALTH QUESTIONNAIRE - PHQ9
1. LITTLE INTEREST OR PLEASURE IN DOING THINGS: NOT AT ALL
2. FEELING DOWN, DEPRESSED, IRRITABLE, OR HOPELESS: NOT AT ALL
SUM OF ALL RESPONSES TO PHQ9 QUESTIONS 1 AND 2: 0

## 2020-07-08 NOTE — DISCHARGE PLANNING
Case Management Discharge Instructions        Discharge Location: Skilled Nursing Facility     Agency Name / Address / Phone: [facility] ANNAMARIE TERAN  [phone]       [address]       [place] [id number]     Comments: [facility] will provide transportation,  at 12:00.        Follow-up Information:     Dr. Ned Tam  Orthopaedic surgeon    [facility]    [phone] or [phone]   Schedule an appointment for post op follow up when discharged from [facility].      Jacquie Lawson D.O.  Physiatry  [phone]    [facility] [facility] [address]   Schedule an appointment for follow up when discharged from [facility].      Angela Paulino, N.P. Primary Care Provider [phone]    [address] [facility] [address]   Schedule an appointment for follow up when discharged from [facility].

## 2020-07-08 NOTE — THERAPY
Recreational Therapy  Daily Treatment     Patient Name: Gaurav Lopez  AGE:  75 y.o., SEX:  male  Medical Record #: [id number]  Today's Date: 7/8/2020       Subjective    Pt requesting to play Wii golf.      Objective       07/08/20 1001   Functional Ability Status - Cognitive   Attention Span Remains on Task   Comprehension Follows Three Step Commands   Judgment Able to Make Independent Decisions   Functional Ability Status - Emotional    Affect Appropriate   Mood Appropriate   Behavior Appropriate   Skilled Intervention    Skilled Intervention Relaxation / Coping Skills   Skilled Intervention Comments Wii golf by request   Interdisciplinary Plan of Care Collaboration   Patient Position at End of Therapy Seated;Call Light within Reach;Tray Table within Reach   Strengths & Barriers   Strengths Able to follow instructions;Good carryover of learning;Pleasant and cooperative;Willingly participates in therapeutic activities;Motivated for self care and independence   Barriers Decreased endurance;Fatigue;Generalized weakness;Impaired activity tolerance;Limited mobility   Treatment Time   Total Time Spent (mins) 30   Procedural Tracking   Procedural Tracking Leisure Skills Development       Assessment    Pt was able to learn how to the the game was played and adapt his technique for better success in the game.     Strengths: (P) Able to follow instructions, Good carryover of learning, Pleasant and cooperative, Willingly participates in therapeutic activities, Motivated for self care and independence  Barriers: (P) Decreased endurance, Fatigue, Generalized weakness, Impaired activity tolerance, Limited mobility    Plan    Discharge Pt.

## 2020-07-08 NOTE — THERAPY
Occupational Therapy  Daily Treatment     Patient Name: Gaurav Lopez  Age:  75 y.o., Sex:  male  Medical Record #: [id number]  Today's Date: 7/8/2020     Precautions  Precautions: Fall Risk, Cervical Collar  , Spinal / Back Precautions , Other (See Comments)  Comments: LLE prosthesis, pacemaker, orthostatic hypotension     Safety   ADL Safety : Requires Supervision for Safety, Requires Physical Assist for Safety  Bathroom Safety: Impaired, Requires Supervision for Safety    Subjective       Objective       07/08/20 0701   Precautions   Precautions Fall Risk;Cervical Collar  ;Spinal / Back Precautions ;Other (See Comments)   Comments LLE prosthesis, pacemaker, orthostatic hypotension    Vitals   O2 Delivery Device None - Room Air   Pain   Intervention Declines   Pain 0 - 10 Group   Therapist Pain Assessment 0   Non Verbal Descriptors   Non Verbal Scale  Calm   Cognition    Level of Consciousness Alert   Sleep/Wake Cycle   Sleep & Rest Awake   Functional Level of Assist   Eating Modified Independent   Eating Description Insert dentures;Increased time   Grooming Modified Independent;Seated   Grooming Description Seated in wheelchair at sink;Increased time   Bathing Moderate Assist   Bathing Description Grab bar;Hand held shower;Long handled bath tool;Tub bench;Assit with back;Assit with perineal;Increased time;Initial preparation for task;Set-up of equipment;Supervision for safety;Verbal cueing   Upper Body Dressing Minimal Assist   Upper Body Dressing Description Application of orthotic or brace;Assist with pulling shirt over head;Increased time;Set-up of equipment;Reacher;Other (comment);Verbal cueing;Supervision for safety  (Assist to pull shirt down back)   Lower Body Dressing Maximal Assist   Lower Body Dressing Description Grab bar;Increased time;Initial preparation for task;Set-up of equipment;Supervision for safety;Verbal cueing  (See notes)   Toileting Maximal Assist   Toileting Description Assist for  hygiene;Assist to pull pants up;Grab bar;Assist for standing balance;Increased time;Initial preparation for task;Set-up of equipment;Supervision for safety;Verbal cueing   Toilet Transfers Minimal Assist   Toilet Transfer Description Grab bar;Increased time;Initial preparation for task;Verbal cueing;Supervision for safety  (wc/commode transfer via LLE prothesis and grab bar)   Tub / Shower Transfers Minimal Assist   Tub Shower Transfer Description Grab bar;Shower bench;Set-up of equipment;Supervision for safety;Verbal cueing  (wc/shower bench via grab bar and LLE prosthesis)   Comprehension Independent   Expression Independent   Problem Solving Minimal Assist   Problem Solving Description Increased time;Supervision;Therapy schedule;Verbal cueing   Memory Supervision   Memory Description Verbal cueing;Therapy schedule;Supervision   Interdisciplinary Plan of Care Collaboration   IDT Collaboration with  Nursing;Certified Nursing Assistant   Patient Position at End of Therapy Seated;Self Releasing Lap Belt Applied;Call Light within Reach;Tray Table within Reach;Phone within Reach   Collaboration Comments CLOF, transition of care to CNA   OT Total Time Spent   OT Individual Total Time Spent (Mins) 60   OT Charge Group   OT Self Care / ADL 4       Assessment    Pt was alert and cooperative w/ tx.  Tx emphasis on ADL's - see notes above.    Strengths: Alert and oriented, Effective communication skills, Pleasant and cooperative, Willingly participates in therapeutic activities  Barriers: Decreased endurance, Generalized weakness, Orthostatic hypotension, Impaired balance, Limited mobility    Plan     Focus on ADLs, transfers, activity tolerance and functional use of UEs, standing bal/tolerance.    Occupational Therapy Goals     Problem: Dressing     Dates: Start: 06/29/20       Goal: STG-Within one week, patient will dress LB     Dates: Start: 06/29/20       Description: 1) Individualized Goal:  min assist lower body  dressing, using appropriate AE  2) Interventions:  OT Self Care/ADL, OT Therapeutic Activity, and OT Therapeutic Exercise      Note:     Goal Note filed on 07/06/20 1100 by Mesfin Mtz MS,OTR/L    Max assist for LB clothing management via AE/DME                        Problem: OT Long Term Goals     Dates: Start: 06/19/20       Goal: LTG-By discharge, patient will complete basic self care tasks     Dates: Start: 06/19/20       Description: 1) Individualized Goal:  Mod I for BADL's, Sup/SBA for shower via AE/DME PRN  2) Interventions:  OT Self Care/ADL, OT Neuro Re-Ed/Balance, OT Therapeutic Activity, OT Evaluation, and OT Therapeutic Exercise            Goal: LTG-By discharge, patient will perform bathroom transfers     Dates: Start: 06/19/20

## 2020-07-08 NOTE — DISCHARGE PLANNING
Spoke w/ wife, Tina, via phone this morning to update [facility] accepted for admit today & will transport at 12:00 noon.   Met w/ patient at bedside to update acceptance & transport time to [facility] today. Questions answered. Emotional support provided.  Follow up MD appts will be set up at discharge from SNF.

## 2020-07-08 NOTE — CARE PLAN
Problem: Recreation Therapy  Goal: LTG-By discharge, patient will  Description: Participate in a fine motor leisure activity with pieces smaller than an inch without dropping them.   Outcome: MET

## 2020-07-08 NOTE — PROGRESS NOTES
Received bedside shift report from Maddie SEAY RN regarding patient and assumed care. Patient awake, calm and stable, currently positioned in bed for comfort and safety; call light within reach. Denies pain or discomfort at this time. Will continue to monitor.

## 2020-07-08 NOTE — CARE PLAN
Problem: PT-Long Term Goals  Goal: LTG-By discharge, patient will ambulate  Description: 1) Individualized goal:  150 feet with FWW, c/s collar, L BKA prosthesis, at SBA level  2) Interventions:  PT Group Therapy, PT E Stim Attended, PT Prosthetic Training, PT Gait Training, PT Therapeutic Exercises, PT TENS Application, PT Neuro Re-Ed/Balance, PT Therapeutic Activity, and PT Manual Therapy  Outcome: DISCHARGED-GOAL NOT MET  Goal: LTG-By discharge, patient will transfer one surface to another  Description: 1) Individualized goal:  with FWW, c/s collar, L BKA prosthesis, at SBA level  2) Interventions:  PT Group Therapy, PT E Stim Attended, PT Prosthetic Training, PT Gait Training, PT Therapeutic Exercises, PT TENS Application, PT Neuro Re-Ed/Balance, PT Therapeutic Activity, and PT Manual Therapy    Outcome: DISCHARGED-GOAL NOT MET  Goal: LTG-By discharge, patient will ambulate up/down 4-6 stairs  Description: 1) Individualized goal:  with 1 railing, c/s collar, L BKA prosthesis, gait belt, at Min A level  2) Interventions:  PT Group Therapy, PT E Stim Attended, PT Prosthetic Training, PT Gait Training, PT Therapeutic Exercises, PT TENS Application, PT Neuro Re-Ed/Balance, PT Therapeutic Activity, and PT Manual Therapy  Outcome: DISCHARGED-GOAL NOT MET  Goal: LTG-By discharge, patient will transfer in/out of a car  Description: 1) Individualized goal:  with FWW, c/s collar, L BKA prosthesis, at SBA level  2) Interventions:  PT Group Therapy, PT E Stim Attended, PT Prosthetic Training, PT Gait Training, PT Therapeutic Exercises, PT TENS Application, PT Neuro Re-Ed/Balance, PT Therapeutic Activity, and PT Manual Therapy    Outcome: DISCHARGED-GOAL NOT MET

## 2020-07-08 NOTE — THERAPY
Physical Therapy   Daily Treatment     Patient Name: Gaurav Lopez  Age:  75 y.o., Sex:  male  Medical Record #: [id number]  Today's Date: 7/8/2020     Precautions  Precautions: Fall Risk, Cervical Collar  , Spinal / Back Precautions , Other (See Comments)  Comments: LLE prosthesis, pacemaker, orthostatic hypotension     Subjective    Patient was seated in room upon arrival. Patient was agreeable to physical therapy treatment session. Patient denied having any symptoms of low BP.      Objective       07/08/20 0831   Cosignature   Documentation Review Approved with modifications made by preceptor in flowsheet   Vitals   Pulse 82   Patient BP Position Standing 1 minute   Blood Pressure  (!) 88/52  (nurse notified)   O2 (LPM) 0   O2 Delivery Device None - Room Air   Vitals Comments abdominal binder, see flowsheet for resting BP   Wheelchair Functional Level of Assist   Wheelchair Assist Moderate Assist  (Supervision - Mod assist. Mod assist with going up ramps. )   Distance Wheelchair (Feet or Distance) 459  ( )   Wheelchair Description Supervision for safety;Leg rest management;Limited by fatigue;Extra time;Assistance with steering;Requires incidental assist  (Outside uneven surfaces. Over approximatly 15-20 minutes.)   Transfer Functional Level of Assist   Toilet Transfers Moderate Assist   Toilet Transfer Description Grab bar;Increased time;Initial preparation for task;Set-up of equipment;Supervision for safety;Verbal cueing   Bed Mobility    Sit to Stand Moderate Assist  (FWW, gait belt. )   Interdisciplinary Plan of Care Collaboration   IDT Collaboration with  Nursing   Patient Position at End of Therapy Seated;Chair Alarm On;Call Light within Reach;Tray Table within Reach;Phone within Reach;Self Releasing Lap Belt Applied   Collaboration Comments Nursing informed on low BP in standing. Nursing also informed on small unformed bowel movement and small incontinece episode and brief change during session.    PT Total  Time Spent   PT Individual Total Time Spent (Mins) 60   PT Charge Group   PT Therapeutic Exercise 2   PT Therapeutic Activities 2       Assessment    Patient again had orthostatic BP in standing today so we were limited to doing seated exercise. We trialed wheelchair mobility outside patient was able to approximately 460 feet but did require some steering assistance and physical assistance with ascending ramps. Patient also had a small incontinence during the session.   Strengths: Able to follow instructions, Effective communication skills, Independent prior level of function, Pleasant and cooperative, Willingly participates in therapeutic activities  Barriers: Bladder incontinence, Bowel incontinence, Confused, Decreased endurance, Generalized weakness, Home accessibility, Hypotension, Orthostatic hypotension, Impaired activity tolerance, Impaired balance, Impaired carryover of learning, Limited mobility    Plan    D/c plan for today for SNF.     Physical Therapy Problems     Problem: PT-Long Term Goals     Dates: Start: 06/19/20       Goal: LTG-By discharge, patient will ambulate     Dates: Start: 06/19/20       Description: 1) Individualized goal:  150 feet with FWW, c/s collar, L BKA prosthesis, at SBA level  2) Interventions:  PT Group Therapy, PT E Stim Attended, PT Prosthetic Training, PT Gait Training, PT Therapeutic Exercises, PT TENS Application, PT Neuro Re-Ed/Balance, PT Therapeutic Activity, and PT Manual Therapy    Note:     Goal Note filed on 07/06/20 0807 by Cori Morris, Student    Partially met patient was CGA assist 310 feet with FWW.                  Goal: LTG-By discharge, patient will transfer one surface to another     Dates: Start: 06/19/20       Description: 1) Individualized goal:  with FWW, c/s collar, L BKA prosthesis, at SBA level  2) Interventions:  PT Group Therapy, PT E Stim Attended, PT Prosthetic Training, PT Gait Training, PT Therapeutic Exercises, PT TENS Application, PT  Neuro Re-Ed/Balance, PT Therapeutic Activity, and PT Manual Therapy      Note:     Goal Note filed on 07/06/20 0807 by Cori Morris, Aureliano    Min assist with supine to sit and sit to stands with FWW.                   Goal: LTG-By discharge, patient will ambulate up/down 4-6 stairs     Dates: Start: 06/19/20       Description: 1) Individualized goal:  with 1 railing, c/s collar, L BKA prosthesis, gait belt, at Min A level  2) Interventions:  PT Group Therapy, PT E Stim Attended, PT Prosthetic Training, PT Gait Training, PT Therapeutic Exercises, PT TENS Application, PT Neuro Re-Ed/Balance, PT Therapeutic Activity, and PT Manual Therapy    Note:     Goal Note filed on 07/06/20 0807 by Cori Morris, Student    Patient did not complete stairs recently. Occasionally due to orthostatic hypotension.                   Goal: LTG-By discharge, patient will transfer in/out of a car     Dates: Start: 06/19/20       Description: 1) Individualized goal:  with FWW, c/s collar, L BKA prosthesis, at SBA level  2) Interventions:  PT Group Therapy, PT E Stim Attended, PT Prosthetic Training, PT Gait Training, PT Therapeutic Exercises, PT TENS Application, PT Neuro Re-Ed/Balance, PT Therapeutic Activity, and PT Manual Therapy      Note:     Goal Note filed on 07/06/20 0807 by Cori Morris, Student    Patient was max assist for car transfer on last attempt.

## 2020-07-08 NOTE — DISCHARGE PLANNING
Per Cait at [facility], updated referral accepted.  They will pick patient up today at 1200.  Dr. Roman is accepting physician.  CM notified.

## 2020-07-08 NOTE — DISCHARGE INSTRUCTIONS
[facility] NURSING DISCHARGE INSTRUCTIONS    Blood Pressure : 146/79  Weight: 77.6 kg (171 lb)  Nursing recommendations for Gaurav Lopez at time of discharge are as follows:  Client verbalized understanding of all discharge instructions and prescriptions.     Review all your home medications and newly ordered medications with your doctor and/or pharmacist. Follow medication instructions as directed by your doctor and/or pharmacist.    Pain Management:   Discharge Pain Medication Instructions:  Comfort Goal: Comfort with Movement, Sleep Comfortably  Notify your primary care provider if pain is unrelieved with these measures, if the pain is new, or increased in intensity.    Discharge Skin Characteristics:    Discharge Skin Exam:    Wound 06/05/20 Incision Neck Posterior C7-T4 Laminectomy Incision. (Active)   Wound Image   06/20/20 2300   Site Assessment PEG 07/02/20 2006   Periwound Assessment Clean;Dry;Intact 07/02/20 1600   Margins Attached edges 07/01/20 0900   Closure Open to air;Approximated;Closure strips 07/02/20 1600   Drainage Amount None 07/02/20 1600   Treatments Site care 06/26/20 0715   Wound Cleansing Not Applicable 06/27/20 1935   Periwound Protectant Not Applicable 06/27/20 1935   Dressing Cleansing/Solutions Normal Saline 06/26/20 2038   Dressing Options [place] Dressing;Mepilex 07/02/20 2006   Dressing Changed Observed 07/02/20 0900   Dressing Status Clean;Dry;Intact 07/02/20 2006   Dressing Change/Treatment Frequency As Needed 07/02/20 2006   NEXT Dressing Change/Treatment Date 06/28/20 06/27/20 0710     Skin / Wound Care Instructions: Please contact your primary care physician for any change in skin integrity.     If You Have Surgical Incisions / Wounds:  Monitor surgical site(s) for signs of increased swelling, redness or symptoms of drainage from the site or fever as this could indicate signs and symptoms of infection. If these symptoms are noted, notifiy your primary care  provider.      Discharge Safety Instructions:       Discharge Safety Concerns:    The interdisciplinary team has made recommendation that you should not be left alone  in the house due to weakness and unsteady gait  Anti-embolic stockings are required during the day and off at night to increase circulation to the lower extremities.    Discharge Diet:       Discharge Liquids:    Discharge Bowel Function:    Please contact your primary care physician for any changes in bowel habits.  Discharge Bowel Program:    Discharge Bladder Function:    Discharge Urinary Devices:        Nursing Discharge Plan:        Case Management Discharge Instructions:   Discharge Location:    Agency Name/Address/Phone:    Home Health:    Outpatient Services:    DME Provider/Phone:    Medical Equipment Ordered:    Prescription Faxed to:        Discharge Medication Instructions:  Below are the medications your physician expects you to take upon discharge:      Orthostatic Hypotension  Blood pressure is a measurement of how strongly, or weakly, your blood is pressing against the walls of your arteries. Orthostatic hypotension is a sudden drop in blood pressure that happens when you quickly change positions, such as when you get up from sitting or lying down.  Arteries are blood vessels that carry blood from your heart throughout your body. When blood pressure is too low, you may not get enough blood to your brain or to the rest of your organs. This can cause weakness, light-headedness, rapid heartbeat, and fainting. This can last for just a few seconds or for up to a few minutes. Orthostatic hypotension is usually not a serious problem. However, if it happens frequently or gets worse, it may be a sign of something more serious.  What are the causes?  This condition may be caused by:  · Sudden changes in posture, such as standing up quickly after you have been sitting or lying down.  · Blood loss.  · Loss of body fluids (dehydration).  · Heart  "problems.  · Hormone (endocrine) problems.  · Pregnancy.  · Severe infection.  · Lack of certain nutrients.  · Severe allergic reactions (anaphylaxis).  · Certain medicines, such as blood pressure medicine or medicines that make the body lose excess fluids (diuretics). Sometimes, this condition can be caused by not taking medicine as directed, such as taking too much of a certain medicine.  What increases the risk?  The following factors may make you more likely to develop this condition:  · Age. Risk increases as you get older.  · Conditions that affect the heart or the central nervous system.  · Taking certain medicines, such as blood pressure medicine or diuretics.  · Being pregnant.  What are the signs or symptoms?  Symptoms of this condition may include:  · Weakness.  · Light-headedness.  · Dizziness.  · Blurred vision.  · Fatigue.  · Rapid heartbeat.  · Fainting, in severe cases.  How is this diagnosed?  This condition is diagnosed based on:  · Your medical history.  · Your symptoms.  · Your blood pressure measurement. Your health care provider will check your blood pressure when you are:  ? Lying down.  ? Sitting.  ? Standing.  A blood pressure reading is recorded as two numbers, such as \"120 over 80\" (or 120/80). The first (\"top\") number is called the systolic pressure. It is a measure of the pressure in your arteries as your heart beats. The second (\"bottom\") number is called the diastolic pressure. It is a measure of the pressure in your arteries when your heart relaxes between beats. Blood pressure is measured in a unit called mm Hg. Healthy blood pressure for most adults is 120/80. If your blood pressure is below 90/60, you may be diagnosed with hypotension.  Other information or tests that may be used to diagnose orthostatic hypotension include:  · Your other vital signs, such as your heart rate and temperature.  · Blood tests.  · Tilt table test. For this test, you will be safely secured to a table " that moves you from a lying position to an upright position. Your heart rhythm and blood pressure will be monitored during the test.  How is this treated?  This condition may be treated by:  · Changing your diet. This may involve eating more salt (sodium) or drinking more water.  · Taking medicines to raise your blood pressure.  · Changing the dosage of certain medicines you are taking that might be lowering your blood pressure.  · Wearing compression stockings. These stockings help to prevent blood clots and reduce swelling in your legs.  In some cases, you may need to go to the hospital for:  · Fluid replacement. This means you will receive fluids through an IV.  · Blood replacement. This means you will receive donated blood through an IV (transfusion).  · Treating an infection or heart problems, if this applies.  · Monitoring. You may need to be monitored while medicines that you are taking wear off.  Follow these instructions at home:  Eating and drinking    · Drink enough fluid to keep your urine pale yellow.  · Eat a healthy diet, and follow instructions from your health care provider about eating or drinking restrictions. A healthy diet includes:  ? Fresh fruits and vegetables.  ? Whole grains.  ? Lean meats.  ? Low-fat dairy products.  · Eat extra salt only as directed. Do not add extra salt to your diet unless your health care provider told you to do that.  · Eat frequent, small meals.  · Avoid standing up suddenly after eating.  Medicines  · Take over-the-counter and prescription medicines only as told by your health care provider.  ? Follow instructions from your health care provider about changing the dosage of your current medicines, if this applies.  ? Do not stop or adjust any of your medicines on your own.  General instructions    · Wear compression stockings as told by your health care provider.  · Get up slowly from lying down or sitting positions. This gives your blood pressure a chance to  adjust.  · Avoid hot showers and excessive heat as directed by your health care provider.  · Return to your normal activities as told by your health care provider. Ask your health care provider what activities are safe for you.  · Do not use any products that contain nicotine or tobacco, such as cigarettes, e-cigarettes, and chewing tobacco. If you need help quitting, ask your health care provider.  · Keep all follow-up visits as told by your health care provider. This is important.  Contact a health care provider if you:  · Vomit.  · Have diarrhea.  · Have a fever for more than 2-3 days.  · Feel more thirsty than usual.  · Feel weak and tired.  Get help right away if you:  · Have chest pain.  · Have a fast or irregular heartbeat.  · Develop numbness in any part of your body.  · Cannot move your arms or your legs.  · Have trouble speaking.  · Become sweaty or feel light-headed.  · Faint.  · Feel short of breath.  · Have trouble staying awake.  · Feel confused.  Summary  · Orthostatic hypotension is a sudden drop in blood pressure that happens when you quickly change positions.  · Orthostatic hypotension is usually not a serious problem.  · It is diagnosed by having your blood pressure taken lying down, sitting, and then standing.  · It may be treated by changing your diet or adjusting your medicines.  This information is not intended to replace advice given to you by your health care provider. Make sure you discuss any questions you have with your health care provider.  Document Released: 12/08/2003 Document Revised: 06/13/2019 Document Reviewed: 06/13/2019  [employer] Patient Education © 2020 [employer]        Fall Prevention in the Home, Adult  Falls can cause injuries and can affect people from all age groups. There are many simple things that you can do to make your home safe and to help prevent falls. Ask for help when making these changes, if needed.  What actions can I take to prevent falls?  General  instructions  · Use good lighting in all rooms. Replace any light bulbs that burn out.  · Turn on lights if it is dark. Use night-lights.  · Place frequently used items in easy-to-reach places. Lower the shelves around your home if necessary.  · Set up furniture so that there are clear paths around it. Avoid moving your furniture around.  · Remove throw rugs and other tripping hazards from the floor.  · Avoid walking on wet floors.  · Fix any uneven floor surfaces.  · Add color or contrast paint or tape to grab bars and handrails in your home. Place contrasting color strips on the first and last steps of stairways.  · When you use a stepladder, make sure that it is completely opened and that the sides are firmly locked. Have someone hold the ladder while you are using it. Do not climb a closed stepladder.  · Be aware of any and all pets.  What can I do in the bathroom?         · Keep the floor dry. Immediately clean up any water that spills onto the floor.  · Remove soap buildup in the tub or shower on a regular basis.  · Use non-skid mats or decals on the floor of the tub or shower.  · Attach bath mats securely with double-sided, non-slip rug tape.  · If you need to sit down while you are in the shower, use a plastic, non-slip stool.  · Install grab bars by the toilet and in the tub and shower. Do not use towel bars as grab bars.  What can I do in the bedroom?  · Make sure that a bedside light is easy to reach.  · Do not use oversized bedding that drapes onto the floor.  · Have a firm chair that has side arms to use for getting dressed.  What can I do in the kitchen?  · Clean up any spills right away.  · If you need to reach for something above you, use a sturdy step stool that has a grab bar.  · Keep electrical cables out of the way.  · Do not use floor polish or wax that makes floors slippery. If you must use wax, make sure that it is non-skid floor wax.  What can I do in the stairways?  · Do not leave any items  on the stairs.  · Make sure that you have a light switch at the top of the stairs and the bottom of the stairs. Have them installed if you do not have them.  · Make sure that there are handrails on both sides of the stairs. Fix handrails that are broken or loose. Make sure that handrails are as long as the stairways.  · Install non-slip stair treads on all stairs in your home.  · Avoid having throw rugs at the top or bottom of stairways, or secure the rugs with carpet tape to prevent them from moving.  · Choose a carpet design that does not hide the edge of steps on the stairway.  · Check any carpeting to make sure that it is firmly attached to the stairs. Fix any carpet that is loose or worn.  What can I do on the outside of my home?  · Use bright outdoor lighting.  · Regularly repair the edges of walkways and driveways and fix any cracks.  · Remove high doorway thresholds.  · Trim any shrubbery on the main path into your home.  · Regularly check that handrails are securely fastened and in good repair. Both sides of any steps should have handrails.  · Install guardrails along the edges of any raised decks or porches.  · Clear walkways of debris and clutter, including tools and rocks.  · Have leaves, snow, and ice cleared regularly.  · Use sand or salt on walkways during winter months.  · In the garage, clean up any spills right away, including grease or oil spills.  What other actions can I take?  · Wear closed-toe shoes that fit well and support your feet. Wear shoes that have rubber soles or low heels.  · Use mobility aids as needed, such as canes, walkers, scooters, and crutches.  · Review your medicines with your health care provider. Some medicines can cause dizziness or changes in blood pressure, which increase your risk of falling.  Talk with your health care provider about other ways that you can decrease your risk of falls. This may include working with a physical therapist or  to improve your  strength, balance, and endurance.  Where to find more information  · Centers for Disease Control and Prevention, [place]: https://www.cdc.gov  · National [place] on Aging: https://sz0wvmy.abdiaziz.nih.gov  Contact a health care provider if:  · You are afraid of falling at home.  · You feel weak, drowsy, or dizzy at home.  · You fall at home.  Summary  · There are many simple things that you can do to make your home safe and to help prevent falls.  · Ways to make your home safe include removing tripping hazards and installing grab bars in the bathroom.  · Ask for help when making these changes in your home.  This information is not intended to replace advice given to you by your health care provider. Make sure you discuss any questions you have with your health care provider.  Document Released: 12/08/2003 Document Revised: 11/30/2018 Document Reviewed: 08/02/2018  [employer] Patient Education © 2020 [employer]      Depression / Suicide Risk    As you are discharged from this [facility] facility, it is important to learn how to keep safe from harming yourself.    Recognize the warning signs:  · Abrupt changes in personality, positive or negative- including increase in energy   · Giving away possessions  · Change in eating patterns- significant weight changes-  positive or negative  · Change in sleeping patterns- unable to sleep or sleeping all the time   · Unwillingness or inability to communicate  · Depression  · Unusual sadness, discouragement and loneliness  · Talk of wanting to die  · Neglect of personal appearance   · Rebelliousness- reckless behavior  · Withdrawal from people/activities they love  · Confusion- inability to concentrate     If you or a loved one observes any of these behaviors or has concerns about self-harm, here's what you can do:  · Talk about it- your feelings and reasons for harming yourself  · Remove any means that you might use to hurt yourself (examples: pills, rope, extension cords,  firearm)  · Get professional help from the community (Mental Health, Substance Abuse, psychological counseling)  · Do not be alone:Call your Safe Contact- someone whom you trust who will be there for you.  · Call your local CRISIS HOTLINE 055-3714 or 226-865-1733  · Call your local Children's Mobile Crisis Response Team [place] (589) 370-7705 or www.[place]  · Call the toll free National Suicide Prevention Hotlines   · National Suicide Prevention Lifeline 168-824-EFVQ (7529)  · National Hope Line Network 800-SUICIDE (071-9583)      Spinal Fusion, Adult, Care After  This sheet gives you information about how to care for yourself after your procedure. Your doctor may also give you more specific instructions. If you have problems or questions, contact your doctor.  Follow these instructions at home:  Medicines  · Take over-the-counter and prescription medicines only as told by your doctor. These include any medicines for pain or blood-thinning medicines (anticoagulants).  · If you were prescribed an antibiotic medicine, take it as told by your doctor. Do not stop taking the antibiotic even if you start to feel better.  · Do not drive for 24 hours if you were given a medicine to help you relax (sedative) during your procedure.  · Do not drive or use heavy machinery while taking prescription pain medicine.  If you have a brace:  · Wear the brace as told by your doctor. Take it off only as told by your doctor.  · Keep the brace clean.  Managing pain, stiffness, and swelling  · If directed, put ice on the surgery area:  ? If you have a removable brace, take it off as told by your doctor.  ? Put ice in a plastic bag.  ? Place a towel between your skin and the bag.  ? Leave the ice on for 20 minutes, 2-3 times a day.  Surgery cut care    · Follow instructions from your doctor about how to take care of your cut from surgery (incision). Make sure you:  ? Wash your hands with soap and water before you change your  bandage (dressing). If you cannot use soap and water, use hand .  ? Change your bandage as told by your doctor.  ? Leave stitches (sutures), skin glue, or skin tape (adhesive) strips in place. They may need to stay in place for 2 weeks or longer. If tape strips get loose and curl up, you may trim the loose edges. Do not remove tape strips completely unless your doctor says it is okay.  · Keep your cut from surgery clean and dry.  ? Do not take baths, swim, or use a hot tub until your doctor says it is okay.  ? Ask your doctor if you can take showers. You may only be allowed to take sponge baths.  · Every day, check your cut from surgery and the area around it for:  ? More redness, swelling, or pain.  ? Fluid or blood.  ? Warmth.  ? Pus or a bad smell.  · If you have a drain tube, follow instructions from your doctor about caring for it. Do not take out the drain tube or any bandages unless your doctor says it is okay.  Physical activity  · Rest and protect your back as much as possible.  · Follow instructions from your doctor about how to move. Use good posture to help your spine heal.  · Do not lift anything that is heavier than 8 lb (3.6 kg), or the limit that you are told, until your doctor says that it is safe.  · Do not twist or bend at the waist until your doctor says it is okay.  · It is best if you:  ? Do not make pushing and pulling motions.  ? Do not sit or lie down in the same position for a long time.  ? Do not raise your hands or arms above your head.  · Return to your normal activities as told by your doctor. Ask your doctor what activities are safe for you. Rest and protect your back as much as you can.  · Do not start to exercise until your doctor says it is okay. Ask your doctor what kinds of exercise you can do to make your back stronger.  General instructions  · To prevent blood clots and lessen swelling in your legs:  ? Wear compression stockings as told.  ? Walk one or more times every  few hours as told by your doctor.  · Do not use any products that contain nicotine or tobacco, such as cigarettes and e-cigarettes. These can delay bone healing. If you need help quitting, ask your doctor.  · To prevent or treat constipation while you are taking prescription pain medicine, your doctor may suggest that you:  ? Drink enough fluid to keep your pee (urine) pale yellow.  ? Take over-the-counter or prescription medicines.  ? Eat foods that are high in fiber. These include fresh fruits and vegetables, whole grains, and beans.  ? Limit foods that are high in fat and processed sugars, such as fried and sweet foods.  · Keep all follow-up visits as told by your doctor. This is important.  Contact a doctor if:  · Your pain gets worse.  · Your medicine does not help your pain.  · Your legs or feet get painful or swollen.  · Your cut from surgery is more red, swollen, or painful.  · Your cut from surgery feels warm to the touch.  · You have:  ? Fluid or blood coming from your cut from surgery.  ? Pus or a bad smell coming from your cut from surgery.  ? A fever.  ? Weakness or loss of feeling (numbness) in your legs that is new or getting worse.  ? Trouble controlling when you pee (urinate) or poop (have a bowel movement).  · You feel sick to your stomach (nauseous).  · You throw up (vomit).  Get help right away if:  · Your pain is very bad.  · You have chest pain.  · You have trouble breathing.  · You start to have a cough.  These symptoms may be an emergency. Do not wait to see if the symptoms will go away. Get medical help right away. Call your local emergency services (911 in the [place] Do not drive yourself to the hospital.  Summary  · After the procedure, it is common to have pain in your back and pain by your surgery cut(s).  · Icing and pain medicines may help to control the pain. Follow directions from your doctor.  · Rest and protect your back as much as possible. Do not twist or bend at the  waist.  · Get up and walk one or more times every few hours as told by your doctor.  This information is not intended to replace advice given to you by your health care provider. Make sure you discuss any questions you have with your health care provider.  Document Released: 04/12/2012 Document Revised: 04/09/2020 Document Reviewed: 04/03/2018  [employer] Patient Education © 2020 [employer]      Spinal Fusion, Adult  Spinal fusion is a procedure to make two or more bones in your spine (vertebrae) grow together (fuse). This procedure stops the two bones from rubbing on each other. This can help:  · Lessen pain.  · Prevent your spine from getting weaker or changing shape.  Bone material (graft) will be put in between the spine bones that need to grow together.  What happens before the procedure?  Staying hydrated  Follow instructions from your doctor about hydration, which may include:  · Up to 2 hours before the procedure - you may continue to drink clear liquids, such as:  ? Water.  ? Clear fruit juice.  ? Black coffee.  ? Plain tea.  Eating and drinking restrictions  Follow instructions from your doctor about eating and drinking, which may include:  · 8 hours before the procedure - stop eating heavy meals or foods such as meat, fried foods, or fatty foods.  · 6 hours before the procedure - stop eating light meals or foods, such as toast or cereal.  · 6 hours before the procedure - stop drinking milk or drinks that have milk in them.  · 2 hours before the procedure - stop drinking clear liquids.  Medicines  · Ask your doctor about:  ? Changing or stopping your normal medicines. This is important if you take diabetes medicines or blood thinners.  ? Taking medicines such as aspirin and ibuprofen. These medicines can thin your blood. Do not take these medicines unless your doctor tells you to take them.  ? Taking over-the-counter medicines, vitamins, herbs, and supplements.  · You may be given antibiotic medicine to  help prevent infection.  General instructions  · Ask your doctor how your surgery site will be marked.  · You will have blood and pee (urine) samples taken.  · You may also have tests that take pictures, such as:  ? X-rays.  ? CT scan.  ? MRI.  · Plan to have someone take you home from the hospital or clinic.  · Plan to have a responsible adult care for you for at least 24 hours after you leave the hospital or clinic. This is important.  · Do not use any products that have nicotine or tobacco in them. These include cigarettes and e-cigarettes. These can make your bones take longer to heal. If you need help quitting, ask your doctor.  What happens during the procedure?    · To lower your risk of infection:  ? Your health care team will wash or sanitize their hands.  ? Your skin will be washed with soap.  ? Hair may be removed where you will have surgery.  · An IV tube will be put into one of your veins.  · You will be given one or more of the following:  ? A medicine to help you relax (sedative).  ? A medicine to make you fall asleep (general anesthetic).  · If bone from another part of your body is being used to fill the space between the bones of your spine:  ? A surgery cut (incision) will be made over the site of the bone graft.  ? A small part of the bone will be taken out.  · A surgery cut will be made over the spine bones that will be worked on. This cut may be in one of these areas:  ? Your back.  ? Your belly (abdomen).  ? Your side.  · The muscles will be moved over so the doctor can see the bones of your spine.  · If you are having this procedure because a disk in your spine bulges out too far (herniated disk), part of the disk will be taken out.  · The space between the bones of your spine will be filled with one of these:  ? Bone from another part of your body.  ? Bone from someone else (bone donor).  ? Bone that is not real (is artificial).  · Screws and rods or metal plates may be used in the bones.  "This is to keep the spine bones still while they grow together.  · Your muscles will be moved back into place.  · A small tube (drain) may be put near your surgery cut(s) to help drain extra fluid.  · Your surgery cut(s) will be closed.  · A bandage (dressing) may be used to cover your surgery cut(s).  The procedure may vary among doctors and hospitals.  What happens after the procedure?  · Your blood pressure, heart rate, breathing rate, and blood oxygen level will be checked until the medicines you were given have worn off.  · You will:  ? Be given medicine for pain if you need it.  ? Keep getting fluids and medicines through an IV tube.  ? Be helped to turn in bed often by \"log rolling.\" This is when you move your whole body without twisting your back.  ? Be taught how to move the right way and how to stand and walk.  · You may be given a brace to wear while you heal.  · You may have to wear compression stockings. These stockings help to prevent blood clots. They also lessen swelling in your legs.  · Do not drive for 24 hours if you were given a medicine to help you relax.  Summary  · Spinal fusion is a procedure to make two or more of the bones in your spine (vertebrae) grow together (fuse).  · Before the procedure, follow instructions from your doctor about what you can eat, drink, and take for medicine.  · After surgery, you will be helped to turn in bed often without twisting your back. This is called \"log rolling.\" You will also be taught how to move, stand, and walk the right way.  This information is not intended to replace advice given to you by your health care provider. Make sure you discuss any questions you have with your health care provider.  Document Released: 04/12/2012 Document Revised: 02/13/2020 Document Reviewed: 04/03/2018  Elsevier Patient Education © 2020 [employer]      Occupational Therapy Discharge Instructions for Gaurav Lopez    7/3/2020    Level of Assist Required for Eating: Able " to Complete Eating without Assist  Level of Assist Required for Grooming: Able to Complete Grooming without Assist  Level of Assist Required for Dressing: Requires Physical Assist with Dressing  Equipment for Dressing: Sock Aid, Reacher, Long Handled Shoe Horn, Dressing Stick, Elastic Shoe Laces  Level of Assist Required for Toileting: Requires Physical Assist with Toileting  Level of Assist Required for Toilet Transfer: Requires Physical Assist with Toilet Transfer  Equipment for Toilet Transfer: Grab Bars by Toilet  Level of Assist Required for Bathing: Requires Physical Assist with Bathing  Equipment for Bathing: Grab Bars in Tub / Shower, Hand Held Shower Head, Long Handled Sponge, Shower Chair  Level of Assist Required for Shower Transfer: Requires Physical Assist with Shower Transfer  Equipment for Shower Transfer: Grab Bars in Tub / Shower, Shower Chair  Level of Assist Required for Home Mgmt: Requires Physical Assist with Home Management  Level of Assist Required for Meal Prep: Requires Physical Assist with Meal Preparation  Driving: May not Drive, Please Contact Physician for Further Information  Home Exercise Program: None Issued

## 2020-07-08 NOTE — PROGRESS NOTES
Patient discharged to [facility] per order.  Report called to Nurse Mattie. Patient has all belongings; signed copy of form in chart.  Patient left facility at about 12:15pm via wheelchair accompanied by [facility] staff.  Have enjoyed working with this pleasant patient.

## 2020-07-08 NOTE — THERAPY
Physical Therapy   Daily Treatment     Patient Name: Gaurav Lopez  Age:  75 y.o., Sex:  male  Medical Record #: [id number]  Today's Date: 7/8/2020     Precautions  Precautions: Fall Risk, Cervical Collar  , Spinal / Back Precautions , Other (See Comments)  Comments: LLE prosthesis, pacemaker, orthostatic hypotension     Subjective    Patient seated in the gym upon arrival. Patient agreeable to physical therapy intervention.      Objective       07/08/20 1031   Cosignature   Documentation Review Approved with modifications made by preceptor in flowsheet   Vitals   Pulse 83   Patient BP Position Standing 1 minute   Blood Pressure  (!) 75/49  (nurse notified. )   O2 (LPM) 0   O2 Delivery Device None - Room Air   Vitals Comments abdominal binder, see flowsheet for sitting    Bed Mobility    Sit to Stand Moderate Assist  (FWW, gait belt. )   Neuro-Muscular Treatments   Neuro-Muscular Treatments Other (See Comments)   Comments Sitting unsupported in wheelchair while playing a game on the [employer]. Performed for approximatly 15 minutes.    Interdisciplinary Plan of Care Collaboration   IDT Collaboration with  Nursing;Physician   Collaboration Comments RN notified and MD notified of orthostatic hypotension (standing) but normal BP in seated again.   PT Total Time Spent   PT Individual Total Time Spent (Mins) 30   PT Charge Group   PT Neuromuscular Re-Education / Balance 1   PT Therapeutic Activities 1       Assessment    Again the patient had low BP in standing and so we were limited to sitting activities. Patient did require more assistance with sit to stands today due to low BP. We worked on dynamic balance with sitting unsupported and playing a game on the [employer].   Strengths: Able to follow instructions, Effective communication skills, Independent prior level of function, Pleasant and cooperative, Willingly participates in therapeutic activities  Barriers: Bladder incontinence, Bowel incontinence, Confused, Decreased endurance,  Generalized weakness, Home accessibility, Hypotension, Orthostatic hypotension, Impaired activity tolerance, Impaired balance, Impaired carryover of learning, Limited mobility    Plan    D/c plan for today for SNF.     Physical Therapy Problems     Problem: PT-Long Term Goals     Dates: Start: 06/19/20       Goal: LTG-By discharge, patient will ambulate     Dates: Start: 06/19/20       Description: 1) Individualized goal:  150 feet with FWW, c/s collar, L BKA prosthesis, at SBA level  2) Interventions:  PT Group Therapy, PT E Stim Attended, PT Prosthetic Training, PT Gait Training, PT Therapeutic Exercises, PT TENS Application, PT Neuro Re-Ed/Balance, PT Therapeutic Activity, and PT Manual Therapy    Note:     Goal Note filed on 07/06/20 0807 by Cori Morris, Student    Partially met patient was CGA assist 310 feet with FWW.                  Goal: LTG-By discharge, patient will transfer one surface to another     Dates: Start: 06/19/20       Description: 1) Individualized goal:  with FWW, c/s collar, L BKA prosthesis, at SBA level  2) Interventions:  PT Group Therapy, PT E Stim Attended, PT Prosthetic Training, PT Gait Training, PT Therapeutic Exercises, PT TENS Application, PT Neuro Re-Ed/Balance, PT Therapeutic Activity, and PT Manual Therapy      Note:     Goal Note filed on 07/06/20 0807 by Cori Morris, Aureliano    Min assist with supine to sit and sit to stands with FWW.                   Goal: LTG-By discharge, patient will ambulate up/down 4-6 stairs     Dates: Start: 06/19/20       Description: 1) Individualized goal:  with 1 railing, c/s collar, L BKA prosthesis, gait belt, at Min A level  2) Interventions:  PT Group Therapy, PT E Stim Attended, PT Prosthetic Training, PT Gait Training, PT Therapeutic Exercises, PT TENS Application, PT Neuro Re-Ed/Balance, PT Therapeutic Activity, and PT Manual Therapy    Note:     Goal Note filed on 07/06/20 0807 by Cori Morris, Student    Patient did not  complete stairs recently. Occasionally due to orthostatic hypotension.                   Goal: LTG-By discharge, patient will transfer in/out of a car     Dates: Start: 06/19/20       Description: 1) Individualized goal:  with FWW, c/s collar, L BKA prosthesis, at SBA level  2) Interventions:  PT Group Therapy, PT E Stim Attended, PT Prosthetic Training, PT Gait Training, PT Therapeutic Exercises, PT TENS Application, PT Neuro Re-Ed/Balance, PT Therapeutic Activity, and PT Manual Therapy      Note:     Goal Note filed on 07/06/20 0807 by Cori Morris, Student    Patient was max assist for car transfer on last attempt.

## 2020-07-23 ENCOUNTER — TELEPHONE (OUTPATIENT)
Dept: PHYSICAL MEDICINE AND REHAB | Facility: REHABILITATION | Age: 75
End: 2020-07-23

## 2020-07-23 NOTE — TELEPHONE ENCOUNTER
Tao called and said her  has swelling in limb that has been amputated and wanted to know if he would be able to see Dr. Lawson. I spoke with tao and I let her know I spoke with Dr Lawson and my supervisor and they said she needs to speak with nurses and doctors at facility where he is. Dr. Lawson also suggested talking with Prosthetist to see if  could help.

## 2021-08-12 ENCOUNTER — HOSPITAL ENCOUNTER (INPATIENT)
Facility: MEDICAL CENTER | Age: 76
LOS: 4 days | DRG: 177 | End: 2021-08-16
Attending: EMERGENCY MEDICINE | Admitting: STUDENT IN AN ORGANIZED HEALTH CARE EDUCATION/TRAINING PROGRAM
Payer: MEDICARE

## 2021-08-12 ENCOUNTER — APPOINTMENT (OUTPATIENT)
Dept: RADIOLOGY | Facility: MEDICAL CENTER | Age: 76
DRG: 177 | End: 2021-08-12
Attending: EMERGENCY MEDICINE
Payer: MEDICARE

## 2021-08-12 DIAGNOSIS — U07.1 COVID-19 VIRUS INFECTION: ICD-10-CM

## 2021-08-12 DIAGNOSIS — J96.00 ACUTE RESPIRATORY FAILURE DUE TO COVID-19 (HCC): ICD-10-CM

## 2021-08-12 DIAGNOSIS — R09.02 HYPOXIA: ICD-10-CM

## 2021-08-12 DIAGNOSIS — U07.1 ACUTE RESPIRATORY FAILURE DUE TO COVID-19 (HCC): ICD-10-CM

## 2021-08-12 DIAGNOSIS — R06.00 DYSPNEA, UNSPECIFIED TYPE: ICD-10-CM

## 2021-08-12 LAB
ANION GAP SERPL CALC-SCNC: 8 MMOL/L (ref 7–16)
BASOPHILS # BLD AUTO: 0.7 % (ref 0–1.8)
BASOPHILS # BLD: 0.05 K/UL (ref 0–0.12)
BUN SERPL-MCNC: 14 MG/DL (ref 8–22)
CALCIUM SERPL-MCNC: 8.9 MG/DL (ref 8.5–10.5)
CHLORIDE SERPL-SCNC: 104 MMOL/L (ref 96–112)
CO2 SERPL-SCNC: 27 MMOL/L (ref 20–33)
CREAT SERPL-MCNC: 0.9 MG/DL (ref 0.5–1.4)
CRP SERPL HS-MCNC: 4.69 MG/DL (ref 0–0.75)
D DIMER PPP IA.FEU-MCNC: 1.92 UG/ML (FEU) (ref 0–0.5)
EKG IMPRESSION: NORMAL
EOSINOPHIL # BLD AUTO: 0.05 K/UL (ref 0–0.51)
EOSINOPHIL NFR BLD: 0.7 % (ref 0–6.9)
ERYTHROCYTE [DISTWIDTH] IN BLOOD BY AUTOMATED COUNT: 50.5 FL (ref 35.9–50)
FLUAV RNA SPEC QL NAA+PROBE: NEGATIVE
FLUBV RNA SPEC QL NAA+PROBE: NEGATIVE
GLUCOSE BLD-MCNC: 187 MG/DL (ref 65–99)
GLUCOSE BLD-MCNC: 208 MG/DL (ref 65–99)
GLUCOSE SERPL-MCNC: 123 MG/DL (ref 65–99)
HCT VFR BLD AUTO: 43.8 % (ref 42–52)
HGB BLD-MCNC: 13.9 G/DL (ref 14–18)
IMM GRANULOCYTES # BLD AUTO: 0.03 K/UL (ref 0–0.11)
IMM GRANULOCYTES NFR BLD AUTO: 0.4 % (ref 0–0.9)
LYMPHOCYTES # BLD AUTO: 0.63 K/UL (ref 1–4.8)
LYMPHOCYTES NFR BLD: 8.8 % (ref 22–41)
MCH RBC QN AUTO: 30.5 PG (ref 27–33)
MCHC RBC AUTO-ENTMCNC: 31.7 G/DL (ref 33.7–35.3)
MCV RBC AUTO: 96.3 FL (ref 81.4–97.8)
MONOCYTES # BLD AUTO: 0.95 K/UL (ref 0–0.85)
MONOCYTES NFR BLD AUTO: 13.3 % (ref 0–13.4)
NEUTROPHILS # BLD AUTO: 5.43 K/UL (ref 1.82–7.42)
NEUTROPHILS NFR BLD: 76.1 % (ref 44–72)
NRBC # BLD AUTO: 0 K/UL
NRBC BLD-RTO: 0 /100 WBC
NT-PROBNP SERPL IA-MCNC: 411 PG/ML (ref 0–125)
PLATELET # BLD AUTO: 171 K/UL (ref 164–446)
PMV BLD AUTO: 10.2 FL (ref 9–12.9)
POTASSIUM SERPL-SCNC: 4.7 MMOL/L (ref 3.6–5.5)
PROCALCITONIN SERPL-MCNC: <0.05 NG/ML
RBC # BLD AUTO: 4.55 M/UL (ref 4.7–6.1)
RSV RNA SPEC QL NAA+PROBE: NEGATIVE
SARS-COV-2 RNA RESP QL NAA+PROBE: DETECTED
SODIUM SERPL-SCNC: 139 MMOL/L (ref 135–145)
SPECIMEN SOURCE: ABNORMAL
WBC # BLD AUTO: 7.1 K/UL (ref 4.8–10.8)

## 2021-08-12 PROCEDURE — 84145 PROCALCITONIN (PCT): CPT

## 2021-08-12 PROCEDURE — 94760 N-INVAS EAR/PLS OXIMETRY 1: CPT

## 2021-08-12 PROCEDURE — 93005 ELECTROCARDIOGRAM TRACING: CPT | Performed by: EMERGENCY MEDICINE

## 2021-08-12 PROCEDURE — 700102 HCHG RX REV CODE 250 W/ 637 OVERRIDE(OP): Performed by: STUDENT IN AN ORGANIZED HEALTH CARE EDUCATION/TRAINING PROGRAM

## 2021-08-12 PROCEDURE — 770020 HCHG ROOM/CARE - TELE (206)

## 2021-08-12 PROCEDURE — 99223 1ST HOSP IP/OBS HIGH 75: CPT | Mod: AI | Performed by: STUDENT IN AN ORGANIZED HEALTH CARE EDUCATION/TRAINING PROGRAM

## 2021-08-12 PROCEDURE — 94640 AIRWAY INHALATION TREATMENT: CPT

## 2021-08-12 PROCEDURE — 85379 FIBRIN DEGRADATION QUANT: CPT

## 2021-08-12 PROCEDURE — A9270 NON-COVERED ITEM OR SERVICE: HCPCS | Performed by: STUDENT IN AN ORGANIZED HEALTH CARE EDUCATION/TRAINING PROGRAM

## 2021-08-12 PROCEDURE — 700101 HCHG RX REV CODE 250: Performed by: EMERGENCY MEDICINE

## 2021-08-12 PROCEDURE — C9803 HOPD COVID-19 SPEC COLLECT: HCPCS | Performed by: EMERGENCY MEDICINE

## 2021-08-12 PROCEDURE — 700111 HCHG RX REV CODE 636 W/ 250 OVERRIDE (IP): Performed by: EMERGENCY MEDICINE

## 2021-08-12 PROCEDURE — 99285 EMERGENCY DEPT VISIT HI MDM: CPT

## 2021-08-12 PROCEDURE — 83880 ASSAY OF NATRIURETIC PEPTIDE: CPT

## 2021-08-12 PROCEDURE — 93005 ELECTROCARDIOGRAM TRACING: CPT

## 2021-08-12 PROCEDURE — 80048 BASIC METABOLIC PNL TOTAL CA: CPT

## 2021-08-12 PROCEDURE — 71045 X-RAY EXAM CHEST 1 VIEW: CPT

## 2021-08-12 PROCEDURE — 82962 GLUCOSE BLOOD TEST: CPT

## 2021-08-12 PROCEDURE — 86140 C-REACTIVE PROTEIN: CPT

## 2021-08-12 PROCEDURE — 96374 THER/PROPH/DIAG INJ IV PUSH: CPT

## 2021-08-12 PROCEDURE — 0241U HCHG SARS-COV-2 COVID-19 NFCT DS RESP RNA 4 TRGT MIC: CPT

## 2021-08-12 PROCEDURE — 36415 COLL VENOUS BLD VENIPUNCTURE: CPT

## 2021-08-12 PROCEDURE — 85025 COMPLETE CBC W/AUTO DIFF WBC: CPT

## 2021-08-12 RX ORDER — POLYETHYLENE GLYCOL 3350 17 G/17G
17 POWDER, FOR SOLUTION ORAL 2 TIMES DAILY
COMMUNITY

## 2021-08-12 RX ORDER — GUAIFENESIN 600 MG/1
600 TABLET, EXTENDED RELEASE ORAL EVERY 12 HOURS
Status: DISCONTINUED | OUTPATIENT
Start: 2021-08-12 | End: 2021-08-16 | Stop reason: HOSPADM

## 2021-08-12 RX ORDER — TRAZODONE HYDROCHLORIDE 50 MG/1
50 TABLET ORAL
Status: DISCONTINUED | OUTPATIENT
Start: 2021-08-12 | End: 2021-08-16 | Stop reason: HOSPADM

## 2021-08-12 RX ORDER — ONDANSETRON 2 MG/ML
4 INJECTION INTRAMUSCULAR; INTRAVENOUS EVERY 4 HOURS PRN
Status: DISCONTINUED | OUTPATIENT
Start: 2021-08-12 | End: 2021-08-16 | Stop reason: HOSPADM

## 2021-08-12 RX ORDER — BACLOFEN 10 MG/1
20 TABLET ORAL
Status: DISCONTINUED | OUTPATIENT
Start: 2021-08-12 | End: 2021-08-16 | Stop reason: HOSPADM

## 2021-08-12 RX ORDER — AMOXICILLIN 250 MG
2 CAPSULE ORAL 2 TIMES DAILY
Status: DISCONTINUED | OUTPATIENT
Start: 2021-08-12 | End: 2021-08-16 | Stop reason: HOSPADM

## 2021-08-12 RX ORDER — DEXTROSE MONOHYDRATE 25 G/50ML
50 INJECTION, SOLUTION INTRAVENOUS
Status: DISCONTINUED | OUTPATIENT
Start: 2021-08-12 | End: 2021-08-16 | Stop reason: HOSPADM

## 2021-08-12 RX ORDER — ALBUTEROL SULFATE 90 UG/1
2 AEROSOL, METERED RESPIRATORY (INHALATION)
Status: DISCONTINUED | OUTPATIENT
Start: 2021-08-12 | End: 2021-08-16 | Stop reason: HOSPADM

## 2021-08-12 RX ORDER — LORATADINE 10 MG/1
5 TABLET ORAL DAILY
COMMUNITY

## 2021-08-12 RX ORDER — ATORVASTATIN CALCIUM 20 MG/1
20 TABLET, FILM COATED ORAL NIGHTLY
Status: DISCONTINUED | OUTPATIENT
Start: 2021-08-12 | End: 2021-08-16 | Stop reason: HOSPADM

## 2021-08-12 RX ORDER — ENALAPRILAT 1.25 MG/ML
1.25 INJECTION INTRAVENOUS EVERY 6 HOURS PRN
Status: DISCONTINUED | OUTPATIENT
Start: 2021-08-12 | End: 2021-08-13

## 2021-08-12 RX ORDER — SENNOSIDES A AND B 8.6 MG/1
17.2 TABLET, FILM COATED ORAL
COMMUNITY

## 2021-08-12 RX ORDER — BENZONATATE 100 MG/1
100 CAPSULE ORAL 3 TIMES DAILY PRN
COMMUNITY

## 2021-08-12 RX ORDER — METFORMIN HYDROCHLORIDE 500 MG/1
500 TABLET, EXTENDED RELEASE ORAL DAILY
COMMUNITY

## 2021-08-12 RX ORDER — IPRATROPIUM BROMIDE AND ALBUTEROL SULFATE 2.5; .5 MG/3ML; MG/3ML
3 SOLUTION RESPIRATORY (INHALATION)
Status: COMPLETED | OUTPATIENT
Start: 2021-08-12 | End: 2021-08-12

## 2021-08-12 RX ORDER — SULFAMETHOXAZOLE AND TRIMETHOPRIM 800; 160 MG/1; MG/1
1 TABLET ORAL 2 TIMES DAILY
Status: ON HOLD | COMMUNITY
Start: 2021-08-09 | End: 2021-08-13

## 2021-08-12 RX ORDER — VENLAFAXINE HYDROCHLORIDE 75 MG/1
225 CAPSULE, EXTENDED RELEASE ORAL DAILY
COMMUNITY

## 2021-08-12 RX ORDER — METHYLPREDNISOLONE SODIUM SUCCINATE 125 MG/2ML
125 INJECTION, POWDER, LYOPHILIZED, FOR SOLUTION INTRAMUSCULAR; INTRAVENOUS ONCE
Status: COMPLETED | OUTPATIENT
Start: 2021-08-12 | End: 2021-08-12

## 2021-08-12 RX ORDER — GABAPENTIN 600 MG/1
600 TABLET ORAL 3 TIMES DAILY
COMMUNITY

## 2021-08-12 RX ORDER — LORATADINE 10 MG/1
5 TABLET ORAL DAILY
Status: DISCONTINUED | OUTPATIENT
Start: 2021-08-12 | End: 2021-08-16 | Stop reason: HOSPADM

## 2021-08-12 RX ORDER — VENLAFAXINE HYDROCHLORIDE 75 MG/1
225 CAPSULE, EXTENDED RELEASE ORAL DAILY
Status: DISCONTINUED | OUTPATIENT
Start: 2021-08-12 | End: 2021-08-16 | Stop reason: HOSPADM

## 2021-08-12 RX ORDER — BACLOFEN 10 MG/1
10 TABLET ORAL EVERY MORNING
Status: DISCONTINUED | OUTPATIENT
Start: 2021-08-13 | End: 2021-08-16 | Stop reason: HOSPADM

## 2021-08-12 RX ORDER — BACLOFEN 10 MG/1
10-20 TABLET ORAL 4 TIMES DAILY
COMMUNITY

## 2021-08-12 RX ORDER — BISACODYL 10 MG
10 SUPPOSITORY, RECTAL RECTAL
Status: DISCONTINUED | OUTPATIENT
Start: 2021-08-12 | End: 2021-08-16 | Stop reason: HOSPADM

## 2021-08-12 RX ORDER — FINASTERIDE 5 MG/1
5 TABLET, FILM COATED ORAL DAILY
Status: DISCONTINUED | OUTPATIENT
Start: 2021-08-12 | End: 2021-08-16 | Stop reason: HOSPADM

## 2021-08-12 RX ORDER — BACLOFEN 10 MG/1
10-20 TABLET ORAL 4 TIMES DAILY
Status: DISCONTINUED | OUTPATIENT
Start: 2021-08-12 | End: 2021-08-12

## 2021-08-12 RX ORDER — FERROUS SULFATE 325(65) MG
325 TABLET ORAL
Status: DISCONTINUED | OUTPATIENT
Start: 2021-08-13 | End: 2021-08-16 | Stop reason: HOSPADM

## 2021-08-12 RX ORDER — ALBUTEROL SULFATE 90 UG/1
2 AEROSOL, METERED RESPIRATORY (INHALATION) EVERY 4 HOURS PRN
COMMUNITY

## 2021-08-12 RX ORDER — GABAPENTIN 300 MG/1
600 CAPSULE ORAL 3 TIMES DAILY
Status: DISCONTINUED | OUTPATIENT
Start: 2021-08-12 | End: 2021-08-16 | Stop reason: HOSPADM

## 2021-08-12 RX ORDER — POLYETHYLENE GLYCOL 3350 17 G/17G
1 POWDER, FOR SOLUTION ORAL
Status: DISCONTINUED | OUTPATIENT
Start: 2021-08-12 | End: 2021-08-16 | Stop reason: HOSPADM

## 2021-08-12 RX ORDER — DEXAMETHASONE 4 MG/1
6 TABLET ORAL DAILY
Status: DISCONTINUED | OUTPATIENT
Start: 2021-08-12 | End: 2021-08-16 | Stop reason: HOSPADM

## 2021-08-12 RX ORDER — TRAZODONE HYDROCHLORIDE 50 MG/1
50 TABLET ORAL
Status: DISCONTINUED | OUTPATIENT
Start: 2021-08-12 | End: 2021-08-12

## 2021-08-12 RX ORDER — ACETAMINOPHEN 325 MG/1
650 TABLET ORAL EVERY 6 HOURS PRN
Status: DISCONTINUED | OUTPATIENT
Start: 2021-08-12 | End: 2021-08-16 | Stop reason: HOSPADM

## 2021-08-12 RX ORDER — ONDANSETRON 4 MG/1
4 TABLET, ORALLY DISINTEGRATING ORAL EVERY 4 HOURS PRN
Status: DISCONTINUED | OUTPATIENT
Start: 2021-08-12 | End: 2021-08-16 | Stop reason: HOSPADM

## 2021-08-12 RX ORDER — ATORVASTATIN CALCIUM 20 MG/1
20 TABLET, FILM COATED ORAL NIGHTLY
COMMUNITY

## 2021-08-12 RX ORDER — LABETALOL HYDROCHLORIDE 5 MG/ML
10 INJECTION, SOLUTION INTRAVENOUS EVERY 4 HOURS PRN
Status: DISCONTINUED | OUTPATIENT
Start: 2021-08-12 | End: 2021-08-16 | Stop reason: HOSPADM

## 2021-08-12 RX ORDER — ERGOCALCIFEROL 1.25 MG/1
50000 CAPSULE ORAL
COMMUNITY

## 2021-08-12 RX ADMIN — VENLAFAXINE HYDROCHLORIDE 225 MG: 75 CAPSULE, EXTENDED RELEASE ORAL at 13:02

## 2021-08-12 RX ADMIN — DEXAMETHASONE 6 MG: 4 TABLET ORAL at 12:59

## 2021-08-12 RX ADMIN — GABAPENTIN 600 MG: 300 CAPSULE ORAL at 17:37

## 2021-08-12 RX ADMIN — DOCUSATE SODIUM 50 MG AND SENNOSIDES 8.6 MG 2 TABLET: 8.6; 5 TABLET, FILM COATED ORAL at 17:37

## 2021-08-12 RX ADMIN — METHYLPREDNISOLONE SODIUM SUCCINATE 125 MG: 125 INJECTION, POWDER, FOR SOLUTION INTRAMUSCULAR; INTRAVENOUS at 09:00

## 2021-08-12 RX ADMIN — GABAPENTIN 600 MG: 300 CAPSULE ORAL at 13:01

## 2021-08-12 RX ADMIN — IPRATROPIUM BROMIDE AND ALBUTEROL SULFATE 3 ML: .5; 2.5 SOLUTION RESPIRATORY (INHALATION) at 10:15

## 2021-08-12 RX ADMIN — GUAIFENESIN 600 MG: 600 TABLET, EXTENDED RELEASE ORAL at 17:37

## 2021-08-12 RX ADMIN — FINASTERIDE 5 MG: 5 TABLET, FILM COATED ORAL at 13:00

## 2021-08-12 RX ADMIN — ATORVASTATIN CALCIUM 20 MG: 20 TABLET, FILM COATED ORAL at 20:28

## 2021-08-12 RX ADMIN — LORATADINE 5 MG: 10 TABLET ORAL at 13:00

## 2021-08-12 RX ADMIN — INSULIN HUMAN 2 UNITS: 100 INJECTION, SOLUTION PARENTERAL at 20:34

## 2021-08-12 RX ADMIN — INSULIN HUMAN 1 UNITS: 100 INJECTION, SOLUTION PARENTERAL at 17:41

## 2021-08-12 RX ADMIN — BACLOFEN 20 MG: 10 TABLET ORAL at 13:01

## 2021-08-12 RX ADMIN — APIXABAN 5 MG: 5 TABLET, FILM COATED ORAL at 17:37

## 2021-08-12 RX ADMIN — BACLOFEN 20 MG: 10 TABLET ORAL at 20:28

## 2021-08-12 ASSESSMENT — LIFESTYLE VARIABLES
HAVE YOU EVER FELT YOU SHOULD CUT DOWN ON YOUR DRINKING: NO
HAVE YOU EVER FELT YOU SHOULD CUT DOWN ON YOUR DRINKING: NO
AVERAGE NUMBER OF DAYS PER WEEK YOU HAVE A DRINK CONTAINING ALCOHOL: 0
DOES PATIENT WANT TO STOP DRINKING: NO
EVER FELT BAD OR GUILTY ABOUT YOUR DRINKING: NO
EVER HAD A DRINK FIRST THING IN THE MORNING TO STEADY YOUR NERVES TO GET RID OF A HANGOVER: NO
TOTAL SCORE: 0
HOW MANY TIMES IN THE PAST YEAR HAVE YOU HAD 5 OR MORE DRINKS IN A DAY: 0
EVER HAD A DRINK FIRST THING IN THE MORNING TO STEADY YOUR NERVES TO GET RID OF A HANGOVER: NO
HAVE YOU EVER FELT YOU SHOULD CUT DOWN ON YOUR DRINKING: NO
EVER FELT BAD OR GUILTY ABOUT YOUR DRINKING: NO
TOTAL SCORE: 0
CONSUMPTION TOTAL: INCOMPLETE
HAVE PEOPLE ANNOYED YOU BY CRITICIZING YOUR DRINKING: NO
ON A TYPICAL DAY WHEN YOU DRINK ALCOHOL HOW MANY DRINKS DO YOU HAVE: 0
ALCOHOL_USE: NO
TOTAL SCORE: 0
CONSUMPTION TOTAL: NEGATIVE
ALCOHOL_USE: NO
TOTAL SCORE: 0
HAVE PEOPLE ANNOYED YOU BY CRITICIZING YOUR DRINKING: NO
CONSUMPTION TOTAL: INCOMPLETE
HAVE PEOPLE ANNOYED YOU BY CRITICIZING YOUR DRINKING: NO
TOTAL SCORE: 0
TOTAL SCORE: 0
DOES PATIENT WANT TO STOP DRINKING: CANNOT ASSESS
ALCOHOL_USE: NO
TOTAL SCORE: 0
EVER HAD A DRINK FIRST THING IN THE MORNING TO STEADY YOUR NERVES TO GET RID OF A HANGOVER: NO
TOTAL SCORE: 0
EVER FELT BAD OR GUILTY ABOUT YOUR DRINKING: NO
DOES PATIENT WANT TO STOP DRINKING: CANNOT ASSESS
TOTAL SCORE: 0

## 2021-08-12 ASSESSMENT — ENCOUNTER SYMPTOMS
SPUTUM PRODUCTION: 1
DIARRHEA: 0
COUGH: 1
FEVER: 0
SHORTNESS OF BREATH: 1
CHILLS: 0

## 2021-08-12 ASSESSMENT — COPD QUESTIONNAIRES
DURING THE PAST 4 WEEKS HOW MUCH DID YOU FEEL SHORT OF BREATH: SOME OF THE TIME
DO YOU EVER COUGH UP ANY MUCUS OR PHLEGM?: NO/ONLY WITH OCCASIONAL COLDS OR INFECTIONS
COPD SCREENING SCORE: 4
HAVE YOU SMOKED AT LEAST 100 CIGARETTES IN YOUR ENTIRE LIFE: NO/DON'T KNOW

## 2021-08-12 ASSESSMENT — PATIENT HEALTH QUESTIONNAIRE - PHQ9
7. TROUBLE CONCENTRATING ON THINGS, SUCH AS READING THE NEWSPAPER OR WATCHING TELEVISION: NOT AT ALL
9. THOUGHTS THAT YOU WOULD BE BETTER OFF DEAD, OR OF HURTING YOURSELF: NOT AT ALL
SUM OF ALL RESPONSES TO PHQ QUESTIONS 1-9: 12
4. FEELING TIRED OR HAVING LITTLE ENERGY: NOT AT ALL
2. FEELING DOWN, DEPRESSED, IRRITABLE, OR HOPELESS: NEARLY EVERY DAY
5. POOR APPETITE OR OVEREATING: NOT AT ALL
3. TROUBLE FALLING OR STAYING ASLEEP OR SLEEPING TOO MUCH: NOT AT ALL
1. LITTLE INTEREST OR PLEASURE IN DOING THINGS: NEARLY EVERY DAY
8. MOVING OR SPEAKING SO SLOWLY THAT OTHER PEOPLE COULD HAVE NOTICED. OR THE OPPOSITE, BEING SO FIGETY OR RESTLESS THAT YOU HAVE BEEN MOVING AROUND A LOT MORE THAN USUAL: NEARLY EVERY DAY
SUM OF ALL RESPONSES TO PHQ9 QUESTIONS 1 AND 2: 6
6. FEELING BAD ABOUT YOURSELF - OR THAT YOU ARE A FAILURE OR HAVE LET YOURSELF OR YOUR FAMILY DOWN: NEARLY EVERY DAY

## 2021-08-12 ASSESSMENT — COGNITIVE AND FUNCTIONAL STATUS - GENERAL
TURNING FROM BACK TO SIDE WHILE IN FLAT BAD: A LOT
SUGGESTED CMS G CODE MODIFIER MOBILITY: CL
STANDING UP FROM CHAIR USING ARMS: A LOT
SUGGESTED CMS G CODE MODIFIER DAILY ACTIVITY: CK
MOVING FROM LYING ON BACK TO SITTING ON SIDE OF FLAT BED: A LOT
MOBILITY SCORE: 12
HELP NEEDED FOR BATHING: A LOT
MOVING TO AND FROM BED TO CHAIR: A LOT
DAILY ACTIVITIY SCORE: 18
DRESSING REGULAR UPPER BODY CLOTHING: A LITTLE
DRESSING REGULAR LOWER BODY CLOTHING: A LOT
TOILETING: A LITTLE
WALKING IN HOSPITAL ROOM: A LOT
CLIMB 3 TO 5 STEPS WITH RAILING: A LOT

## 2021-08-12 ASSESSMENT — FIBROSIS 4 INDEX
FIB4 SCORE: 0.4
FIB4 SCORE: 1.44

## 2021-08-12 ASSESSMENT — PAIN DESCRIPTION - PAIN TYPE: TYPE: ACUTE PAIN

## 2021-08-12 NOTE — ED NOTES
Med rec completed [facility]  Pt states that he has filled some medications at ([facility]) but this pharmacy is no longer in service so I am unable to verify if there are any other medications from this pharmacy   Pt started a 7 day course of Bactrim DS on 8/9/2021   Allergies reviewed

## 2021-08-12 NOTE — ED NOTES
Lab called with critical result of Covid positive. Critical lab result read back to lab.   Dr. Chavez notified of critical lab result at 1041.  Critical lab result read back by Dr. Chavez.

## 2021-08-12 NOTE — ASSESSMENT & PLAN NOTE
On eliquis, home dose 2.5mg bid. However per guideline, he should be on 5mg bid. Dose adjusted to 5mg bid.   No sign of active bleeding
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
## 2021-08-12 NOTE — CARE PLAN
The patient is Watcher - Medium risk of patient condition declining or worsening    Shift Goals  Clinical Goals: Control pain     Progress made toward(s) clinical / shift goals: patient sitting  up in bed comfortably.     Problem: Pain - Standard  Goal: Alleviation of pain or a reduction in pain to the patient’s comfort goal  Outcome: Progressing     Problem: Knowledge Deficit - Standard  Goal: Patient and family/care givers will demonstrate understanding of plan of care, disease process/condition, diagnostic tests and medications  Outcome: Progressing     Problem: Fall Risk  Goal: Patient will remain free from falls  Outcome: Progressing    Pt has call light and demonstrated back how to use it.      Problem: Depression  Goal: Patient and family/caregiver will verbalize accurate information about at least two of the possible causes of depression, three-four of the signs and symptoms of depression  Outcome: Progressing
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
## 2021-08-12 NOTE — ASSESSMENT & PLAN NOTE
COVID-19 (positive test 8/12/21).    Patient started on Decadron, procalcitonin is negative, no indication for antibiotics.    Patient is on Eliquis due to history of DVT, we will continue.    Patient is currently requiring 6 L of oxygen.  Supportive managements: early mobilization, self prone, RT protocol, judicious fluid, IS  Wean off O2 as tolerated    Symptoms >2 weeks, not a candidate for Remdesivir  CRP is 4.69, not a candidate for Actemra at this time

## 2021-08-12 NOTE — PROGRESS NOTES
4 Eyes Skin Assessment Completed by ADALBERTO Acosta and ADALBERTO Willson.    Head WDL  Ears WDL  Nose WDL  Mouth WDL  Neck WDL  Breast/Chest WDL  Shoulder Blades Redness, scar's from previous back surgeries   Spine Redness, scars from previous back surgeries  (R) Arm/Elbow/Hand Scab  (L) Arm/Elbow/Hand WDL  Abdomen WDL  Groin WDL  Scrotum/Coccyx/Buttocks WDL  (R) Leg WDL  (L) Leg Redness, LBKA  (R) Heel/Foot/Toe WDL  (L) Heel/Foot/Toe WDL          Devices In Places Tele Box, PIV      Interventions In Place Pressure Redistribution Mattress, Waffle Mattress, Q2H turns     Possible Skin Injury No    Pictures Uploaded Into Epic N/A  Wound Consult Placed N/A  RN Wound Prevention Protocol Ordered No

## 2021-08-12 NOTE — PROGRESS NOTES
Patient up from ED, report received. Patient placed on tele monitor, monitor room notified. Pt SR 73 on the monitor. Physical assessment performed. Patient is A&O X 4, pain level 0.  Patient oriented to room and unit routine. Patient educated on plan for the day and educated on use of call light.

## 2021-08-13 PROBLEM — E11.49 TYPE 2 DIABETES MELLITUS WITH NEUROLOGIC COMPLICATION, WITHOUT LONG-TERM CURRENT USE OF INSULIN (HCC): Status: ACTIVE | Noted: 2021-08-13

## 2021-08-13 LAB
ANION GAP SERPL CALC-SCNC: 16 MMOL/L (ref 7–16)
BASOPHILS # BLD AUTO: 0.3 % (ref 0–1.8)
BASOPHILS # BLD: 0.02 K/UL (ref 0–0.12)
BUN SERPL-MCNC: 23 MG/DL (ref 8–22)
CALCIUM SERPL-MCNC: 9.3 MG/DL (ref 8.5–10.5)
CHLORIDE SERPL-SCNC: 100 MMOL/L (ref 96–112)
CO2 SERPL-SCNC: 21 MMOL/L (ref 20–33)
CREAT SERPL-MCNC: 0.92 MG/DL (ref 0.5–1.4)
CRP SERPL HS-MCNC: 4.48 MG/DL (ref 0–0.75)
EOSINOPHIL # BLD AUTO: 0 K/UL (ref 0–0.51)
EOSINOPHIL NFR BLD: 0 % (ref 0–6.9)
ERYTHROCYTE [DISTWIDTH] IN BLOOD BY AUTOMATED COUNT: 49.6 FL (ref 35.9–50)
GLUCOSE BLD-MCNC: 150 MG/DL (ref 65–99)
GLUCOSE BLD-MCNC: 151 MG/DL (ref 65–99)
GLUCOSE BLD-MCNC: 172 MG/DL (ref 65–99)
GLUCOSE BLD-MCNC: 174 MG/DL (ref 65–99)
GLUCOSE SERPL-MCNC: 210 MG/DL (ref 65–99)
HCT VFR BLD AUTO: 45.9 % (ref 42–52)
HGB BLD-MCNC: 14.6 G/DL (ref 14–18)
IMM GRANULOCYTES # BLD AUTO: 0.03 K/UL (ref 0–0.11)
IMM GRANULOCYTES NFR BLD AUTO: 0.4 % (ref 0–0.9)
LYMPHOCYTES # BLD AUTO: 0.52 K/UL (ref 1–4.8)
LYMPHOCYTES NFR BLD: 6.9 % (ref 22–41)
MCH RBC QN AUTO: 30.6 PG (ref 27–33)
MCHC RBC AUTO-ENTMCNC: 31.8 G/DL (ref 33.7–35.3)
MCV RBC AUTO: 96.2 FL (ref 81.4–97.8)
MONOCYTES # BLD AUTO: 0.5 K/UL (ref 0–0.85)
MONOCYTES NFR BLD AUTO: 6.6 % (ref 0–13.4)
NEUTROPHILS # BLD AUTO: 6.52 K/UL (ref 1.82–7.42)
NEUTROPHILS NFR BLD: 85.8 % (ref 44–72)
NRBC # BLD AUTO: 0 K/UL
NRBC BLD-RTO: 0 /100 WBC
PLATELET # BLD AUTO: 188 K/UL (ref 164–446)
PMV BLD AUTO: 9.8 FL (ref 9–12.9)
POTASSIUM SERPL-SCNC: 4.8 MMOL/L (ref 3.6–5.5)
RBC # BLD AUTO: 4.77 M/UL (ref 4.7–6.1)
SODIUM SERPL-SCNC: 137 MMOL/L (ref 135–145)
WBC # BLD AUTO: 7.6 K/UL (ref 4.8–10.8)

## 2021-08-13 PROCEDURE — 86140 C-REACTIVE PROTEIN: CPT

## 2021-08-13 PROCEDURE — 700111 HCHG RX REV CODE 636 W/ 250 OVERRIDE (IP): Performed by: GENERAL PRACTICE

## 2021-08-13 PROCEDURE — 36415 COLL VENOUS BLD VENIPUNCTURE: CPT

## 2021-08-13 PROCEDURE — A9270 NON-COVERED ITEM OR SERVICE: HCPCS

## 2021-08-13 PROCEDURE — RXMED WILLOW AMBULATORY MEDICATION CHARGE: Performed by: GENERAL PRACTICE

## 2021-08-13 PROCEDURE — 770020 HCHG ROOM/CARE - TELE (206)

## 2021-08-13 PROCEDURE — 700102 HCHG RX REV CODE 250 W/ 637 OVERRIDE(OP): Performed by: STUDENT IN AN ORGANIZED HEALTH CARE EDUCATION/TRAINING PROGRAM

## 2021-08-13 PROCEDURE — 82962 GLUCOSE BLOOD TEST: CPT | Mod: 91

## 2021-08-13 PROCEDURE — 80048 BASIC METABOLIC PNL TOTAL CA: CPT

## 2021-08-13 PROCEDURE — 700101 HCHG RX REV CODE 250: Performed by: STUDENT IN AN ORGANIZED HEALTH CARE EDUCATION/TRAINING PROGRAM

## 2021-08-13 PROCEDURE — 700102 HCHG RX REV CODE 250 W/ 637 OVERRIDE(OP)

## 2021-08-13 PROCEDURE — A9270 NON-COVERED ITEM OR SERVICE: HCPCS | Performed by: STUDENT IN AN ORGANIZED HEALTH CARE EDUCATION/TRAINING PROGRAM

## 2021-08-13 PROCEDURE — 85025 COMPLETE CBC W/AUTO DIFF WBC: CPT

## 2021-08-13 RX ORDER — LIDOCAINE 50 MG/G
2 PATCH TOPICAL EVERY 24 HOURS
Status: DISCONTINUED | OUTPATIENT
Start: 2021-08-13 | End: 2021-08-16 | Stop reason: HOSPADM

## 2021-08-13 RX ORDER — FUROSEMIDE 10 MG/ML
20 INJECTION INTRAMUSCULAR; INTRAVENOUS
Status: DISCONTINUED | OUTPATIENT
Start: 2021-08-13 | End: 2021-08-16 | Stop reason: HOSPADM

## 2021-08-13 RX ORDER — BACLOFEN 10 MG/1
TABLET ORAL
Status: COMPLETED
Start: 2021-08-13 | End: 2021-08-13

## 2021-08-13 RX ORDER — DEXAMETHASONE 6 MG/1
6 TABLET ORAL DAILY
Qty: 7 TABLET | Refills: 0 | Status: SHIPPED | OUTPATIENT
Start: 2021-08-15 | End: 2021-08-23

## 2021-08-13 RX ADMIN — FINASTERIDE 5 MG: 5 TABLET, FILM COATED ORAL at 04:11

## 2021-08-13 RX ADMIN — TRAZODONE HYDROCHLORIDE 50 MG: 50 TABLET ORAL at 02:08

## 2021-08-13 RX ADMIN — FERROUS SULFATE TAB 325 MG (65 MG ELEMENTAL FE) 325 MG: 325 (65 FE) TAB at 08:12

## 2021-08-13 RX ADMIN — INSULIN HUMAN 1 UNITS: 100 INJECTION, SOLUTION PARENTERAL at 12:59

## 2021-08-13 RX ADMIN — VENLAFAXINE HYDROCHLORIDE 225 MG: 75 CAPSULE, EXTENDED RELEASE ORAL at 04:11

## 2021-08-13 RX ADMIN — LORATADINE 5 MG: 10 TABLET ORAL at 04:10

## 2021-08-13 RX ADMIN — DOCUSATE SODIUM 50 MG AND SENNOSIDES 8.6 MG 2 TABLET: 8.6; 5 TABLET, FILM COATED ORAL at 16:58

## 2021-08-13 RX ADMIN — FUROSEMIDE 20 MG: 20 INJECTION, SOLUTION INTRAMUSCULAR; INTRAVENOUS at 08:12

## 2021-08-13 RX ADMIN — GABAPENTIN 600 MG: 300 CAPSULE ORAL at 04:10

## 2021-08-13 RX ADMIN — FUROSEMIDE 20 MG: 20 INJECTION, SOLUTION INTRAMUSCULAR; INTRAVENOUS at 16:58

## 2021-08-13 RX ADMIN — DEXAMETHASONE 6 MG: 4 TABLET ORAL at 04:11

## 2021-08-13 RX ADMIN — BACLOFEN 20 MG: 10 TABLET ORAL at 22:04

## 2021-08-13 RX ADMIN — APIXABAN 5 MG: 5 TABLET, FILM COATED ORAL at 16:58

## 2021-08-13 RX ADMIN — GUAIFENESIN 600 MG: 600 TABLET, EXTENDED RELEASE ORAL at 04:11

## 2021-08-13 RX ADMIN — INSULIN HUMAN 1 UNITS: 100 INJECTION, SOLUTION PARENTERAL at 22:00

## 2021-08-13 RX ADMIN — GABAPENTIN 600 MG: 300 CAPSULE ORAL at 16:58

## 2021-08-13 RX ADMIN — APIXABAN 5 MG: 5 TABLET, FILM COATED ORAL at 04:11

## 2021-08-13 RX ADMIN — GUAIFENESIN 600 MG: 600 TABLET, EXTENDED RELEASE ORAL at 16:58

## 2021-08-13 RX ADMIN — DOCUSATE SODIUM 50 MG AND SENNOSIDES 8.6 MG 2 TABLET: 8.6; 5 TABLET, FILM COATED ORAL at 04:10

## 2021-08-13 RX ADMIN — ATORVASTATIN CALCIUM 20 MG: 20 TABLET, FILM COATED ORAL at 22:04

## 2021-08-13 RX ADMIN — ACETAMINOPHEN 650 MG: 325 TABLET, FILM COATED ORAL at 02:07

## 2021-08-13 RX ADMIN — BACLOFEN 10 MG: 10 TABLET ORAL at 04:11

## 2021-08-13 RX ADMIN — LIDOCAINE 2 PATCH: 50 PATCH CUTANEOUS at 23:28

## 2021-08-13 RX ADMIN — BACLOFEN 20 MG: 10 TABLET ORAL at 12:58

## 2021-08-13 RX ADMIN — INSULIN HUMAN 1 UNITS: 100 INJECTION, SOLUTION PARENTERAL at 17:02

## 2021-08-13 RX ADMIN — GABAPENTIN 600 MG: 300 CAPSULE ORAL at 12:58

## 2021-08-13 ASSESSMENT — ENCOUNTER SYMPTOMS
COUGH: 1
SHORTNESS OF BREATH: 1

## 2021-08-13 ASSESSMENT — FIBROSIS 4 INDEX: FIB4 SCORE: 1.31

## 2021-08-13 ASSESSMENT — PAIN DESCRIPTION - PAIN TYPE: TYPE: ACUTE PAIN
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
## 2021-08-13 NOTE — CARE PLAN
Problem: Knowledge Deficit - Standard  Goal: Patient and family/care givers will demonstrate understanding of plan of care, disease process/condition, diagnostic tests and medications  Outcome: Progressing     Problem: Fall Risk  Goal: Patient will remain free from falls  Outcome: Progressing     The patient is Stable - Low risk of patient condition declining or worsening    Shift Goals  Clinical Goals: Control pain     Progress made toward(s) clinical / shift goals:  Fall precautions in place. Bed in lowest position. Non-skid socks in place. Personal possessions within reach. Mobility sign on door. Bed-alarm on. Call light within reach. Pt educated regarding fall prevention and states understanding.   Fall precautions in place. Bed in lowest position. Non-skid socks in place. Personal possessions within reach. Mobility sign on door. Bed-alarm on. Call light within reach. Pt educated regarding fall prevention and states understanding.       Patient is not progressing towards the following goals:

## 2021-08-13 NOTE — DISCHARGE PLANNING
Received Choice form at 1223  Agency[facility]Facility Name: [facility]  Referral sent per Choice form @ 1238     1303-  [facility]Facility Name: [facility]  Spoke To: Elza  Outcome: Elza will be sending referral to the [facility] for follow up.    3303-  Agency/Facility Name: [facility]  Spoke To: Elza  Outcome: Referral received at [facility], if Pt doesn't d/c Monday updated oxygen notes will need to be uploaded.

## 2021-08-13 NOTE — RESPIRATORY CARE
REMOTE MONITORING PROGRAM by RESPIRATORY THERAPY  8/13/2021 at 8:36 AM by Jaimee Montero, RRT     Patient chart reviewed for COVID remote monitoring eligibility. Patient does not qualify for RPM due to [place] residency

## 2021-08-13 NOTE — FACE TO FACE
Face to Face Note  -  Durable Medical Equipment    Tamara Schumacher D.O. - NPI: [id number]  I certify that this patient is under my care and that they had a durable medical equipment(DME)face to face encounter by myself that meets the physician DME face-to-face encounter requirements with this patient on:    Date of encounter:   Patient:                    MRN:                       YOB: 2019  Gaurav Lopez  [id number]  1945     The encounter with the patient was in whole, or in part, for the following medical condition, which is the primary reason for durable medical equipment:  Covid-19 Infection    I certify that, based on my findings, the following durable medical equipment is medically necessary:  Oxygen.    HOME O2 Saturation Measurements:(Values must be present for Home Oxygen orders)  Room air sat at rest: 93  Room air sat with amb: 87  With liters of O2: 2, O2 sat at rest with O2: 96  With Liters of O2: 2, O2 sat with amb with O2 : 92  Is the patient mobile?: Yes    My Clinical findings support the need for the above equipment due to:  Hypoxia    Supporting Symptoms: N/A    If patient feels more short of breath, they can go up to 6 liters per minute and contact healthcare provider.

## 2021-08-13 NOTE — PROGRESS NOTES
Received report from Cat RN, at pt bedside.  POC discussed. Call light and belongings within reach. Bed locked and in low position. Alarm on and fall precautions in place.

## 2021-08-13 NOTE — DISCHARGE PLANNING
Anticipated Discharge Disposition: TBD    Action: Pt pending medical clearance, however, pt demanding more O2, Case Management needs unknown at this time.     Barriers to Discharge: Increasing O2 demands    Plan: F/u with the medical team, and pt to discuss barriers and needs    1200: Spoke with pt thru phone. Pt confirmed his address, asked if he has a contact or phone number, he stated that he doesn't. Pt stated that he was living in the [facility] prior to admission however, when [facility] was called, they are unable to accommodate him related to COVID. Per pt, he will go back to his home address listed on his facesheet. Choice for DME verbalized by patient, form faxed to [facility]. Will set-up home O2. Pending home O2 acceptance. 2nd IMM given

## 2021-08-13 NOTE — CARE PLAN
The patient is Watcher - Medium risk of patient condition declining or worsening    Shift Goals  Clinical Goals: Control pain     Problem: Knowledge Deficit - Standard  Goal: Patient and family/care givers will demonstrate understanding of plan of care, disease process/condition, diagnostic tests and medications  Outcome: Progressing  Note: Pt aware of plan to titrate O2
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
## 2021-08-13 NOTE — PROGRESS NOTES
Received report from ADALBERTO Acosta and ADALBERTO Sloan. Reviewed POC, no questions at this time. Call light is within reach, bed is in lowest/locked position.

## 2021-08-14 LAB
ALBUMIN SERPL BCP-MCNC: 3.5 G/DL (ref 3.2–4.9)
ALBUMIN/GLOB SERPL: 1.1 G/DL
ALP SERPL-CCNC: 77 U/L (ref 30–99)
ALT SERPL-CCNC: 13 U/L (ref 2–50)
ANION GAP SERPL CALC-SCNC: 12 MMOL/L (ref 7–16)
AST SERPL-CCNC: 21 U/L (ref 12–45)
BILIRUB SERPL-MCNC: 0.2 MG/DL (ref 0.1–1.5)
BUN SERPL-MCNC: 36 MG/DL (ref 8–22)
CALCIUM SERPL-MCNC: 9 MG/DL (ref 8.5–10.5)
CHLORIDE SERPL-SCNC: 103 MMOL/L (ref 96–112)
CO2 SERPL-SCNC: 23 MMOL/L (ref 20–33)
CREAT SERPL-MCNC: 1.27 MG/DL (ref 0.5–1.4)
GLOBULIN SER CALC-MCNC: 3.2 G/DL (ref 1.9–3.5)
GLUCOSE BLD-MCNC: 135 MG/DL (ref 65–99)
GLUCOSE BLD-MCNC: 184 MG/DL (ref 65–99)
GLUCOSE BLD-MCNC: 206 MG/DL (ref 65–99)
GLUCOSE BLD-MCNC: 210 MG/DL (ref 65–99)
GLUCOSE SERPL-MCNC: 131 MG/DL (ref 65–99)
MAGNESIUM SERPL-MCNC: 2.1 MG/DL (ref 1.5–2.5)
POTASSIUM SERPL-SCNC: 3.8 MMOL/L (ref 3.6–5.5)
PROT SERPL-MCNC: 6.7 G/DL (ref 6–8.2)
SODIUM SERPL-SCNC: 138 MMOL/L (ref 135–145)

## 2021-08-14 PROCEDURE — 770020 HCHG ROOM/CARE - TELE (206)

## 2021-08-14 PROCEDURE — 700101 HCHG RX REV CODE 250: Performed by: STUDENT IN AN ORGANIZED HEALTH CARE EDUCATION/TRAINING PROGRAM

## 2021-08-14 PROCEDURE — 36415 COLL VENOUS BLD VENIPUNCTURE: CPT

## 2021-08-14 PROCEDURE — 82962 GLUCOSE BLOOD TEST: CPT

## 2021-08-14 PROCEDURE — C9803 HOPD COVID-19 SPEC COLLECT: HCPCS | Performed by: GENERAL PRACTICE

## 2021-08-14 PROCEDURE — A9270 NON-COVERED ITEM OR SERVICE: HCPCS | Performed by: STUDENT IN AN ORGANIZED HEALTH CARE EDUCATION/TRAINING PROGRAM

## 2021-08-14 PROCEDURE — 80053 COMPREHEN METABOLIC PANEL: CPT

## 2021-08-14 PROCEDURE — 83735 ASSAY OF MAGNESIUM: CPT

## 2021-08-14 PROCEDURE — 700111 HCHG RX REV CODE 636 W/ 250 OVERRIDE (IP): Performed by: GENERAL PRACTICE

## 2021-08-14 PROCEDURE — 700102 HCHG RX REV CODE 250 W/ 637 OVERRIDE(OP): Performed by: STUDENT IN AN ORGANIZED HEALTH CARE EDUCATION/TRAINING PROGRAM

## 2021-08-14 RX ADMIN — APIXABAN 5 MG: 5 TABLET, FILM COATED ORAL at 06:03

## 2021-08-14 RX ADMIN — DOCUSATE SODIUM 50 MG AND SENNOSIDES 8.6 MG 2 TABLET: 8.6; 5 TABLET, FILM COATED ORAL at 17:24

## 2021-08-14 RX ADMIN — BACLOFEN 10 MG: 10 TABLET ORAL at 06:03

## 2021-08-14 RX ADMIN — DEXAMETHASONE 6 MG: 4 TABLET ORAL at 06:01

## 2021-08-14 RX ADMIN — GABAPENTIN 600 MG: 300 CAPSULE ORAL at 06:02

## 2021-08-14 RX ADMIN — ATORVASTATIN CALCIUM 20 MG: 20 TABLET, FILM COATED ORAL at 20:40

## 2021-08-14 RX ADMIN — BACLOFEN 20 MG: 10 TABLET ORAL at 12:58

## 2021-08-14 RX ADMIN — INSULIN HUMAN 2 UNITS: 100 INJECTION, SOLUTION PARENTERAL at 17:45

## 2021-08-14 RX ADMIN — GUAIFENESIN 600 MG: 600 TABLET, EXTENDED RELEASE ORAL at 06:01

## 2021-08-14 RX ADMIN — INSULIN HUMAN 2 UNITS: 100 INJECTION, SOLUTION PARENTERAL at 20:45

## 2021-08-14 RX ADMIN — GABAPENTIN 600 MG: 300 CAPSULE ORAL at 12:58

## 2021-08-14 RX ADMIN — BACLOFEN 20 MG: 10 TABLET ORAL at 20:40

## 2021-08-14 RX ADMIN — LORATADINE 5 MG: 10 TABLET ORAL at 06:03

## 2021-08-14 RX ADMIN — GUAIFENESIN 600 MG: 600 TABLET, EXTENDED RELEASE ORAL at 17:24

## 2021-08-14 RX ADMIN — FUROSEMIDE 20 MG: 20 INJECTION, SOLUTION INTRAMUSCULAR; INTRAVENOUS at 06:01

## 2021-08-14 RX ADMIN — APIXABAN 5 MG: 5 TABLET, FILM COATED ORAL at 17:24

## 2021-08-14 RX ADMIN — LIDOCAINE 2 PATCH: 50 PATCH CUTANEOUS at 21:59

## 2021-08-14 RX ADMIN — FERROUS SULFATE TAB 325 MG (65 MG ELEMENTAL FE) 325 MG: 325 (65 FE) TAB at 08:05

## 2021-08-14 RX ADMIN — FUROSEMIDE 20 MG: 20 INJECTION, SOLUTION INTRAMUSCULAR; INTRAVENOUS at 17:30

## 2021-08-14 RX ADMIN — FINASTERIDE 5 MG: 5 TABLET, FILM COATED ORAL at 06:02

## 2021-08-14 RX ADMIN — VENLAFAXINE HYDROCHLORIDE 225 MG: 75 CAPSULE, EXTENDED RELEASE ORAL at 06:03

## 2021-08-14 RX ADMIN — GABAPENTIN 600 MG: 300 CAPSULE ORAL at 17:24

## 2021-08-14 RX ADMIN — INSULIN HUMAN 1 UNITS: 100 INJECTION, SOLUTION PARENTERAL at 13:08

## 2021-08-14 ASSESSMENT — ENCOUNTER SYMPTOMS
COUGH: 1
SHORTNESS OF BREATH: 1

## 2021-08-14 ASSESSMENT — PAIN DESCRIPTION - PAIN TYPE: TYPE: ACUTE PAIN

## 2021-08-14 ASSESSMENT — FIBROSIS 4 INDEX: FIB4 SCORE: 2.29

## 2021-08-14 NOTE — CARE PLAN
The patient is Stable - Low risk of patient condition declining or worsening    Shift Goals  Clinical Goals: Control pain       Problem: Pain - Standard  Goal: Alleviation of pain or a reduction in pain to the patient’s comfort goal  Outcome: Progressing     Problem: Knowledge Deficit - Standard  Goal: Patient and family/care givers will demonstrate understanding of plan of care, disease process/condition, diagnostic tests and medications  Outcome: Progressing     Problem: Fall Risk  Goal: Patient will remain free from falls  Outcome: Progressing

## 2021-08-14 NOTE — PROGRESS NOTES
Assumed care of patient at bedside report from NOC RN. Updated on POC. Patient currently A & O x 4; on 3 L O2 nasal canula; up standby assist to chair with prosthetic; without complaints of acute pain.  Call light within reach. Whiteboard updated. Fall precautions in place. Bed locked and in lowest position. All questions answered. No other needs indicated at this time.

## 2021-08-14 NOTE — DISCHARGE PLANNING
Anticipated Discharge Disposition: Home with Home O2    Action: Spoke to Ravindra with [facility] in [place], [place]. Ravindra states pt is accepted, bunker tank can be ordered. RT aide notified. Pt provided with orange O2 DME follow up sheet with [facility]'s # to call when pt gets to where he will stay on discharge.    Barriers to Discharge: O2 delivered    Plan: DC to home when O2 delivered.

## 2021-08-14 NOTE — RESPIRATORY CARE
REMOTE MONITORING PROGRAM by RESPIRATORY THERAPY  8/14/2021 at 6:55 AM by Misti Guerrier     Patient chart reviewed for COVID remote monitoring eligibility. Patient does not qualify for RPM due to [place] residency.        No questions at this time.
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
## 2021-08-14 NOTE — DISCHARGE PLANNING
Agency/Facility Name: [facility]  Spoke To: Yvonne  Outcome: Yvonne stated she left a message with the [facility] [place] office and a on-call  will f/u with this DPA    @0440  This DPA requested RT [facility]    @0914  Agency/Facility Name: [facility]  Spoke To: Wicho  Outcome: Wicho stated he would call the pt to coordinate home o2 setup

## 2021-08-15 LAB
FLUAV RNA SPEC QL NAA+PROBE: NEGATIVE
FLUBV RNA SPEC QL NAA+PROBE: NEGATIVE
GLUCOSE BLD-MCNC: 134 MG/DL (ref 65–99)
GLUCOSE BLD-MCNC: 140 MG/DL (ref 65–99)
GLUCOSE BLD-MCNC: 175 MG/DL (ref 65–99)
GLUCOSE BLD-MCNC: 232 MG/DL (ref 65–99)
RSV RNA SPEC QL NAA+PROBE: NEGATIVE
SARS-COV-2 RNA RESP QL NAA+PROBE: DETECTED
SPECIMEN SOURCE: ABNORMAL

## 2021-08-15 PROCEDURE — 700111 HCHG RX REV CODE 636 W/ 250 OVERRIDE (IP): Performed by: GENERAL PRACTICE

## 2021-08-15 PROCEDURE — A9270 NON-COVERED ITEM OR SERVICE: HCPCS | Performed by: STUDENT IN AN ORGANIZED HEALTH CARE EDUCATION/TRAINING PROGRAM

## 2021-08-15 PROCEDURE — 0241U HCHG SARS-COV-2 COVID-19 NFCT DS RESP RNA 4 TRGT MIC: CPT

## 2021-08-15 PROCEDURE — 82962 GLUCOSE BLOOD TEST: CPT | Mod: 91

## 2021-08-15 PROCEDURE — 770020 HCHG ROOM/CARE - TELE (206)

## 2021-08-15 PROCEDURE — 700102 HCHG RX REV CODE 250 W/ 637 OVERRIDE(OP): Performed by: STUDENT IN AN ORGANIZED HEALTH CARE EDUCATION/TRAINING PROGRAM

## 2021-08-15 PROCEDURE — C9803 HOPD COVID-19 SPEC COLLECT: HCPCS | Performed by: GENERAL PRACTICE

## 2021-08-15 PROCEDURE — 700101 HCHG RX REV CODE 250: Performed by: STUDENT IN AN ORGANIZED HEALTH CARE EDUCATION/TRAINING PROGRAM

## 2021-08-15 PROCEDURE — 99232 SBSQ HOSP IP/OBS MODERATE 35: CPT | Performed by: GENERAL PRACTICE

## 2021-08-15 RX ADMIN — APIXABAN 5 MG: 5 TABLET, FILM COATED ORAL at 04:28

## 2021-08-15 RX ADMIN — BACLOFEN 20 MG: 10 TABLET ORAL at 21:10

## 2021-08-15 RX ADMIN — ATORVASTATIN CALCIUM 20 MG: 20 TABLET, FILM COATED ORAL at 21:10

## 2021-08-15 RX ADMIN — APIXABAN 5 MG: 5 TABLET, FILM COATED ORAL at 17:40

## 2021-08-15 RX ADMIN — GABAPENTIN 600 MG: 300 CAPSULE ORAL at 17:40

## 2021-08-15 RX ADMIN — BACLOFEN 20 MG: 10 TABLET ORAL at 12:46

## 2021-08-15 RX ADMIN — FINASTERIDE 5 MG: 5 TABLET, FILM COATED ORAL at 04:27

## 2021-08-15 RX ADMIN — DOCUSATE SODIUM 50 MG AND SENNOSIDES 8.6 MG 2 TABLET: 8.6; 5 TABLET, FILM COATED ORAL at 04:30

## 2021-08-15 RX ADMIN — DOCUSATE SODIUM 50 MG AND SENNOSIDES 8.6 MG 2 TABLET: 8.6; 5 TABLET, FILM COATED ORAL at 17:39

## 2021-08-15 RX ADMIN — DEXAMETHASONE 6 MG: 4 TABLET ORAL at 04:28

## 2021-08-15 RX ADMIN — FUROSEMIDE 20 MG: 20 INJECTION, SOLUTION INTRAMUSCULAR; INTRAVENOUS at 17:39

## 2021-08-15 RX ADMIN — GUAIFENESIN 600 MG: 600 TABLET, EXTENDED RELEASE ORAL at 04:29

## 2021-08-15 RX ADMIN — GABAPENTIN 600 MG: 300 CAPSULE ORAL at 12:46

## 2021-08-15 RX ADMIN — LIDOCAINE 2 PATCH: 50 PATCH CUTANEOUS at 23:24

## 2021-08-15 RX ADMIN — GABAPENTIN 600 MG: 300 CAPSULE ORAL at 04:28

## 2021-08-15 RX ADMIN — INSULIN HUMAN 2 UNITS: 100 INJECTION, SOLUTION PARENTERAL at 14:06

## 2021-08-15 RX ADMIN — FUROSEMIDE 20 MG: 20 INJECTION, SOLUTION INTRAMUSCULAR; INTRAVENOUS at 04:25

## 2021-08-15 RX ADMIN — GUAIFENESIN 600 MG: 600 TABLET, EXTENDED RELEASE ORAL at 17:39

## 2021-08-15 RX ADMIN — LORATADINE 5 MG: 10 TABLET ORAL at 04:25

## 2021-08-15 RX ADMIN — INSULIN HUMAN 1 UNITS: 100 INJECTION, SOLUTION PARENTERAL at 17:40

## 2021-08-15 RX ADMIN — VENLAFAXINE HYDROCHLORIDE 225 MG: 75 CAPSULE, EXTENDED RELEASE ORAL at 04:28

## 2021-08-15 RX ADMIN — FERROUS SULFATE TAB 325 MG (65 MG ELEMENTAL FE) 325 MG: 325 (65 FE) TAB at 08:30

## 2021-08-15 RX ADMIN — BACLOFEN 10 MG: 10 TABLET ORAL at 04:26

## 2021-08-15 ASSESSMENT — FIBROSIS 4 INDEX: FIB4 SCORE: 2.29
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
## 2021-08-15 NOTE — PROGRESS NOTES
Report received from Katerine Mendoza and patient care assumed. Patient is A & O x 4, denied pain, Paced and SR on the monitor, 2 L O2 via NC with SpO2 96%. Enhanced droplet precautions maintained. Safety measures in place.

## 2021-08-15 NOTE — CARE PLAN
The patient is Stable - Low risk of patient condition declining or worsening    Shift Goals  Clinical Goals: Control pain     Progress made toward(s) clinical / shift goals:    Problem: Pain - Standard  Goal: Alleviation of pain or a reduction in pain to the patient’s comfort goal  Outcome: Progressing     Problem: Knowledge Deficit - Standard  Goal: Patient and family/care givers will demonstrate understanding of plan of care, disease process/condition, diagnostic tests and medications  Outcome: Progressing     Problem: Fall Risk  Goal: Patient will remain free from falls  Outcome: Progressing       Patient is not progressing towards the following goals:

## 2021-08-15 NOTE — PROGRESS NOTES
Monitor Summary  Rhythm: Paced/SR  Rate: 63-86  Ectopy: PVC, Couplet, trigem  0.14 / 0.13 / 0.43
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
## 2021-08-15 NOTE — CARE PLAN
The patient is Watcher - Medium risk of patient condition declining or worsening    Shift Goals  Clinical Goals: DC Plan; Rest  Patient Goals: Go Home  Family Goals: NA    Progress made toward(s) clinical / shift goals:    Problem: Pain - Standard  Goal: Alleviation of pain or a reduction in pain to the patient’s comfort goal  Outcome: Progressing     Problem: Knowledge Deficit - Standard  Goal: Patient and family/care givers will demonstrate understanding of plan of care, disease process/condition, diagnostic tests and medications  Outcome: Progressing     Problem: Fall Risk  Goal: Patient will remain free from falls  Outcome: Progressing     Problem: Depression  Goal: Patient and family/caregiver will verbalize accurate information about at least two of the possible causes of depression, three-four of the signs and symptoms of depression  Outcome: Progressing       Patient is not progressing towards the following goals:

## 2021-08-16 ENCOUNTER — PHARMACY VISIT (OUTPATIENT)
Dept: PHARMACY | Facility: MEDICAL CENTER | Age: 76
End: 2021-08-16
Payer: COMMERCIAL

## 2021-08-16 VITALS
TEMPERATURE: 98.2 F | SYSTOLIC BLOOD PRESSURE: 140 MMHG | RESPIRATION RATE: 16 BRPM | OXYGEN SATURATION: 93 % | HEART RATE: 65 BPM | HEIGHT: 68 IN | DIASTOLIC BLOOD PRESSURE: 67 MMHG | WEIGHT: 182.98 LBS | BODY MASS INDEX: 27.73 KG/M2

## 2021-08-16 LAB
GLUCOSE BLD-MCNC: 152 MG/DL (ref 65–99)
GLUCOSE BLD-MCNC: 224 MG/DL (ref 65–99)

## 2021-08-16 PROCEDURE — 700102 HCHG RX REV CODE 250 W/ 637 OVERRIDE(OP): Performed by: STUDENT IN AN ORGANIZED HEALTH CARE EDUCATION/TRAINING PROGRAM

## 2021-08-16 PROCEDURE — 99239 HOSP IP/OBS DSCHRG MGMT >30: CPT | Performed by: GENERAL PRACTICE

## 2021-08-16 PROCEDURE — 82962 GLUCOSE BLOOD TEST: CPT

## 2021-08-16 PROCEDURE — A9270 NON-COVERED ITEM OR SERVICE: HCPCS | Performed by: STUDENT IN AN ORGANIZED HEALTH CARE EDUCATION/TRAINING PROGRAM

## 2021-08-16 PROCEDURE — 700111 HCHG RX REV CODE 636 W/ 250 OVERRIDE (IP): Performed by: GENERAL PRACTICE

## 2021-08-16 RX ADMIN — DEXAMETHASONE 6 MG: 4 TABLET ORAL at 04:14

## 2021-08-16 RX ADMIN — GABAPENTIN 600 MG: 300 CAPSULE ORAL at 11:54

## 2021-08-16 RX ADMIN — GABAPENTIN 600 MG: 300 CAPSULE ORAL at 04:14

## 2021-08-16 RX ADMIN — VENLAFAXINE HYDROCHLORIDE 225 MG: 75 CAPSULE, EXTENDED RELEASE ORAL at 04:12

## 2021-08-16 RX ADMIN — GUAIFENESIN 600 MG: 600 TABLET, EXTENDED RELEASE ORAL at 04:14

## 2021-08-16 RX ADMIN — FUROSEMIDE 20 MG: 20 INJECTION, SOLUTION INTRAMUSCULAR; INTRAVENOUS at 04:12

## 2021-08-16 RX ADMIN — BACLOFEN 20 MG: 10 TABLET ORAL at 11:54

## 2021-08-16 RX ADMIN — DOCUSATE SODIUM 50 MG AND SENNOSIDES 8.6 MG 2 TABLET: 8.6; 5 TABLET, FILM COATED ORAL at 04:13

## 2021-08-16 RX ADMIN — FERROUS SULFATE TAB 325 MG (65 MG ELEMENTAL FE) 325 MG: 325 (65 FE) TAB at 08:01

## 2021-08-16 RX ADMIN — FINASTERIDE 5 MG: 5 TABLET, FILM COATED ORAL at 04:14

## 2021-08-16 RX ADMIN — BACLOFEN 10 MG: 10 TABLET ORAL at 04:13

## 2021-08-16 RX ADMIN — INSULIN HUMAN 2 UNITS: 100 INJECTION, SOLUTION PARENTERAL at 11:53

## 2021-08-16 RX ADMIN — APIXABAN 5 MG: 5 TABLET, FILM COATED ORAL at 04:14

## 2021-08-16 RX ADMIN — LORATADINE 5 MG: 10 TABLET ORAL at 04:13

## 2021-08-16 RX ADMIN — INSULIN HUMAN 1 UNITS: 100 INJECTION, SOLUTION PARENTERAL at 07:59

## 2021-08-16 ASSESSMENT — PAIN DESCRIPTION - PAIN TYPE: TYPE: ACUTE PAIN

## 2021-08-16 ASSESSMENT — ENCOUNTER SYMPTOMS
SHORTNESS OF BREATH: 1
COUGH: 1

## 2021-08-16 NOTE — DISCHARGE INSTRUCTIONS
You will continue all your medications as you are taking before, with the addition of Decadron 6 mg daily your last dose will be on August 21, Saturday.  In addition we have increased your Eliquis from 2.5 to 5 mg twice a day, as this is the therapeutic recommendation for your diagnosis of DVT.   Please follow-up with your primary care physician within 2 to 3 weeks.  Please take care and be well!    Discharge Instructions    Discharged to home by car with relative. Discharged via wheelchair, hospital escort: Yes.  Special equipment needed: Oxygen    Be sure to schedule a follow-up appointment with your primary care doctor or any specialists as instructed.     Discharge Plan:   Diet Plan: Discussed  Activity Level: Discussed  Confirmed Follow up Appointment: Patient to Call and Schedule Appointment  Confirmed Symptoms Management: Discussed  Medication Reconciliation Updated: Yes    I understand that a diet low in cholesterol, fat, and sodium is recommended for good health. Unless I have been given specific instructions below for another diet, I accept this instruction as my diet prescription.   Other diet: diabetic     Special Instructions: None    · Is patient discharged on Warfarin / Coumadin?   No       COVID-19  COVID-19 is a respiratory infection that is caused by a virus called severe acute respiratory syndrome coronavirus 2 (SARS-CoV-2). The disease is also known as coronavirus disease or novel coronavirus. In some people, the virus may not cause any symptoms. In others, it may cause a serious infection. The infection can get worse quickly and can lead to complications, such as:  · Pneumonia, or infection of the lungs.  · Acute respiratory distress syndrome or ARDS. This is fluid build-up in the lungs.  · Acute respiratory failure. This is a condition in which there is not enough oxygen passing from the lungs to the body.  · Sepsis or septic shock. This is a serious bodily reaction to an infection.  · Blood  clotting problems.  · Secondary infections due to bacteria or fungus.  The virus that causes COVID-19 is contagious. This means that it can spread from person to person through droplets from coughs and sneezes (respiratory secretions).  What are the causes?  This illness is caused by a virus. You may catch the virus by:  · Breathing in droplets from an infected person's cough or sneeze.  · Touching something, like a table or a doorknob, that was exposed to the virus (contaminated) and then touching your mouth, nose, or eyes.  What increases the risk?  Risk for infection  You are more likely to be infected with this virus if you:  · Live in or travel to an area with a COVID-19 outbreak.  · Come in contact with a sick person who recently traveled to an area with a COVID-19 outbreak.  · Provide care for or live with a person who is infected with COVID-19.  Risk for serious illness  You are more likely to become seriously ill from the virus if you:  · Are 65 years of age or older.  · Have a long-term disease that lowers your body's ability to fight infection (immunocompromised).  · Live in a nursing home or long-term care facility.  · Have a long-term (chronic) disease such as:  ? Chronic lung disease, including chronic obstructive pulmonary disease or asthma  ? Heart disease.  ? Diabetes.  ? Chronic kidney disease.  ? Liver disease.  · Are obese.  What are the signs or symptoms?  Symptoms of this condition can range from mild to severe. Symptoms may appear any time from 2 to 14 days after being exposed to the virus. They include:  · A fever.  · A cough.  · Difficulty breathing.  · Chills.  · Muscle pains.  · A sore throat.  · Loss of taste or smell.  Some people may also have stomach problems, such as nausea, vomiting, or diarrhea.  Other people may not have any symptoms of COVID-19.  How is this diagnosed?  This condition may be diagnosed based on:  · Your signs and symptoms, especially if:  ? You live in an area with  a COVID-19 outbreak.  ? You recently traveled to or from an area where the virus is common.  ? You provide care for or live with a person who was diagnosed with COVID-19.  · A physical exam.  · Lab tests, which may include:  ? A nasal swab to take a sample of fluid from your nose.  ? A throat swab to take a sample of fluid from your throat.  ? A sample of mucus from your lungs (sputum).  ? Blood tests.  · Imaging tests, which may include, X-rays, CT scan, or ultrasound.  How is this treated?  At present, there is no medicine to treat COVID-19. Medicines that treat other diseases are being used on a trial basis to see if they are effective against COVID-19.  Your health care provider will talk with you about ways to treat your symptoms. For most people, the infection is mild and can be managed at home with rest, fluids, and over-the-counter medicines.  Treatment for a serious infection usually takes places in a hospital intensive care unit (ICU). It may include one or more of the following treatments. These treatments are given until your symptoms improve.  · Receiving fluids and medicines through an IV.  · Supplemental oxygen. Extra oxygen is given through a tube in the nose, a face mask, or a najera.  · Positioning you to lie on your stomach (prone position). This makes it easier for oxygen to get into the lungs.  · Continuous positive airway pressure (CPAP) or bi-level positive airway pressure (BPAP) machine. This treatment uses mild air pressure to keep the airways open. A tube that is connected to a motor delivers oxygen to the body.  · Ventilator. This treatment moves air into and out of the lungs by using a tube that is placed in your windpipe.  · Tracheostomy. This is a procedure to create a hole in the neck so that a breathing tube can be inserted.  · Extracorporeal membrane oxygenation (ECMO). This procedure gives the lungs a chance to recover by taking over the functions of the heart and lungs. It supplies  oxygen to the body and removes carbon dioxide.  Follow these instructions at home:  Lifestyle  · If you are sick, stay home except to get medical care. Your health care provider will tell you how long to stay home. Call your health care provider before you go for medical care.  · Rest at home as told by your health care provider.  · Do not use any products that contain nicotine or tobacco, such as cigarettes, e-cigarettes, and chewing tobacco. If you need help quitting, ask your health care provider.  · Return to your normal activities as told by your health care provider. Ask your health care provider what activities are safe for you.  General instructions  · Take over-the-counter and prescription medicines only as told by your health care provider.  · Drink enough fluid to keep your urine pale yellow.  · Keep all follow-up visits as told by your health care provider. This is important.  How is this prevented?    There is no vaccine to help prevent COVID-19 infection. However, there are steps you can take to protect yourself and others from this virus.  To protect yourself:   · Do not travel to areas where COVID-19 is a risk. The areas where COVID-19 is reported change often. To identify high-risk areas and travel restrictions, check the CDC travel website: wwwnc.cdc.gov/travel/notices  · If you live in, or must travel to, an area where COVID-19 is a risk, take precautions to avoid infection.  ? Stay away from people who are sick.  ? Wash your hands often with soap and water for 20 seconds. If soap and water are not available, use an alcohol-based hand .  ? Avoid touching your mouth, face, eyes, or nose.  ? Avoid going out in public, follow guidance from your state and local health authorities.  ? If you must go out in public, wear a cloth face covering or face mask.  ? Disinfect objects and surfaces that are frequently touched every day. This may include:  § Counters and tables.  § Doorknobs and light  switches.  § Sinks and faucets.  § Electronics, such as phones, remote controls, keyboards, computers, and tablets.  To protect others:  If you have symptoms of COVID-19, take steps to prevent the virus from spreading to others.  · If you think you have a COVID-19 infection, contact your health care provider right away. Tell your health care team that you think you may have a COVID-19 infection.  · Stay home. Leave your house only to seek medical care. Do not use public transport.  · Do not travel while you are sick.  · Wash your hands often with soap and water for 20 seconds. If soap and water are not available, use alcohol-based hand .  · Stay away from other members of your household. Let healthy household members care for children and pets, if possible. If you have to care for children or pets, wash your hands often and wear a mask. If possible, stay in your own room, separate from others. Use a different bathroom.  · Make sure that all people in your household wash their hands well and often.  · Cough or sneeze into a tissue or your sleeve or elbow. Do not cough or sneeze into your hand or into the air.  · Wear a cloth face covering or face mask.  Where to find more information  · Centers for Disease Control and Prevention: www.cdc.gov/coronavirus/2019-ncov/index.html  · World Health Organization: www.who.int/health-topics/coronavirus  Contact a health care provider if:  · You live in or have traveled to an area where COVID-19 is a risk and you have symptoms of the infection.  · You have had contact with someone who has COVID-19 and you have symptoms of the infection.  Get help right away if:  · You have trouble breathing.  · You have pain or pressure in your chest.  · You have confusion.  · You have bluish lips and fingernails.  · You have difficulty waking from sleep.  · You have symptoms that get worse.  These symptoms may represent a serious problem that is an emergency. Do not wait to see if the  symptoms will go away. Get medical help right away. Call your local emergency services (911 in the [place] Do not drive yourself to the hospital. Let the emergency medical personnel know if you think you have COVID-19.  Summary  · COVID-19 is a respiratory infection that is caused by a virus. It is also known as coronavirus disease or novel coronavirus. It can cause serious infections, such as pneumonia, acute respiratory distress syndrome, acute respiratory failure, or sepsis.  · The virus that causes COVID-19 is contagious. This means that it can spread from person to person through droplets from coughs and sneezes.  · You are more likely to develop a serious illness if you are 65 years of age or older, have a weak immunity, live in a nursing home, or have chronic disease.  · There is no medicine to treat COVID-19. Your health care provider will talk with you about ways to treat your symptoms.  · Take steps to protect yourself and others from infection. Wash your hands often and disinfect objects and surfaces that are frequently touched every day. Stay away from people who are sick and wear a mask if you are sick.  This information is not intended to replace advice given to you by your health care provider. Make sure you discuss any questions you have with your health care provider.  Document Released: 01/23/2020 Document Revised: 05/14/2020 Document Reviewed: 01/23/2020  [employer] Patient Education © 2020 [employer]      Depression / Suicide Risk    As you are discharged from this [facility] facility, it is important to learn how to keep safe from harming yourself.    Recognize the warning signs:  · Abrupt changes in personality, positive or negative- including increase in energy   · Giving away possessions  · Change in eating patterns- significant weight changes-  positive or negative  · Change in sleeping patterns- unable to sleep or sleeping all the time   · Unwillingness or inability to  communicate  · Depression  · Unusual sadness, discouragement and loneliness  · Talk of wanting to die  · Neglect of personal appearance   · Rebelliousness- reckless behavior  · Withdrawal from people/activities they love  · Confusion- inability to concentrate     If you or a loved one observes any of these behaviors or has concerns about self-harm, here's what you can do:  · Talk about it- your feelings and reasons for harming yourself  · Remove any means that you might use to hurt yourself (examples: pills, rope, extension cords, firearm)  · Get professional help from the community (Mental Health, Substance Abuse, psychological counseling)  · Do not be alone:Call your Safe Contact- someone whom you trust who will be there for you.  · Call your local CRISIS HOTLINE 042-0436 or 280-699-2677  · Call your local Children's Mobile Crisis Response Team [place] (258) 450-3971 or www.[employer]  · Call the toll free National Suicide Prevention Hotlines   · National Suicide Prevention Lifeline 557-781-VMSK (8238)  · National Hope Line Network 800-SUICIDE (912-3207)

## 2021-08-16 NOTE — DISCHARGE PLANNING
Anticipated Discharge Disposition: [facility] with spouse.    Action: Received phone call from pts spouse, Tina, who states that she is going to Urgent Care now for a cough, and afterwards she will be coming to  pt to take him to the hotel with her. 2nd IMM given, Tina verbalized.    Barriers to Discharge: Spouse picking up pt after her Urgent Care visit    Plan: dc with wife when she comes to hospital after Urgent Care visit
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
## 2021-08-16 NOTE — PROGRESS NOTES
Report received from Kelly Acosta and patient care assumed at 1900. Patient is A & O x4, patient is paced/ SR on the monitor, using 2 L via NC, and denies pain at this time. Patient was able to contact his wife at the [facility], phone number: [phone] Room 805.  POC discussed and questions answered. Call light is in reach and safety measures are in place.

## 2021-08-16 NOTE — PROGRESS NOTES
Patient was discharged, tele box and IV removed. All paperwork and instructions given. Patient had his home oxygen with him. RN apprentice wheeled him down to his car.

## 2021-08-16 NOTE — PROGRESS NOTES
Patient complained of pain to left antecubital IV site.  Upon assessment, writer noted site to be red, warm to touch, and raised.  IV removed.  Affected area circled with a marker.

## 2021-08-16 NOTE — DISCHARGE PLANNING
Meds-to-Beds: Discharge prescription orders listed below delivered to patient's bedside. ADALBERTO Ignacio notified. Called patient's mobile phone and room, no answer. RN aware. Written information regarding the dispensed prescriptions was provided to the patient including the phone number of the pharmacy to call for any questions. Patient elected to have co-payment billed to patient account.      Current Outpatient Medications   Medication Sig Dispense Refill   • apixaban (ELIQUIS) 5mg Tab Take 1 Tablet by mouth 2 times a day for 90 days. 180 Tablet 0   • dexamethasone (DECADRON) 6 MG Tab Take 1 Tablet by mouth every day for 7 days. 7 Tablet 0        Chasity Love, PharmD
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
## 2021-08-16 NOTE — CARE PLAN
The patient is Stable - Low risk of patient condition declining or worsening    Shift Goals  Clinical Goals: DC Plan; Rest  Patient Goals: Go Home  Family Goals: NA    Progress made toward(s) clinical / shift goals:    Problem: Pain - Standard  Goal: Alleviation of pain or a reduction in pain to the patient’s comfort goal  Outcome: Progressing     Problem: Knowledge Deficit - Standard  Goal: Patient and family/care givers will demonstrate understanding of plan of care, disease process/condition, diagnostic tests and medications  Outcome: Progressing     Problem: Fall Risk  Goal: Patient will remain free from falls  Outcome: Progressing       Patient is not progressing towards the following goals:

## 2021-08-17 ENCOUNTER — HOSPITAL ENCOUNTER (EMERGENCY)
Facility: MEDICAL CENTER | Age: 76
End: 2021-08-17
Attending: EMERGENCY MEDICINE
Payer: MEDICARE

## 2021-08-17 ENCOUNTER — APPOINTMENT (OUTPATIENT)
Dept: RADIOLOGY | Facility: MEDICAL CENTER | Age: 76
End: 2021-08-17
Attending: EMERGENCY MEDICINE
Payer: MEDICARE

## 2021-08-17 VITALS
RESPIRATION RATE: 14 BRPM | BODY MASS INDEX: 28.79 KG/M2 | SYSTOLIC BLOOD PRESSURE: 104 MMHG | HEIGHT: 68 IN | WEIGHT: 190 LBS | TEMPERATURE: 98.7 F | DIASTOLIC BLOOD PRESSURE: 65 MMHG | HEART RATE: 70 BPM | OXYGEN SATURATION: 96 %
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
## 2021-08-17 DIAGNOSIS — R53.1 GENERALIZED WEAKNESS: ICD-10-CM

## 2021-08-17 LAB
BASOPHILS # BLD AUTO: 0.2 % (ref 0–1.8)
BASOPHILS # BLD: 0.02 K/UL (ref 0–0.12)
EOSINOPHIL # BLD AUTO: 0.01 K/UL (ref 0–0.51)
EOSINOPHIL NFR BLD: 0.1 % (ref 0–6.9)
ERYTHROCYTE [DISTWIDTH] IN BLOOD BY AUTOMATED COUNT: 48.4 FL (ref 35.9–50)
HCT VFR BLD AUTO: 44 % (ref 42–52)
HGB BLD-MCNC: 14.4 G/DL (ref 14–18)
IMM GRANULOCYTES # BLD AUTO: 0.06 K/UL (ref 0–0.11)
IMM GRANULOCYTES NFR BLD AUTO: 0.6 % (ref 0–0.9)
LACTATE BLD-SCNC: 1.5 MMOL/L (ref 0.5–2)
LYMPHOCYTES # BLD AUTO: 0.64 K/UL (ref 1–4.8)
LYMPHOCYTES NFR BLD: 6.7 % (ref 22–41)
MCH RBC QN AUTO: 30.9 PG (ref 27–33)
MCHC RBC AUTO-ENTMCNC: 32.7 G/DL (ref 33.7–35.3)
MCV RBC AUTO: 94.4 FL (ref 81.4–97.8)
MONOCYTES # BLD AUTO: 0.71 K/UL (ref 0–0.85)
MONOCYTES NFR BLD AUTO: 7.4 % (ref 0–13.4)
NEUTROPHILS # BLD AUTO: 8.16 K/UL (ref 1.82–7.42)
NEUTROPHILS NFR BLD: 85 % (ref 44–72)
NRBC # BLD AUTO: 0 K/UL
NRBC BLD-RTO: 0 /100 WBC
PLATELET # BLD AUTO: 186 K/UL (ref 164–446)
PMV BLD AUTO: 9.9 FL (ref 9–12.9)
RBC # BLD AUTO: 4.66 M/UL (ref 4.7–6.1)
WBC # BLD AUTO: 9.6 K/UL (ref 4.8–10.8)

## 2021-08-17 PROCEDURE — 85025 COMPLETE CBC W/AUTO DIFF WBC: CPT

## 2021-08-17 PROCEDURE — 71045 X-RAY EXAM CHEST 1 VIEW: CPT

## 2021-08-17 PROCEDURE — 99284 EMERGENCY DEPT VISIT MOD MDM: CPT

## 2021-08-17 PROCEDURE — 83605 ASSAY OF LACTIC ACID: CPT

## 2021-08-17 PROCEDURE — 93005 ELECTROCARDIOGRAM TRACING: CPT | Performed by: EMERGENCY MEDICINE

## 2021-08-17 RX ORDER — SODIUM CHLORIDE, SODIUM LACTATE, POTASSIUM CHLORIDE, CALCIUM CHLORIDE 600; 310; 30; 20 MG/100ML; MG/100ML; MG/100ML; MG/100ML
1000 INJECTION, SOLUTION INTRAVENOUS ONCE
Status: DISCONTINUED | OUTPATIENT
Start: 2021-08-17 | End: 2021-08-17 | Stop reason: HOSPADM

## 2021-08-17 ASSESSMENT — FIBROSIS 4 INDEX: FIB4 SCORE: 2.29

## 2021-08-17 NOTE — DISCHARGE PLANNING
Moisés contact info  [phone].  If patient calls looking for O2 get contact info and address.  Called [facility]chugh he is not checked in there.

## 2021-08-17 NOTE — DISCHARGE PLANNING
West from [facility] called and stated they have everything they need to get oxygen delivered to Pt. But they are unable to locate Pt.   RN thought  Pt was at the [facility] but [facility] will not release room information to [facility].     SW assisting in trying to locate Pt.
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
## 2021-08-17 NOTE — ED TRIAGE NOTES
"Gaurav Lopez  76 y.o. male  Chief Complaint   Patient presents with   • GLF   • Weakness       Pt BIB EMS from [facility] for above complaints. Pt tested + COVID recently and has been having generalized weakness. Tonight pt had a GLF, pt states his legs gave out. EMS stated fall was mechanical.     - head strike, - trauma, denies pain except to L groin. + BT. Hypoxic on RA, prescribed O2    ERP at bedside.    /65   Pulse 70   Temp 37.1 °C (98.7 °F) (Temporal)   Resp 14   Ht 1.727 m (5' 8\")   Wt 86.2 kg (190 lb)   SpO2 (!) 86%   BMI 28.89 kg/m²     "

## 2021-08-17 NOTE — DISCHARGE PLANNING
"NAWAF able to verify that pt is staying at [facility] ([phone]) with his wife Tina, room #805. GIOSONIA called and left a message on the room phone. When calling Tina (wife) phone it states \"all circuits are busy\".   "

## 2021-08-17 NOTE — FACE TO FACE
Face to Face Note  -  Durable Medical Equipment    Tamara Schumacher D.O. - NPI: [id number]  I certify that this patient is under my care and that they had a durable medical equipment(DME)face to face encounter by myself that meets the physician DME face-to-face encounter requirements with this patient on:    Date of encounter:   Patient:                    MRN:                       YOB: 2019  Gaurav Lopez  [id number]  1945     The encounter with the patient was in whole, or in part, for the following medical condition, which is the primary reason for durable medical equipment:  Covid-19 Infection    I certify that, based on my findings, the following durable medical equipment is medically necessary:  Oxygen.    HOME O2 Saturation Measurements:(Values must be present for Home Oxygen orders)  Room air sat at rest: 93  Room air sat with amb: 87  With liters of O2: 2, O2 sat at rest with O2: 96  With Liters of O2: 2, O2 sat with amb with O2 : 92  Is the patient mobile?: Yes    My Clinical findings support the need for the above equipment due to:  Hypoxia    Supporting Symptoms: N/A    If patient feels more short of breath, they can go up to 6 liters per minute and contact healthcare provider.

## 2021-09-28 LAB — EKG IMPRESSION: NORMAL

## 2022-02-25 NOTE — THERAPY
Physical Therapy   Daily Treatment     Patient Name: Gaurav Lopez  Age:  75 y.o., Sex:  male  Medical Record #: [id number]  Today's Date: 7/7/2020     Precautions  Precautions: Fall Risk, Spinal / Back Precautions , Cervical Collar  , Other (See Comments)  Comments: LLE prosthesis, pacemaker, orthostatic hypotension     Subjective    Patient seated in wheelchair upon arrival. Patient agreeable to physical therapy session. Patient reports that he thinks that he is going to be d/c tomorrow.      Objective     07/07/20 1031   Cosignature   Documentation Review Approved    Vitals   Pulse 87   Patient BP Position Standing 1 minute   Blood Pressure  (!) 97/64  (RN notified)   O2 (LPM) 0   O2 Delivery Device None - Room Air   Vitals Comments abdominal binder present, See flowsheet for seated vitals.    Wheelchair Functional Level of Assist   Wheelchair Assist Supervised   Distance Wheelchair (Feet or Distance) 150    Wheelchair Description Adaptive equipment;Extra time;Leg rest management;Supervision for safety;Verbal cueing  (Not limited by fatigue. )   Sitting Lower Body Exercises   Long Arc Quad 1 set of 10  (1 # ankle weight. )   Marching 2 sets of 10;Reciprocal  (UE prayer position with perturbations. #1 ankle weights. )   Bed Mobility    Sit to Stand Moderate Assist  (Gait belt, FWW. )   Interdisciplinary Plan of Care Collaboration   IDT Collaboration with  Nursing;Physical Therapist   Patient Position at End of Therapy Seated;Chair Alarm On;Self Releasing Lap Belt Applied;Call Light within Reach;Tray Table within Reach;Phone within Reach   Collaboration Comments Nursing informed about orthostatic hypotension. Discussed treatment plan with PT.   PT Total Time Spent   PT Individual Total Time Spent (Mins) 30   PT Charge Group   PT Therapeutic Exercise 1   PT Therapeutic Activities 1          07/07/20 1031   Wheel 50 Feet with Two Turns   Assistance Needed Independent   CARE Score 6   Wheel 150 Feet   Assistance Needed  Independent   CARE Score 6       Assessment    Patient again demonstrated orthostatic hypotension during this session limiting us to seated exercise. We worked on wheelchair mobility along with seated lower extremity and core strengthening.   Strengths: Able to follow instructions, Effective communication skills, Independent prior level of function, Pleasant and cooperative, Willingly participates in therapeutic activities  Barriers: Bladder incontinence, Bowel incontinence, Confused, Decreased endurance, Generalized weakness, Home accessibility, Hypotension, Orthostatic hypotension, Impaired activity tolerance, Impaired balance, Impaired carryover of learning, Limited mobility    Plan    Day-to-day d/c to SNF, and continue education on precautions.    Physical Therapy Problems     Problem: PT-Long Term Goals     Dates: Start: 06/19/20       Goal: LTG-By discharge, patient will ambulate     Dates: Start: 06/19/20       Description: 1) Individualized goal:  150 feet with FWW, c/s collar, L BKA prosthesis, at SBA level  2) Interventions:  PT Group Therapy, PT E Stim Attended, PT Prosthetic Training, PT Gait Training, PT Therapeutic Exercises, PT TENS Application, PT Neuro Re-Ed/Balance, PT Therapeutic Activity, and PT Manual Therapy    Note:     Goal Note filed on 07/06/20 0807 by Cori Morris, Student    Partially met patient was CGA assist 310 feet with FWW.                  Goal: LTG-By discharge, patient will transfer one surface to another     Dates: Start: 06/19/20       Description: 1) Individualized goal:  with FWW, c/s collar, L BKA prosthesis, at SBA level  2) Interventions:  PT Group Therapy, PT E Stim Attended, PT Prosthetic Training, PT Gait Training, PT Therapeutic Exercises, PT TENS Application, PT Neuro Re-Ed/Balance, PT Therapeutic Activity, and PT Manual Therapy      Note:     Goal Note filed on 07/06/20 0807 by Cori Morris Student    Min assist with supine to sit and sit to stands with  FWW.                   Goal: LTG-By discharge, patient will ambulate up/down 4-6 stairs     Dates: Start: 06/19/20       Description: 1) Individualized goal:  with 1 railing, c/s collar, L BKA prosthesis, gait belt, at Min A level  2) Interventions:  PT Group Therapy, PT E Stim Attended, PT Prosthetic Training, PT Gait Training, PT Therapeutic Exercises, PT TENS Application, PT Neuro Re-Ed/Balance, PT Therapeutic Activity, and PT Manual Therapy    Note:     Goal Note filed on 07/06/20 0807 by Cori Morris, Student    Patient did not complete stairs recently. Occasionally due to orthostatic hypotension.                   Goal: LTG-By discharge, patient will transfer in/out of a car     Dates: Start: 06/19/20       Description: 1) Individualized goal:  with FWW, c/s collar, L BKA prosthesis, at SBA level  2) Interventions:  PT Group Therapy, PT E Stim Attended, PT Prosthetic Training, PT Gait Training, PT Therapeutic Exercises, PT TENS Application, PT Neuro Re-Ed/Balance, PT Therapeutic Activity, and PT Manual Therapy      Note:     Goal Note filed on 07/06/20 0807 by Cori Morris, Student    Patient was max assist for car transfer on last attempt.                              4 = No assist / stand by assistance

## (undated) DEVICE — LACTATED RINGERS INJ 1000 ML - (14EA/CA 60CA/PF)

## (undated) DEVICE — SET EXTENSION WITH 2 PORTS (48EA/CA) ***PART #2C8610 IS A SUBSTITUTE*****

## (undated) DEVICE — GOWN SURGICAL X-LARGE ULTRA - FILM-REINFORCED (20/CA)

## (undated) DEVICE — ELECTRODE 850 FOAM ADHESIVE - HYDROGEL RADIOTRNSPRNT (50/PK)

## (undated) DEVICE — BAG DRAINAGE ANTIREFLUX TOWER SLIDE TAP 4000 ML (20EA/CA)

## (undated) DEVICE — CONNECTOR HOSE NEPTUNE FOR CYSTO ROOM

## (undated) DEVICE — COVER FOOT UNIVERSAL DISP. - (25EA/CA)

## (undated) DEVICE — GOWN WARMING STANDARD FLEX - (30/CA)

## (undated) DEVICE — JELLY, KY 2 0Z STERILE

## (undated) DEVICE — CONTAINER SPECIMEN BAG OR - STERILE 4 OZ W/LID (100EA/CA)

## (undated) DEVICE — PACK CYSTOSCOPY III - (8/CA)

## (undated) DEVICE — SET LEADWIRE 5 LEAD BEDSIDE DISPOSABLE ECG (1SET OF 5/EA)

## (undated) DEVICE — GLYCINE IRRIGATION 1.5% - 3000 ML  (4/CS)

## (undated) DEVICE — BAG URODRAIN WITH TUBING - (20/CA)

## (undated) DEVICE — ELECTRODE MONOPOLAR ANGLED CUTTING LOOP DIAM 0.35 YELLOW 24FR (6EA/PK)

## (undated) DEVICE — WATER IRRIG. STER. 1500 ML - (9/CA)

## (undated) DEVICE — SET EPIDURAL MICRO SAPPHIRE EPIDAURAL INFUSION  (1/EA)

## (undated) DEVICE — KIT ROOM DECONTAMINATION

## (undated) DEVICE — SET IRRIGATION CYSTOSCOPY Y-TYPE L81 IN (20EA/CA)

## (undated) DEVICE — TUBING CLEARLINK DUO-VENT - C-FLO (48EA/CA)

## (undated) DEVICE — SPONGE GAUZESTER 4 X 4 4PLY - (128PK/CA)

## (undated) DEVICE — SLEEVE, VASO, THIGH, MED

## (undated) DEVICE — SUCTION INSTRUMENT YANKAUER BULBOUS TIP W/O VENT (50EA/CA)

## (undated) DEVICE — PACK SINGLE BASIN - (6/CA)

## (undated) DEVICE — GOWN SURGEONS X-LARGE - DISP. (30/CA)

## (undated) DEVICE — MASK ANESTHESIA ADULT  - (100/CA)

## (undated) DEVICE — TUBE CONNECT SUCTION CLEAR 120 X 1/4" (50EA/CA)"

## (undated) DEVICE — CATHETER FOLEY 22 FR 30 CC (12EA/CA)

## (undated) DEVICE — KIT ANESTHESIA W/CIRCUIT & 3/LT BAG W/FILTER (20EA/CA)

## (undated) DEVICE — PROTECTOR ULNA NERVE - (36PR/CA)

## (undated) DEVICE — NEPTUNE 4 PORT MANIFOLD - (20/PK)

## (undated) DEVICE — GLOVE BIOGEL SZ 7.5 SURGICAL PF LTX - (50PR/BX 4BX/CA)

## (undated) DEVICE — EVACUATOR BLADDER ELLIK - (10/BX)

## (undated) DEVICE — WATER IRRIG. STER 3000 ML - (4/CA)

## (undated) DEVICE — SYRINGE 30 ML LL (56/BX)

## (undated) DEVICE — ELECTRODE DUAL RETURN W/ CORD - (50/PK)

## (undated) DEVICE — HEAD HOLDER JUNIOR/ADULT

## (undated) DEVICE — SENSOR SPO2 NEO LNCS ADHESIVE (20/BX) SEE USER NOTES

## (undated) DEVICE — SYRINGE TOOMEY (50EA/CA)